# Patient Record
Sex: MALE | Race: WHITE | NOT HISPANIC OR LATINO | Employment: OTHER | URBAN - NONMETROPOLITAN AREA
[De-identification: names, ages, dates, MRNs, and addresses within clinical notes are randomized per-mention and may not be internally consistent; named-entity substitution may affect disease eponyms.]

---

## 2019-03-28 ENCOUNTER — HOSPITAL ENCOUNTER (INPATIENT)
Facility: HOSPITAL | Age: 64
LOS: 2 days | Discharge: HOME | End: 2019-03-30
Attending: EMERGENCY MEDICINE | Admitting: INTERNAL MEDICINE

## 2019-03-28 ENCOUNTER — APPOINTMENT (OUTPATIENT)
Dept: GENERAL RADIOLOGY | Facility: HOSPITAL | Age: 64
End: 2019-03-28

## 2019-03-28 ENCOUNTER — HOSPITAL ENCOUNTER (EMERGENCY)
Facility: HOSPITAL | Age: 64
End: 2019-03-28
Attending: INTERNAL MEDICINE | Admitting: INTERNAL MEDICINE

## 2019-03-28 VITALS — OXYGEN SATURATION: 100 %

## 2019-03-28 DIAGNOSIS — I21.3 ST ELEVATION MYOCARDIAL INFARCTION (STEMI), UNSPECIFIED ARTERY (HCC): Primary | ICD-10-CM

## 2019-03-28 PROBLEM — R73.9 HYPERGLYCEMIA: Status: ACTIVE | Noted: 2019-03-28

## 2019-03-28 PROBLEM — I10 ESSENTIAL HYPERTENSION: Chronic | Status: ACTIVE | Noted: 2019-03-28

## 2019-03-28 PROBLEM — Z72.0 TOBACCO ABUSE: Chronic | Status: ACTIVE | Noted: 2019-03-28

## 2019-03-28 LAB
ALBUMIN SERPL-MCNC: 4.2 G/DL (ref 3.5–5)
ALBUMIN/GLOB SERPL: 1.6 G/DL (ref 1.1–2.5)
ALP SERPL-CCNC: 85 U/L (ref 24–120)
ALT SERPL W P-5'-P-CCNC: <15 U/L (ref 0–54)
ANION GAP SERPL CALCULATED.3IONS-SCNC: 9 MMOL/L (ref 4–13)
AST SERPL-CCNC: 27 U/L (ref 7–45)
BASOPHILS # BLD AUTO: 0.07 10*3/MM3 (ref 0–0.2)
BASOPHILS NFR BLD AUTO: 0.5 % (ref 0–2)
BILIRUB SERPL-MCNC: 0.5 MG/DL (ref 0.1–1)
BUN BLD-MCNC: 17 MG/DL (ref 5–21)
BUN/CREAT SERPL: 18.1 (ref 7–25)
CALCIUM SPEC-SCNC: 8.9 MG/DL (ref 8.4–10.4)
CHLORIDE SERPL-SCNC: 103 MMOL/L (ref 98–110)
CO2 SERPL-SCNC: 25 MMOL/L (ref 24–31)
CREAT BLD-MCNC: 0.94 MG/DL (ref 0.5–1.4)
DEPRECATED RDW RBC AUTO: 39.1 FL (ref 40–54)
EOSINOPHIL # BLD AUTO: 0.09 10*3/MM3 (ref 0–0.7)
EOSINOPHIL NFR BLD AUTO: 0.6 % (ref 0–4)
ERYTHROCYTE [DISTWIDTH] IN BLOOD BY AUTOMATED COUNT: 12.2 % (ref 12–15)
GFR SERPL CREATININE-BSD FRML MDRD: 81 ML/MIN/1.73
GLOBULIN UR ELPH-MCNC: 2.7 GM/DL
GLUCOSE BLD-MCNC: 237 MG/DL (ref 70–100)
GLUCOSE BLDC GLUCOMTR-MCNC: 155 MG/DL (ref 70–130)
HCT VFR BLD AUTO: 47.4 % (ref 40–52)
HGB BLD-MCNC: 16.4 G/DL (ref 14–18)
HOLD SPECIMEN: NORMAL
HOLD SPECIMEN: NORMAL
IMM GRANULOCYTES # BLD AUTO: 0.07 10*3/MM3 (ref 0–0.05)
IMM GRANULOCYTES NFR BLD AUTO: 0.5 % (ref 0–5)
LYMPHOCYTES # BLD AUTO: 2.24 10*3/MM3 (ref 0.72–4.86)
LYMPHOCYTES NFR BLD AUTO: 16.1 % (ref 15–45)
MCH RBC QN AUTO: 30.3 PG (ref 28–32)
MCHC RBC AUTO-ENTMCNC: 34.6 G/DL (ref 33–36)
MCV RBC AUTO: 87.5 FL (ref 82–95)
MONOCYTES # BLD AUTO: 1.1 10*3/MM3 (ref 0.19–1.3)
MONOCYTES NFR BLD AUTO: 7.9 % (ref 4–12)
NEUTROPHILS # BLD AUTO: 10.35 10*3/MM3 (ref 1.87–8.4)
NEUTROPHILS NFR BLD AUTO: 74.4 % (ref 39–78)
NRBC BLD AUTO-RTO: 0 /100 WBC (ref 0–0)
PLATELET # BLD AUTO: 221 10*3/MM3 (ref 130–400)
PMV BLD AUTO: 10.2 FL (ref 6–12)
POTASSIUM BLD-SCNC: 4.7 MMOL/L (ref 3.5–5.3)
PROT SERPL-MCNC: 6.9 G/DL (ref 6.3–8.7)
RBC # BLD AUTO: 5.42 10*6/MM3 (ref 4.8–5.9)
SODIUM BLD-SCNC: 137 MMOL/L (ref 135–145)
TROPONIN I SERPL-MCNC: 0.07 NG/ML (ref 0–0.03)
TROPONIN I SERPL-MCNC: 122 NG/ML (ref 0–0.03)
TROPONIN I SERPL-MCNC: 33.8 NG/ML (ref 0–0.03)
TROPONIN I SERPL-MCNC: 95.9 NG/ML (ref 0–0.03)
WBC NRBC COR # BLD: 13.92 10*3/MM3 (ref 4.8–10.8)
WHOLE BLOOD HOLD SPECIMEN: NORMAL
WHOLE BLOOD HOLD SPECIMEN: NORMAL

## 2019-03-28 PROCEDURE — 027135Z DILATION OF CORONARY ARTERY, TWO ARTERIES WITH TWO DRUG-ELUTING INTRALUMINAL DEVICES, PERCUTANEOUS APPROACH: ICD-10-PCS | Performed by: INTERNAL MEDICINE

## 2019-03-28 PROCEDURE — C1894 INTRO/SHEATH, NON-LASER: HCPCS | Performed by: INTERNAL MEDICINE

## 2019-03-28 PROCEDURE — 84484 ASSAY OF TROPONIN QUANT: CPT | Performed by: EMERGENCY MEDICINE

## 2019-03-28 PROCEDURE — C1769 GUIDE WIRE: HCPCS | Performed by: INTERNAL MEDICINE

## 2019-03-28 PROCEDURE — 82962 GLUCOSE BLOOD TEST: CPT

## 2019-03-28 PROCEDURE — C1874 STENT, COATED/COV W/DEL SYS: HCPCS | Performed by: INTERNAL MEDICINE

## 2019-03-28 PROCEDURE — 93005 ELECTROCARDIOGRAM TRACING: CPT | Performed by: INTERNAL MEDICINE

## 2019-03-28 PROCEDURE — 99291 CRITICAL CARE FIRST HOUR: CPT

## 2019-03-28 PROCEDURE — 80053 COMPREHEN METABOLIC PANEL: CPT | Performed by: EMERGENCY MEDICINE

## 2019-03-28 PROCEDURE — 93010 ELECTROCARDIOGRAM REPORT: CPT | Performed by: INTERNAL MEDICINE

## 2019-03-28 PROCEDURE — L1830 KO IMMOB CANVAS LONG PRE OTS: HCPCS | Performed by: INTERNAL MEDICINE

## 2019-03-28 PROCEDURE — C1760 CLOSURE DEV, VASC: HCPCS | Performed by: INTERNAL MEDICINE

## 2019-03-28 PROCEDURE — 94799 UNLISTED PULMONARY SVC/PX: CPT

## 2019-03-28 PROCEDURE — 93005 ELECTROCARDIOGRAM TRACING: CPT

## 2019-03-28 PROCEDURE — 92928 PRQ TCAT PLMT NTRAC ST 1 LES: CPT | Performed by: INTERNAL MEDICINE

## 2019-03-28 PROCEDURE — 85025 COMPLETE CBC W/AUTO DIFF WBC: CPT | Performed by: EMERGENCY MEDICINE

## 2019-03-28 PROCEDURE — C1725 CATH, TRANSLUMIN NON-LASER: HCPCS | Performed by: INTERNAL MEDICINE

## 2019-03-28 PROCEDURE — C1887 CATHETER, GUIDING: HCPCS | Performed by: INTERNAL MEDICINE

## 2019-03-28 PROCEDURE — 25010000002 IOPAMIDOL 61 % SOLUTION: Performed by: INTERNAL MEDICINE

## 2019-03-28 PROCEDURE — 36415 COLL VENOUS BLD VENIPUNCTURE: CPT | Performed by: EMERGENCY MEDICINE

## 2019-03-28 PROCEDURE — 93458 L HRT ARTERY/VENTRICLE ANGIO: CPT | Performed by: INTERNAL MEDICINE

## 2019-03-28 PROCEDURE — 63710000001 INSULIN LISPRO (HUMAN) PER 5 UNITS: Performed by: INTERNAL MEDICINE

## 2019-03-28 PROCEDURE — B2111ZZ FLUOROSCOPY OF MULTIPLE CORONARY ARTERIES USING LOW OSMOLAR CONTRAST: ICD-10-PCS | Performed by: INTERNAL MEDICINE

## 2019-03-28 PROCEDURE — 92941 PRQ TRLML REVSC TOT OCCL AMI: CPT | Performed by: INTERNAL MEDICINE

## 2019-03-28 PROCEDURE — 99239 HOSP IP/OBS DSCHRG MGMT >30: CPT | Performed by: INTERNAL MEDICINE

## 2019-03-28 PROCEDURE — 71045 X-RAY EXAM CHEST 1 VIEW: CPT

## 2019-03-28 PROCEDURE — C9600 PERC DRUG-EL COR STENT SING: HCPCS | Performed by: INTERNAL MEDICINE

## 2019-03-28 PROCEDURE — 84484 ASSAY OF TROPONIN QUANT: CPT | Performed by: INTERNAL MEDICINE

## 2019-03-28 PROCEDURE — 4A023N7 MEASUREMENT OF CARDIAC SAMPLING AND PRESSURE, LEFT HEART, PERCUTANEOUS APPROACH: ICD-10-PCS | Performed by: INTERNAL MEDICINE

## 2019-03-28 PROCEDURE — B2151ZZ FLUOROSCOPY OF LEFT HEART USING LOW OSMOLAR CONTRAST: ICD-10-PCS | Performed by: INTERNAL MEDICINE

## 2019-03-28 PROCEDURE — 99152 MOD SED SAME PHYS/QHP 5/>YRS: CPT | Performed by: INTERNAL MEDICINE

## 2019-03-28 PROCEDURE — 93005 ELECTROCARDIOGRAM TRACING: CPT | Performed by: EMERGENCY MEDICINE

## 2019-03-28 DEVICE — XIENCE SIERRA™ EVEROLIMUS ELUTING CORONARY STENT SYSTEM 3.50 MM X 18 MM / RAPID-EXCHANGE
Type: IMPLANTABLE DEVICE | Status: FUNCTIONAL
Brand: XIENCE SIERRA™

## 2019-03-28 DEVICE — XIENCE SIERRA™ EVEROLIMUS ELUTING CORONARY STENT SYSTEM 3.00 MM X 18 MM / RAPID-EXCHANGE
Type: IMPLANTABLE DEVICE | Status: FUNCTIONAL
Brand: XIENCE SIERRA™

## 2019-03-28 DEVICE — XIENCE SIERRA™ EVEROLIMUS ELUTING CORONARY STENT SYSTEM 2.50 MM X 15 MM / RAPID-EXCHANGE
Type: IMPLANTABLE DEVICE | Status: FUNCTIONAL
Brand: XIENCE SIERRA™

## 2019-03-28 RX ORDER — CARVEDILOL 3.12 MG/1
3.12 TABLET ORAL EVERY 12 HOURS SCHEDULED
Status: DISCONTINUED | OUTPATIENT
Start: 2019-03-28 | End: 2019-03-30 | Stop reason: HOSPADM

## 2019-03-28 RX ORDER — ACETAMINOPHEN 325 MG/1
650 TABLET ORAL EVERY 6 HOURS PRN
Status: DISCONTINUED | OUTPATIENT
Start: 2019-03-28 | End: 2019-03-30 | Stop reason: HOSPADM

## 2019-03-28 RX ORDER — SODIUM CHLORIDE 0.9 % (FLUSH) 0.9 %
10 SYRINGE (ML) INJECTION AS NEEDED
Status: DISCONTINUED | OUTPATIENT
Start: 2019-03-28 | End: 2019-03-30 | Stop reason: HOSPADM

## 2019-03-28 RX ORDER — NICOTINE POLACRILEX 4 MG
15 LOZENGE BUCCAL
Status: DISCONTINUED | OUTPATIENT
Start: 2019-03-28 | End: 2019-03-30 | Stop reason: HOSPADM

## 2019-03-28 RX ORDER — ASPIRIN 81 MG/1
81 TABLET ORAL DAILY
Status: DISCONTINUED | OUTPATIENT
Start: 2019-03-28 | End: 2019-03-30 | Stop reason: HOSPADM

## 2019-03-28 RX ORDER — LIDOCAINE HYDROCHLORIDE 20 MG/ML
INJECTION, SOLUTION INFILTRATION; PERINEURAL AS NEEDED
Status: DISCONTINUED | OUTPATIENT
Start: 2019-03-28 | End: 2019-06-26

## 2019-03-28 RX ORDER — MIDAZOLAM HYDROCHLORIDE 1 MG/ML
INJECTION INTRAMUSCULAR; INTRAVENOUS AS NEEDED
Status: DISCONTINUED | OUTPATIENT
Start: 2019-03-28 | End: 2019-06-26

## 2019-03-28 RX ORDER — DEXTROSE MONOHYDRATE 25 G/50ML
25 INJECTION, SOLUTION INTRAVENOUS
Status: DISCONTINUED | OUTPATIENT
Start: 2019-03-28 | End: 2019-03-30 | Stop reason: HOSPADM

## 2019-03-28 RX ORDER — SODIUM CHLORIDE 0.9 % (FLUSH) 0.9 %
3-10 SYRINGE (ML) INJECTION AS NEEDED
Status: DISCONTINUED | OUTPATIENT
Start: 2019-03-28 | End: 2019-03-30 | Stop reason: HOSPADM

## 2019-03-28 RX ORDER — SODIUM CHLORIDE 0.9 % (FLUSH) 0.9 %
3 SYRINGE (ML) INJECTION EVERY 12 HOURS SCHEDULED
Status: DISCONTINUED | OUTPATIENT
Start: 2019-03-28 | End: 2019-03-30 | Stop reason: HOSPADM

## 2019-03-28 RX ORDER — ATORVASTATIN CALCIUM 40 MG/1
40 TABLET, FILM COATED ORAL NIGHTLY
Status: DISCONTINUED | OUTPATIENT
Start: 2019-03-28 | End: 2019-03-30 | Stop reason: HOSPADM

## 2019-03-28 RX ORDER — FENTANYL CITRATE 50 UG/ML
INJECTION, SOLUTION INTRAMUSCULAR; INTRAVENOUS AS NEEDED
Status: DISCONTINUED | OUTPATIENT
Start: 2019-03-28 | End: 2019-06-26

## 2019-03-28 RX ORDER — ONDANSETRON 2 MG/ML
4 INJECTION INTRAMUSCULAR; INTRAVENOUS EVERY 6 HOURS PRN
Status: DISCONTINUED | OUTPATIENT
Start: 2019-03-28 | End: 2019-03-30 | Stop reason: HOSPADM

## 2019-03-28 RX ADMIN — ATORVASTATIN CALCIUM 40 MG: 40 TABLET, FILM COATED ORAL at 20:14

## 2019-03-28 RX ADMIN — TICAGRELOR 90 MG: 90 TABLET ORAL at 20:14

## 2019-03-28 RX ADMIN — TICAGRELOR 90 MG: 90 TABLET ORAL at 09:38

## 2019-03-28 RX ADMIN — SODIUM CHLORIDE, PRESERVATIVE FREE 3 ML: 5 INJECTION INTRAVENOUS at 21:02

## 2019-03-28 RX ADMIN — CARVEDILOL 3.12 MG: 3.12 TABLET, FILM COATED ORAL at 09:39

## 2019-03-28 RX ADMIN — CARVEDILOL 3.12 MG: 3.12 TABLET, FILM COATED ORAL at 20:13

## 2019-03-28 RX ADMIN — INSULIN LISPRO 2 UNITS: 100 INJECTION, SOLUTION INTRAVENOUS; SUBCUTANEOUS at 20:14

## 2019-03-28 RX ADMIN — ASPIRIN 81 MG: 81 TABLET, DELAYED RELEASE ORAL at 09:38

## 2019-03-28 RX ADMIN — SODIUM CHLORIDE, PRESERVATIVE FREE 3 ML: 5 INJECTION INTRAVENOUS at 09:39

## 2019-03-29 ENCOUNTER — APPOINTMENT (OUTPATIENT)
Dept: CARDIOLOGY | Facility: HOSPITAL | Age: 64
End: 2019-03-29

## 2019-03-29 PROBLEM — E11.59 TYPE 2 DIABETES MELLITUS WITH CIRCULATORY DISORDER, WITHOUT LONG-TERM CURRENT USE OF INSULIN: Status: ACTIVE | Noted: 2019-03-28

## 2019-03-29 LAB
ANION GAP SERPL CALCULATED.3IONS-SCNC: 10 MMOL/L (ref 4–13)
ARTICHOKE IGE QN: 106 MG/DL (ref 0–99)
BH CV ECHO MEAS - AO MAX PG (FULL): 0.75 MMHG
BH CV ECHO MEAS - AO MAX PG: 4.5 MMHG
BH CV ECHO MEAS - AO MEAN PG (FULL): 1 MMHG
BH CV ECHO MEAS - AO MEAN PG: 3 MMHG
BH CV ECHO MEAS - AO ROOT AREA (BSA CORRECTED): 1.4
BH CV ECHO MEAS - AO ROOT AREA: 5.3 CM^2
BH CV ECHO MEAS - AO ROOT DIAM: 2.6 CM
BH CV ECHO MEAS - AO V2 MAX: 106 CM/SEC
BH CV ECHO MEAS - AO V2 MEAN: 76.8 CM/SEC
BH CV ECHO MEAS - AO V2 VTI: 23 CM
BH CV ECHO MEAS - AVA(I,A): 2.9 CM^2
BH CV ECHO MEAS - AVA(I,D): 2.9 CM^2
BH CV ECHO MEAS - AVA(V,A): 2.9 CM^2
BH CV ECHO MEAS - AVA(V,D): 2.9 CM^2
BH CV ECHO MEAS - BSA(HAYCOCK): 1.9 M^2
BH CV ECHO MEAS - BSA: 1.8 M^2
BH CV ECHO MEAS - BZI_BMI: 32.4 KILOGRAMS/M^2
BH CV ECHO MEAS - BZI_METRIC_HEIGHT: 157.5 CM
BH CV ECHO MEAS - BZI_METRIC_WEIGHT: 80.3 KG
BH CV ECHO MEAS - EDV(CUBED): 96.1 ML
BH CV ECHO MEAS - EDV(MOD-SP4): 88.8 ML
BH CV ECHO MEAS - EDV(TEICH): 96.3 ML
BH CV ECHO MEAS - EF(CUBED): 78.8 %
BH CV ECHO MEAS - EF(MOD-SP4): 64.9 %
BH CV ECHO MEAS - EF(TEICH): 71.2 %
BH CV ECHO MEAS - ESV(CUBED): 20.3 ML
BH CV ECHO MEAS - ESV(MOD-SP4): 31.2 ML
BH CV ECHO MEAS - ESV(TEICH): 27.8 ML
BH CV ECHO MEAS - FS: 40.4 %
BH CV ECHO MEAS - IVS/LVPW: 1.2
BH CV ECHO MEAS - IVSD: 1.2 CM
BH CV ECHO MEAS - LA DIMENSION: 3 CM
BH CV ECHO MEAS - LA/AO: 1.2
BH CV ECHO MEAS - LAT PEAK E' VEL: 6.1 CM/SEC
BH CV ECHO MEAS - LV DIASTOLIC VOL/BSA (35-75): 48.9 ML/M^2
BH CV ECHO MEAS - LV MASS(C)D: 174.5 GRAMS
BH CV ECHO MEAS - LV MASS(C)DI: 96.2 GRAMS/M^2
BH CV ECHO MEAS - LV MAX PG: 3.7 MMHG
BH CV ECHO MEAS - LV MEAN PG: 2 MMHG
BH CV ECHO MEAS - LV SYSTOLIC VOL/BSA (12-30): 17.2 ML/M^2
BH CV ECHO MEAS - LV V1 MAX: 96.8 CM/SEC
BH CV ECHO MEAS - LV V1 MEAN: 65.7 CM/SEC
BH CV ECHO MEAS - LV V1 VTI: 21 CM
BH CV ECHO MEAS - LVIDD: 4.6 CM
BH CV ECHO MEAS - LVIDS: 2.7 CM
BH CV ECHO MEAS - LVLD AP4: 8.7 CM
BH CV ECHO MEAS - LVLS AP4: 7 CM
BH CV ECHO MEAS - LVOT AREA (M): 3.1 CM^2
BH CV ECHO MEAS - LVOT AREA: 3.1 CM^2
BH CV ECHO MEAS - LVOT DIAM: 2 CM
BH CV ECHO MEAS - LVPWD: 0.99 CM
BH CV ECHO MEAS - MED PEAK E' VEL: 6.09 CM/SEC
BH CV ECHO MEAS - MV A MAX VEL: 63.5 CM/SEC
BH CV ECHO MEAS - MV DEC TIME: 0.25 SEC
BH CV ECHO MEAS - MV E MAX VEL: 110 CM/SEC
BH CV ECHO MEAS - MV E/A: 1.7
BH CV ECHO MEAS - PA MAX PG: 3.2 MMHG
BH CV ECHO MEAS - PA V2 MAX: 89.2 CM/SEC
BH CV ECHO MEAS - RAP SYSTOLE: 5 MMHG
BH CV ECHO MEAS - RVSP: 20.7 MMHG
BH CV ECHO MEAS - SI(AO): 67.3 ML/M^2
BH CV ECHO MEAS - SI(CUBED): 41.7 ML/M^2
BH CV ECHO MEAS - SI(LVOT): 36.4 ML/M^2
BH CV ECHO MEAS - SI(MOD-SP4): 31.7 ML/M^2
BH CV ECHO MEAS - SI(TEICH): 37.8 ML/M^2
BH CV ECHO MEAS - SV(AO): 122.1 ML
BH CV ECHO MEAS - SV(CUBED): 75.7 ML
BH CV ECHO MEAS - SV(LVOT): 66 ML
BH CV ECHO MEAS - SV(MOD-SP4): 57.6 ML
BH CV ECHO MEAS - SV(TEICH): 68.6 ML
BH CV ECHO MEAS - TR MAX VEL: 198 CM/SEC
BH CV ECHO MEASUREMENTS AVERAGE E/E' RATIO: 18.05
BUN BLD-MCNC: 16 MG/DL (ref 5–21)
BUN/CREAT SERPL: 19.5 (ref 7–25)
CALCIUM SPEC-SCNC: 8.8 MG/DL (ref 8.4–10.4)
CHLORIDE SERPL-SCNC: 102 MMOL/L (ref 98–110)
CHOLEST SERPL-MCNC: 151 MG/DL (ref 130–200)
CO2 SERPL-SCNC: 23 MMOL/L (ref 24–31)
CREAT BLD-MCNC: 0.82 MG/DL (ref 0.5–1.4)
DEPRECATED RDW RBC AUTO: 39.6 FL (ref 40–54)
ERYTHROCYTE [DISTWIDTH] IN BLOOD BY AUTOMATED COUNT: 12.4 % (ref 12–15)
GFR SERPL CREATININE-BSD FRML MDRD: 95 ML/MIN/1.73
GLUCOSE BLD-MCNC: 147 MG/DL (ref 70–100)
GLUCOSE BLDC GLUCOMTR-MCNC: 132 MG/DL (ref 70–130)
GLUCOSE BLDC GLUCOMTR-MCNC: 176 MG/DL (ref 70–130)
GLUCOSE BLDC GLUCOMTR-MCNC: 188 MG/DL (ref 70–130)
GLUCOSE BLDC GLUCOMTR-MCNC: 215 MG/DL (ref 70–130)
HBA1C MFR BLD: 7 %
HCT VFR BLD AUTO: 42.3 % (ref 40–52)
HDLC SERPL-MCNC: 42 MG/DL
HGB BLD-MCNC: 14.9 G/DL (ref 14–18)
LDLC/HDLC SERPL: 1.89 {RATIO}
LEFT ATRIUM VOLUME INDEX: 14.5 ML/M2
LEFT ATRIUM VOLUME: 26.3 CM3
LV EF 2D ECHO EST: 65 %
MAXIMAL PREDICTED HEART RATE: 158 BPM
MCH RBC QN AUTO: 30.7 PG (ref 28–32)
MCHC RBC AUTO-ENTMCNC: 35.2 G/DL (ref 33–36)
MCV RBC AUTO: 87 FL (ref 82–95)
PLATELET # BLD AUTO: 193 10*3/MM3 (ref 130–400)
PMV BLD AUTO: 10.8 FL (ref 6–12)
POTASSIUM BLD-SCNC: 4.1 MMOL/L (ref 3.5–5.3)
RBC # BLD AUTO: 4.86 10*6/MM3 (ref 4.8–5.9)
SODIUM BLD-SCNC: 135 MMOL/L (ref 135–145)
STRESS TARGET HR: 134 BPM
TRIGL SERPL-MCNC: 149 MG/DL (ref 0–149)
WBC NRBC COR # BLD: 13.42 10*3/MM3 (ref 4.8–10.8)

## 2019-03-29 PROCEDURE — 93010 ELECTROCARDIOGRAM REPORT: CPT | Performed by: INTERNAL MEDICINE

## 2019-03-29 PROCEDURE — 25010000002 PERFLUTREN 6.52 MG/ML SUSPENSION: Performed by: INTERNAL MEDICINE

## 2019-03-29 PROCEDURE — 83036 HEMOGLOBIN GLYCOSYLATED A1C: CPT | Performed by: INTERNAL MEDICINE

## 2019-03-29 PROCEDURE — 93306 TTE W/DOPPLER COMPLETE: CPT | Performed by: INTERNAL MEDICINE

## 2019-03-29 PROCEDURE — 93306 TTE W/DOPPLER COMPLETE: CPT

## 2019-03-29 PROCEDURE — 25010000002 ONDANSETRON PER 1 MG: Performed by: INTERNAL MEDICINE

## 2019-03-29 PROCEDURE — 94799 UNLISTED PULMONARY SVC/PX: CPT

## 2019-03-29 PROCEDURE — 82962 GLUCOSE BLOOD TEST: CPT

## 2019-03-29 PROCEDURE — 80048 BASIC METABOLIC PNL TOTAL CA: CPT | Performed by: INTERNAL MEDICINE

## 2019-03-29 PROCEDURE — 85027 COMPLETE CBC AUTOMATED: CPT | Performed by: INTERNAL MEDICINE

## 2019-03-29 PROCEDURE — 94760 N-INVAS EAR/PLS OXIMETRY 1: CPT

## 2019-03-29 PROCEDURE — 93005 ELECTROCARDIOGRAM TRACING: CPT | Performed by: INTERNAL MEDICINE

## 2019-03-29 PROCEDURE — 80061 LIPID PANEL: CPT | Performed by: INTERNAL MEDICINE

## 2019-03-29 PROCEDURE — 63710000001 INSULIN LISPRO (HUMAN) PER 5 UNITS: Performed by: INTERNAL MEDICINE

## 2019-03-29 PROCEDURE — 99232 SBSQ HOSP IP/OBS MODERATE 35: CPT | Performed by: INTERNAL MEDICINE

## 2019-03-29 RX ORDER — GLIPIZIDE 5 MG/1
5 TABLET ORAL
Status: DISCONTINUED | OUTPATIENT
Start: 2019-03-29 | End: 2019-03-30 | Stop reason: HOSPADM

## 2019-03-29 RX ADMIN — GLIPIZIDE 5 MG: 5 TABLET ORAL at 09:21

## 2019-03-29 RX ADMIN — INSULIN LISPRO 3 UNITS: 100 INJECTION, SOLUTION INTRAVENOUS; SUBCUTANEOUS at 17:46

## 2019-03-29 RX ADMIN — CARVEDILOL 3.12 MG: 3.12 TABLET, FILM COATED ORAL at 08:00

## 2019-03-29 RX ADMIN — SODIUM CHLORIDE, PRESERVATIVE FREE 3 ML: 5 INJECTION INTRAVENOUS at 08:01

## 2019-03-29 RX ADMIN — CARVEDILOL 3.12 MG: 3.12 TABLET, FILM COATED ORAL at 20:54

## 2019-03-29 RX ADMIN — INSULIN LISPRO 2 UNITS: 100 INJECTION, SOLUTION INTRAVENOUS; SUBCUTANEOUS at 11:12

## 2019-03-29 RX ADMIN — SODIUM CHLORIDE, PRESERVATIVE FREE 3 ML: 5 INJECTION INTRAVENOUS at 20:55

## 2019-03-29 RX ADMIN — ASPIRIN 81 MG: 81 TABLET, DELAYED RELEASE ORAL at 08:00

## 2019-03-29 RX ADMIN — ONDANSETRON HYDROCHLORIDE 4 MG: 2 INJECTION INTRAMUSCULAR; INTRAVENOUS at 09:25

## 2019-03-29 RX ADMIN — PERFLUTREN 9.78 MG: 6.52 INJECTION, SUSPENSION INTRAVENOUS at 07:54

## 2019-03-29 RX ADMIN — TICAGRELOR 90 MG: 90 TABLET ORAL at 20:54

## 2019-03-29 RX ADMIN — INSULIN LISPRO 2 UNITS: 100 INJECTION, SOLUTION INTRAVENOUS; SUBCUTANEOUS at 08:00

## 2019-03-29 RX ADMIN — ATORVASTATIN CALCIUM 40 MG: 40 TABLET, FILM COATED ORAL at 20:54

## 2019-03-29 RX ADMIN — TICAGRELOR 90 MG: 90 TABLET ORAL at 08:00

## 2019-03-30 VITALS
HEART RATE: 73 BPM | RESPIRATION RATE: 18 BRPM | BODY MASS INDEX: 35.51 KG/M2 | WEIGHT: 193 LBS | TEMPERATURE: 98 F | HEIGHT: 62 IN | SYSTOLIC BLOOD PRESSURE: 140 MMHG | DIASTOLIC BLOOD PRESSURE: 78 MMHG | OXYGEN SATURATION: 97 %

## 2019-03-30 LAB — GLUCOSE BLDC GLUCOMTR-MCNC: 153 MG/DL (ref 70–130)

## 2019-03-30 PROCEDURE — 82962 GLUCOSE BLOOD TEST: CPT

## 2019-03-30 PROCEDURE — 63710000001 INSULIN LISPRO (HUMAN) PER 5 UNITS: Performed by: INTERNAL MEDICINE

## 2019-03-30 RX ORDER — CARVEDILOL 3.12 MG/1
3.12 TABLET ORAL EVERY 12 HOURS SCHEDULED
Qty: 180 TABLET | Refills: 3 | Status: SHIPPED | OUTPATIENT
Start: 2019-03-30 | End: 2019-06-12 | Stop reason: SDUPTHER

## 2019-03-30 RX ORDER — ATORVASTATIN CALCIUM 40 MG/1
40 TABLET, FILM COATED ORAL NIGHTLY
Qty: 90 TABLET | Refills: 3 | Status: SHIPPED | OUTPATIENT
Start: 2019-03-30 | End: 2019-06-12 | Stop reason: SDUPTHER

## 2019-03-30 RX ORDER — ASPIRIN 81 MG/1
81 TABLET ORAL DAILY
Qty: 100 TABLET | Refills: 3 | Status: SHIPPED | OUTPATIENT
Start: 2019-03-31 | End: 2022-07-20

## 2019-03-30 RX ORDER — NITROGLYCERIN 0.4 MG/1
0.4 TABLET SUBLINGUAL
Status: DISCONTINUED | OUTPATIENT
Start: 2019-03-30 | End: 2019-03-30 | Stop reason: HOSPADM

## 2019-03-30 RX ORDER — LISINOPRIL 5 MG/1
5 TABLET ORAL
Qty: 90 TABLET | Refills: 3 | Status: SHIPPED | OUTPATIENT
Start: 2019-03-30 | End: 2019-06-12 | Stop reason: SDUPTHER

## 2019-03-30 RX ORDER — CLOPIDOGREL BISULFATE 75 MG/1
600 TABLET ORAL ONCE
Status: COMPLETED | OUTPATIENT
Start: 2019-03-30 | End: 2019-03-30

## 2019-03-30 RX ORDER — CLOPIDOGREL BISULFATE 75 MG/1
75 TABLET ORAL DAILY
Qty: 90 TABLET | Refills: 3 | Status: SHIPPED | OUTPATIENT
Start: 2019-03-31 | End: 2019-06-12 | Stop reason: SDUPTHER

## 2019-03-30 RX ORDER — CLOPIDOGREL BISULFATE 75 MG/1
75 TABLET ORAL DAILY
Status: DISCONTINUED | OUTPATIENT
Start: 2019-03-31 | End: 2019-03-30 | Stop reason: HOSPADM

## 2019-03-30 RX ORDER — NITROGLYCERIN 0.4 MG/1
0.4 TABLET SUBLINGUAL
Qty: 100 TABLET | Refills: 12 | Status: SHIPPED | OUTPATIENT
Start: 2019-03-30 | End: 2022-07-20 | Stop reason: SDUPTHER

## 2019-03-30 RX ORDER — LISINOPRIL 5 MG/1
5 TABLET ORAL
Status: DISCONTINUED | OUTPATIENT
Start: 2019-03-30 | End: 2019-03-30 | Stop reason: HOSPADM

## 2019-03-30 RX ADMIN — CARVEDILOL 3.12 MG: 3.12 TABLET, FILM COATED ORAL at 08:06

## 2019-03-30 RX ADMIN — CLOPIDOGREL 600 MG: 75 TABLET, FILM COATED ORAL at 10:25

## 2019-03-30 RX ADMIN — ASPIRIN 81 MG: 81 TABLET, DELAYED RELEASE ORAL at 08:06

## 2019-03-30 RX ADMIN — TICAGRELOR 90 MG: 90 TABLET ORAL at 08:06

## 2019-03-30 RX ADMIN — LISINOPRIL 5 MG: 5 TABLET ORAL at 10:25

## 2019-03-30 RX ADMIN — GLIPIZIDE 5 MG: 5 TABLET ORAL at 08:06

## 2019-03-30 RX ADMIN — INSULIN LISPRO 2 UNITS: 100 INJECTION, SOLUTION INTRAVENOUS; SUBCUTANEOUS at 08:06

## 2019-03-30 RX ADMIN — SODIUM CHLORIDE, PRESERVATIVE FREE 3 ML: 5 INJECTION INTRAVENOUS at 08:11

## 2019-03-31 ENCOUNTER — READMISSION MANAGEMENT (OUTPATIENT)
Dept: CALL CENTER | Facility: HOSPITAL | Age: 64
End: 2019-03-31

## 2019-03-31 NOTE — OUTREACH NOTE
Prep Survey      Responses   Facility patient discharged from?  [facility]   Is patient eligible?  Yes   Discharge diagnosis  STEMI, tobacco abuse, NIDDM II with circulatory disorder, essential HTN, s/p LHC with Stent JOSSIE , left ventriculography   Does the patient have one of the following disease processes/diagnoses(primary or secondary)?  Acute MI (STEMI,NSTEMI)   Does the patient have Home health ordered?  No   Is there a DME ordered?  No   Comments regarding appointments  Office will call.   Prep survey completed?  Yes          Sonal Juarez RN

## 2019-04-01 ENCOUNTER — READMISSION MANAGEMENT (OUTPATIENT)
Dept: CALL CENTER | Facility: HOSPITAL | Age: 64
End: 2019-04-01

## 2019-04-01 NOTE — OUTREACH NOTE
AMI Week 1 Survey      Responses   Facility patient discharged from?  [facility]   Does the patient have one of the following disease processes/diagnoses(primary or secondary)?  Acute MI (STEMI,NSTEMI)   Is there a successful TCM telephone encounter documented?  No   Week 1 attempt successful?  No   Unsuccessful attempts  Attempt 1          Sonal Davis RN

## 2019-04-03 ENCOUNTER — READMISSION MANAGEMENT (OUTPATIENT)
Dept: CALL CENTER | Facility: HOSPITAL | Age: 64
End: 2019-04-03

## 2019-04-03 NOTE — OUTREACH NOTE
AMI Week 1 Survey      Responses   Facility patient discharged from?  [facility]   Does the patient have one of the following disease processes/diagnoses(primary or secondary)?  Acute MI (STEMI,NSTEMI)   Is there a successful TCM telephone encounter documented?  No   Week 1 attempt successful?  No   Unsuccessful attempts  Attempt 2          Sonal Davis RN

## 2019-04-08 ENCOUNTER — READMISSION MANAGEMENT (OUTPATIENT)
Dept: CALL CENTER | Facility: HOSPITAL | Age: 64
End: 2019-04-08

## 2019-04-08 NOTE — OUTREACH NOTE
AMI Week 1 Survey      Responses   Facility patient discharged from?  [facility]   Does the patient have one of the following disease processes/diagnoses(primary or secondary)?  Acute MI (STEMI,NSTEMI)          Madalyn Denton RN

## 2019-04-08 NOTE — OUTREACH NOTE
AMI Week 2 Survey      Responses   Facility patient discharged from?  [facility]   Does the patient have one of the following disease processes/diagnoses(primary or secondary)?  Acute MI (STEMI,NSTEMI)   Week 2 attempt successful?  No   Unsuccessful attempts  Attempt 1          Madalyn Denton RN

## 2019-04-09 ENCOUNTER — READMISSION MANAGEMENT (OUTPATIENT)
Dept: CALL CENTER | Facility: HOSPITAL | Age: 64
End: 2019-04-09

## 2019-04-09 NOTE — OUTREACH NOTE
AMI Week 2 Survey      Responses   Facility patient discharged from?  [facility]   Does the patient have one of the following disease processes/diagnoses(primary or secondary)?  Acute MI (STEMI,NSTEMI)   Week 2 attempt successful?  No   Unsuccessful attempts  Attempt 2          Jonathan Bustamante RN

## 2019-04-15 ENCOUNTER — OFFICE VISIT (OUTPATIENT)
Dept: CARDIOLOGY | Facility: CLINIC | Age: 64
End: 2019-04-15

## 2019-04-15 ENCOUNTER — APPOINTMENT (OUTPATIENT)
Dept: LAB | Facility: HOSPITAL | Age: 64
End: 2019-04-15

## 2019-04-15 VITALS
DIASTOLIC BLOOD PRESSURE: 78 MMHG | WEIGHT: 195 LBS | HEIGHT: 62 IN | BODY MASS INDEX: 35.88 KG/M2 | HEART RATE: 66 BPM | SYSTOLIC BLOOD PRESSURE: 128 MMHG

## 2019-04-15 DIAGNOSIS — Z79.02 VISIT FOR MONITORING PLAVIX THERAPY: ICD-10-CM

## 2019-04-15 DIAGNOSIS — E78.2 MIXED HYPERLIPIDEMIA: ICD-10-CM

## 2019-04-15 DIAGNOSIS — I10 ESSENTIAL HYPERTENSION: Chronic | ICD-10-CM

## 2019-04-15 DIAGNOSIS — Z51.81 VISIT FOR MONITORING PLAVIX THERAPY: ICD-10-CM

## 2019-04-15 DIAGNOSIS — I25.10 CORONARY ARTERY DISEASE INVOLVING NATIVE CORONARY ARTERY OF NATIVE HEART WITHOUT ANGINA PECTORIS: Primary | ICD-10-CM

## 2019-04-15 DIAGNOSIS — I21.11 ST ELEVATION MYOCARDIAL INFARCTION INVOLVING RIGHT CORONARY ARTERY (HCC): ICD-10-CM

## 2019-04-15 DIAGNOSIS — Z72.0 TOBACCO ABUSE: Chronic | ICD-10-CM

## 2019-04-15 LAB
HCT VFR BLD AUTO: 46.3 % (ref 40–52)
PA ADP PRP-ACNC: 133 PRU
PLATELET # BLD AUTO: 255 10*3/MM3 (ref 130–400)

## 2019-04-15 PROCEDURE — 99214 OFFICE O/P EST MOD 30 MIN: CPT | Performed by: NURSE PRACTITIONER

## 2019-04-15 PROCEDURE — 93000 ELECTROCARDIOGRAM COMPLETE: CPT | Performed by: NURSE PRACTITIONER

## 2019-04-15 PROCEDURE — 36415 COLL VENOUS BLD VENIPUNCTURE: CPT

## 2019-04-15 PROCEDURE — 85576 BLOOD PLATELET AGGREGATION: CPT | Performed by: NURSE PRACTITIONER

## 2019-04-15 NOTE — PROGRESS NOTES
"    Subjective:     Encounter Date:04/15/2019      Patient ID: Anival Pennington is a 63 y.o. male.    Chief Complaint:  The patient reports he is feeling fair overall. He admits some fatigue. He states he had some brief chest discomfort 2 days after discharge and he denies any further since that time. He describes the pain as right sided and under his left arm and reports it lasted several seconds and felt like \"a TENS unit.\" It was non exertional and not similar to his previous angina or MI. He denies shortness of breath, edema, palpitations, orthopnea, PND, syncope, or pre syncope. He reports compliance with his medications. He states he is no longer following with Dr. Chen and plans to establish with a new PCP. He is awaiting a return phone call to begin cardiac rehab in [place], [place].         The following portions of the patient's history were reviewed and updated as appropriate: allergies, current medications, past family history, past medical history, past social history, past surgical history and problem list.  /78   Pulse 66   Ht 157.5 cm (62\")   Wt 88.5 kg (195 lb)   BMI 35.67 kg/m²   Allergies   Allergen Reactions   • Bee Venom Unknown (See Comments)     UNKNOWN        Current Outpatient Medications:   •  aspirin 81 MG EC tablet, Take 1 tablet by mouth Daily., Disp: 100 tablet, Rfl: 3  •  atorvastatin (LIPITOR) 40 MG tablet, Take 1 tablet by mouth Every Night., Disp: 90 tablet, Rfl: 3  •  carvedilol (COREG) 3.125 MG tablet, Take 1 tablet by mouth Every 12 (Twelve) Hours., Disp: 180 tablet, Rfl: 3  •  clopidogrel (PLAVIX) 75 MG tablet, Take 1 tablet by mouth Daily., Disp: 90 tablet, Rfl: 3  •  lisinopril (PRINIVIL,ZESTRIL) 5 MG tablet, Take 1 tablet by mouth Daily., Disp: 90 tablet, Rfl: 3  •  nitroglycerin (NITROSTAT) 0.4 MG SL tablet, Place 1 tablet under the tongue Every 5 (Five) Minutes As Needed for Chest Pain. Take no more than 3 doses in 15 minutes., Disp: 100 tablet, Rfl: 12  No current " facility-administered medications for this visit.     Facility-Administered Medications Ordered in Other Visits:   •  bivalirudin (ANGIOMAX) bolus from bag, , , PRN, Vincent Pan MD, 60.525 mg at 03/28/19 0640  •  fentaNYL Citrate (PF) (SUBLIMAZE) injection, , , PRN, Vincent Pan MD, 25 mcg at 03/28/19 0640  •  heparin infusion 2 units/mL in 0.9% NaCl, , , Continuous PRN, Vincent Pan MD, 1,000 mL at 03/28/19 0626  •  iopamidol (ISOVUE-300) 61 % injection, , , PRN, Vincent Pan MD, 155 mL at 03/28/19 0721  •  lidocaine (XYLOCAINE) 2% injection, , , PRN, Vincent Pan MD, 10 mL at 03/28/19 0632  •  midazolam (VERSED) injection, , , PRN, Vincent Pan MD, 1 mg at 03/28/19 0640  Past Medical History:   Diagnosis Date   • Cancer (CMS/[facility])     leukemia   • Coronary artery disease    • Diabetes (CMS/[facility])     Diet controlled   • Hyperlipidemia    • Hypertension    • Tobacco abuse      Past Surgical History:   Procedure Laterality Date   • CARDIAC CATHETERIZATION N/A 3/28/2019    Procedure: Left Heart Cath;  Surgeon: Vincent Pan MD;  Location:  [facility]ATH [facility];  Service: Cardiovascular   • CARDIAC CATHETERIZATION Bilateral 3/28/2019    Procedure: Stent JOSSIE coronary;  Surgeon: Vincent Pan MD;  Location:  [facility] CATH [facility];  Service: Cardiovascular   • CARDIAC CATHETERIZATION N/A 3/28/2019    Procedure: Left ventriculography;  Surgeon: Vincent Pan MD;  Location:  [facility] CATH [facility];  Service: Cardiovascular   • CORONARY STENT PLACEMENT     • TONSILLECTOMY       Social History     Socioeconomic History   • Marital status:      Spouse name: Not on file   • Number of children: Not on file   • Years of education: Not on file   • Highest education level: Not on file   Tobacco Use   • Smoking status: Current Every Day Smoker     Packs/day: 1.00     Years: 50.00     Pack years: 50.00   • Smokeless tobacco: Never Used   Substance and Sexual Activity   • Alcohol use: No      Frequency: Never   • Drug use: No   • Sexual activity: Defer     Family History   Problem Relation Age of Onset   • Valvular heart disease Mother    • Heart disease Brother        Review of Systems   Constitution: Positive for malaise/fatigue. Negative for chills, diaphoresis, fever and weakness.   HENT: Negative for nosebleeds.    Eyes: Negative for visual disturbance.   Cardiovascular: Positive for chest pain (atypical ). Negative for claudication, cyanosis, dyspnea on exertion, irregular heartbeat, leg swelling, near-syncope, orthopnea, palpitations, paroxysmal nocturnal dyspnea and syncope.   Respiratory: Negative for cough, hemoptysis, shortness of breath, sputum production and wheezing.    Hematologic/Lymphatic: Negative for bleeding problem.   Skin: Negative for color change and flushing.   Musculoskeletal: Negative for falls.   Gastrointestinal: Negative for bloating, abdominal pain, hematemesis, hematochezia, melena, nausea and vomiting.   Genitourinary: Negative for hematuria.   Neurological: Negative for dizziness and light-headedness.   Psychiatric/Behavioral: Negative for altered mental status.         ECG 12 Lead  Date/Time: 4/15/2019 2:20 PM  Performed by: Kianna De León APRN  Authorized by: Kianna De León APRN   Comparison: compared with previous ECG from 3/29/2019  Similar to previous ECG  Comparison to previous ECG: T wave inversions III and AVF   Rhythm: sinus rhythm               Objective:     Physical Exam   Constitutional: He is oriented to person, place, and time. He appears well-developed and well-nourished. No distress.   HENT:   Head: Normocephalic and atraumatic.   Eyes: Pupils are equal, round, and reactive to light.   Neck: Normal range of motion. Neck supple. No JVD present. No thyromegaly present.   Cardiovascular: Normal rate, regular rhythm, normal heart sounds and intact distal pulses. Exam reveals no gallop and no friction rub.   No murmur heard.  Pulmonary/Chest: Effort normal and  breath sounds normal. No respiratory distress. He has no wheezes. He has no rales. He exhibits no tenderness.   Abdominal: Soft. Bowel sounds are normal. He exhibits no distension. There is no tenderness.   Musculoskeletal: Normal range of motion. He exhibits no edema.   Neurological: He is alert and oriented to person, place, and time. No cranial nerve deficit.   Skin: Skin is warm and dry. He is not diaphoretic.   Psychiatric: He has a normal mood and affect. His behavior is normal.       Lab Review:       Assessment:          Diagnosis Plan   1. Coronary artery disease involving native coronary artery of native heart without angina pectoris    Stable, no angina   3/2019 STEMI s/p PCI with JOSSIE to RCA x 2 and JOSSIE to LCX x 1, 3/28/2019  Normal LVEF by echo and ventriculogram     2. Visit for monitoring Plavix therapy    P2Y12 Platelet Inhibition  Pt was discharged home on plavix rather than Brilinta due to cost; check PPI      3. ST elevation myocardial infarction involving right coronary artery (CMS/HCC)    S/p PCI with JOSSIE to RCA x 2 and JOSSIE to LCX x 1, 3/28/2019     4. Essential hypertension    Controlled    5. Tobacco abuse    I advised Anival of the risks of continuing to use tobacco, and I provided him with tobacco cessation educational materials in the After Visit Summary.     During this visit, I spent 4 minutes counseling the patient regarding tobacco cessation.     6. Mixed hyperlipidemia  , continue high intensity statin and recheck 3-6 mo      7. Diabetes mellitus type II - establish with PCP      Plan:       As noted above  Check PPI - pending this result, the pt may need to be switched from plavix to Brilinta if this can be covered by insurance and the patient is agreeable  Referred for cardiac rehab   Establish with PCP ASAP  Continue current medical therapy as listed above  Follow up 2 months, sooner with symptoms or concerns

## 2019-04-17 ENCOUNTER — TELEPHONE (OUTPATIENT)
Dept: CARDIOLOGY | Facility: CLINIC | Age: 64
End: 2019-04-17

## 2019-04-30 ENCOUNTER — TELEPHONE (OUTPATIENT)
Dept: CARDIOLOGY | Facility: CLINIC | Age: 64
End: 2019-04-30

## 2019-05-01 NOTE — TELEPHONE ENCOUNTER
I tried to call pt to let him know but he is not excepting calls at this time.  I will try again later.  Neal Vaughan, CMA

## 2019-05-06 NOTE — TELEPHONE ENCOUNTER
I tried to call pt again today and his phone is still not accepting calls.  I called the alt number and left a msg with his wife on a  msg to have pt call me back.  Neal Vaughan, CMA

## 2019-05-10 NOTE — TELEPHONE ENCOUNTER
Mailed pt a letter regarding this msg.  I could never get a hold of him by phone.  Neal Vaughan, CMA

## 2019-05-20 ENCOUNTER — DOCUMENTATION (OUTPATIENT)
Dept: CARDIOLOGY | Facility: CLINIC | Age: 64
End: 2019-05-20

## 2019-06-12 ENCOUNTER — OFFICE VISIT (OUTPATIENT)
Dept: CARDIOLOGY | Facility: CLINIC | Age: 64
End: 2019-06-12

## 2019-06-12 VITALS
HEIGHT: 62 IN | SYSTOLIC BLOOD PRESSURE: 128 MMHG | BODY MASS INDEX: 36.25 KG/M2 | DIASTOLIC BLOOD PRESSURE: 76 MMHG | HEART RATE: 75 BPM | OXYGEN SATURATION: 98 % | WEIGHT: 197 LBS

## 2019-06-12 DIAGNOSIS — I25.118 CORONARY ARTERY DISEASE OF NATIVE ARTERY OF NATIVE HEART WITH STABLE ANGINA PECTORIS (HCC): Primary | ICD-10-CM

## 2019-06-12 PROCEDURE — 99214 OFFICE O/P EST MOD 30 MIN: CPT | Performed by: NURSE PRACTITIONER

## 2019-06-12 PROCEDURE — 93000 ELECTROCARDIOGRAM COMPLETE: CPT | Performed by: NURSE PRACTITIONER

## 2019-06-12 RX ORDER — LISINOPRIL 5 MG/1
5 TABLET ORAL
Qty: 90 TABLET | Refills: 3 | Status: SHIPPED | OUTPATIENT
Start: 2019-06-12 | End: 2022-07-20

## 2019-06-12 RX ORDER — ATORVASTATIN CALCIUM 40 MG/1
40 TABLET, FILM COATED ORAL NIGHTLY
Qty: 90 TABLET | Refills: 3 | Status: SHIPPED | OUTPATIENT
Start: 2019-06-12 | End: 2022-07-20

## 2019-06-12 RX ORDER — CLOPIDOGREL BISULFATE 75 MG/1
75 TABLET ORAL DAILY
Qty: 90 TABLET | Refills: 3 | Status: SHIPPED | OUTPATIENT
Start: 2019-06-12 | End: 2022-07-20

## 2019-06-12 RX ORDER — ISOSORBIDE MONONITRATE 30 MG/1
30 TABLET, EXTENDED RELEASE ORAL DAILY
Qty: 90 TABLET | Refills: 3 | Status: SHIPPED | OUTPATIENT
Start: 2019-06-12 | End: 2022-07-20

## 2019-06-12 RX ORDER — CARVEDILOL 3.12 MG/1
3.12 TABLET ORAL EVERY 12 HOURS SCHEDULED
Qty: 180 TABLET | Refills: 3 | Status: SHIPPED | OUTPATIENT
Start: 2019-06-12 | End: 2022-07-20

## 2019-06-12 NOTE — PROGRESS NOTES
"    Subjective:     Encounter Date:06/12/2019      Patient ID: Anival Pennington is a 63 y.o. male.    Chief Complaint: cad, chest pain, fatigue, dizziness   The patient reports he is feeling fair overall. He admits continued fatigue. He reports he has been having 2 different types of chest pain. He has right sided musculoskeletal pain which is constant and worsened by palpation and when he wears a seatbelt and it rubs. He also reports exertional epigastric chest pressure over the past 6 weeks. This is relieved with rest and typically will last 1-4 minutes. It has been occurring nearly daily. It is not similar to his previous angina or MI. He is attending cardiac rehab in [place], [place] and he reports he is doing well with this, although he does have intermittent dizziness after exercise. He denies edema, shortness of breath, palpitations, orthopnea, PND or syncope. He continues to smoke. He admits he is non compliant with his statin because he cannot remember to take it at night. He also often misses his nighttime dose of coreg for this reason. He reports good blood pressure control. He has not established with a new PCP as instructed last office visit.         The following portions of the patient's history were reviewed and updated as appropriate: allergies, current medications, past family history, past medical history, past social history, past surgical history and problem list.  /76 (BP Location: Left arm, Patient Position: Sitting, Cuff Size: Adult)   Pulse 75   Ht 157.5 cm (62.01\")   Wt 89.4 kg (197 lb)   SpO2 98%   BMI 36.02 kg/m²   Allergies   Allergen Reactions   • Bee Venom Unknown (See Comments)     UNKNOWN        Current Outpatient Medications:   •  aspirin 81 MG EC tablet, Take 1 tablet by mouth Daily., Disp: 100 tablet, Rfl: 3  •  atorvastatin (LIPITOR) 40 MG tablet, Take 1 tablet by mouth Every Night., Disp: 90 tablet, Rfl: 3  •  carvedilol (COREG) 3.125 MG tablet, Take 1 tablet by mouth Every 12 " (Twelve) Hours., Disp: 180 tablet, Rfl: 3  •  clopidogrel (PLAVIX) 75 MG tablet, Take 1 tablet by mouth Daily., Disp: 90 tablet, Rfl: 3  •  lisinopril (PRINIVIL,ZESTRIL) 5 MG tablet, Take 1 tablet by mouth Daily., Disp: 90 tablet, Rfl: 3  •  nitroglycerin (NITROSTAT) 0.4 MG SL tablet, Place 1 tablet under the tongue Every 5 (Five) Minutes As Needed for Chest Pain. Take no more than 3 doses in 15 minutes., Disp: 100 tablet, Rfl: 12  •  isosorbide mononitrate (IMDUR) 30 MG 24 hr tablet, Take 1 tablet by mouth Daily., Disp: 90 tablet, Rfl: 3  No current facility-administered medications for this visit.     Facility-Administered Medications Ordered in Other Visits:   •  bivalirudin (ANGIOMAX) bolus from bag, , , PRN, Vincent Pan MD, 60.525 mg at 03/28/19 0640  •  fentaNYL Citrate (PF) (SUBLIMAZE) injection, , , PRN, Vincent Pan MD, 25 mcg at 03/28/19 0640  •  heparin infusion 2 units/mL in 0.9% NaCl, , , Continuous PRN, Vincent Pan MD, 1,000 mL at 03/28/19 0626  •  iopamidol (ISOVUE-300) 61 % injection, , , PRN, Vincent Pan MD, 155 mL at 03/28/19 0721  •  lidocaine (XYLOCAINE) 2% injection, , , PRN, Vincent Pan MD, 10 mL at 03/28/19 0632  •  midazolam (VERSED) injection, , , PRN, Vincent Pan MD, 1 mg at 03/28/19 0640  Past Medical History:   Diagnosis Date   • Cancer (CMS/[facility])     leukemia   • Coronary artery disease    • Diabetes (CMS/[facility])     Diet controlled   • Hyperlipidemia    • Hypertension    • Tobacco abuse      Past Surgical History:   Procedure Laterality Date   • CARDIAC CATHETERIZATION N/A 3/28/2019    Procedure: Left Heart Cath;  Surgeon: Vincent Pan MD;  Location:  [facility];  Service: Cardiovascular   • CARDIAC CATHETERIZATION Bilateral 3/28/2019    Procedure: Stent JOSSIE coronary;  Surgeon: Vincent Pan MD;  Location: [facility];  Service: Cardiovascular   • CARDIAC CATHETERIZATION N/A 3/28/2019    Procedure: Left ventriculography;  Surgeon:  Vincent Pan MD;  Location:  [facility];  Service: Cardiovascular   • CORONARY STENT PLACEMENT     • TONSILLECTOMY       Social History     Socioeconomic History   • Marital status:      Spouse name: Not on file   • Number of children: Not on file   • Years of education: Not on file   • Highest education level: Not on file   Tobacco Use   • Smoking status: Current Every Day Smoker     Packs/day: 1.00     Years: 50.00     Pack years: 50.00   • Smokeless tobacco: Never Used   Substance and Sexual Activity   • Alcohol use: No     Frequency: Never   • Drug use: No   • Sexual activity: Defer     Family History   Problem Relation Age of Onset   • Valvular heart disease Mother    • Heart disease Brother        Review of Systems   Constitution: Positive for malaise/fatigue. Negative for chills, diaphoresis, fever and weakness.   HENT: Negative for nosebleeds.    Eyes: Negative for visual disturbance.   Cardiovascular: Positive for chest pain. Negative for claudication, cyanosis, dyspnea on exertion, irregular heartbeat, leg swelling, near-syncope, orthopnea, palpitations, paroxysmal nocturnal dyspnea and syncope.   Respiratory: Negative for cough, hemoptysis, shortness of breath, sputum production and wheezing.    Hematologic/Lymphatic: Negative for bleeding problem.   Skin: Negative for color change and flushing.   Musculoskeletal: Negative for falls.   Gastrointestinal: Negative for bloating, abdominal pain, hematemesis, hematochezia, melena, nausea and vomiting.   Genitourinary: Negative for hematuria.   Neurological: Positive for dizziness. Negative for light-headedness.   Psychiatric/Behavioral: Negative for altered mental status.         ECG 12 Lead  Date/Time: 6/12/2019 2:29 PM  Performed by: Kianna De León APRN  Authorized by: Kianna De León APRN   Comparison: compared with previous ECG from 4/15/2019  Similar to previous ECG  Comparison to previous ECG: Inferior Q waves, t wave inversions  III and AVF - not significantly changed from previous   Rhythm: sinus rhythm  Conduction: right bundle branch block               Objective:     Physical Exam   Constitutional: He is oriented to person, place, and time. He appears well-developed and well-nourished. No distress.   HENT:   Head: Normocephalic and atraumatic.   Eyes: Pupils are equal, round, and reactive to light.   Neck: Normal range of motion. Neck supple. No JVD present. No thyromegaly present.   Cardiovascular: Normal rate, regular rhythm, normal heart sounds and intact distal pulses. Exam reveals no gallop and no friction rub.   No murmur heard.  Pulmonary/Chest: Effort normal and breath sounds normal. No respiratory distress. He has no wheezes. He has no rales. He exhibits no tenderness.   Abdominal: Soft. Bowel sounds are normal. He exhibits no distension. There is no tenderness.   Musculoskeletal: Normal range of motion. He exhibits no edema.   Neurological: He is alert and oriented to person, place, and time. No cranial nerve deficit.   Skin: Skin is warm and dry. He is not diaphoretic.   Psychiatric: He has a normal mood and affect. His behavior is normal.       Lab Review:       Assessment:          Diagnosis Plan   1. Coronary artery disease involving native coronary artery of native heart without angina pectoris    See notes in HPI regarding chest pain - some typical and atypical features- add imdur   3/2019 STEMI s/p PCI with JOSSIE to RCA x 2 and JOSSIE to LCX x 1, 3/28/2019  Normal LVEF by echo and ventriculogram     2. Visit for monitoring Plavix therapy    PPI was 133, continue Plavix      3. ST elevation myocardial infarction involving right coronary artery (CMS/HCC)    S/p PCI with JOSSIE to RCA x 2 and JOSSIE to LCX x 1, 3/28/2019     4. Essential hypertension    Controlled    5. Tobacco abuse    I advised Anival of the risks of continuing to use tobacco, and I provided him with tobacco cessation educational materials in the After Visit  Summary.     During this visit, I spent 4 minutes counseling the patient regarding tobacco cessation.    He does not express interest in quitting. He refuses Chantix due to cost, nor is he interested in using nicotine patches.      6. Mixed hyperlipidemia  - he has been non compliant with his statin -counseled on the importance of compliance - he reports he will get back on it today      7. Diabetes mellitus type II - establish with PCP ASAP - counseled on importance of following with PCP    8. Medical non compliance     9. Dizziness - reviewed orthostatic precautions, encouraged hydration      Plan:       As noted above  Add imdur 30 mg daily   Will reassess symptoms at follow up and consider stress echo at that time if warranted  Continue cardiac rehab   Establish with PCP ASAP  Continue current medical therapy as listed above  Reviewed s/s of ACS and when to call EMS/report to ER. He voices understanding of instructions.   Follow up 2 weeks, sooner with symptoms or concerns

## 2019-06-26 ENCOUNTER — TELEPHONE (OUTPATIENT)
Dept: CARDIOLOGY | Facility: CLINIC | Age: 64
End: 2019-06-26

## 2019-06-26 ENCOUNTER — OFFICE VISIT (OUTPATIENT)
Dept: CARDIOLOGY | Facility: CLINIC | Age: 64
End: 2019-06-26

## 2019-06-26 VITALS
HEIGHT: 62 IN | SYSTOLIC BLOOD PRESSURE: 125 MMHG | DIASTOLIC BLOOD PRESSURE: 75 MMHG | BODY MASS INDEX: 36.25 KG/M2 | OXYGEN SATURATION: 98 % | HEART RATE: 73 BPM | WEIGHT: 197 LBS | RESPIRATION RATE: 18 BRPM

## 2019-06-26 DIAGNOSIS — E78.2 MIXED HYPERLIPIDEMIA: ICD-10-CM

## 2019-06-26 DIAGNOSIS — I10 ESSENTIAL HYPERTENSION: Chronic | ICD-10-CM

## 2019-06-26 DIAGNOSIS — E66.01 CLASS 2 SEVERE OBESITY DUE TO EXCESS CALORIES WITH SERIOUS COMORBIDITY AND BODY MASS INDEX (BMI) OF 36.0 TO 36.9 IN ADULT (HCC): ICD-10-CM

## 2019-06-26 DIAGNOSIS — I25.10 CORONARY ARTERY DISEASE INVOLVING NATIVE CORONARY ARTERY OF NATIVE HEART WITHOUT ANGINA PECTORIS: Primary | ICD-10-CM

## 2019-06-26 DIAGNOSIS — E11.59 TYPE 2 DIABETES MELLITUS WITH OTHER CIRCULATORY COMPLICATION, WITHOUT LONG-TERM CURRENT USE OF INSULIN (HCC): ICD-10-CM

## 2019-06-26 DIAGNOSIS — Z72.0 TOBACCO ABUSE: Chronic | ICD-10-CM

## 2019-06-26 DIAGNOSIS — I21.11 ST ELEVATION MYOCARDIAL INFARCTION INVOLVING RIGHT CORONARY ARTERY (HCC): ICD-10-CM

## 2019-06-26 PROBLEM — E66.812 CLASS 2 SEVERE OBESITY DUE TO EXCESS CALORIES WITH SERIOUS COMORBIDITY AND BODY MASS INDEX (BMI) OF 36.0 TO 36.9 IN ADULT: Status: ACTIVE | Noted: 2019-06-26

## 2019-06-26 PROCEDURE — 93000 ELECTROCARDIOGRAM COMPLETE: CPT | Performed by: NURSE PRACTITIONER

## 2019-06-26 PROCEDURE — 99214 OFFICE O/P EST MOD 30 MIN: CPT | Performed by: NURSE PRACTITIONER

## 2019-06-26 NOTE — TELEPHONE ENCOUNTER
----- Message from CLAUDIO Sheikh sent at 6/26/2019  8:37 AM CDT -----  Regarding: labs  Please obtain recent lab results from PCP office and scan to chart.    Thank    Kayleigh

## 2019-06-26 NOTE — PATIENT INSTRUCTIONS
Steps to Quit Smoking  Smoking tobacco can be harmful to your health and can affect almost every organ in your body. Smoking puts you, and those around you, at risk for developing many serious chronic diseases. Quitting smoking is difficult, but it is one of the best things that you can do for your health. It is never too late to quit.  What are the benefits of quitting smoking?  When you quit smoking, you lower your risk of developing serious diseases and conditions, such as:  · Lung cancer or lung disease, such as COPD.  · Heart disease.  · Stroke.  · Heart attack.  · Infertility.  · Osteoporosis and bone fractures.    Additionally, symptoms such as coughing, wheezing, and shortness of breath may get better when you quit. You may also find that you get sick less often because your body is stronger at fighting off colds and infections. If you are pregnant, quitting smoking can help to reduce your chances of having a baby of low birth weight.  How do I get ready to quit?  When you decide to quit smoking, create a plan to make sure that you are successful. Before you quit:  · Pick a date to quit. Set a date within the next two weeks to give you time to prepare.  · Write down the reasons why you are quitting. Keep this list in places where you will see it often, such as on your bathroom mirror or in your car or wallet.  · Identify the people, places, things, and activities that make you want to smoke (triggers) and avoid them. Make sure to take these actions:  ? Throw away all cigarettes at home, at work, and in your car.  ? Throw away smoking accessories, such as ashtrays and lighters.  ? Clean your car and make sure to empty the ashtray.  ? Clean your home, including curtains and carpets.  · Tell your family, friends, and coworkers that you are quitting. Support from your loved ones can make quitting easier.  · Talk with your health care provider about your options for quitting smoking.  · Find out what treatment  options are covered by your health insurance.    What strategies can I use to quit smoking?  Talk with your healthcare provider about different strategies to quit smoking. Some strategies include:  · Quitting smoking altogether instead of gradually lessening how much you smoke over a period of time. Research shows that quitting “cold turkey” is more successful than gradually quitting.  · Attending in-person counseling to help you build problem-solving skills. You are more likely to have success in quitting if you attend several counseling sessions. Even short sessions of 10 minutes can be effective.  · Finding resources and support systems that can help you to quit smoking and remain smoke-free after you quit. These resources are most helpful when you use them often. They can include:  ? Online chats with a counselor.  ? Telephone quitlines.  ? Printed self-help materials.  ? Support groups or group counseling.  ? Text messaging programs.  ? Mobile phone applications.  · Taking medicines to help you quit smoking. (If you are pregnant or breastfeeding, talk with your health care provider first.) Some medicines contain nicotine and some do not. Both types of medicines help with cravings, but the medicines that include nicotine help to relieve withdrawal symptoms. Your health care provider may recommend:  ? Nicotine patches, gum, or lozenges.  ? Nicotine inhalers or sprays.  ? Non-nicotine medicine that is taken by mouth.    Talk with your health care provider about combining strategies, such as taking medicines while you are also receiving in-person counseling. Using these two strategies together makes you more likely to succeed in quitting than if you used either strategy on its own.  If you are pregnant or breastfeeding, talk with your health care provider about finding counseling or other support strategies to quit smoking. Do not take medicine to help you quit smoking unless told to do so by your health care  provider.  What things can I do to make it easier to quit?  Quitting smoking might feel overwhelming at first, but there is a lot that you can do to make it easier. Take these important actions:  · Reach out to your family and friends and ask that they support and encourage you during this time. Call telephone quitlines, reach out to support groups, or work with a counselor for support.  · Ask people who smoke to avoid smoking around you.  · Avoid places that trigger you to smoke, such as bars, parties, or smoke-break areas at work.  · Spend time around people who do not smoke.  · Lessen stress in your life, because stress can be a smoking trigger for some people. To lessen stress, try:  ? Exercising regularly.  ? Deep-breathing exercises.  ? Yoga.  ? Meditating.  ? Performing a body scan. This involves closing your eyes, scanning your body from head to toe, and noticing which parts of your body are particularly tense. Purposefully relax the muscles in those areas.  · Download or purchase mobile phone or tablet apps (applications) that can help you stick to your quit plan by providing reminders, tips, and encouragement. There are many free apps, such as QuitGuide from the CDC (Centers for Disease Control and Prevention). You can find other support for quitting smoking (smoking cessation) through smokefree.gov and other websites.    How will I feel when I quit smoking?  Within the first 24 hours of quitting smoking, you may start to feel some withdrawal symptoms. These symptoms are usually most noticeable 2-3 days after quitting, but they usually do not last beyond 2-3 weeks. Changes or symptoms that you might experience include:  · Mood swings.  · Restlessness, anxiety, or irritation.  · Difficulty concentrating.  · Dizziness.  · Strong cravings for sugary foods in addition to nicotine.  · Mild weight gain.  · Constipation.  · Nausea.  · Coughing or a sore throat.  · Changes in how your medicines work in your  body.  · A depressed mood.  · Difficulty sleeping (insomnia).    After the first 2-3 weeks of quitting, you may start to notice more positive results, such as:  · Improved sense of smell and taste.  · Decreased coughing and sore throat.  · Slower heart rate.  · Lower blood pressure.  · Clearer skin.  · The ability to breathe more easily.  · Fewer sick days.    Quitting smoking is very challenging for most people. Do not get discouraged if you are not successful the first time. Some people need to make many attempts to quit before they achieve long-term success. Do your best to stick to your quit plan, and talk with your health care provider if you have any questions or concerns.  This information is not intended to replace advice given to you by your health care provider. Make sure you discuss any questions you have with your health care provider.  Document Released: 12/12/2002 Document Revised: 07/24/2018 Document Reviewed: 05/03/2016  [employer] Interactive Patient Education © 2019 [employer]  Coronary Artery Disease, Male  Coronary artery disease (CAD) is a condition in which the arteries that lead to the heart (coronary arteries) become narrow or blocked. The narrowing or blockage can lead to decreased blood flow to the heart. Prolonged reduced blood flow can cause a heart attack (myocardial infarction or MI). This condition may also be called coronary heart disease.  Because CAD is the leading cause of death in men, it is important to understand what causes this condition and how it is treated.  What are the causes?  CAD is most often caused by atherosclerosis. This is the buildup of fat and cholesterol (plaque) on the inside of the arteries. Over time, the plaque may narrow or block the artery, reducing blood flow to the heart. Plaque can also become weak and break off within a coronary artery and cause a sudden blockage. Other less common causes of CAD include:  · An embolism or blood clot in a coronary  artery.  · A tearing of the artery (spontaneous coronary artery dissection).  · An aneurysm.  · Inflammation (vasculitis) in the artery wall.    What increases the risk?  The following factors may make you more likely to develop this condition:  · Age. Men over age 45 are at a greater risk of CAD.  · Family history of CAD.  · Gender. Men often develop CAD earlier in life than women.  · High blood pressure (hypertension).  · Diabetes.  · High cholesterol levels.  · Tobacco use.  · Excessive alcohol use.  · Lack of exercise.  · A diet high in saturated and trans fats, such as fried food and processed meat.    Other possible risk factors include:  · High stress levels.  · Depression.  · Obesity.  · Sleep apnea.    What are the signs or symptoms?  Many people do not have any symptoms during the early stages of CAD. As the condition progresses, symptoms may include:  · Chest pain (angina). The pain can:  ? Feel like a crushing or squeezing, or a tightness, pressure, fullness, or heaviness in the chest.  ? Last more than a few minutes or can stop and recur. The pain tends to get worse with exercise or stress and to fade with rest.  · Pain in the arms, neck, jaw, or back.  · Unexplained heartburn or indigestion.  · Shortness of breath.  · Nausea or vomiting.  · Sudden light-headedness.  · Sudden cold sweats.  · Fluttering or fast heartbeat (palpitations).    How is this diagnosed?  This condition is diagnosed based on:  · Your family and medical history.  · A physical exam.  · Tests, including:  ? A test to check the electrical signals in your heart (electrocardiogram).  ? Exercise stress test. This looks for signs of blockage when the heart is stressed with exercise, such as running on a treadmill.  ? Pharmacologic stress test. This test looks for signs of blockage when the heart is being stressed with a medicine.  ? Blood tests.  ? Coronary angiogram. This is a procedure to look at the coronary arteries to see if there  is any blockage. During this test, a dye is injected into your arteries so they appear on an X-ray.  ? A test that uses sound waves to take a picture of your heart (echocardiogram).  ? Chest X-ray.    How is this treated?  This condition may be treated by:  · Healthy lifestyle changes to reduce risk factors.  · Medicines such as:  ? Antiplatelet medicines and blood-thinning medicines, such as aspirin. These help to prevent blood clots.  ? Nitroglycerin.  ? Blood pressure medicines.  ? Cholesterol-lowering medicine.  · Coronary angioplasty and stenting. During this procedure, a thin, flexible tube is inserted through a blood vessel and into a blocked artery. A balloon or similar device on the end of the tube is inflated to open up the artery. In some cases, a small, mesh tube (stent) is inserted into the artery to keep it open.  · Coronary artery bypass surgery. During this surgery, veins or arteries from other parts of the body are used to create a bypass around the blockage and allow blood to reach your heart.    Follow these instructions at home:  Medicines  · Take over-the-counter and prescription medicines only as told by your health care provider.  · Do not take the following medicines unless your health care provider approves:  ? NSAIDs, such as ibuprofen, naproxen, or celecoxib.  ? Vitamin supplements that contain vitamin A, vitamin E, or both.  Lifestyle  · Follow an exercise program approved by your health care provider. Aim for 150 minutes of moderate exercise or 75 minutes of vigorous exercise each week.  · Maintain a healthy weight or lose weight as approved by your health care provider.  · Rest when you are tired.  · Learn to manage stress or try to limit your stress. Ask your health care provider for suggestions if you need help.  · Get screened for depression and seek treatment, if needed.  · Do not use any products that contain nicotine or tobacco, such as cigarettes and e-cigarettes. If you need  help quitting, ask your health care provider.  · Do not use illegal drugs.  Eating and drinking  · Follow a heart-healthy diet. A dietitian can help educate you about healthy food options and changes. In general, eat plenty of fruits and vegetables, lean meats, and whole grains.  · Avoid foods high in:  ? Sugar.  ? Salt (sodium).  ? Saturated fat, such as processed or fatty meat.  ? Trans fat, such as fried foods.  · Use healthy cooking methods such as roasting, grilling, broiling, baking, poaching, steaming, or stir-frying.  · If you drink alcohol, and your health care provider approves, limit your alcohol intake to no more than 2 drinks per day. One drink equals 12 ounces of beer, 5 ounces of wine, or 1½ ounces of hard liquor.  General instructions  · Manage any other health conditions, such as hypertension and diabetes. These conditions affect your heart.  · Your health care provider may ask you to monitor your blood pressure. Ideally, your blood pressure should be below 130/80.  · Keep all follow-up visits as told by your health care provider. This is important.  Get help right away if:  · You have pain in your chest, neck, arm, jaw, stomach, or back that:  ? Lasts more than a few minutes.  ? Is recurring.  ? Is not relieved by taking medicine under your tongue (sublingualnitroglycerin).  · You have too much (profuse) sweating without cause.  · You have unexplained:  ? Heartburn or indigestion.  ? Shortness of breath or difficulty breathing.  ? Fluttering or fast heartbeat (palpitations).  ? Nausea or vomiting.  ? Fatigue.  ? Feelings of nervousness or anxiety.  ? Weakness.  ? Diarrhea.  · You have sudden light-headedness or dizziness.  · You faint.  · You feel like hurting yourself or think about taking your own life.  These symptoms may represent a serious problem that is an emergency. Do not wait to see if the symptoms will go away. Get medical help right away. Call your local emergency services (911 in the  [place] Do not drive yourself to the hospital.  Summary  · Coronary artery disease (CAD) is a process in which the arteries that lead to the heart (coronary arteries) become narrow or blocked. The narrowing or blockage can lead to a heart attack.  · Many people do not have any symptoms during the early stages of CAD. This is called \"silent CAD.\"  · CAD can be treated with lifestyle changes, medicines, surgery, or a combination of these treatments.  This information is not intended to replace advice given to you by your health care provider. Make sure you discuss any questions you have with your health care provider.  Document Released: 07/15/2015 Document Revised: 12/08/2017 Document Reviewed: 12/08/2017  [employer] Interactive Patient Education © 2019 [employer]    "

## 2019-06-26 NOTE — TELEPHONE ENCOUNTER
Left a msg for Dr. Cardona to send the last labs to us.  These will be put into the pts chart once we get them.  Neal Vaughan, CMA

## 2019-06-26 NOTE — PROGRESS NOTES
"    Subjective:     Encounter Date:06/26/2019      Patient ID: Anival Pennington is a 63 y.o. male who presents for 2 week follow up of complaints of chest pain.  He has a hx of CAD with STEMI in March of this year. He also has hx of HTN, HLD,     Chief Complaint: Chest pain follow up  History of Present Illness  Chest pain  Previously he had reports right sided musculoskeletal pain that was worse with palpation. This is still present, however, improved. \"It doesn't really bother me anymore. Palpation does reproduce the pain. HE also had described exertional epigastric chest pressure. This has resolved since his last visit. He has been compliant with his new Rx.  He denies any side effects. His blood pressure has been stable and he has some reported dizziness that was a previous complaint. This has not worsened with Imdur. He is still participating in cardiac rehab. He continues to smoke. He does not appear to be interested in quitting. He has established a PCP and just had lab work drawn on Thursday, he has not heard results. He is not on any medications for diabetes at this time. He continues to deny edema, shortness of breath, palpitations, orthopnea, or syncope.    The following portions of the patient's history were reviewed and updated as appropriate: allergies, current medications, past family history, past medical history, past social history and past surgical history.     Allergies   Allergen Reactions   • Bee Venom Unknown (See Comments)     UNKNOWN        Current Outpatient Medications:   •  aspirin 81 MG EC tablet, Take 1 tablet by mouth Daily., Disp: 100 tablet, Rfl: 3  •  atorvastatin (LIPITOR) 40 MG tablet, Take 1 tablet by mouth Every Night., Disp: 90 tablet, Rfl: 3  •  carvedilol (COREG) 3.125 MG tablet, Take 1 tablet by mouth Every 12 (Twelve) Hours., Disp: 180 tablet, Rfl: 3  •  clopidogrel (PLAVIX) 75 MG tablet, Take 1 tablet by mouth Daily., Disp: 90 tablet, Rfl: 3  •  isosorbide mononitrate (IMDUR) " 30 MG 24 hr tablet, Take 1 tablet by mouth Daily., Disp: 90 tablet, Rfl: 3  •  lisinopril (PRINIVIL,ZESTRIL) 5 MG tablet, Take 1 tablet by mouth Daily., Disp: 90 tablet, Rfl: 3  •  nitroglycerin (NITROSTAT) 0.4 MG SL tablet, Place 1 tablet under the tongue Every 5 (Five) Minutes As Needed for Chest Pain. Take no more than 3 doses in 15 minutes., Disp: 100 tablet, Rfl: 12  No current facility-administered medications for this visit.     Past Medical History:   Diagnosis Date   • Cancer (CMS/HCC)     leukemia   • Coronary artery disease    • Diabetes (CMS/[facility])     Diet controlled   • Hyperlipidemia    • Hypertension    • Tobacco abuse      Family History   Problem Relation Age of Onset   • Valvular heart disease Mother    • Heart disease Brother      Social History     Socioeconomic History   • Marital status:      Spouse name: Not on file   • Number of children: Not on file   • Years of education: Not on file   • Highest education level: Not on file   Tobacco Use   • Smoking status: Current Every Day Smoker     Packs/day: 1.00     Years: 50.00     Pack years: 50.00   • Smokeless tobacco: Never Used   Substance and Sexual Activity   • Alcohol use: No     Frequency: Never   • Drug use: No   • Sexual activity: Defer     Past Surgical History:   Procedure Laterality Date   • CARDIAC CATHETERIZATION N/A 3/28/2019    Procedure: Left Heart Cath;  Surgeon: Vincent Pan MD;  Location:  [facility] CATH INVASIVE LOC[facility];  Service: Cardiovascular   • CARDIAC CATHETERIZATION Bilateral 3/28/2019    Procedure: Stent JOSSIE coronary;  Surgeon: Vincent Pan MD;  Location:  [facility] CATH [facility];  Service: Cardiovascular   • CARDIAC CATHETERIZATION N/A 3/28/2019    Procedure: Left ventriculography;  Surgeon: Vincent Pan MD;  Location:  [facility] CATH INVAS[facility];  Service: Cardiovascular   • CORONARY STENT PLACEMENT     • TONSILLECTOMY       Review of Systems   Constitution: Positive for malaise/fatigue. Negative for  "diaphoresis, fever and weakness.   Cardiovascular: Negative for chest pain, dyspnea on exertion, leg swelling, near-syncope, orthopnea, palpitations, paroxysmal nocturnal dyspnea and syncope.   Respiratory: Negative for cough, shortness of breath and wheezing.    Hematologic/Lymphatic: Negative for bleeding problem.   Musculoskeletal: Positive for arthritis.   Gastrointestinal: Negative for bloating, abdominal pain, nausea and vomiting.   Neurological: Positive for dizziness.   Psychiatric/Behavioral: Negative for altered mental status. The patient is not nervous/anxious.          ECG 12 Lead  Date/Time: 6/26/2019 8:50 AM  Performed by: Kayleigh Chaudhari APRN  Authorized by: Kayleigh Chaudhari APRN   Comparison: compared with previous ECG from 6/12/2019  Similar to previous ECG  Rhythm: sinus rhythm  Rate: normal  BPM: 73  Q waves: II, III and aVF    T inversion: III  T flattening: aVF  QRS axis: left    Clinical impression: abnormal EKG          /75 (BP Location: Right arm, Patient Position: Sitting, Cuff Size: Adult)   Pulse 73   Resp 18   Ht 157.5 cm (62\")   Wt 89.4 kg (197 lb)   SpO2 98%   BMI 36.03 kg/m²        Objective:     Physical Exam   Constitutional: He is oriented to person, place, and time. He appears well-developed and well-nourished. No distress.   HENT:   Head: Normocephalic and atraumatic.   Nose: Nose normal.   Mouth/Throat: Oropharynx is clear and moist.   Eyes: Conjunctivae are normal.   Neck: Normal range of motion. Neck supple.   Cardiovascular: Normal rate, regular rhythm and normal heart sounds.   No murmur heard.  Pulmonary/Chest: Effort normal. No respiratory distress. He has decreased breath sounds. He has no wheezes. He has no rales. He exhibits tenderness (right sided under breast).   Abdominal: Soft. Normal appearance. He exhibits no distension. There is no tenderness.   Musculoskeletal: He exhibits no edema.   Neurological: He is alert and oriented to person, place, and time. "   Skin: Skin is warm, dry and intact. He is not diaphoretic.   Psychiatric: He has a normal mood and affect. His behavior is normal.   Vitals reviewed.      Lab Review:       Assessment:          Diagnosis Plan   1. Coronary artery disease involving native coronary artery of native heart without angina pectoris     2. Essential hypertension     3. Mixed hyperlipidemia     4. ST elevation myocardial infarction involving right coronary artery (CMS/HCC)     5. Type 2 diabetes mellitus with other circulatory complication, without long-term current use of insulin (CMS/[facility])     6. Tobacco abuse            Plan:       - CAD: Improvement of symptoms since addition of Imdur. Continue DAPT due to recent STEMI. Continue cardiac rehab. No changes in medication regimen at this time. Stable without angina.     -HTN: well controlled. Continue same.    - HLD: on statin, has recently had labs with new PCP. I have asked our office to obtain these records.Continue statin.    - STEMI: march of 2019. On plavix due to cost of Brilinta.    - DM: elevated Hgb A1c of 7.0 during hospital stay. Should be addressed by PCP. Has had recent labs drawn from PCP but has not heard results.     - Smoking: daily tobacco use. No desire to quit. Encouraged cessation.     -Obesity: encouraged completion of cardiac rehab with continued efforts to maintain an exercise routine for health and weight loss.    Follow up in 6 months with Dr. Pan, sooner if needed. Continue follow up with PCP.

## 2019-07-05 ENCOUNTER — TELEPHONE (OUTPATIENT)
Dept: CARDIOLOGY | Facility: CLINIC | Age: 64
End: 2019-07-05

## 2019-07-05 NOTE — TELEPHONE ENCOUNTER
"I called pts wife and let her know.  She asked if he could take naproxen.  I told her as long as the pain medication did not have aspirin or anti inflammatory in it that would be fine and since naproxen has anti inflammatory he could not take it.  I told her to stay away from ibuprofen or motrin Advil type medication but tylenol was ok.  She said he was not prescribed a \"pain pill\" and needs one for his back.  I told her that we will not prescribe a pain pill but he can take tylenol over the counter.  She stated understanding.  Neal Vaughan, [facility]   "

## 2019-07-05 NOTE — TELEPHONE ENCOUNTER
Pts wife called and left a  msg asking what pain medications are safe for pt to take with his heart medication.  Please advise.  Neal Vaughan, CMA

## 2020-02-21 ENCOUNTER — OFFICE VISIT (OUTPATIENT)
Dept: CARDIOLOGY | Facility: CLINIC | Age: 65
End: 2020-02-21

## 2020-02-21 VITALS
DIASTOLIC BLOOD PRESSURE: 80 MMHG | WEIGHT: 203 LBS | SYSTOLIC BLOOD PRESSURE: 145 MMHG | HEIGHT: 64 IN | OXYGEN SATURATION: 98 % | HEART RATE: 82 BPM | BODY MASS INDEX: 34.66 KG/M2

## 2020-02-21 DIAGNOSIS — Z72.0 TOBACCO ABUSE: Chronic | ICD-10-CM

## 2020-02-21 DIAGNOSIS — E78.2 MIXED HYPERLIPIDEMIA: ICD-10-CM

## 2020-02-21 DIAGNOSIS — I25.10 CORONARY ARTERY DISEASE INVOLVING NATIVE CORONARY ARTERY OF NATIVE HEART WITHOUT ANGINA PECTORIS: ICD-10-CM

## 2020-02-21 DIAGNOSIS — R55 NEAR SYNCOPE: Primary | ICD-10-CM

## 2020-02-21 DIAGNOSIS — I10 ESSENTIAL HYPERTENSION: Chronic | ICD-10-CM

## 2020-02-21 PROCEDURE — 93000 ELECTROCARDIOGRAM COMPLETE: CPT | Performed by: INTERNAL MEDICINE

## 2020-02-21 PROCEDURE — 99214 OFFICE O/P EST MOD 30 MIN: CPT | Performed by: INTERNAL MEDICINE

## 2020-02-21 RX ORDER — GLIPIZIDE 2.5 MG/1
TABLET, EXTENDED RELEASE ORAL
COMMUNITY
Start: 2020-01-22 | End: 2022-07-20

## 2020-02-21 RX ORDER — METFORMIN HYDROCHLORIDE 750 MG/1
750 TABLET, EXTENDED RELEASE ORAL 2 TIMES DAILY
COMMUNITY
Start: 2020-01-22 | End: 2022-07-20

## 2020-02-21 NOTE — PROGRESS NOTES
Referring Provider: Mariana Lu APRN    Reason for Follow-up Visit: CAD    Subjective .   Chief Complaint:   Chief Complaint   Patient presents with   • Follow-up     6 month fu   • Coronary Artery Disease     history of NSTEMI.  pt has no complaints of symptoms and is feeling fine.   • Hypertension     pt does not check but states its doing fine.   • Hyperlipidemia     last lab done 6/20/19 LDL was 52 takes Lipitor 40 mg and states he is not regular taking it.  maybe once to twice a week.       History of present illness:  Anival Pennington is a 64 y.o. yo male with history of CAD, s/p JOSSIE 1 yr ago. Denies CP or SOB. Still smoking. Down to 1 ppd.        History  Past Medical History:   Diagnosis Date   • Cancer (CMS/HCC)     leukemia   • Coronary artery disease    • Diabetes (CMS/HCC)     Diet controlled   • Hyperlipidemia    • Hypertension    • Tobacco abuse    ,   Past Surgical History:   Procedure Laterality Date   • CARDIAC CATHETERIZATION N/A 3/28/2019    Procedure: Left Heart Cath;  Surgeon: Vincent Pan MD;  Location:  [facility];  Service: Cardiovascular   • CARDIAC CATHETERIZATION Bilateral 3/28/2019    Procedure: Stent JOSSIE coronary;  Surgeon: Vincent Pan MD;  Location:  [facility];  Service: Cardiovascular   • CARDIAC CATHETERIZATION N/A 3/28/2019    Procedure: Left ventriculography;  Surgeon: Vincent Pan MD;  Location:  [facility];  Service: Cardiovascular   • CORONARY STENT PLACEMENT     • TONSILLECTOMY     ,   Family History   Problem Relation Age of Onset   • Valvular heart disease Mother    • Heart attack Mother    • Heart disease Mother    • Heart disease Brother    • Heart attack Brother    • Cancer Father    • No Known Problems Brother    ,   Social History     Tobacco Use   • Smoking status: Current Every Day Smoker     Packs/day: 1.00     Years: 50.00     Pack years: 50.00     Start date: 1969   • Smokeless tobacco: Never Used  "  Substance Use Topics   • Alcohol use: Not Currently     Frequency: Never     Comment: quit 2010   • Drug use: Never   ,     Medications  Current Outpatient Medications   Medication Sig Dispense Refill   • aspirin 81 MG EC tablet Take 1 tablet by mouth Daily. 100 tablet 3   • atorvastatin (LIPITOR) 40 MG tablet Take 1 tablet by mouth Every Night. (Patient taking differently: Take 40 mg by mouth 2 (Two) Times a Week.) 90 tablet 3   • carvedilol (COREG) 3.125 MG tablet Take 1 tablet by mouth Every 12 (Twelve) Hours. 180 tablet 3   • clopidogrel (PLAVIX) 75 MG tablet Take 1 tablet by mouth Daily. 90 tablet 3   • glipizide (GLUCOTROL XL) 2.5 MG 24 hr tablet TAKE 1 TABLET BY MOUTH ONCE DAILY WITH BREAKFAST FOR 30 DAYS     • isosorbide mononitrate (IMDUR) 30 MG 24 hr tablet Take 1 tablet by mouth Daily. 90 tablet 3   • lisinopril (PRINIVIL,ZESTRIL) 5 MG tablet Take 1 tablet by mouth Daily. 90 tablet 3   • metFORMIN ER (GLUCOPHAGE-XR) 750 MG 24 hr tablet Take 750 mg by mouth 2 (Two) Times a Day.     • nitroglycerin (NITROSTAT) 0.4 MG SL tablet Place 1 tablet under the tongue Every 5 (Five) Minutes As Needed for Chest Pain. Take no more than 3 doses in 15 minutes. 100 tablet 12     No current facility-administered medications for this visit.        Allergies:  Bee venom and Aspirin    Review of Systems  Review of Systems   HENT: Negative for nosebleeds.    Cardiovascular: Negative for chest pain, claudication, dyspnea on exertion, irregular heartbeat, leg swelling, near-syncope, orthopnea, palpitations, paroxysmal nocturnal dyspnea and syncope.   Respiratory: Negative for cough, hemoptysis and shortness of breath.    Gastrointestinal: Negative for dysphagia, hematemesis and melena.   Genitourinary: Negative for hematuria.   Neurological: Positive for dizziness.   All other systems reviewed and are negative.      Objective     Physical Exam:  /80   Pulse 82   Ht 162.6 cm (64\")   Wt 92.1 kg (203 lb)   SpO2 98%   " BMI 34.84 kg/m²   Physical Exam   Constitutional: He is oriented to person, place, and time. He appears well-nourished. No distress.   HENT:   Head: Normocephalic.   Eyes: No scleral icterus.   Neck: Normal range of motion. Neck supple.   Cardiovascular: Normal rate, regular rhythm and normal heart sounds. Exam reveals no gallop and no friction rub.   No murmur heard.  Pulmonary/Chest: Effort normal and breath sounds normal. No respiratory distress. He has no wheezes. He has no rales.   Abdominal: Soft. Bowel sounds are normal. He exhibits no distension. There is no tenderness.   Musculoskeletal: He exhibits no edema.   Neurological: He is alert and oriented to person, place, and time.   Skin: Skin is warm and dry. He is not diaphoretic. No erythema.   Psychiatric: He has a normal mood and affect. His behavior is normal.       Results Review:    ECG 12 Lead  Date/Time: 2/21/2020 9:23 AM  Performed by: Vincent Pan MD  Authorized by: Vincent Pan MD   Comparison: compared with previous ECG   Similar to previous ECG  Rhythm: sinus rhythm  Rate: normal  Conduction: incomplete right bundle branch block  Q waves: II, III and aVF    ST Segments: ST segments normal  T Waves: T waves normal  QRS axis: left    Clinical impression: abnormal EKG            Office Visit on 04/15/2019   Component Date Value Ref Range Status   • Hematocrit 04/15/2019 46.3  40.0 - 52.0 % Final   • Platelets 04/15/2019 255  130 - 400 10*3/mm3 Final   • P2Y12 Reactivity Unit 04/15/2019 133  PRU Final       Assessment/Plan   Anival was seen today for follow-up, coronary artery disease, hypertension and hyperlipidemia.    Diagnoses and all orders for this visit:    Essential hypertension, adequate control    Coronary artery disease involving native coronary artery of native heart without angina pectoris, The patient denies chest pain, shortness of breath, dyspnea on exertion, orthopnea, PND, edema. There is no evidence of ongoing  ischemia    Tobacco abuse, discussed again the need to quit smoking but he is making progress    Mixed hyperlipidemia, Patient's statin therapy is followed by PCP.    Dizziness, will get a 21 day event monitor        Patient's Body mass index is 34.84 kg/m². BMI is above normal parameters. Recommendations include: exercise counseling.

## 2020-09-24 PROBLEM — Z95.5 S/P CORONARY ARTERY STENT PLACEMENT: Status: ACTIVE | Noted: 2020-09-24

## 2022-07-20 ENCOUNTER — LAB (OUTPATIENT)
Dept: LAB | Facility: HOSPITAL | Age: 67
End: 2022-07-20

## 2022-07-20 ENCOUNTER — OFFICE VISIT (OUTPATIENT)
Dept: FAMILY MEDICINE CLINIC | Facility: CLINIC | Age: 67
End: 2022-07-20

## 2022-07-20 VITALS
DIASTOLIC BLOOD PRESSURE: 80 MMHG | BODY MASS INDEX: 32.85 KG/M2 | HEART RATE: 84 BPM | TEMPERATURE: 99.7 F | HEIGHT: 63 IN | WEIGHT: 185.4 LBS | SYSTOLIC BLOOD PRESSURE: 120 MMHG | OXYGEN SATURATION: 96 %

## 2022-07-20 DIAGNOSIS — I20.0 UNSTABLE ANGINA: ICD-10-CM

## 2022-07-20 DIAGNOSIS — M89.9 DISORDER OF BONE, UNSPECIFIED: ICD-10-CM

## 2022-07-20 DIAGNOSIS — Z95.5 PRESENCE OF STENT IN CORONARY ARTERY IN PATIENT WITH CORONARY ARTERY DISEASE: ICD-10-CM

## 2022-07-20 DIAGNOSIS — I25.10 PRESENCE OF STENT IN CORONARY ARTERY IN PATIENT WITH CORONARY ARTERY DISEASE: ICD-10-CM

## 2022-07-20 DIAGNOSIS — D23.4: ICD-10-CM

## 2022-07-20 DIAGNOSIS — E11.59 TYPE 2 DIABETES MELLITUS WITH OTHER CIRCULATORY COMPLICATION, WITHOUT LONG-TERM CURRENT USE OF INSULIN: Primary | ICD-10-CM

## 2022-07-20 DIAGNOSIS — Z12.5 SCREENING PSA (PROSTATE SPECIFIC ANTIGEN): ICD-10-CM

## 2022-07-20 DIAGNOSIS — E08.59 DIABETES MELLITUS DUE TO UNDERLYING CONDITION WITH OTHER CIRCULATORY COMPLICATION, UNSPECIFIED WHETHER LONG TERM INSULIN USE: ICD-10-CM

## 2022-07-20 DIAGNOSIS — I25.2 HISTORY OF MI (MYOCARDIAL INFARCTION): ICD-10-CM

## 2022-07-20 DIAGNOSIS — M40.209 KYPHOSIS, UNSPECIFIED KYPHOSIS TYPE, UNSPECIFIED SPINAL REGION: ICD-10-CM

## 2022-07-20 DIAGNOSIS — E63.9 DISORDER OF NUTRITION: ICD-10-CM

## 2022-07-20 DIAGNOSIS — R42 DIZZINESS: ICD-10-CM

## 2022-07-20 DIAGNOSIS — E11.59 TYPE 2 DIABETES MELLITUS WITH OTHER CIRCULATORY COMPLICATION, WITHOUT LONG-TERM CURRENT USE OF INSULIN: ICD-10-CM

## 2022-07-20 DIAGNOSIS — E08.59 DIABETES MELLITUS DUE TO UNDERLYING CONDITION WITH OTHER CIRCULATORY COMPLICATIONS: ICD-10-CM

## 2022-07-20 DIAGNOSIS — F17.210 HEAVY SMOKER (MORE THAN 20 CIGARETTES PER DAY): ICD-10-CM

## 2022-07-20 LAB
25(OH)D3 SERPL-MCNC: 15.3 NG/ML (ref 30–100)
ALBUMIN SERPL-MCNC: 4.5 G/DL (ref 3.5–5.2)
ALBUMIN/GLOB SERPL: 1.6 G/DL
ALP SERPL-CCNC: 76 U/L (ref 39–117)
ALT SERPL W P-5'-P-CCNC: 8 U/L (ref 1–41)
ANION GAP SERPL CALCULATED.3IONS-SCNC: 15 MMOL/L (ref 5–15)
AST SERPL-CCNC: 18 U/L (ref 1–40)
BACTERIA UR QL AUTO: ABNORMAL /HPF
BILIRUB SERPL-MCNC: 0.6 MG/DL (ref 0–1.2)
BILIRUB UR QL STRIP: ABNORMAL
BUN SERPL-MCNC: 12 MG/DL (ref 8–23)
BUN/CREAT SERPL: 15 (ref 7–25)
CALCIUM SPEC-SCNC: 9.6 MG/DL (ref 8.6–10.5)
CHLORIDE SERPL-SCNC: 97 MMOL/L (ref 98–107)
CHOLEST SERPL-MCNC: 211 MG/DL (ref 0–200)
CLARITY UR: ABNORMAL
CO2 SERPL-SCNC: 23 MMOL/L (ref 22–29)
COLOR UR: ABNORMAL
CREAT SERPL-MCNC: 0.8 MG/DL (ref 0.76–1.27)
DEPRECATED RDW RBC AUTO: 45.4 FL (ref 37–54)
EGFRCR SERPLBLD CKD-EPI 2021: 98.2 ML/MIN/1.73
ERYTHROCYTE [DISTWIDTH] IN BLOOD BY AUTOMATED COUNT: 12.8 % (ref 12.3–15.4)
GLOBULIN UR ELPH-MCNC: 2.8 GM/DL
GLUCOSE SERPL-MCNC: 235 MG/DL (ref 65–99)
GLUCOSE UR STRIP-MCNC: ABNORMAL MG/DL
HBA1C MFR BLD: 11 % (ref 4.8–5.6)
HCT VFR BLD AUTO: 50.4 % (ref 37.5–51)
HDLC SERPL-MCNC: 42 MG/DL (ref 40–60)
HGB BLD-MCNC: 16.9 G/DL (ref 13–17.7)
HGB UR QL STRIP.AUTO: NEGATIVE
HYALINE CASTS UR QL AUTO: ABNORMAL /LPF
IRON 24H UR-MRATE: 145 MCG/DL (ref 59–158)
IRON SATN MFR SERPL: 33 % (ref 20–50)
KETONES UR QL STRIP: ABNORMAL
LDLC SERPL CALC-MCNC: 108 MG/DL (ref 0–100)
LDLC/HDLC SERPL: 2.31 {RATIO}
LEUKOCYTE ESTERASE UR QL STRIP.AUTO: ABNORMAL
MAGNESIUM SERPL-MCNC: 1.8 MG/DL (ref 1.6–2.4)
MCH RBC QN AUTO: 32.6 PG (ref 26.6–33)
MCHC RBC AUTO-ENTMCNC: 33.5 G/DL (ref 31.5–35.7)
MCV RBC AUTO: 97.3 FL (ref 79–97)
NITRITE UR QL STRIP: NEGATIVE
PH UR STRIP.AUTO: 5.5 [PH] (ref 5–8)
PLATELET # BLD AUTO: 255 10*3/MM3 (ref 140–450)
PMV BLD AUTO: 10.7 FL (ref 6–12)
POTASSIUM SERPL-SCNC: 4.6 MMOL/L (ref 3.5–5.2)
PROT SERPL-MCNC: 7.3 G/DL (ref 6–8.5)
PROT UR QL STRIP: ABNORMAL
PSA SERPL-MCNC: 0.67 NG/ML (ref 0–4)
RBC # BLD AUTO: 5.18 10*6/MM3 (ref 4.14–5.8)
RBC # UR STRIP: ABNORMAL /HPF
REF LAB TEST METHOD: ABNORMAL
SODIUM SERPL-SCNC: 135 MMOL/L (ref 136–145)
SP GR UR STRIP: >1.03 (ref 1–1.03)
SQUAMOUS #/AREA URNS HPF: ABNORMAL /HPF
TIBC SERPL-MCNC: 440 MCG/DL (ref 298–536)
TRANSFERRIN SERPL-MCNC: 295 MG/DL (ref 200–360)
TRIGL SERPL-MCNC: 359 MG/DL (ref 0–150)
TSH SERPL DL<=0.05 MIU/L-ACNC: 0.9 UIU/ML (ref 0.27–4.2)
UROBILINOGEN UR QL STRIP: ABNORMAL
VLDLC SERPL-MCNC: 61 MG/DL (ref 5–40)
WBC # UR STRIP: ABNORMAL /HPF
WBC NRBC COR # BLD: 11.53 10*3/MM3 (ref 3.4–10.8)
YEAST URNS QL MICRO: ABNORMAL /HPF

## 2022-07-20 PROCEDURE — 80061 LIPID PANEL: CPT

## 2022-07-20 PROCEDURE — 81001 URINALYSIS AUTO W/SCOPE: CPT

## 2022-07-20 PROCEDURE — 36415 COLL VENOUS BLD VENIPUNCTURE: CPT

## 2022-07-20 PROCEDURE — 87086 URINE CULTURE/COLONY COUNT: CPT

## 2022-07-20 PROCEDURE — 83540 ASSAY OF IRON: CPT

## 2022-07-20 PROCEDURE — 83735 ASSAY OF MAGNESIUM: CPT

## 2022-07-20 PROCEDURE — 83036 HEMOGLOBIN GLYCOSYLATED A1C: CPT

## 2022-07-20 PROCEDURE — G0103 PSA SCREENING: HCPCS

## 2022-07-20 PROCEDURE — 84466 ASSAY OF TRANSFERRIN: CPT

## 2022-07-20 PROCEDURE — 82306 VITAMIN D 25 HYDROXY: CPT

## 2022-07-20 PROCEDURE — 84443 ASSAY THYROID STIM HORMONE: CPT | Performed by: FAMILY MEDICINE

## 2022-07-20 PROCEDURE — 80053 COMPREHEN METABOLIC PANEL: CPT

## 2022-07-20 PROCEDURE — 85027 COMPLETE CBC AUTOMATED: CPT

## 2022-07-20 PROCEDURE — 99204 OFFICE O/P NEW MOD 45 MIN: CPT | Performed by: FAMILY MEDICINE

## 2022-07-20 RX ORDER — NAPROXEN 250 MG/1
250 TABLET ORAL 2 TIMES DAILY PRN
COMMUNITY

## 2022-07-20 RX ORDER — NITROGLYCERIN 0.4 MG/1
0.4 TABLET SUBLINGUAL
Qty: 100 TABLET | Refills: 12 | Status: SHIPPED | OUTPATIENT
Start: 2022-07-20

## 2022-07-20 NOTE — PROGRESS NOTES
Chief Complaint  Establish Care and Diabetes   He and wife are concerned about dizziness complaints he has.  She is with him today.    Subjective        History of Present Illness  Anival Pennington is a 66 y.o. male who presents to [facility] - new patient.  He has significant cardiac hx and DM and has been off the rx that were started in the past after his MI ( B blocker, ACE I, statin, Imdur, plavix, ASA, GARG for DM) but he has continued his metformin.  Well-tolerated.  Needs Rx.  No recent labs.    Previous meds:                                        Doesn't take NTG for CP at this point because he is out of it.  Actually would take it if he had some as he still has some CP but his main activity is lying in bed all the time watching TV; he gets up to eat and go to bathroom.  He has chronic back pain that is alleviated by lying down.  Not taking controlled substances, only some naprosyn for pain.  ALFA reviewed today.  No entries.    Pt reports after his past heart surgery he still had some residual blockage but Dr. Pan didn't want to stent it.  I reviewed with him the reasoning behind medical management of CAD and that he is no longer taking the tx.    Last EKG was 2021 and Holter too; reviewed today:  Study Description    Monitor placed on patient  on 2/21/2020 .  The monitor was scanned on 5/14/2020. The patient was monitored for 21 days. Indications for this exam include syncope. Average HR:  88. Min HR:  51. Max HR:  120.     Study Findings    Patient diary submitted.Dizziness was reported during the monitoring period. Dizziness had no correlations. Patient reported rare episodes of dizziness. No complications noted. The predominant rhythm noted during the testing period was sinus rhythm. Premature atrial contractions occured occasionally. There was no evidence of atrial arrhythmias. There were no episodes of supraventricular tachycardia. Premature ventricular contractions  occured rarely. There were no episodes of ventricular tachycardia. Sinoatrial node conduction was normal. No atrioventricular block noted.     Study Impressions    A relatively benign monitor study. Occasional asymptomatic PAC's     ---------------------------       ECG 12 Lead  Date/Time: 2/21/2020 9:23 AM  Performed by: Vincent Pan MD  Authorized by: Vincent Pan MD   Comparison: compared with previous ECG   Similar to previous ECG  Rhythm: sinus rhythm  Rate: normal  Conduction: incomplete right bundle branch block  Q waves: II, III and aVF     ST Segments: ST segments normal  T Waves: T waves normal  QRS axis: left    Clinical impression: abnormal EKG       Last A1C here:      0 Result Notes     1  Topic    Component   Ref Range & Units 3 yr ago    Hemoglobin A1C   % 7.0    Resulting Agency  [facility]            Had some checks since then at another facility and he believes it was 7.10    Review of Systems   HENT: Negative for trouble swallowing.    Respiratory: Negative for cough and shortness of breath.    Cardiovascular: Positive for chest pain. Negative for palpitations and leg swelling.        Has squeezing chest pain that can last a couple hours and then will go away.   Gastrointestinal:        Takes occ naprosyn OTC for back pain and may only take it Q 10 days;  Had gastritis sx with high dose IBU about 20 years ago.   Neurological:        + Vertigo and has off balance and slt rotational quality,  X 1 years.    Doesn't eat salty foods.    Denies ear issues.      Denies h/o head injury    Dizziness is related to movement and activity, not necessarily related to chest pain.        Past Medical History:   Diagnosis Date   • Cancer (HCC)     leukemia   • Coronary artery disease    • diabetes with circulatory complications    • Hyperlipidemia    • Hypertension    • Obesity (BMI 30-39.9)    • Tobacco abuse        Outpatient Medications Prior to Visit   Medication Sig Dispense Refill   • metFORMIN  (GLUCOPHAGE) 1000 MG tablet Take 1,000 mg by mouth 2 (Two) Times a Day With Meals.     • nitroglycerin (NITROSTAT) 0.4 MG SL tablet Place 1 tablet under the tongue Every 5 (Five) Minutes As Needed for Chest Pain. Take no more than 3 doses in 15 minutes. 100 tablet 12   • naproxen (NAPROSYN) 250 MG tablet Take 250 mg by mouth 2 (Two) Times a Day As Needed.     • aspirin 81 MG EC tablet Take 1 tablet by mouth Daily. 100 tablet 3   • atorvastatin (LIPITOR) 40 MG tablet Take 1 tablet by mouth Every Night. (Patient taking differently: Take 40 mg by mouth 2 (Two) Times a Week.) 90 tablet 3   • carvedilol (COREG) 3.125 MG tablet Take 1 tablet by mouth Every 12 (Twelve) Hours. 180 tablet 3   • clopidogrel (PLAVIX) 75 MG tablet Take 1 tablet by mouth Daily. 90 tablet 3   • glipizide (GLUCOTROL XL) 2.5 MG 24 hr tablet TAKE 1 TABLET BY MOUTH ONCE DAILY WITH BREAKFAST FOR 30 DAYS     • isosorbide mononitrate (IMDUR) 30 MG 24 hr tablet Take 1 tablet by mouth Daily. 90 tablet 3   • lisinopril (PRINIVIL,ZESTRIL) 5 MG tablet Take 1 tablet by mouth Daily. 90 tablet 3   • metFORMIN ER (GLUCOPHAGE-XR) 750 MG 24 hr tablet Take 750 mg by mouth 2 (Two) Times a Day.       No facility-administered medications prior to visit.      Past Surgical History:   Procedure Laterality Date   • CARDIAC CATHETERIZATION N/A 3/28/2019    Procedure: Left Heart Cath;  Surgeon: Vincent Pan MD;  Location:  [facility];  Service: Cardiovascular   • CARDIAC CATHETERIZATION Bilateral 3/28/2019    Procedure: Stent JOSSIE coronary;  Surgeon: Vincent Pan MD;  Location:  [facility];  Service: Cardiovascular   • CARDIAC CATHETERIZATION N/A 3/28/2019    Procedure: Left ventriculography;  Surgeon: Vincent Pan MD;  Location:  [facility];  Service: Cardiovascular   • CORONARY STENT PLACEMENT     • TONSILLECTOMY           Objective   Vital Signs:   /80 (BP Location: Left arm, Patient Position: Sitting,  "Cuff Size: Adult)   Pulse 84   Temp 99.7 °F (37.6 °C) (Oral)   Ht 160 cm (63\")   Wt 84.1 kg (185 lb 6.4 oz)   SpO2 96%   BMI 32.84 kg/m²       Physical Exam  Vitals reviewed.   Constitutional:       Appearance: He is obese. He is not ill-appearing.   HENT:      Head:        Right Ear: External ear normal.      Left Ear: External ear normal.      Nose: Nose normal.      Mouth/Throat:      Mouth: Mucous membranes are moist.      Pharynx: Oropharynx is clear.      Comments: Large tongue, Mallampati 3 throat  Eyes:      Extraocular Movements: Extraocular movements intact.      Conjunctiva/sclera: Conjunctivae normal.   Neck:      Comments: Enlarged neck circumference, no carotid bruits.  NTTP.  Normal thyroid exam.  Cardiovascular:      Rate and Rhythm: Normal rate and regular rhythm.      Heart sounds: No murmur heard.  Pulmonary:      Effort: Pulmonary effort is normal.      Comments: Diminished I and E phases and limited airflow throughout.  Occ smoker's cough, gruff/ wet-sounding voice  Chest:   Breasts:      Right: No supraclavicular adenopathy.      Left: No supraclavicular adenopathy.       Abdominal:      General: Bowel sounds are normal.      Palpations: Abdomen is soft.      Tenderness: There is no abdominal tenderness.      Comments: Centrally obese and protuberant habitus.   Musculoskeletal:      Right lower leg: No edema.      Left lower leg: No edema.      Comments: Ambulatory on his own. Able to sit and stand from a chair on his own.  Stiff after sitting for a while.    Kyphotic upper back,  Mildly stooped posture   Lymphadenopathy:      Head:      Right side of head: No tonsillar adenopathy.      Left side of head: No tonsillar adenopathy.      Cervical: No cervical adenopathy.      Upper Body:      Right upper body: No supraclavicular adenopathy.      Left upper body: No supraclavicular adenopathy.   Skin:     General: Skin is warm.      Findings: No rash.   Neurological:      General: No focal " deficit present.      Mental Status: He is alert and oriented to person, place, and time.      Motor: No tremor.      Comments: Not objectively dizzy here in the office.  Able to change positions independently at a normal pace   Psychiatric:         Attention and Perception: Attention normal.         Mood and Affect: Affect is blunt.         Speech: Speech normal.         Behavior: Behavior normal. Behavior is not slowed, withdrawn or hyperactive. Behavior is cooperative.         Cognition and Memory: Cognition normal.         Judgment: Judgment is impulsive.      Comments: Not interested in taking more meds or doing more interventions.                 Assessment and Plan    Diagnoses and all orders for this visit:    1. Type 2 diabetes mellitus with other circulatory complication, without long-term current use of insulin (HCC) (Primary)  -     Hemoglobin A1c; Future  -     Comprehensive Metabolic Panel; Future  -     Lipid Panel; Future  -     Urinalysis With Culture If Indicated - Urine, Clean Catch; Future  -     metFORMIN (GLUCOPHAGE) 1000 MG tablet; Take 1 tablet by mouth 2 (Two) Times a Day With Meals. For diabetes control  Dispense: 180 tablet; Refill: 1    2. Heavy smoker (more than 20 cigarettes per day)  -     CBC (No Diff); Future  -     Comprehensive Metabolic Panel; Future  -     Vitamin D 25 Hydroxy; Future    3. Trichilemmoma of scalp and skin of neck  -     Ambulatory Referral to Plastic Surgery    4. Presence of stent in coronary artery in patient with coronary artery disease  -     Hemoglobin A1c; Future  -     Comprehensive Metabolic Panel; Future  -     Lipid Panel; Future  -     Magnesium; Future  -     Iron Profile; Future  -     TSH  -     Vitamin D 25 Hydroxy; Future  -     nitroglycerin (NITROSTAT) 0.4 MG SL tablet; Place 1 tablet under the tongue Every 5 (Five) Minutes As Needed for Chest Pain. Take no more than 3 doses in 15 minutes.  Dispense: 100 tablet; Refill: 12    5. Dizziness  -      Magnesium; Future  -     Iron Profile; Future  -     TSH  -     Urinalysis With Culture If Indicated - Urine, Clean Catch; Future  -     Vitamin D 25 Hydroxy; Future    6. Screening PSA (prostate specific antigen)  -     PSA Screen; Future    7. Kyphosis, unspecified kyphosis type, unspecified spinal region  -     Vitamin D 25 Hydroxy; Future    8. Unstable angina (HCC)  -     Lipid Panel; Future    9. Disorder of nutrition  -     Magnesium; Future  -     Iron Profile; Future  -     Vitamin D 25 Hydroxy; Future    10. Diabetes mellitus due to underlying condition with other circulatory complication, unspecified whether long term insulin use (HCC)  -     Iron Profile; Future  -     TSH  -     Vitamin D 25 Hydroxy; Future    11. Disorder of bone, unspecified   -     Vitamin D 25 Hydroxy; Future    12. History of MI (myocardial infarction)      Consider statin, f/u with cardiology and carotid US due to the dizziness complaints.      Follow Up   Return in about 3 months (around 10/20/2022) for recheck dm, lab results.  Sooner as indicated by today's lab results.    Counseled pt on benefits of statin to prevent CVA and MI and further CAD progression.   He is not sure he wants to take additional medications.  Believes he is doing okay right now.    I did give him a Rx for SL NTG and counseled him and his wife that he may benefit from ImDur due to what sounds like unstable angina.  He will think about it.  Encouraged pt to see cardiology for further discussion/ updated testing.  He will think about a referral, not ready at this time.    Patient was given instructions and counseling regarding his condition or for health maintenance advice.       Please see specific information/handouts pulled into the AVS if appropriate.       Betty Mcguire M.D.  [facility]   Family Medicine

## 2022-07-21 LAB — BACTERIA SPEC AEROBE CULT: NORMAL

## 2022-07-24 ENCOUNTER — DOCUMENTATION (OUTPATIENT)
Dept: FAMILY MEDICINE CLINIC | Facility: CLINIC | Age: 67
End: 2022-07-24

## 2022-07-24 PROBLEM — R42 DIZZINESS: Status: ACTIVE | Noted: 2022-07-24

## 2022-07-24 PROBLEM — I25.2 HISTORY OF MI (MYOCARDIAL INFARCTION): Status: ACTIVE | Noted: 2022-07-24

## 2022-07-24 PROBLEM — E08.59 DIABETES MELLITUS DUE TO UNDERLYING CONDITION WITH CIRCULATORY COMPLICATION (HCC): Status: ACTIVE | Noted: 2022-07-24

## 2022-07-24 PROBLEM — F17.210 HEAVY SMOKER (MORE THAN 20 CIGARETTES PER DAY): Status: ACTIVE | Noted: 2022-07-24

## 2022-07-24 RX ORDER — ROSUVASTATIN CALCIUM 10 MG/1
10 TABLET, COATED ORAL DAILY
Qty: 30 TABLET | Refills: 2 | Status: SHIPPED | OUTPATIENT
Start: 2022-07-24 | End: 2022-10-26 | Stop reason: SDUPTHER

## 2022-07-24 NOTE — PROGRESS NOTES
Diabetes is very uncontrolled, vitamin D level is low, would add vitamin D 3   2000 IU by mouth Daily and statin due to heart blockage history and high cholesterol and triglycerides.    Send patient a letter w the results.  Statin Rx will be sent to the pharmacy ( [facility]); needs recheck in the office in 3-8 weeks to see how he is tolerating it.   Needs to bring in some BG readings to that appt.  Please call him and see if he needs a BG monitor etc.... sent in for him.    A1C of 11 is not likely to improve without insulin therapy. But he may not have been consistent with dosing prior to the recent labs.  ADA diet, increased water intake ( he has lots of sugar spilling from the blood into the urine) and regular use of the BID metformin is key.  Based on further home BG readings, we may add once a day long- acting insulin or additional oral meds.        Send a letter to him also.    Have him track for any increased muscle aches, weakness or swelling in his legs while on the Crestor.  Let us know if that is happening.     Also, a referral to Cardiology and an updated ECHO and carotid US are recommended for work up of the dizziness.  I will order those if he wants further evaluation.  Please let me know what he says.     Other labs look okay.

## 2022-07-25 ENCOUNTER — TELEPHONE (OUTPATIENT)
Dept: FAMILY MEDICINE CLINIC | Facility: CLINIC | Age: 67
End: 2022-07-25

## 2022-07-25 NOTE — TELEPHONE ENCOUNTER
Patient's  called and stated that she would like a callback from Nurse or PCP    Notes: Patient is wanting to check status of plastic surgeon referral and is needing to get results for recent lab work.    Please advise with callback @ [phone]    Thank you,  Duc eGe, PCT

## 2022-08-01 ENCOUNTER — TELEPHONE (OUTPATIENT)
Dept: FAMILY MEDICINE CLINIC | Facility: CLINIC | Age: 67
End: 2022-08-01

## 2022-08-04 NOTE — TELEPHONE ENCOUNTER
Reviewed.  Dr. Heaton wants to defer surgery until his DM is better-controlled and he has stopped smoking.  Pt isn't likely to quit smoking, will discuss further DM management with him at his next appt.  A1C was 11.

## 2022-08-04 NOTE — TELEPHONE ENCOUNTER
Please see if patient can come in sooner than October to discuss diabetes management per Dr. Mcguire

## 2022-08-25 ENCOUNTER — TELEPHONE (OUTPATIENT)
Dept: FAMILY MEDICINE CLINIC | Facility: CLINIC | Age: 67
End: 2022-08-25

## 2022-09-13 ENCOUNTER — HOSPITAL ENCOUNTER (EMERGENCY)
Facility: HOSPITAL | Age: 67
Discharge: HOME | End: 2022-09-13
Admitting: FAMILY MEDICINE

## 2022-09-13 ENCOUNTER — APPOINTMENT (OUTPATIENT)
Dept: GENERAL RADIOLOGY | Facility: HOSPITAL | Age: 67
End: 2022-09-13

## 2022-09-13 VITALS
OXYGEN SATURATION: 99 % | WEIGHT: 180 LBS | BODY MASS INDEX: 31.89 KG/M2 | DIASTOLIC BLOOD PRESSURE: 80 MMHG | RESPIRATION RATE: 18 BRPM | HEART RATE: 83 BPM | SYSTOLIC BLOOD PRESSURE: 160 MMHG | HEIGHT: 63 IN | TEMPERATURE: 98.4 F

## 2022-09-13 DIAGNOSIS — M17.12 OSTEOARTHRITIS OF LEFT KNEE, UNSPECIFIED OSTEOARTHRITIS TYPE: Primary | ICD-10-CM

## 2022-09-13 DIAGNOSIS — S86.912A KNEE STRAIN, LEFT, INITIAL ENCOUNTER: ICD-10-CM

## 2022-09-13 PROCEDURE — 99283 EMERGENCY DEPT VISIT LOW MDM: CPT

## 2022-09-13 PROCEDURE — 73562 X-RAY EXAM OF KNEE 3: CPT

## 2022-09-13 NOTE — DISCHARGE INSTRUCTIONS
Please continue to wear the knee brace for stability within your knee.  It is important you rest, ice, and elevate the knee is much as tolerated.  Please also apply heat followed by gentle range of motion stretching.  Please use anti-inflammatories.  You will need to follow-up with the orthopedic [facility] for further evaluation, possible additional imaging, and discussion of future treatment.  Please follow with your primary care provider or the orthopedic [facility] within the next 48 hours.  Should you develop any new or worsening symptoms please return to the ER for further evaluation.
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
## 2022-09-17 DIAGNOSIS — E78.2 MIXED HYPERLIPIDEMIA: ICD-10-CM

## 2022-09-17 DIAGNOSIS — I25.2 HISTORY OF MI (MYOCARDIAL INFARCTION): ICD-10-CM

## 2022-09-17 DIAGNOSIS — E55.9 VITAMIN D DEFICIENCY: ICD-10-CM

## 2022-09-17 DIAGNOSIS — E11.59 TYPE 2 DIABETES MELLITUS WITH OTHER CIRCULATORY COMPLICATION, WITHOUT LONG-TERM CURRENT USE OF INSULIN: Primary | ICD-10-CM

## 2022-09-17 DIAGNOSIS — I10 ESSENTIAL HYPERTENSION: ICD-10-CM

## 2022-09-17 DIAGNOSIS — I25.10 CORONARY ARTERY DISEASE INVOLVING NATIVE CORONARY ARTERY OF NATIVE HEART WITHOUT ANGINA PECTORIS: ICD-10-CM

## 2022-09-17 DIAGNOSIS — F17.210 HEAVY SMOKER (MORE THAN 20 CIGARETTES PER DAY): ICD-10-CM

## 2022-09-17 NOTE — TELEPHONE ENCOUNTER
Let pt know 12 hour fasting labs have been ordered for mid October.  He needs to do them 4-5 days before his appointment in October so we have the results back for the office visit.  Drink lots of water during the fasting.  Tell him:  Fasting means having no juice, no milk, and no food, but you can have anything with zero calories such as water, black coffee, unsweet tea or diet soda while you are fasting.  We recommend 12 hours of fasting before the labs but if you can't do that, then 8-10 hours is acceptable. There is no need for an appointment for the labwork; the main lab is open 6 AM to 5 PM Monday through Friday on the first floor in the [facility].  Go to the main entrance.  Make sure you drink lots of water before the labs are done so it's easier for them to draw the blood and for you to urinate;  urine tests have been ordered.

## 2022-10-20 ENCOUNTER — LAB (OUTPATIENT)
Dept: LAB | Facility: HOSPITAL | Age: 67
End: 2022-10-20

## 2022-10-20 ENCOUNTER — OFFICE VISIT (OUTPATIENT)
Dept: FAMILY MEDICINE CLINIC | Facility: CLINIC | Age: 67
End: 2022-10-20
Payer: MEDICARE

## 2022-10-20 VITALS
BODY MASS INDEX: 32.25 KG/M2 | HEIGHT: 63 IN | TEMPERATURE: 98 F | SYSTOLIC BLOOD PRESSURE: 150 MMHG | WEIGHT: 182 LBS | DIASTOLIC BLOOD PRESSURE: 82 MMHG | HEART RATE: 80 BPM | OXYGEN SATURATION: 98 %

## 2022-10-20 DIAGNOSIS — Z53.21 PATIENT LEFT WITHOUT BEING SEEN: Primary | ICD-10-CM

## 2022-10-20 DIAGNOSIS — F17.210 HEAVY SMOKER (MORE THAN 20 CIGARETTES PER DAY): ICD-10-CM

## 2022-10-20 DIAGNOSIS — I10 ESSENTIAL HYPERTENSION: ICD-10-CM

## 2022-10-20 DIAGNOSIS — E55.9 VITAMIN D DEFICIENCY: ICD-10-CM

## 2022-10-20 DIAGNOSIS — E11.59 TYPE 2 DIABETES MELLITUS WITH OTHER CIRCULATORY COMPLICATION, WITHOUT LONG-TERM CURRENT USE OF INSULIN: ICD-10-CM

## 2022-10-20 DIAGNOSIS — E78.2 MIXED HYPERLIPIDEMIA: ICD-10-CM

## 2022-10-20 LAB
25(OH)D3 SERPL-MCNC: 20.6 NG/ML (ref 30–100)
ALBUMIN SERPL-MCNC: 4.1 G/DL (ref 3.5–5.2)
ALBUMIN/GLOB SERPL: 1.5 G/DL
ALP SERPL-CCNC: 79 U/L (ref 39–117)
ALT SERPL W P-5'-P-CCNC: 6 U/L (ref 1–41)
ANION GAP SERPL CALCULATED.3IONS-SCNC: 12.3 MMOL/L (ref 5–15)
AST SERPL-CCNC: 14 U/L (ref 1–40)
BACTERIA UR QL AUTO: ABNORMAL /HPF
BILIRUB SERPL-MCNC: 0.3 MG/DL (ref 0–1.2)
BILIRUB UR QL STRIP: NEGATIVE
BUN SERPL-MCNC: 14 MG/DL (ref 8–23)
BUN/CREAT SERPL: 15.6 (ref 7–25)
CALCIUM SPEC-SCNC: 9.6 MG/DL (ref 8.6–10.5)
CHLORIDE SERPL-SCNC: 99 MMOL/L (ref 98–107)
CHOLEST SERPL-MCNC: 174 MG/DL (ref 0–200)
CLARITY UR: CLEAR
CO2 SERPL-SCNC: 22.7 MMOL/L (ref 22–29)
COLOR UR: YELLOW
CREAT SERPL-MCNC: 0.9 MG/DL (ref 0.76–1.27)
EGFRCR SERPLBLD CKD-EPI 2021: 94.2 ML/MIN/1.73
GLOBULIN UR ELPH-MCNC: 2.7 GM/DL
GLUCOSE SERPL-MCNC: 171 MG/DL (ref 65–99)
GLUCOSE UR STRIP-MCNC: ABNORMAL MG/DL
HBA1C MFR BLD: 8.5 % (ref 4.8–5.6)
HDLC SERPL-MCNC: 46 MG/DL (ref 40–60)
HGB UR QL STRIP.AUTO: NEGATIVE
HYALINE CASTS UR QL AUTO: ABNORMAL /LPF
KETONES UR QL STRIP: NEGATIVE
LDLC SERPL CALC-MCNC: 95 MG/DL (ref 0–100)
LDLC/HDLC SERPL: 1.94 {RATIO}
LEUKOCYTE ESTERASE UR QL STRIP.AUTO: ABNORMAL
NITRITE UR QL STRIP: NEGATIVE
PH UR STRIP.AUTO: 6 [PH] (ref 5–8)
POTASSIUM SERPL-SCNC: 4 MMOL/L (ref 3.5–5.2)
PROT SERPL-MCNC: 6.8 G/DL (ref 6–8.5)
PROT UR QL STRIP: ABNORMAL
RBC # UR STRIP: ABNORMAL /HPF
REF LAB TEST METHOD: ABNORMAL
SODIUM SERPL-SCNC: 134 MMOL/L (ref 136–145)
SP GR UR STRIP: 1.02 (ref 1–1.03)
SQUAMOUS #/AREA URNS HPF: ABNORMAL /HPF
TRIGL SERPL-MCNC: 193 MG/DL (ref 0–150)
UROBILINOGEN UR QL STRIP: ABNORMAL
VLDLC SERPL-MCNC: 33 MG/DL (ref 5–40)
WBC # UR STRIP: ABNORMAL /HPF

## 2022-10-20 PROCEDURE — 81001 URINALYSIS AUTO W/SCOPE: CPT

## 2022-10-20 PROCEDURE — 36415 COLL VENOUS BLD VENIPUNCTURE: CPT

## 2022-10-20 PROCEDURE — 82043 UR ALBUMIN QUANTITATIVE: CPT

## 2022-10-20 PROCEDURE — 3077F SYST BP >= 140 MM HG: CPT | Performed by: FAMILY MEDICINE

## 2022-10-20 PROCEDURE — 80061 LIPID PANEL: CPT

## 2022-10-20 PROCEDURE — 3046F HEMOGLOBIN A1C LEVEL >9.0%: CPT | Performed by: FAMILY MEDICINE

## 2022-10-20 PROCEDURE — 3079F DIAST BP 80-89 MM HG: CPT | Performed by: FAMILY MEDICINE

## 2022-10-20 PROCEDURE — 1126F AMNT PAIN NOTED NONE PRSNT: CPT | Performed by: FAMILY MEDICINE

## 2022-10-20 PROCEDURE — 82306 VITAMIN D 25 HYDROXY: CPT

## 2022-10-20 PROCEDURE — 83036 HEMOGLOBIN GLYCOSYLATED A1C: CPT

## 2022-10-20 PROCEDURE — 80053 COMPREHEN METABOLIC PANEL: CPT

## 2022-10-21 LAB — ALBUMIN UR-MCNC: 9.2 MG/DL

## 2022-10-26 DIAGNOSIS — E78.2 MIXED HYPERLIPIDEMIA: Primary | ICD-10-CM

## 2022-10-26 DIAGNOSIS — E66.01 CLASS 2 SEVERE OBESITY DUE TO EXCESS CALORIES WITH SERIOUS COMORBIDITY AND BODY MASS INDEX (BMI) OF 36.0 TO 36.9 IN ADULT: ICD-10-CM

## 2022-10-26 DIAGNOSIS — I25.2 HISTORY OF MI (MYOCARDIAL INFARCTION): ICD-10-CM

## 2022-10-26 RX ORDER — ROSUVASTATIN CALCIUM 10 MG/1
10 TABLET, COATED ORAL DAILY
Qty: 30 TABLET | Refills: 5 | Status: SHIPPED | OUTPATIENT
Start: 2022-10-26

## 2022-10-27 NOTE — PROGRESS NOTES
Pt should continue the metformin 1000 mg BID but add either Farxiga for better DM control and heart protection or use Ozempic ( perhaps Rybelsus if shot not wanted) to help with DM and weight loss.  Weight loss could help to alleviate his back pain.      Needs OV.

## 2022-12-12 ENCOUNTER — TELEPHONE (OUTPATIENT)
Dept: FAMILY MEDICINE CLINIC | Facility: CLINIC | Age: 67
End: 2022-12-12

## 2022-12-12 DIAGNOSIS — R42 DIZZINESS: Primary | ICD-10-CM

## 2022-12-12 RX ORDER — MECLIZINE HYDROCHLORIDE 25 MG/1
25 TABLET ORAL 3 TIMES DAILY PRN
Qty: 20 TABLET | Refills: 0 | Status: SHIPPED | OUTPATIENT
Start: 2022-12-12 | End: 2023-05-16 | Stop reason: SDUPTHER

## 2022-12-12 NOTE — TELEPHONE ENCOUNTER
Caller: DEVORAH LUCERO    Relationship: Emergency Contact    Best call back number: [phone]    What medication are you requesting: no preference, something for vertigo. Pt said that this was discussed at visit but nothing was sent.     What are your current symptoms: vertigo, has worsened some    If a prescription is needed, what is your preferred pharmacy and phone number: [facility] [address] [place] [address] [phone] [facility] [phone] FX     Additional notes:

## 2022-12-13 NOTE — TELEPHONE ENCOUNTER
Meclizine sent to pt's pharmacy.  If dizziness worsens or he has chest pain/stroke symptoms, needs to call ambulance and get seen at ER.

## 2022-12-13 NOTE — TELEPHONE ENCOUNTER
Spouse notified and to report to ER if symptoms worsens or having chest pain. Wife voiced understanding.

## 2023-05-02 ENCOUNTER — TELEPHONE (OUTPATIENT)
Dept: FAMILY MEDICINE CLINIC | Facility: CLINIC | Age: 68
End: 2023-05-02

## 2023-05-02 NOTE — TELEPHONE ENCOUNTER
Caller: DEVORAH LUCERO    Relationship: Emergency Contact    Best call back number:[phone]    Requested Prescriptions:   Requested Prescriptions      No prescriptions requested or ordered in this encounter        Pharmacy where request should be sent: [address] [phone] [facility] [phone]      Last office visit with prescribing clinician: Visit date not found   Last telemedicine visit with prescribing clinician: Visit date not found   Next office visit with prescribing clinician: Visit date not found     Additional details provided by patient:  VIRTIGO MEDICATION     Does the patient have less than a 3 day supply:  [x] Yes  [] No    Would you like a call back once the refill request has been completed: [x] Yes [] No    If the office needs to give you a call back, can they leave a voicemail: [x] Yes [] No    Alverto Leary Rep   05/02/23 13:28 CDT

## 2023-05-16 ENCOUNTER — OFFICE VISIT (OUTPATIENT)
Dept: FAMILY MEDICINE CLINIC | Facility: CLINIC | Age: 68
End: 2023-05-16
Payer: MEDICARE

## 2023-05-16 VITALS
TEMPERATURE: 98.4 F | BODY MASS INDEX: 33.49 KG/M2 | SYSTOLIC BLOOD PRESSURE: 130 MMHG | DIASTOLIC BLOOD PRESSURE: 70 MMHG | OXYGEN SATURATION: 98 % | RESPIRATION RATE: 18 BRPM | WEIGHT: 182 LBS | HEART RATE: 104 BPM | HEIGHT: 62 IN

## 2023-05-16 DIAGNOSIS — L03.211 CELLULITIS OF FACE: Primary | ICD-10-CM

## 2023-05-16 DIAGNOSIS — R42 DIZZINESS: ICD-10-CM

## 2023-05-16 DIAGNOSIS — E66.9 OBESITY (BMI 30-39.9): ICD-10-CM

## 2023-05-16 PROCEDURE — 99214 OFFICE O/P EST MOD 30 MIN: CPT | Performed by: NURSE PRACTITIONER

## 2023-05-16 PROCEDURE — 3075F SYST BP GE 130 - 139MM HG: CPT | Performed by: NURSE PRACTITIONER

## 2023-05-16 PROCEDURE — 3078F DIAST BP <80 MM HG: CPT | Performed by: NURSE PRACTITIONER

## 2023-05-16 RX ORDER — SULFAMETHOXAZOLE AND TRIMETHOPRIM 800; 160 MG/1; MG/1
1 TABLET ORAL 2 TIMES DAILY
Qty: 20 TABLET | Refills: 0 | Status: SHIPPED | OUTPATIENT
Start: 2023-05-16 | End: 2023-05-26

## 2023-05-16 RX ORDER — MECLIZINE HYDROCHLORIDE 25 MG/1
25 TABLET ORAL 3 TIMES DAILY PRN
Qty: 20 TABLET | Refills: 0 | Status: SHIPPED | OUTPATIENT
Start: 2023-05-16

## 2023-05-16 NOTE — PROGRESS NOTES
CLAUDIO Brown  [facility]   Family Medicine  [address]  [place] [address]  Phone: [phone]  Fax: [phone]         Chief Complaint:  Chief Complaint   Patient presents with    Facial Swelling     Started 2 weeks ago         History:  Anival Pennington is a 67 y.o. male that is an established patient. He  is here for evaluation of the above complaint.    Facial Swelling  Pertinent negatives include no abdominal pain, chest pain, congestion, fatigue, fever, headaches, myalgias, nausea, neck pain, numbness, rash, vomiting or weakness.        Facial swelling started 2 weeks ago. Notes that the right side of his face is erythematous and warm to touch. Denies drainage.          ROS:  Review of Systems   Constitutional:  Negative for fatigue, fever and unexpected weight change.   HENT:  Positive for facial swelling. Negative for congestion, ear pain, rhinorrhea, sinus pressure, sinus pain and voice change.    Eyes:  Negative for visual disturbance.   Respiratory:  Negative for shortness of breath and wheezing.    Cardiovascular:  Negative for chest pain and palpitations.   Gastrointestinal:  Negative for abdominal pain, nausea and vomiting.   Genitourinary:  Negative for dysuria and flank pain.   Musculoskeletal:  Negative for back pain, myalgias and neck pain.   Skin:  Negative for color change and rash.   Neurological:  Negative for dizziness, weakness, numbness and headaches.   Psychiatric/Behavioral:  Negative for behavioral problems, dysphoric mood, self-injury and sleep disturbance.       reports that he has been smoking cigarettes. He started smoking about 54 years ago. He has a 75.00 pack-year smoking history. He has never used smokeless tobacco. He reports that he does not currently use alcohol. He reports that he does not currently use drugs after having used the following drugs: Marijuana.    Current Outpatient Medications   Medication Instructions    meclizine  "(ANTIVERT) 25 mg, Oral, 3 Times Daily PRN, May make you sleepy.    metFORMIN (GLUCOPHAGE) 1,000 mg, Oral, 2 Times Daily With Meals, For diabetes control    naproxen (NAPROSYN) 250 mg, Oral, 2 Times Daily PRN    nitroglycerin (NITROSTAT) 0.4 mg, Sublingual, Every 5 Minutes PRN, Take no more than 3 doses in 15 minutes.    rosuvastatin (CRESTOR) 10 mg, Oral, Daily, To prevent heart attacks and control cholesterol    sulfamethoxazole-trimethoprim (Bactrim DS) 800-160 MG per tablet 1 tablet, Oral, 2 Times Daily       OBJECTIVE:  /70 (BP Location: Left arm, Patient Position: Sitting, Cuff Size: Adult)   Pulse 104   Temp 98.4 °F (36.9 °C) (Infrared)   Resp 18   Ht 157.5 cm (62\")   Wt 82.6 kg (182 lb)   SpO2 98%   BMI 33.29 kg/m²    Physical Exam  Vitals and nursing note reviewed.   Constitutional:       Appearance: Normal appearance. He is well-developed.   HENT:      Head: Normocephalic and atraumatic.        Comments: Abscess to right side of face 3cm x 3cm.      Right Ear: Tympanic membrane, ear canal and external ear normal.      Left Ear: Tympanic membrane, ear canal and external ear normal.      Nose: Nose normal. No septal deviation, nasal tenderness or congestion.      Mouth/Throat:      Lips: Pink. No lesions.      Mouth: Mucous membranes are moist. No oral lesions.      Dentition: Normal dentition.      Pharynx: Oropharynx is clear. No pharyngeal swelling, oropharyngeal exudate or posterior oropharyngeal erythema.   Eyes:      General: Lids are normal. Vision grossly intact. No scleral icterus.        Right eye: No discharge.         Left eye: No discharge.      Extraocular Movements: Extraocular movements intact.      Conjunctiva/sclera: Conjunctivae normal.      Right eye: Right conjunctiva is not injected.      Left eye: Left conjunctiva is not injected.      Pupils: Pupils are equal, round, and reactive to light.   Neck:      Thyroid: No thyroid mass.      Trachea: Trachea normal. "   Cardiovascular:      Rate and Rhythm: Normal rate and regular rhythm.      Heart sounds: Normal heart sounds. No murmur heard.    No gallop.   Pulmonary:      Effort: Pulmonary effort is normal.      Breath sounds: Normal breath sounds and air entry. No wheezing, rhonchi or rales.   Musculoskeletal:         General: No tenderness or deformity. Normal range of motion.      Cervical back: Full passive range of motion without pain, normal range of motion and neck supple.      Thoracic back: Normal.      Right lower leg: No edema.      Left lower leg: No edema.   Skin:     General: Skin is warm and dry.      Coloration: Skin is not jaundiced.      Findings: No rash.   Neurological:      Mental Status: He is alert and oriented to person, place, and time.      Sensory: Sensation is intact.      Motor: Motor function is intact.      Coordination: Coordination is intact.      Gait: Gait is intact.      Deep Tendon Reflexes: Reflexes are normal and symmetric.   Psychiatric:         Mood and Affect: Mood and affect normal.         Behavior: Behavior normal.         Judgment: Judgment normal.       Procedures    Assessment/Plan:     Diagnoses and all orders for this visit:    1. Cellulitis of face (Primary)    2. Dizziness  -     meclizine (ANTIVERT) 25 MG tablet; Take 1 tablet by mouth 3 (Three) Times a Day As Needed for Dizziness. May make you sleepy.  Dispense: 20 tablet; Refill: 0    3. Obesity (BMI 30-39.9)    Other orders  -     sulfamethoxazole-trimethoprim (Bactrim DS) 800-160 MG per tablet; Take 1 tablet by mouth 2 (Two) Times a Day for 10 days.  Dispense: 20 tablet; Refill: 0      BMI is >= 30 and <35. (Class 1 Obesity). The following options were offered after discussion;: nutrition counseling/recommendations    An After Visit Summary was printed and given to the patient at discharge.  Return in about 1 week (around 5/23/2023), or if symptoms worsen or fail to improve.       Patient Instructions   Begin bactrim  twice daily.           Discussion:     I spent 30 minutes caring for Anival on this date of service. This time includes time spent by me in the following activities: preparing for the visit, reviewing tests, obtaining and/or reviewing a separately obtained history, performing a medically appropriate examination and/or evaluation, counseling and educating the patient/family/caregiver, ordering medications, tests, or procedures, and documenting information in the medical record     Joanna SNYDER 5/21/2023   Electronically signed.

## 2023-05-21 PROBLEM — E66.9 OBESITY (BMI 30-39.9): Status: ACTIVE | Noted: 2023-05-21

## 2023-11-15 ENCOUNTER — TELEPHONE (OUTPATIENT)
Dept: VASCULAR SURGERY | Facility: CLINIC | Age: 68
End: 2023-11-15
Payer: MEDICARE

## 2023-11-16 ENCOUNTER — OFFICE VISIT (OUTPATIENT)
Dept: VASCULAR SURGERY | Facility: CLINIC | Age: 68
End: 2023-11-16
Payer: MEDICARE

## 2023-11-16 VITALS
DIASTOLIC BLOOD PRESSURE: 68 MMHG | WEIGHT: 186.8 LBS | HEART RATE: 96 BPM | SYSTOLIC BLOOD PRESSURE: 132 MMHG | BODY MASS INDEX: 34.37 KG/M2 | OXYGEN SATURATION: 98 % | HEIGHT: 62 IN

## 2023-11-16 DIAGNOSIS — I73.9 PAD (PERIPHERAL ARTERY DISEASE): ICD-10-CM

## 2023-11-16 DIAGNOSIS — Z13.6 SCREENING FOR AAA (ABDOMINAL AORTIC ANEURYSM): ICD-10-CM

## 2023-11-16 DIAGNOSIS — I10 ESSENTIAL HYPERTENSION: Chronic | ICD-10-CM

## 2023-11-16 DIAGNOSIS — E78.2 MIXED HYPERLIPIDEMIA: ICD-10-CM

## 2023-11-16 DIAGNOSIS — I65.23 BILATERAL CAROTID ARTERY STENOSIS: Primary | ICD-10-CM

## 2023-11-16 DIAGNOSIS — Z72.0 TOBACCO ABUSE: Chronic | ICD-10-CM

## 2023-11-16 PROCEDURE — 1159F MED LIST DOCD IN RCRD: CPT | Performed by: NURSE PRACTITIONER

## 2023-11-16 PROCEDURE — 99214 OFFICE O/P EST MOD 30 MIN: CPT | Performed by: NURSE PRACTITIONER

## 2023-11-16 PROCEDURE — 3075F SYST BP GE 130 - 139MM HG: CPT | Performed by: NURSE PRACTITIONER

## 2023-11-16 PROCEDURE — 3078F DIAST BP <80 MM HG: CPT | Performed by: NURSE PRACTITIONER

## 2023-11-16 PROCEDURE — 1160F RVW MEDS BY RX/DR IN RCRD: CPT | Performed by: NURSE PRACTITIONER

## 2023-11-16 RX ORDER — GLIMEPIRIDE 4 MG/1
8 TABLET ORAL
COMMUNITY
Start: 2023-10-09

## 2023-11-16 RX ORDER — CILOSTAZOL 100 MG/1
TABLET ORAL
COMMUNITY

## 2023-11-16 NOTE — LETTER
November 30, 2023       No Recipients    Patient: Anival Pennington   YOB: 1955   Date of Visit: 11/16/2023     Dear Betty Mcguire MD:       Thank you for referring Anival Pennington to me for evaluation. Below are the relevant portions of my assessment and plan of care.    If you have questions, please do not hesitate to call me. I look forward to following Anival along with you.         Sincerely,        Daphnie CLAUDIO Christianson        CC:   No Recipients    Daphnie ChristiansonCLAUDIO  11/30/23 1620  Sign when Signing Visit  11/16/2023      Betty Mcguire MD  [address] [place] [address]    Anival Pennington  1955    Chief Complaint   Patient presents with   • Peripheral Vascular Disease     Legs are heavy and painful, limited ability to walk distance       Dear Betty Mcguire MD:      HPI  I had the pleasure of seeing your patient Anival Pennington in the office today.  Thank you kindly for this consultation.  As you recall, Anival Pennington is a 68 y.o.  male who you are currently following for routine health maintenance.  He has been having complaints of calf claudication greater on the right versus left.  He is a current daily smoker.  He does report short distance claudication to his right lower extremity and had to stop from parking garage into our office.  He did have testing performed at [facility] per records revealing severe arterial insufficiency to the right lower extremity and mild to the left, which I did review in office.    Past Medical History:   Diagnosis Date   • Cancer     leukemia   • Coronary artery disease    • diabetes with circulatory complications    • Hyperlipidemia    • Hypertension    • Obesity (BMI 30-39.9)    • Tobacco abuse        Past Surgical History:   Procedure Laterality Date   • CARDIAC CATHETERIZATION N/A 3/28/2019    Procedure: Left Heart Cath;  Surgeon: Vincent Pan MD;  Location:  [facility];  Service: Cardiovascular   • CARDIAC  CATHETERIZATION Bilateral 3/28/2019    Procedure: Stent JOSSIE coronary;  Surgeon: Vincent Pan MD;  Location:  [facility];  Service: Cardiovascular   • CARDIAC CATHETERIZATION N/A 3/28/2019    Procedure: Left ventriculography;  Surgeon: Vincent Pan MD;  Location:  [facility];  Service: Cardiovascular   • CORONARY STENT PLACEMENT     • TONSILLECTOMY         Family History   Problem Relation Age of Onset   • Valvular heart disease Mother    • Heart attack Mother    • Heart disease Mother    • Heart disease Brother    • Heart attack Brother    • Cancer Father    • No Known Problems Brother        Social History     Socioeconomic History   • Marital status:    Tobacco Use   • Smoking status: Every Day     Packs/day: 1.50     Years: 50.00     Additional pack years: 0.00     Total pack years: 75.00     Types: Cigarettes     Start date: 1969   • Smokeless tobacco: Never   Substance and Sexual Activity   • Alcohol use: Not Currently     Comment: quit 2010   • Drug use: Not Currently     Types: Marijuana     Comment: occ   • Sexual activity: Defer       Allergies   Allergen Reactions   • Aspirin Other (See Comments)     Tightness in throat with high dose asa.  Can take the 81 mg without difficutly   • Bee Venom Anaphylaxis       Current Outpatient Medications   Medication Instructions   • cilostazol (PLETAL) 100 MG tablet TAKE 1 TABLET BY MOUTH TWICE DAILY FOR LEG PAIN   • Cyanocobalamin 3000 MCG/ML liquid 3 drops, Sublingual, Daily   • glimepiride (AMARYL) 8 mg, Oral   • meclizine (ANTIVERT) 25 mg, Oral, 3 Times Daily PRN, May make you sleepy.   • metFORMIN (GLUCOPHAGE) 1,000 mg, Oral, 2 Times Daily With Meals, For diabetes control   • naproxen (NAPROSYN) 250 mg, 2 Times Daily PRN   • nitroglycerin (NITROSTAT) 0.4 mg, Sublingual, Every 5 Minutes PRN, Take no more than 3 doses in 15 minutes.   • rosuvastatin (CRESTOR) 10 mg, Oral, Daily, To prevent heart attacks and control  "cholesterol         Review of Systems   Constitutional: Negative.    HENT: Negative.     Eyes: Negative.    Respiratory: Negative.     Cardiovascular: Negative.         Claudication right leg greater than left   Gastrointestinal: Negative.    Endocrine: Negative.    Genitourinary: Negative.    Musculoskeletal: Negative.    Skin: Negative.    Allergic/Immunologic: Negative.    Neurological: Negative.    Hematological: Negative.    Psychiatric/Behavioral: Negative.     All other systems reviewed and are negative.      /68   Pulse 96   Ht 157.5 cm (62\")   Wt 84.7 kg (186 lb 12.8 oz)   SpO2 98%   BMI 34.17 kg/m²   Physical Exam  Vitals and nursing note reviewed.   Constitutional:       Appearance: Normal appearance. He is well-developed.   HENT:      Head: Normocephalic and atraumatic.   Eyes:      General: No scleral icterus.     Pupils: Pupils are equal, round, and reactive to light.   Neck:      Thyroid: No thyromegaly.   Cardiovascular:      Rate and Rhythm: Normal rate and regular rhythm.      Pulses:           Dorsalis pedis pulses are detected w/ Doppler on the right side.        Posterior tibial pulses are detected w/ Doppler on the right side.      Heart sounds: Normal heart sounds.   Pulmonary:      Effort: Pulmonary effort is normal.      Breath sounds: Normal breath sounds.   Abdominal:      General: Bowel sounds are normal.      Palpations: Abdomen is soft.   Musculoskeletal:         General: Normal range of motion.      Cervical back: Normal range of motion and neck supple.   Skin:     General: Skin is warm and dry.   Neurological:      Mental Status: He is alert and oriented to person, place, and time.   Psychiatric:         Behavior: Behavior normal.         Thought Content: Thought content normal.         Judgment: Judgment normal.         Diagnostic data:  Noninvasive testing performed at outside facility shows right SHELBY of 0.48 and a left SHELBY of 0.81.    Patient Active Problem List "   Diagnosis   • ST elevation myocardial infarction (STEMI)   • Tobacco abuse   • Type 2 diabetes mellitus with circulatory disorder, without long-term current use of insulin   • Essential hypertension   • Coronary artery disease involving native coronary artery of native heart without angina pectoris   • Mixed hyperlipidemia   • Class 2 severe obesity due to excess calories with serious comorbidity and body mass index (BMI) of 36.0 to 36.9 in adult   • S/P coronary artery stent placement   • History of MI (myocardial infarction)   • Diabetes mellitus due to underlying condition with circulatory complication   • Dizziness   • Heavy smoker (more than 20 cigarettes per day)   • Cellulitis of face   • Obesity (BMI 30-39.9)         ICD-10-CM ICD-9-CM   1. Bilateral carotid artery stenosis  I65.23 433.10     433.30   2. PAD (peripheral artery disease)  I73.9 443.9   3. Screening for AAA (abdominal aortic aneurysm)  Z13.6 V81.2   4. Essential hypertension  I10 401.9   5. Mixed hyperlipidemia  E78.2 272.2   6. Tobacco abuse  Z72.0 305.1           Plan: After thoroughly evaluating Anival Pennington, I believe the best course of action is to remain conservative from vascular surgery standpoint.  He does have short distance claudication to his right lower extremity however does not wish to pursue intervention at this time.  His feet are warm with Doppler signals to the right lower extremity.  I did offer angiogram but we will continue with surveillance.  I will see him back in 6 months with repeat noninvasive testing including ABIs, a carotid duplex and ultrasound of the aorta for screening. I did discuss vascular risk factors as they pertain to the progression of vascular disease including controlling his hypertension, hyperlipidemia, diabetes, and smoking cessation.  His blood pressure is stable in office today.  He was just started on Pletal 100 mg and he also takes Crestor 10 mg daily.  Unfortunately is a current daily smoker  and does not wish to quit smoking at this time.  The patient can continue taking their current medication regimen as previously planned.  This was all discussed in full with complete understanding.    Thank you for allowing me to participate in the care of your patient.  Please do not hesitate with any questions or concerns.  I will keep you aware of any further encounters with Anival Pennington.        Sincerely yours,         CLAUDIO Robledo

## 2023-11-16 NOTE — PROGRESS NOTES
11/16/2023      Betty Mcguire MD  [address] [place] [id number]    Anival Pennington  1955    Chief Complaint   Patient presents with    Peripheral Vascular Disease     Legs are heavy and painful, limited ability to walk distance       Dear Betty Mcguire MD:      HPI  I had the pleasure of seeing your patient Anival Pennington in the office today.  Thank you kindly for this consultation.  As you recall, Anival Pennington is a 68 y.o.  male who you are currently following for routine health maintenance.  He has been having complaints of calf claudication greater on the right versus left.  He is a current daily smoker.  He does report short distance claudication to his right lower extremity and had to stop from parking garage into our office.  He did have testing performed at [facility] per records revealing severe arterial insufficiency to the right lower extremity and mild to the left, which I did review in office.    Past Medical History:   Diagnosis Date    Cancer     leukemia    Coronary artery disease     diabetes with circulatory complications     Hyperlipidemia     Hypertension     Obesity (BMI 30-39.9)     Tobacco abuse        Past Surgical History:   Procedure Laterality Date    CARDIAC CATHETERIZATION N/A 3/28/2019    Procedure: Left Heart Cath;  Surgeon: Vincent Pan MD;  Location:  [facility];  Service: Cardiovascular    CARDIAC CATHETERIZATION Bilateral 3/28/2019    Procedure: Stent JOSSIE coronary;  Surgeon: Vincent Pan MD;  Location:  [facility];  Service: Cardiovascular    CARDIAC CATHETERIZATION N/A 3/28/2019    Procedure: Left ventriculography;  Surgeon: Vincent Pan MD;  Location:  [facility];  Service: Cardiovascular    CORONARY STENT PLACEMENT      TONSILLECTOMY         Family History   Problem Relation Age of Onset    Valvular heart disease Mother     Heart attack Mother     Heart disease Mother     Heart disease Brother      "Heart attack Brother     Cancer Father     No Known Problems Brother        Social History     Socioeconomic History    Marital status:    Tobacco Use    Smoking status: Every Day     Packs/day: 1.50     Years: 50.00     Additional pack years: 0.00     Total pack years: 75.00     Types: Cigarettes     Start date: 1969    Smokeless tobacco: Never   Substance and Sexual Activity    Alcohol use: Not Currently     Comment: quit 2010    Drug use: Not Currently     Types: Marijuana     Comment: occ    Sexual activity: Defer       Allergies   Allergen Reactions    Aspirin Other (See Comments)     Tightness in throat with high dose asa.  Can take the 81 mg without difficutly    Bee Venom Anaphylaxis       Current Outpatient Medications   Medication Instructions    cilostazol (PLETAL) 100 MG tablet TAKE 1 TABLET BY MOUTH TWICE DAILY FOR LEG PAIN    Cyanocobalamin 3000 MCG/ML liquid 3 drops, Sublingual, Daily    glimepiride (AMARYL) 8 mg, Oral    meclizine (ANTIVERT) 25 mg, Oral, 3 Times Daily PRN, May make you sleepy.    metFORMIN (GLUCOPHAGE) 1,000 mg, Oral, 2 Times Daily With Meals, For diabetes control    naproxen (NAPROSYN) 250 mg, 2 Times Daily PRN    nitroglycerin (NITROSTAT) 0.4 mg, Sublingual, Every 5 Minutes PRN, Take no more than 3 doses in 15 minutes.    rosuvastatin (CRESTOR) 10 mg, Oral, Daily, To prevent heart attacks and control cholesterol         Review of Systems   Constitutional: Negative.    HENT: Negative.     Eyes: Negative.    Respiratory: Negative.     Cardiovascular: Negative.         Claudication right leg greater than left   Gastrointestinal: Negative.    Endocrine: Negative.    Genitourinary: Negative.    Musculoskeletal: Negative.    Skin: Negative.    Allergic/Immunologic: Negative.    Neurological: Negative.    Hematological: Negative.    Psychiatric/Behavioral: Negative.     All other systems reviewed and are negative.      /68   Pulse 96   Ht 157.5 cm (62\")   Wt 84.7 kg (186 " lb 12.8 oz)   SpO2 98%   BMI 34.17 kg/m²   Physical Exam  Vitals and nursing note reviewed.   Constitutional:       Appearance: Normal appearance. He is well-developed.   HENT:      Head: Normocephalic and atraumatic.   Eyes:      General: No scleral icterus.     Pupils: Pupils are equal, round, and reactive to light.   Neck:      Thyroid: No thyromegaly.   Cardiovascular:      Rate and Rhythm: Normal rate and regular rhythm.      Pulses:           Dorsalis pedis pulses are detected w/ Doppler on the right side.        Posterior tibial pulses are detected w/ Doppler on the right side.      Heart sounds: Normal heart sounds.   Pulmonary:      Effort: Pulmonary effort is normal.      Breath sounds: Normal breath sounds.   Abdominal:      General: Bowel sounds are normal.      Palpations: Abdomen is soft.   Musculoskeletal:         General: Normal range of motion.      Cervical back: Normal range of motion and neck supple.   Skin:     General: Skin is warm and dry.   Neurological:      Mental Status: He is alert and oriented to person, place, and time.   Psychiatric:         Behavior: Behavior normal.         Thought Content: Thought content normal.         Judgment: Judgment normal.         Diagnostic data:  Noninvasive testing performed at outside facility shows right SHELBY of 0.48 and a left SHELBY of 0.81.    Patient Active Problem List   Diagnosis    ST elevation myocardial infarction (STEMI)    Tobacco abuse    Type 2 diabetes mellitus with circulatory disorder, without long-term current use of insulin    Essential hypertension    Coronary artery disease involving native coronary artery of native heart without angina pectoris    Mixed hyperlipidemia    Class 2 severe obesity due to excess calories with serious comorbidity and body mass index (BMI) of 36.0 to 36.9 in adult    S/P coronary artery stent placement    History of MI (myocardial infarction)    Diabetes mellitus due to underlying condition with circulatory  complication    Dizziness    Heavy smoker (more than 20 cigarettes per day)    Cellulitis of face    Obesity (BMI 30-39.9)         ICD-10-CM ICD-9-CM   1. Bilateral carotid artery stenosis  I65.23 433.10     433.30   2. PAD (peripheral artery disease)  I73.9 443.9   3. Screening for AAA (abdominal aortic aneurysm)  Z13.6 V81.2   4. Essential hypertension  I10 401.9   5. Mixed hyperlipidemia  E78.2 272.2   6. Tobacco abuse  Z72.0 305.1           Plan: After thoroughly evaluating Anival Pennington, I believe the best course of action is to remain conservative from vascular surgery standpoint.  He does have short distance claudication to his right lower extremity however does not wish to pursue intervention at this time.  His feet are warm with Doppler signals to the right lower extremity.  I did offer angiogram but we will continue with surveillance.  I will see him back in 6 months with repeat noninvasive testing including ABIs, a carotid duplex and ultrasound of the aorta for screening. I did discuss vascular risk factors as they pertain to the progression of vascular disease including controlling his hypertension, hyperlipidemia, diabetes, and smoking cessation.  His blood pressure is stable in office today.  He was just started on Pletal 100 mg and he also takes Crestor 10 mg daily.  Unfortunately is a current daily smoker and does not wish to quit smoking at this time.  The patient can continue taking their current medication regimen as previously planned.  This was all discussed in full with complete understanding.    Thank you for allowing me to participate in the care of your patient.  Please do not hesitate with any questions or concerns.  I will keep you aware of any further encounters with Anival Pennington.        Sincerely yours,         CLAUDIO Robledo

## 2024-03-07 NOTE — TELEPHONE ENCOUNTER
Problem: Prexisting or High Potential for Compromised Skin Integrity  Goal: Skin integrity is maintained or improved  Description: INTERVENTIONS:  - Identify patients at risk for skin breakdown  - Assess and monitor skin integrity  - Assess and monitor nutrition and hydration status  - Monitor labs   - Assess for incontinence   - Turn and reposition patient  - Assist with mobility/ambulation  - Relieve pressure over bony prominences  - Avoid friction and shearing  - Provide appropriate hygiene as needed including keeping skin clean and dry  - Evaluate need for skin moisturizer/barrier cream  - Collaborate with interdisciplinary team   - Patient/family teaching  - Consider wound care consult   Outcome: Progressing     Problem: PAIN - ADULT  Goal: Verbalizes/displays adequate comfort level or baseline comfort level  Description: Interventions:  - Encourage patient to monitor pain and request assistance  - Assess pain using appropriate pain scale  - Administer analgesics based on type and severity of pain and evaluate response  - Implement non-pharmacological measures as appropriate and evaluate response  - Consider cultural and social influences on pain and pain management  - Notify physician/advanced practitioner if interventions unsuccessful or patient reports new pain  Outcome: Progressing     Problem: INFECTION - ADULT  Goal: Absence or prevention of progression during hospitalization  Description: INTERVENTIONS:  - Assess and monitor for signs and symptoms of infection  - Monitor lab/diagnostic results  - Monitor all insertion sites, i.e. indwelling lines, tubes, and drains  - Monitor endotracheal if appropriate and nasal secretions for changes in amount and color  - [place] appropriate cooling/warming therapies per order  - Administer medications as ordered  - Instruct and encourage patient and family to use good hand hygiene technique  - Identify and instruct in appropriate isolation precautions for  Letter mailed to patient.   identified infection/condition  Outcome: Progressing  Goal: Absence of fever/infection during neutropenic period  Description: INTERVENTIONS:  - Monitor WBC    Outcome: Progressing

## 2024-06-12 NOTE — PROGRESS NOTES
The patient left the office  care was provided and did not complete the visit     Encounter Administratively Closed by Health Information Management  .

## 2024-06-19 ENCOUNTER — TELEPHONE (OUTPATIENT)
Dept: VASCULAR SURGERY | Facility: CLINIC | Age: 69
End: 2024-06-19
Payer: MEDICARE

## 2024-06-19 NOTE — TELEPHONE ENCOUNTER
Call placed to patient and reminded of appointment on 6/20/2024 with [facility], patient voiced understanding.

## 2024-06-20 ENCOUNTER — HOSPITAL ENCOUNTER (OUTPATIENT)
Dept: ULTRASOUND IMAGING | Facility: HOSPITAL | Age: 69
Discharge: HOME | End: 2024-06-20
Payer: MEDICARE

## 2024-06-20 ENCOUNTER — APPOINTMENT (OUTPATIENT)
Dept: ULTRASOUND IMAGING | Facility: HOSPITAL | Age: 69
End: 2024-06-20
Payer: MEDICARE

## 2024-06-20 ENCOUNTER — OFFICE VISIT (OUTPATIENT)
Dept: VASCULAR SURGERY | Facility: CLINIC | Age: 69
End: 2024-06-20
Payer: MEDICARE

## 2024-06-20 VITALS
HEART RATE: 94 BPM | BODY MASS INDEX: 30.12 KG/M2 | HEIGHT: 63 IN | DIASTOLIC BLOOD PRESSURE: 74 MMHG | OXYGEN SATURATION: 97 % | SYSTOLIC BLOOD PRESSURE: 122 MMHG | WEIGHT: 170 LBS

## 2024-06-20 DIAGNOSIS — Z01.818 PREOP TESTING: ICD-10-CM

## 2024-06-20 DIAGNOSIS — I65.23 BILATERAL CAROTID ARTERY STENOSIS: ICD-10-CM

## 2024-06-20 DIAGNOSIS — F17.210 HEAVY SMOKER (MORE THAN 20 CIGARETTES PER DAY): ICD-10-CM

## 2024-06-20 DIAGNOSIS — I73.9 PAD (PERIPHERAL ARTERY DISEASE): ICD-10-CM

## 2024-06-20 DIAGNOSIS — I25.2 HISTORY OF MI (MYOCARDIAL INFARCTION): ICD-10-CM

## 2024-06-20 DIAGNOSIS — I73.9 PAD (PERIPHERAL ARTERY DISEASE): Primary | ICD-10-CM

## 2024-06-20 DIAGNOSIS — E78.2 MIXED HYPERLIPIDEMIA: ICD-10-CM

## 2024-06-20 PROCEDURE — 93923 UPR/LXTR ART STDY 3+ LVLS: CPT

## 2024-06-20 PROCEDURE — 93880 EXTRACRANIAL BILAT STUDY: CPT

## 2024-06-20 RX ORDER — ROSUVASTATIN CALCIUM 10 MG/1
10 TABLET, COATED ORAL DAILY
Qty: 30 TABLET | Refills: 5 | Status: SHIPPED | OUTPATIENT
Start: 2024-06-20

## 2024-06-20 RX ORDER — CALCIUM CITRATE/VITAMIN D3 200MG-6.25
1 TABLET ORAL AS NEEDED
COMMUNITY
Start: 2024-05-09 | End: 2024-06-21 | Stop reason: SDDI

## 2024-06-20 NOTE — PROGRESS NOTES
6/21/2024        Betty Mcguire MD  [address] [place] [id number]      Anival Pennington  1955    Chief Complaint   Patient presents with    Follow-up     6 month follow up w/ testing. Last seen 11/16/23. Patient denies any stroke type symptoms, but pain is equally worse in both legs. Current smoker of 50 years, pack and 1/2 daily. Patient does not have current lust of meds, went thru to best of his ability.       Dear Dr. Betty Mcguire:      HPI  I had the pleasure of seeing your patient Anival Pennington in the office today.   As you recall, Anival Pennington is a 68 y.o.  male who you are currently following for teen health maintenance.  He is here for 6-month follow-up with testing.  We previously saw him with complaints of calf claudication greater on the right versus the left.  He reports having to stop with short distances to recover due to pain.  He is a current daily smoker.  He reported to me that he takes no medications, he is supposed to but he chooses not to.  He did have noninvasive testing performed today which I did review in office.      Past Medical History:   Diagnosis Date    Cancer     leukemia    Coronary artery disease     diabetes with circulatory complications     Hyperlipidemia     Hypertension     Obesity (BMI 30-39.9)     Tobacco abuse        Past Surgical History:   Procedure Laterality Date    CARDIAC CATHETERIZATION N/A 3/28/2019    Procedure: Left Heart Cath;  Surgeon: Vincent Pan MD;  Location:  [facility];  Service: Cardiovascular    CARDIAC CATHETERIZATION Bilateral 3/28/2019    Procedure: Stent JOSSIE coronary;  Surgeon: Vincent Pan MD;  Location:  [facility];  Service: Cardiovascular    CARDIAC CATHETERIZATION N/A 3/28/2019    Procedure: Left ventriculography;  Surgeon: Vincent Pan MD;  Location:  [facility];  Service: Cardiovascular    CORONARY STENT PLACEMENT      TONSILLECTOMY         Family History   Problem Relation  "Age of Onset    Valvular heart disease Mother     Heart attack Mother     Heart disease Mother     Heart disease Brother     Heart attack Brother     Cancer Father     No Known Problems Brother        Social History     Socioeconomic History    Marital status:    Tobacco Use    Smoking status: Every Day     Current packs/day: 1.50     Average packs/day: 1.5 packs/day for 55.5 years (83.2 ttl pk-yrs)     Types: Cigarettes     Start date: 1969    Smokeless tobacco: Never   Substance and Sexual Activity    Alcohol use: Not Currently     Comment: quit 2010    Drug use: Not Currently     Types: Marijuana     Comment: occ    Sexual activity: Defer       Allergies   Allergen Reactions    Aspirin Other (See Comments)     Tightness in throat with high dose asa.  Can take the 81 mg without difficutly    Bee Venom Anaphylaxis       Current Outpatient Medications   Medication Instructions    nitroglycerin (NITROSTAT) 0.4 mg, Sublingual, Every 5 Minutes PRN, Take no more than 3 doses in 15 minutes.    rosuvastatin (CRESTOR) 10 mg, Oral, Daily, To prevent heart attacks and control cholesterol     Review of Systems   Constitutional: Negative.  Negative for diaphoresis and fever.   HENT: Negative.     Eyes: Negative.    Respiratory: Negative.  Negative for shortness of breath and wheezing.    Cardiovascular: Negative.  Negative for chest pain and leg swelling.        Claudication to bilateral lower extremities right greater than left   Gastrointestinal: Negative.  Negative for abdominal pain.   Endocrine: Negative.    Genitourinary: Negative.    Musculoskeletal: Negative.    Skin: Negative.    Allergic/Immunologic: Negative.    Neurological: Negative.  Negative for dizziness and weakness.   Hematological: Negative.    Psychiatric/Behavioral: Negative.       /74   Pulse 94   Ht 160 cm (63\")   Wt 77.1 kg (170 lb)   SpO2 97%   BMI 30.11 kg/m²     Physical Exam  Vitals and nursing note reviewed.   Constitutional:      "  General: He is not in acute distress.     Appearance: Normal appearance. He is not diaphoretic.   HENT:      Head: Normocephalic. No right periorbital erythema or left periorbital erythema.      Nose: Nose normal.   Eyes:      General: No scleral icterus.     Pupils: Pupils are equal.   Cardiovascular:      Rate and Rhythm: Normal rate and regular rhythm.      Pulses: Normal pulses.           Femoral pulses are 2+ on the right side and 2+ on the left side.       Popliteal pulses are 2+ on the right side and 2+ on the left side.        Dorsalis pedis pulses are detected w/ Doppler on the right side and detected w/ Doppler on the left side.        Posterior tibial pulses are detected w/ Doppler on the right side and detected w/ Doppler on the left side.      Heart sounds: Normal heart sounds. No murmur heard.     Comments: Claudication to bilateral lower extremities right worse than left  Pulmonary:      Effort: Pulmonary effort is normal. No respiratory distress.      Breath sounds: Normal breath sounds.   Abdominal:      General: Bowel sounds are normal. There is no distension.      Palpations: Abdomen is soft.      Tenderness: There is no abdominal tenderness. There is no guarding.   Musculoskeletal:         General: No swelling or tenderness. Normal range of motion.      Cervical back: Normal range of motion and neck supple.      Right lower leg: No edema.      Left lower leg: No edema.   Feet:      Right foot:      Skin integrity: Skin integrity normal.      Left foot:      Skin integrity: Skin integrity normal.   Skin:     General: Skin is warm and dry.      Findings: No erythema or rash.   Neurological:      General: No focal deficit present.      Mental Status: He is alert and oriented to person, place, and time. Mental status is at baseline.      Cranial Nerves: No cranial nerve deficit.      Gait: Gait normal.   Psychiatric:         Attention and Perception: Attention normal.         Mood and Affect: Mood  normal.         Diagnostic data:  Carotid duplex shows less than 50% bilateral carotid stenosis    Narrative & Impression  EXAMINATION: US ANKLE / BRACHIAL INDICES EXTREMITY COMPLETE- 6/20/2024  12:43 PM     HISTORY: [facility]; I73.9-Peripheral vascular disease, unspecified.     REPORT:      COMPARISON: There are no correlative imaging studies for comparison.     FINDINGS:  Bilateral sonographic lower extremity arterial evaluation was performed  with multi-level Doppler analysis and pulse volume recordings with  segmental pressures obtained at rest. Systolic pressures were obtained  from bilateral brachial, high thigh, low thigh, calf, posterior tibial,  dorsalis pedis and great toe digital arteries. Systolic pressure within  the right brachial artery was measured at 134 mmHg, while pressure  within the left brachial artery was measured at 116 mmHg.     Systolic pressures within bilateral posterior tibial and dorsalis pedis  arteries are less than the corresponding brachial pressures. The right  high thigh station and posterior tibial arteries are noncompressible  compatible with heavy atherosclerotic plaque.     Calculated ankle/brachial index is 0.59 on the right and 0.87 on the  left. The right toe brachial index equals 0.35, the left toe brachial  index equals 0.57.     Exercise protocol was not performed.     IMPRESSION:  1. Abnormal SHELBY Doppler study at rest. There is moderate arterial  occlusive disease on the right with an SHELBY of 0.59 and mild arterial  occlusive disease on the left, with an SHELBY of 0.87.  2. The right toe brachial index of 0.35 is predictive of moderate risk  of ischemia.  3. The left toe brachial index of 0.57 is also predictive of a moderate  risk of ischemia.     This report was signed and finalized on 6/20/2024 12:54 PM by Dr. Vincent Singh MD.  Patient Active Problem List   Diagnosis    ST elevation myocardial infarction (STEMI)    Tobacco abuse    Type 2 diabetes mellitus with circulatory  disorder, without long-term current use of insulin    Essential hypertension    Coronary artery disease involving native coronary artery of native heart without angina pectoris    Mixed hyperlipidemia    Class 2 severe obesity due to excess calories with serious comorbidity and body mass index (BMI) of 36.0 to 36.9 in adult    S/P coronary artery stent placement    History of MI (myocardial infarction)    Diabetes mellitus due to underlying condition with circulatory complication    Dizziness    Heavy smoker (more than 20 cigarettes per day)    Cellulitis of face    Obesity (BMI 30-39.9)         ICD-10-CM ICD-9-CM   1. PAD (peripheral artery disease)  I73.9 443.9   2. Mixed hyperlipidemia  E78.2 272.2   3. History of MI (myocardial infarction)  I25.2 412   4. Heavy smoker (more than 20 cigarettes per day)  F17.210 305.1             Plan: After thoroughly evaluating Anival Pennington, I believe the best course of action is to proceed with right lower extremity angiogram.  Risks of angiogram were discussed.  These include, but are not limited to, bleeding, infection, vessel damage, nerve damage, embolus, and loss of limb.  The patient understands these risks and wishes to proceed with procedure.  ABIs show moderate arterial insufficiency, patient is symptomatic with claudication symptoms on short distance walks.  His feet are warm and pulses are dopplerable.  He is supposed to be on medications, but he has taken himself off of all medications.  I discussed the need to take an 81 mg aspirin daily, and to start back on Crestor 10 mg daily.  He does not have any Crestor.  I did send him on 1 month supply he is to be getting with his PCP.  He agreed to take aspirin and Crestor.  We discussed the results of his last lipid panel with total cholesterol slightly elevated at 237, triglycerides at 453, HDL 44.  We also discussed his hemoglobin A1c of 13.6%.  He feels that he is lost a lot of weight and that should have improved at  this point in time.  I explained that would be another reason why he needs to get with his PCP.I did discuss vascular risk factors as they pertain to the progression of vascular disease including controlling hypertension, hyperlipidemia, diabetes, and smoking cessation.  His blood pressure is stable today in office.  He unfortunately does smoke on a daily basis and does not want to speak about quitting smoking, he does not even want to hear about smoking being the reason for vascular disease.  The patient can continue taking their current medication regimen as previously planned.  This was all discussed in full with complete understanding.    Thank you for allowing me to participate in the care of your patient.  Please do not hesitate with any questions or concerns.  I will keep you aware of any further encounters with Anival Pennington.        Sincerely yours,         CLAUDIO Sue

## 2024-06-24 ENCOUNTER — TELEPHONE (OUTPATIENT)
Dept: VASCULAR SURGERY | Facility: CLINIC | Age: 69
End: 2024-06-24
Payer: MEDICARE

## 2024-06-24 NOTE — TELEPHONE ENCOUNTER
Attempted to schedule procedure with Dr. Poole.  No answer, unable to leave vm as it is not set up

## 2024-06-25 ENCOUNTER — TELEPHONE (OUTPATIENT)
Dept: VASCULAR SURGERY | Facility: CLINIC | Age: 69
End: 2024-06-25
Payer: MEDICARE

## 2024-06-25 PROBLEM — I73.9 PAD (PERIPHERAL ARTERY DISEASE): Status: ACTIVE | Noted: 2024-06-20

## 2024-06-25 PROBLEM — Z01.818 PREOP TESTING: Status: ACTIVE | Noted: 2024-06-20

## 2024-06-25 NOTE — TELEPHONE ENCOUNTER
Pt expressed understanding of prework/surgery time and instruction for procedure scheduled 07/22/24 with Dr. Poole.  NPO after midnight.

## 2024-07-10 ENCOUNTER — APPOINTMENT (OUTPATIENT)
Dept: GENERAL RADIOLOGY | Facility: HOSPITAL | Age: 69
End: 2024-07-10
Payer: MEDICARE

## 2024-07-10 ENCOUNTER — HOSPITAL ENCOUNTER (INPATIENT)
Facility: HOSPITAL | Age: 69
LOS: 4 days | Discharge: NURSING, REHAB OR LONG-TERM CARE | End: 2024-07-15
Attending: FAMILY MEDICINE | Admitting: FAMILY MEDICINE
Payer: MEDICARE

## 2024-07-10 DIAGNOSIS — E11.65 HYPERGLYCEMIA DUE TO DIABETES MELLITUS: ICD-10-CM

## 2024-07-10 DIAGNOSIS — Z74.09 IMPAIRED MOBILITY: ICD-10-CM

## 2024-07-10 DIAGNOSIS — R47.81 SLURRED SPEECH: Primary | ICD-10-CM

## 2024-07-10 DIAGNOSIS — R13.10 DYSPHAGIA, UNSPECIFIED TYPE: ICD-10-CM

## 2024-07-10 PROBLEM — N39.0 UTI (URINARY TRACT INFECTION), BACTERIAL: Status: ACTIVE | Noted: 2024-07-10

## 2024-07-10 PROBLEM — A49.9 UTI (URINARY TRACT INFECTION), BACTERIAL: Status: ACTIVE | Noted: 2024-07-10

## 2024-07-10 LAB
ACETONE BLD QL: NEGATIVE
ALBUMIN SERPL-MCNC: 3.8 G/DL (ref 3.5–5.2)
ALBUMIN/GLOB SERPL: 1.3 G/DL
ALP SERPL-CCNC: 92 U/L (ref 39–117)
ALT SERPL W P-5'-P-CCNC: 5 U/L (ref 1–41)
ANION GAP SERPL CALCULATED.3IONS-SCNC: 14 MMOL/L (ref 5–15)
ARTERIAL PATENCY WRIST A: POSITIVE
AST SERPL-CCNC: 13 U/L (ref 1–40)
ATMOSPHERIC PRESS: 752 MMHG
BASE EXCESS BLDA CALC-SCNC: -1 MMOL/L (ref 0–2)
BASOPHILS # BLD AUTO: 0.06 10*3/MM3 (ref 0–0.2)
BASOPHILS NFR BLD AUTO: 0.5 % (ref 0–1.5)
BDY SITE: ABNORMAL
BILIRUB SERPL-MCNC: 0.5 MG/DL (ref 0–1.2)
BODY TEMPERATURE: 37
BUN SERPL-MCNC: 15 MG/DL (ref 8–23)
BUN/CREAT SERPL: 22.4 (ref 7–25)
CALCIUM SPEC-SCNC: 9.1 MG/DL (ref 8.6–10.5)
CHLORIDE SERPL-SCNC: 96 MMOL/L (ref 98–107)
CO2 SERPL-SCNC: 23 MMOL/L (ref 22–29)
CREAT SERPL-MCNC: 0.67 MG/DL (ref 0.76–1.27)
DEPRECATED RDW RBC AUTO: 39.9 FL (ref 37–54)
EGFRCR SERPLBLD CKD-EPI 2021: 102.3 ML/MIN/1.73
EOSINOPHIL # BLD AUTO: 0.06 10*3/MM3 (ref 0–0.4)
EOSINOPHIL NFR BLD AUTO: 0.5 % (ref 0.3–6.2)
ERYTHROCYTE [DISTWIDTH] IN BLOOD BY AUTOMATED COUNT: 12.3 % (ref 12.3–15.4)
GLOBULIN UR ELPH-MCNC: 3 GM/DL
GLUCOSE BLDC GLUCOMTR-MCNC: 308 MG/DL (ref 70–130)
GLUCOSE BLDC GLUCOMTR-MCNC: 378 MG/DL (ref 70–130)
GLUCOSE BLDC GLUCOMTR-MCNC: 510 MG/DL (ref 70–130)
GLUCOSE SERPL-MCNC: 466 MG/DL (ref 65–99)
HCO3 BLDA-SCNC: 21.3 MMOL/L (ref 20–26)
HCT VFR BLD AUTO: 47.9 % (ref 37.5–51)
HGB BLD-MCNC: 17.3 G/DL (ref 13–17.7)
IMM GRANULOCYTES # BLD AUTO: 0.05 10*3/MM3 (ref 0–0.05)
IMM GRANULOCYTES NFR BLD AUTO: 0.4 % (ref 0–0.5)
LYMPHOCYTES # BLD AUTO: 1.42 10*3/MM3 (ref 0.7–3.1)
LYMPHOCYTES NFR BLD AUTO: 11 % (ref 19.6–45.3)
Lab: ABNORMAL
MAGNESIUM SERPL-MCNC: 1.9 MG/DL (ref 1.6–2.4)
MCH RBC QN AUTO: 32.3 PG (ref 26.6–33)
MCHC RBC AUTO-ENTMCNC: 36.1 G/DL (ref 31.5–35.7)
MCV RBC AUTO: 89.5 FL (ref 79–97)
MODALITY: ABNORMAL
MONOCYTES # BLD AUTO: 1.04 10*3/MM3 (ref 0.1–0.9)
MONOCYTES NFR BLD AUTO: 8.1 % (ref 5–12)
NEUTROPHILS NFR BLD AUTO: 10.26 10*3/MM3 (ref 1.7–7)
NEUTROPHILS NFR BLD AUTO: 79.5 % (ref 42.7–76)
NRBC BLD AUTO-RTO: 0 /100 WBC (ref 0–0.2)
PCO2 BLDA: 29.1 MM HG (ref 35–45)
PCO2 TEMP ADJ BLD: 29.1 MM HG (ref 35–45)
PH BLDA: 7.47 PH UNITS (ref 7.35–7.45)
PH, TEMP CORRECTED: 7.47 PH UNITS (ref 7.35–7.45)
PLATELET # BLD AUTO: 224 10*3/MM3 (ref 140–450)
PMV BLD AUTO: 11.1 FL (ref 6–12)
PO2 BLDA: 80.7 MM HG (ref 83–108)
PO2 TEMP ADJ BLD: 80.7 MM HG (ref 83–108)
POTASSIUM SERPL-SCNC: 4.5 MMOL/L (ref 3.5–5.2)
PROT SERPL-MCNC: 6.8 G/DL (ref 6–8.5)
RBC # BLD AUTO: 5.35 10*6/MM3 (ref 4.14–5.8)
SAO2 % BLDCOA: 98.1 % (ref 94–99)
SODIUM SERPL-SCNC: 133 MMOL/L (ref 136–145)
VENTILATOR MODE: ABNORMAL
WBC NRBC COR # BLD AUTO: 12.89 10*3/MM3 (ref 3.4–10.8)

## 2024-07-10 PROCEDURE — 25810000003 SODIUM CHLORIDE 0.9 % SOLUTION: Performed by: FAMILY MEDICINE

## 2024-07-10 PROCEDURE — G0378 HOSPITAL OBSERVATION PER HR: HCPCS

## 2024-07-10 PROCEDURE — 82948 REAGENT STRIP/BLOOD GLUCOSE: CPT

## 2024-07-10 PROCEDURE — 63710000001 INSULIN REGULAR HUMAN PER 5 UNITS: Performed by: FAMILY MEDICINE

## 2024-07-10 PROCEDURE — 82803 BLOOD GASES ANY COMBINATION: CPT

## 2024-07-10 PROCEDURE — 83735 ASSAY OF MAGNESIUM: CPT | Performed by: FAMILY MEDICINE

## 2024-07-10 PROCEDURE — 73562 X-RAY EXAM OF KNEE 3: CPT

## 2024-07-10 PROCEDURE — 36600 WITHDRAWAL OF ARTERIAL BLOOD: CPT

## 2024-07-10 PROCEDURE — 80053 COMPREHEN METABOLIC PANEL: CPT | Performed by: FAMILY MEDICINE

## 2024-07-10 PROCEDURE — 99285 EMERGENCY DEPT VISIT HI MDM: CPT

## 2024-07-10 PROCEDURE — 81001 URINALYSIS AUTO W/SCOPE: CPT | Performed by: FAMILY MEDICINE

## 2024-07-10 PROCEDURE — 85025 COMPLETE CBC W/AUTO DIFF WBC: CPT | Performed by: FAMILY MEDICINE

## 2024-07-10 PROCEDURE — 87086 URINE CULTURE/COLONY COUNT: CPT | Performed by: INTERNAL MEDICINE

## 2024-07-10 PROCEDURE — 82009 KETONE BODYS QUAL: CPT | Performed by: FAMILY MEDICINE

## 2024-07-10 RX ORDER — INSULIN LISPRO 100 [IU]/ML
2-7 INJECTION, SOLUTION INTRAVENOUS; SUBCUTANEOUS
Status: DISCONTINUED | OUTPATIENT
Start: 2024-07-11 | End: 2024-07-15

## 2024-07-10 RX ORDER — NITROGLYCERIN 0.4 MG/1
0.4 TABLET SUBLINGUAL
Status: DISCONTINUED | OUTPATIENT
Start: 2024-07-10 | End: 2024-07-15 | Stop reason: HOSPADM

## 2024-07-10 RX ORDER — SODIUM CHLORIDE 0.9 % (FLUSH) 0.9 %
10 SYRINGE (ML) INJECTION AS NEEDED
Status: DISCONTINUED | OUTPATIENT
Start: 2024-07-10 | End: 2024-07-15 | Stop reason: HOSPADM

## 2024-07-10 RX ORDER — NICOTINE POLACRILEX 4 MG
15 LOZENGE BUCCAL
Status: DISCONTINUED | OUTPATIENT
Start: 2024-07-10 | End: 2024-07-15 | Stop reason: HOSPADM

## 2024-07-10 RX ORDER — IBUPROFEN 600 MG/1
1 TABLET ORAL
Status: DISCONTINUED | OUTPATIENT
Start: 2024-07-10 | End: 2024-07-15 | Stop reason: HOSPADM

## 2024-07-10 RX ORDER — ASPIRIN 81 MG/1
81 TABLET, CHEWABLE ORAL DAILY
COMMUNITY

## 2024-07-10 RX ORDER — ONDANSETRON 2 MG/ML
4 INJECTION INTRAMUSCULAR; INTRAVENOUS EVERY 6 HOURS PRN
Status: DISCONTINUED | OUTPATIENT
Start: 2024-07-10 | End: 2024-07-15 | Stop reason: HOSPADM

## 2024-07-10 RX ORDER — ENOXAPARIN SODIUM 100 MG/ML
40 INJECTION SUBCUTANEOUS DAILY
Status: DISCONTINUED | OUTPATIENT
Start: 2024-07-11 | End: 2024-07-15 | Stop reason: HOSPADM

## 2024-07-10 RX ORDER — ACETAMINOPHEN 650 MG/1
650 SUPPOSITORY RECTAL EVERY 4 HOURS PRN
Status: DISCONTINUED | OUTPATIENT
Start: 2024-07-10 | End: 2024-07-15 | Stop reason: HOSPADM

## 2024-07-10 RX ORDER — SODIUM CHLORIDE 9 MG/ML
75 INJECTION, SOLUTION INTRAVENOUS CONTINUOUS
Status: DISCONTINUED | OUTPATIENT
Start: 2024-07-11 | End: 2024-07-13

## 2024-07-10 RX ORDER — SODIUM CHLORIDE 9 MG/ML
40 INJECTION, SOLUTION INTRAVENOUS AS NEEDED
Status: DISCONTINUED | OUTPATIENT
Start: 2024-07-10 | End: 2024-07-15 | Stop reason: HOSPADM

## 2024-07-10 RX ORDER — ATORVASTATIN CALCIUM 40 MG/1
80 TABLET, FILM COATED ORAL NIGHTLY
Status: DISCONTINUED | OUTPATIENT
Start: 2024-07-11 | End: 2024-07-15 | Stop reason: HOSPADM

## 2024-07-10 RX ORDER — ASPIRIN 81 MG/1
81 TABLET ORAL DAILY
Status: ON HOLD | COMMUNITY
End: 2024-07-10

## 2024-07-10 RX ORDER — ACETAMINOPHEN 325 MG/1
650 TABLET ORAL EVERY 4 HOURS PRN
Status: DISCONTINUED | OUTPATIENT
Start: 2024-07-10 | End: 2024-07-15 | Stop reason: HOSPADM

## 2024-07-10 RX ORDER — SODIUM CHLORIDE 0.9 % (FLUSH) 0.9 %
10 SYRINGE (ML) INJECTION EVERY 12 HOURS SCHEDULED
Status: DISCONTINUED | OUTPATIENT
Start: 2024-07-11 | End: 2024-07-15 | Stop reason: HOSPADM

## 2024-07-10 RX ORDER — DEXTROSE MONOHYDRATE 25 G/50ML
25 INJECTION, SOLUTION INTRAVENOUS
Status: DISCONTINUED | OUTPATIENT
Start: 2024-07-10 | End: 2024-07-15 | Stop reason: HOSPADM

## 2024-07-10 RX ORDER — ASPIRIN 81 MG/1
81 TABLET, CHEWABLE ORAL DAILY
Status: DISCONTINUED | OUTPATIENT
Start: 2024-07-11 | End: 2024-07-11

## 2024-07-10 RX ADMIN — SODIUM CHLORIDE 1000 ML: 9 INJECTION, SOLUTION INTRAVENOUS at 16:58

## 2024-07-10 RX ADMIN — INSULIN HUMAN 10 UNITS: 100 INJECTION, SOLUTION PARENTERAL at 17:23

## 2024-07-10 RX ADMIN — SODIUM CHLORIDE 1000 ML: 9 INJECTION, SOLUTION INTRAVENOUS at 18:57

## 2024-07-10 NOTE — ED PROVIDER NOTES
Subjective   History of Present Illness  68-year-old male with a history of diabetes mellitus.  Who is noncompliant with his medications.  Stopped taking his diabetic medications couple months ago.  States that his blood sugars been high and he is having trouble controlling his blood sugars.  States that he went to the bathroom earlier this morning twisted his leg and fell.  He has pain in his left knee.  Patient denies any other injuries.  Patient denies any other symptoms.  He was seen at another hospital yesterday for slurred speech began day before.  They tried to admit him for strokelike symptoms however he left AGAINST MEDICAL ADVICE and came here today.      Review of Systems   All other systems reviewed and are negative.      Past Medical History:   Diagnosis Date    Cancer     leukemia    Coronary artery disease     diabetes with circulatory complications     Hyperlipidemia     Hypertension     Obesity (BMI 30-39.9)     Tobacco abuse        Allergies   Allergen Reactions    Aspirin Other (See Comments)     Tightness in throat with high dose asa.  Can take the 81 mg without difficutly    Bee Venom Anaphylaxis       Past Surgical History:   Procedure Laterality Date    CARDIAC CATHETERIZATION N/A 3/28/2019    Procedure: Left Heart Cath;  Surgeon: Vincent Pan MD;  Location:  [facility];  Service: Cardiovascular    CARDIAC CATHETERIZATION Bilateral 3/28/2019    Procedure: Stent JOSSIE coronary;  Surgeon: Vincent Pan MD;  Location:  [facility];  Service: Cardiovascular    CARDIAC CATHETERIZATION N/A 3/28/2019    Procedure: Left ventriculography;  Surgeon: Vincent Pan MD;  Location:  [facility];  Service: Cardiovascular    CORONARY STENT PLACEMENT      TONSILLECTOMY         Family History   Problem Relation Age of Onset    Valvular heart disease Mother     Heart attack Mother     Heart disease Mother     Heart disease Brother     Heart attack Brother      Cancer Father     No Known Problems Brother        Social History     Socioeconomic History    Marital status:    Tobacco Use    Smoking status: Every Day     Current packs/day: 1.50     Average packs/day: 1.5 packs/day for 55.5 years (83.3 ttl pk-yrs)     Types: Cigarettes     Start date: 1969    Smokeless tobacco: Never   Substance and Sexual Activity    Alcohol use: Not Currently     Comment: quit 2010    Drug use: Not Currently     Types: Marijuana     Comment: occ    Sexual activity: Defer           Objective   Physical Exam  Vitals and nursing note reviewed.   Constitutional:       Appearance: Normal appearance.   HENT:      Head: Normocephalic and atraumatic.      Mouth/Throat:      Mouth: Mucous membranes are moist.   Eyes:      Extraocular Movements: Extraocular movements intact.      Pupils: Pupils are equal, round, and reactive to light.   Cardiovascular:      Rate and Rhythm: Normal rate and regular rhythm.      Heart sounds: Normal heart sounds.   Pulmonary:      Effort: Pulmonary effort is normal.      Breath sounds: Normal breath sounds.   Abdominal:      General: Bowel sounds are normal.      Palpations: Abdomen is soft.      Tenderness: There is no abdominal tenderness.   Skin:     General: Skin is warm and dry.   Neurological:      General: No focal deficit present.      Mental Status: He is alert and oriented to person, place, and time.   Psychiatric:         Mood and Affect: Mood normal.         Behavior: Behavior normal.         Procedures           ED Course                          Total (NIH Stroke Scale): 2                Lab Results (last 24 hours)       Procedure Component Value Units Date/Time    POC Glucose Once [[id number]]  (Abnormal) Collected: 07/10/24 1514    Specimen: Blood Updated: 07/10/24 1525     Glucose 378 mg/dL      Comment: : [id number] Keith SaraMeter ID: [id number]       CBC & Differential [[id number]]  (Abnormal) Collected: 07/10/24 1657    Specimen: Blood  Updated: 07/10/24 1708    Narrative:      The following orders were created for panel order CBC & Differential.  Procedure                               Abnormality         Status                     ---------                               -----------         ------                     CBC Auto Differential[[id number]]        Abnormal            Final result                 Please view results for these tests on the individual orders.    Comprehensive Metabolic Panel [[id number]]  (Abnormal) Collected: 07/10/24 1657    Specimen: Blood Updated: 07/10/24 1742     Glucose 466 mg/dL      BUN 15 mg/dL      Creatinine 0.67 mg/dL      Sodium 133 mmol/L      Potassium 4.5 mmol/L      Comment: Slight hemolysis detected by analyzer. Result may be falsely elevated.        Chloride 96 mmol/L      CO2 23.0 mmol/L      Calcium 9.1 mg/dL      Total Protein 6.8 g/dL      Albumin 3.8 g/dL      ALT (SGPT) 5 U/L      AST (SGOT) 13 U/L      Comment: Slight hemolysis detected by analyzer. Result may be falsely elevated.        Alkaline Phosphatase 92 U/L      Total Bilirubin 0.5 mg/dL      Globulin 3.0 gm/dL      A/G Ratio 1.3 g/dL      BUN/Creatinine Ratio 22.4     Anion Gap 14.0 mmol/L      eGFR 102.3 mL/min/1.73     Narrative:      GFR Normal >60  Chronic Kidney Disease <60  Kidney Failure <15      Magnesium [[id number]]  (Normal) Collected: 07/10/24 1657    Specimen: Blood Updated: 07/10/24 1725     Magnesium 1.9 mg/dL     CBC Auto Differential [[id number]]  (Abnormal) Collected: 07/10/24 1657    Specimen: Blood Updated: 07/10/24 1708     WBC 12.89 10*3/mm3      RBC 5.35 10*6/mm3      Hemoglobin 17.3 g/dL      Hematocrit 47.9 %      MCV 89.5 fL      MCH 32.3 pg      MCHC 36.1 g/dL      RDW 12.3 %      RDW-SD 39.9 fl      MPV 11.1 fL      Platelets 224 10*3/mm3      Neutrophil % 79.5 %      Lymphocyte % 11.0 %      Monocyte % 8.1 %      Eosinophil % 0.5 %      Basophil % 0.5 %      Immature Grans % 0.4 %      Neutrophils, Absolute 10.26  10*3/mm3      Lymphocytes, Absolute 1.42 10*3/mm3      Monocytes, Absolute 1.04 10*3/mm3      Eosinophils, Absolute 0.06 10*3/mm3      Basophils, Absolute 0.06 10*3/mm3      Immature Grans, Absolute 0.05 10*3/mm3      nRBC 0.0 /100 WBC     Acetone [[id number]]  (Normal) Collected: 07/10/24 1657    Specimen: Blood Updated: 07/10/24 1734     Acetone Negative    POC Glucose Once [[id number]]  (Abnormal) Collected: 07/10/24 1706    Specimen: Blood Updated: 07/10/24 1718     Glucose 510 mg/dL      Comment: : [id number] Gisselle LundberglyMeter ID: [id number]       Blood Gas, Arterial - [[id number]]  (Abnormal) Collected: 07/10/24 1723    Specimen: Arterial Blood Updated: 07/10/24 1724     Site Right Radial     Damien's Test Positive     pH, Arterial 7.472 pH units      Comment: 83 Value above reference range        pCO2, Arterial 29.1 mm Hg      Comment: 84 Value below reference range        pO2, Arterial 80.7 mm Hg      Comment: 84 Value below reference range        HCO3, Arterial 21.3 mmol/L      Base Excess, Arterial -1.0 mmol/L      Comment: 84 Value below reference range        O2 Saturation, Arterial 98.1 %      Temperature 37.0     Barometric Pressure for Blood Gas 752 mmHg      Modality Room Air     Ventilator Mode NA     Collected by [id number]     Comment: Meter: [id number]     :  Ana Malave, MONTEZ        pCO2, Temperature Corrected 29.1 mm Hg      pH, Temp Corrected 7.472 pH Units      pO2, Temperature Corrected 80.7 mm Hg     Urinalysis With Microscopic If Indicated (No Culture) - Urine, Clean Catch [[id number]]  (Abnormal) Collected: 07/10/24 1847    Specimen: Urine, Clean Catch Updated: 07/10/24 1921     Color, UA Yellow     Appearance, UA Cloudy     pH, UA 6.5     Specific Gravity, UA >1.030     Glucose, UA >=1000 mg/dL (3+)     Ketones, UA 15 mg/dL (1+)     Bilirubin, UA Negative     Blood, UA Trace     Protein,  mg/dL (2+)     Leuk Esterase, UA Moderate (2+)     Nitrite, UA Negative      Urobilinogen, UA 1.0 E.U./dL    Urinalysis, Microscopic Only - Urine, Clean Catch [[id number]]  (Abnormal) Collected: 07/10/24 1847    Specimen: Urine, Clean Catch Updated: 07/10/24 1921     RBC, UA 0-2 /HPF      WBC, UA Too Numerous to Count /HPF      Bacteria, UA Trace /HPF      Squamous Epithelial Cells, UA 3-6 /HPF      Yeast, UA       Moderate/2+ Budding Yeast w/Hyphae     /HPF     Hyaline Casts, UA None Seen /LPF      Methodology Manual Light Microscopy    POC Glucose Once [[id number]]  (Abnormal) Collected: 07/10/24 1855    Specimen: Blood Updated: 07/10/24 1907     Glucose 308 mg/dL      Comment: : [id number] Faviola ColeMeter ID: [id number]             XR Knee 3 View Left   Final Result   1. Moderate tricompartmental osteoarthritis with small knee joint   effusion. No acute osseous injury.           This report was signed and finalized on 7/10/2024 5:52 PM by Dr. Ragini Soto MD.            Medications   sodium chloride 0.9 % flush 10 mL (has no administration in time range)   sodium chloride 0.9 % bolus 1,000 mL (0 mL Intravenous Stopped 7/10/24 1857)   insulin regular (humuLIN R,novoLIN R) injection 10 Units (10 Units Intravenous Given 7/10/24 1723)   sodium chloride 0.9 % bolus 1,000 mL (1,000 mL Intravenous New Bag 7/10/24 1857)       Medical Decision Making  68-year-old male came in with slurred speech and hyperglycemia.  Patient's blood sugar did improve.  I discussed the findings with Dr. Mcneill he the hospitalist on-call.  He has accepted patient under their services.  Patient did have a CT scan of the head within the last 24 hours that was negative for any acute findings after reviewing the outside records.  No new CT scan was performed today after discussion with the hospitalist on-call.  Patient will be admitted to their service at this time.    Problems Addressed:  Hyperglycemia due to diabetes mellitus: complicated acute illness or injury  Slurred speech: complicated acute illness or  injury    Amount and/or Complexity of Data Reviewed  External Data Reviewed: labs, radiology and notes.  Labs: ordered. Decision-making details documented in ED Course.  Radiology: ordered. Decision-making details documented in ED Course.    Risk  OTC drugs.  Prescription drug management.  Decision regarding hospitalization.        Final diagnoses:   Slurred speech   Hyperglycemia due to diabetes mellitus       ED Disposition  ED Disposition       ED Disposition   Decision to Admit    Condition   --    Comment   Level of Care: Remote Telemetry [26]   Diagnosis: Slurred speech [[id number]]   Admitting Physician: ALEXIA URENA [[id number]]   Attending Physician: ALEXIA URENA [[id number]]                 No follow-up provider specified.       Medication List      No changes were made to your prescriptions during this visit.            Lit Ureña MD  07/10/24 [id number]

## 2024-07-11 ENCOUNTER — APPOINTMENT (OUTPATIENT)
Dept: CT IMAGING | Facility: HOSPITAL | Age: 69
End: 2024-07-11
Payer: MEDICARE

## 2024-07-11 ENCOUNTER — APPOINTMENT (OUTPATIENT)
Dept: CARDIOLOGY | Facility: HOSPITAL | Age: 69
End: 2024-07-11
Payer: MEDICARE

## 2024-07-11 ENCOUNTER — APPOINTMENT (OUTPATIENT)
Dept: MRI IMAGING | Facility: HOSPITAL | Age: 69
End: 2024-07-11
Payer: MEDICARE

## 2024-07-11 PROBLEM — R29.818 ACUTE FOCAL NEUROLOGICAL DEFICIT: Status: ACTIVE | Noted: 2024-07-10

## 2024-07-11 PROBLEM — I63.512 ACUTE ISCHEMIC LEFT MCA STROKE: Status: ACTIVE | Noted: 2024-07-11

## 2024-07-11 PROBLEM — I63.9 ACUTE STROKE DUE TO ISCHEMIA: Status: ACTIVE | Noted: 2024-07-11

## 2024-07-11 LAB
AMPHET+METHAMPHET UR QL: NEGATIVE
AMPHETAMINES UR QL: NEGATIVE
ANION GAP SERPL CALCULATED.3IONS-SCNC: 11 MMOL/L (ref 5–15)
BACTERIA UR QL AUTO: ABNORMAL /HPF
BARBITURATES UR QL SCN: NEGATIVE
BENZODIAZ UR QL SCN: NEGATIVE
BILIRUB UR QL STRIP: NEGATIVE
BUN SERPL-MCNC: 10 MG/DL (ref 8–23)
BUN/CREAT SERPL: 23.8 (ref 7–25)
BUPRENORPHINE SERPL-MCNC: NEGATIVE NG/ML
CALCIUM SPEC-SCNC: 8.5 MG/DL (ref 8.6–10.5)
CANNABINOIDS SERPL QL: NEGATIVE
CHLORIDE SERPL-SCNC: 102 MMOL/L (ref 98–107)
CHOLEST SERPL-MCNC: 104 MG/DL (ref 0–200)
CLARITY UR: ABNORMAL
CO2 SERPL-SCNC: 22 MMOL/L (ref 22–29)
COCAINE UR QL: NEGATIVE
COLOR UR: YELLOW
CREAT SERPL-MCNC: 0.42 MG/DL (ref 0.76–1.27)
DEPRECATED RDW RBC AUTO: 39.9 FL (ref 37–54)
EGFRCR SERPLBLD CKD-EPI 2021: 117.8 ML/MIN/1.73
ERYTHROCYTE [DISTWIDTH] IN BLOOD BY AUTOMATED COUNT: 12.1 % (ref 12.3–15.4)
FENTANYL UR-MCNC: NEGATIVE NG/ML
GLUCOSE BLDC GLUCOMTR-MCNC: 251 MG/DL (ref 70–130)
GLUCOSE BLDC GLUCOMTR-MCNC: 271 MG/DL (ref 70–130)
GLUCOSE BLDC GLUCOMTR-MCNC: 274 MG/DL (ref 70–130)
GLUCOSE BLDC GLUCOMTR-MCNC: 278 MG/DL (ref 70–130)
GLUCOSE BLDC GLUCOMTR-MCNC: 318 MG/DL (ref 70–130)
GLUCOSE SERPL-MCNC: 233 MG/DL (ref 65–99)
GLUCOSE UR STRIP-MCNC: ABNORMAL MG/DL
HBA1C MFR BLD: 13.2 % (ref 4.8–5.6)
HCT VFR BLD AUTO: 40.7 % (ref 37.5–51)
HDLC SERPL-MCNC: 37 MG/DL (ref 40–60)
HGB BLD-MCNC: 14 G/DL (ref 13–17.7)
HGB UR QL STRIP.AUTO: ABNORMAL
HYALINE CASTS UR QL AUTO: ABNORMAL /LPF
KETONES UR QL STRIP: ABNORMAL
LDLC SERPL CALC-MCNC: 38 MG/DL (ref 0–100)
LDLC/HDLC SERPL: 0.86 {RATIO}
LEUKOCYTE ESTERASE UR QL STRIP.AUTO: ABNORMAL
MCH RBC QN AUTO: 31 PG (ref 26.6–33)
MCHC RBC AUTO-ENTMCNC: 34.4 G/DL (ref 31.5–35.7)
MCV RBC AUTO: 90.2 FL (ref 79–97)
METHADONE UR QL SCN: NEGATIVE
NITRITE UR QL STRIP: NEGATIVE
OPIATES UR QL: NEGATIVE
OXYCODONE UR QL SCN: NEGATIVE
PCP UR QL SCN: NEGATIVE
PH UR STRIP.AUTO: 6.5 [PH] (ref 5–8)
PLATELET # BLD AUTO: 218 10*3/MM3 (ref 140–450)
PMV BLD AUTO: 10.9 FL (ref 6–12)
POTASSIUM SERPL-SCNC: 3.5 MMOL/L (ref 3.5–5.2)
PROT UR QL STRIP: ABNORMAL
RBC # BLD AUTO: 4.51 10*6/MM3 (ref 4.14–5.8)
RBC # UR STRIP: ABNORMAL /HPF
REF LAB TEST METHOD: ABNORMAL
SODIUM SERPL-SCNC: 135 MMOL/L (ref 136–145)
SP GR UR STRIP: >1.03 (ref 1–1.03)
SQUAMOUS #/AREA URNS HPF: ABNORMAL /HPF
T4 FREE SERPL-MCNC: 1.29 NG/DL (ref 0.93–1.7)
TRICYCLICS UR QL SCN: NEGATIVE
TRIGL SERPL-MCNC: 175 MG/DL (ref 0–150)
TSH SERPL DL<=0.05 MIU/L-ACNC: 0.89 UIU/ML (ref 0.27–4.2)
UROBILINOGEN UR QL STRIP: ABNORMAL
VIT B12 BLD-MCNC: 171 PG/ML (ref 211–946)
VLDLC SERPL-MCNC: 29 MG/DL (ref 5–40)
WBC # UR STRIP: ABNORMAL /HPF
WBC NRBC COR # BLD AUTO: 13.25 10*3/MM3 (ref 3.4–10.8)
YEAST URNS QL MICRO: ABNORMAL /HPF

## 2024-07-11 PROCEDURE — 70551 MRI BRAIN STEM W/O DYE: CPT

## 2024-07-11 PROCEDURE — 84439 ASSAY OF FREE THYROXINE: CPT | Performed by: CLINICAL NURSE SPECIALIST

## 2024-07-11 PROCEDURE — 97162 PT EVAL MOD COMPLEX 30 MIN: CPT

## 2024-07-11 PROCEDURE — 93306 TTE W/DOPPLER COMPLETE: CPT

## 2024-07-11 PROCEDURE — 25510000001 IOPAMIDOL PER 1 ML: Performed by: INTERNAL MEDICINE

## 2024-07-11 PROCEDURE — 84443 ASSAY THYROID STIM HORMONE: CPT | Performed by: CLINICAL NURSE SPECIALIST

## 2024-07-11 PROCEDURE — 63710000001 INSULIN LISPRO (HUMAN) PER 5 UNITS: Performed by: FAMILY MEDICINE

## 2024-07-11 PROCEDURE — 85027 COMPLETE CBC AUTOMATED: CPT | Performed by: FAMILY MEDICINE

## 2024-07-11 PROCEDURE — 80061 LIPID PANEL: CPT | Performed by: FAMILY MEDICINE

## 2024-07-11 PROCEDURE — 25010000002 ENOXAPARIN PER 10 MG: Performed by: FAMILY MEDICINE

## 2024-07-11 PROCEDURE — 63710000001 INSULIN GLARGINE PER 5 UNITS: Performed by: FAMILY MEDICINE

## 2024-07-11 PROCEDURE — 70496 CT ANGIOGRAPHY HEAD: CPT

## 2024-07-11 PROCEDURE — 70498 CT ANGIOGRAPHY NECK: CPT

## 2024-07-11 PROCEDURE — 93010 ELECTROCARDIOGRAM REPORT: CPT | Performed by: EMERGENCY MEDICINE

## 2024-07-11 PROCEDURE — 82607 VITAMIN B-12: CPT | Performed by: CLINICAL NURSE SPECIALIST

## 2024-07-11 PROCEDURE — 25810000003 SODIUM CHLORIDE 0.9 % SOLUTION: Performed by: FAMILY MEDICINE

## 2024-07-11 PROCEDURE — 80307 DRUG TEST PRSMV CHEM ANLYZR: CPT | Performed by: CLINICAL NURSE SPECIALIST

## 2024-07-11 PROCEDURE — 93306 TTE W/DOPPLER COMPLETE: CPT | Performed by: INTERNAL MEDICINE

## 2024-07-11 PROCEDURE — 83036 HEMOGLOBIN GLYCOSYLATED A1C: CPT | Performed by: FAMILY MEDICINE

## 2024-07-11 PROCEDURE — 97166 OT EVAL MOD COMPLEX 45 MIN: CPT

## 2024-07-11 PROCEDURE — 80048 BASIC METABOLIC PNL TOTAL CA: CPT | Performed by: FAMILY MEDICINE

## 2024-07-11 PROCEDURE — 99223 1ST HOSP IP/OBS HIGH 75: CPT | Performed by: NURSE PRACTITIONER

## 2024-07-11 PROCEDURE — 82948 REAGENT STRIP/BLOOD GLUCOSE: CPT

## 2024-07-11 PROCEDURE — 93005 ELECTROCARDIOGRAM TRACING: CPT | Performed by: FAMILY MEDICINE

## 2024-07-11 PROCEDURE — 92610 EVALUATE SWALLOWING FUNCTION: CPT | Performed by: SPEECH-LANGUAGE PATHOLOGIST

## 2024-07-11 PROCEDURE — 25510000001 PERFLUTREN 6.52 MG/ML SUSPENSION: Performed by: HOSPITALIST

## 2024-07-11 RX ORDER — FLUCONAZOLE 150 MG/1
150 TABLET ORAL ONCE
Status: COMPLETED | OUTPATIENT
Start: 2024-07-11 | End: 2024-07-11

## 2024-07-11 RX ORDER — ASPIRIN 81 MG/1
81 TABLET, CHEWABLE ORAL DAILY
Status: DISCONTINUED | OUTPATIENT
Start: 2024-07-11 | End: 2024-07-15 | Stop reason: HOSPADM

## 2024-07-11 RX ORDER — ASPIRIN 300 MG/1
300 SUPPOSITORY RECTAL DAILY
Status: DISCONTINUED | OUTPATIENT
Start: 2024-07-11 | End: 2024-07-15 | Stop reason: HOSPADM

## 2024-07-11 RX ORDER — POTASSIUM CHLORIDE 750 MG/1
40 CAPSULE, EXTENDED RELEASE ORAL ONCE
Status: COMPLETED | OUTPATIENT
Start: 2024-07-11 | End: 2024-07-11

## 2024-07-11 RX ORDER — LISINOPRIL 10 MG/1
10 TABLET ORAL
Status: DISCONTINUED | OUTPATIENT
Start: 2024-07-12 | End: 2024-07-12

## 2024-07-11 RX ADMIN — ENOXAPARIN SODIUM 40 MG: 100 INJECTION SUBCUTANEOUS at 09:26

## 2024-07-11 RX ADMIN — ATORVASTATIN CALCIUM 80 MG: 40 TABLET ORAL at 21:18

## 2024-07-11 RX ADMIN — IOPAMIDOL 100 ML: 755 INJECTION, SOLUTION INTRAVENOUS at 12:06

## 2024-07-11 RX ADMIN — INSULIN LISPRO 4 UNITS: 100 INJECTION, SOLUTION INTRAVENOUS; SUBCUTANEOUS at 17:11

## 2024-07-11 RX ADMIN — INSULIN LISPRO 5 UNITS: 100 INJECTION, SOLUTION INTRAVENOUS; SUBCUTANEOUS at 12:30

## 2024-07-11 RX ADMIN — POTASSIUM CHLORIDE 40 MEQ: 750 CAPSULE, EXTENDED RELEASE ORAL at 17:06

## 2024-07-11 RX ADMIN — INSULIN GLARGINE 10 UNITS: 100 INJECTION, SOLUTION SUBCUTANEOUS at 21:19

## 2024-07-11 RX ADMIN — INSULIN LISPRO 4 UNITS: 100 INJECTION, SOLUTION INTRAVENOUS; SUBCUTANEOUS at 08:23

## 2024-07-11 RX ADMIN — Medication 10 ML: at 09:32

## 2024-07-11 RX ADMIN — ACETAMINOPHEN 650 MG: 325 TABLET, FILM COATED ORAL at 21:17

## 2024-07-11 RX ADMIN — SODIUM CHLORIDE 75 ML/HR: 9 INJECTION, SOLUTION INTRAVENOUS at 18:21

## 2024-07-11 RX ADMIN — INSULIN LISPRO 4 UNITS: 100 INJECTION, SOLUTION INTRAVENOUS; SUBCUTANEOUS at 21:19

## 2024-07-11 RX ADMIN — PERFLUTREN 13.04 MG: 6.52 INJECTION, SUSPENSION INTRAVENOUS at 11:10

## 2024-07-11 RX ADMIN — Medication 10 ML: at 00:42

## 2024-07-11 RX ADMIN — SODIUM CHLORIDE 75 ML/HR: 9 INJECTION, SOLUTION INTRAVENOUS at 00:41

## 2024-07-11 RX ADMIN — ASPIRIN 81 MG CHEWABLE TABLET 81 MG: 81 TABLET CHEWABLE at 09:26

## 2024-07-11 RX ADMIN — INSULIN GLARGINE 10 UNITS: 100 INJECTION, SOLUTION SUBCUTANEOUS at 00:41

## 2024-07-11 RX ADMIN — FLUCONAZOLE 150 MG: 150 TABLET ORAL at 17:06

## 2024-07-11 NOTE — THERAPY EVALUATION
Acute Care - Speech Language Pathology   Swallow Initial Evaluation [facility]     Patient Name: Anival Pennington  : 1955  MRN: [id number]  Today's Date: 2024               Admit Date: 7/10/2024    SPEECH-LANGUAGE PATHOLOGY EVALUATION - SWALLOW  Subjective: The patient was seen on this date for a Clinical Swallow evaluation.  Patient was alert and cooperative. Spouse present at end of session.   Significant history: Presented due to slurred speech. Awaiting test results. Medical history of non-compliance with medications for DM, lukemia, HLD, HTN, and tobacco use. SLP consulted due to failed rn dysphagia screening secondary to facial and lingual weakness.   Objective: Oral motor examination results: right facial asymmetry. Speech is slurred and only 50% intelligible.  Textures given during assessment of swallow function included thin liquid, puree consistency, and regular consistency.  Assessment: Difficulties were noted with none of the above consistencies.  Observations: Edentulous but does not have dentures. No overt s/s of aspiration observed. Mildly increased prep time with solids due to edentulous status.     SLP Findings:  Patient presents with functional swallow, without esophageal component.   Recommendations: Diet Textures: Regular diet with thin liquids.  Medications should be taken whole as tolerated.   Recommended Strategies: upright for PO, small bites and sips, and alternate liquids and solids. Oral care 2x a day.  Other Recommended Evaluations: Speech-Language Evaluation    Dysphagia therapy is recommended PRN to ensure diet toleration.   Caridad Aguayo MS CCC-SLP 2024 08:14 CDT    Visit Dx:     ICD-10-CM ICD-9-CM   1. Slurred speech  R47.81 784.59   2. Hyperglycemia due to diabetes mellitus  E11.65 250.02   3. Dysphagia, unspecified type  R13.10 787.20     Patient Active Problem List   Diagnosis    ST elevation myocardial infarction (STEMI)    Tobacco abuse    Type 2 diabetes  Spoke with pt and got her scheduled    mellitus with circulatory disorder, without long-term current use of insulin    Essential hypertension    Coronary artery disease involving native coronary artery of native heart without angina pectoris    Mixed hyperlipidemia    Class 2 severe obesity due to excess calories with serious comorbidity and body mass index (BMI) of 36.0 to 36.9 in adult    S/P coronary artery stent placement    History of MI (myocardial infarction)    Diabetes mellitus due to underlying condition with circulatory complication    Dizziness    Heavy smoker (more than 20 cigarettes per day)    Cellulitis of face    Obesity (BMI 30-39.9)    PAD (peripheral artery disease)    Preop testing    Acute focal neurological deficit    UTI (urinary tract infection), bacterial    Uncontrolled type 2 diabetes mellitus with hyperglycemia     Past Medical History:   Diagnosis Date    Cancer     leukemia    Coronary artery disease     diabetes with circulatory complications     Hyperlipidemia     Hypertension     Obesity (BMI 30-39.9)     Tobacco abuse      Past Surgical History:   Procedure Laterality Date    CARDIAC CATHETERIZATION N/A 3/28/2019    Procedure: Left Heart Cath;  Surgeon: Vincent Pan MD;  Location:  [facility];  Service: Cardiovascular    CARDIAC CATHETERIZATION Bilateral 3/28/2019    Procedure: Stent JOSSIE coronary;  Surgeon: Vincent Pan MD;  Location:  [facility];  Service: Cardiovascular    CARDIAC CATHETERIZATION N/A 3/28/2019    Procedure: Left ventriculography;  Surgeon: Vincent Pan MD;  Location:  [facility];  Service: Cardiovascular    CORONARY STENT PLACEMENT      TONSILLECTOMY         SLP Recommendation and Plan  SLP Swallowing Diagnosis: mild, oral dysphagia, R/O pharyngeal dysphagia (07/11/24 0653)  SLP Diet Recommendation: regular textures, thin liquids (07/11/24 0653)  Recommended Precautions and Strategies: upright posture during/after eating, small bites of food  and sips of liquid, general aspiration precautions (07/11/24 0653)  SLP Rec. for Method of Medication Administration: as tolerated (07/11/24 0653)     Monitor for Signs of Aspiration: yes, notify SLP if any concerns (07/11/24 0653)  Recommended Diagnostics: SLE/Cog/Motor Speech Evaluation (07/11/24 0653)  Swallow Criteria for Skilled Therapeutic Interventions Met: demonstrates skilled criteria (07/11/24 0653)  Anticipated Discharge Disposition (SLP): unknown (07/11/24 0653)  Rehab Potential/Prognosis, Swallowing: good, to achieve stated therapy goals (07/11/24 0653)  Therapy Frequency (Swallow): PRN (07/11/24 0653)  Predicted Duration Therapy Intervention (Days): until discharge (07/11/24 0653)  Oral Care Recommendations: Oral Care BID/PRN, Toothbrush (07/11/24 0653)                                        Plan of Care Reviewed With: patient, spouse, caregiver (CIARA Avilez)  Progress: no change (Initial Evaluation)      SWALLOW EVALUATION (Last 72 Hours)       SLP Adult Swallow Evaluation       Row Name 07/11/24 0653                   Rehab Evaluation    Document Type evaluation  -MG        Subjective Information no complaints  -MG        Patient Observations alert;poorly cooperative;agree to therapy  -MG        Patient/Family/Caregiver Comments/Observations wife present at end of session  -MG        Patient Effort good  -MG        Symptoms Noted During/After Treatment none  -MG           General Information    Patient Profile Reviewed yes  -MG        Pertinent History Of Current Problem Presented due to slurred speech. Awaiting test results. Medical history of non-compliance with medications for DM, lukemia, HLD, HTN, and tobacco use. SLP consulted due to failed rn dysphagia screening secondary to facial and lingual weakness.  -MG        Current Method of Nutrition NPO  -MG        Precautions/Limitations, Vision WFL;for purposes of eval  -MG        Precautions/Limitations, Hearing WFL;for purposes of eval  -MG         Prior Level of Function-Communication WFL  -MG        Prior Level of Function-Swallowing no diet consistency restrictions  -MG        Plans/Goals Discussed with patient;agreed upon;spouse/S.O.  -MG        Barriers to Rehab none identified  -MG        Patient's Goals for Discharge return to PO diet  -MG        Family Goals for Discharge patient able to return to PO diet  -MG           Pain    Additional Documentation Pain Scale: FACES Pre/Post-Treatment (Group)  -MG           Pain Scale: FACES Pre/Post-Treatment    Pain: FACES Scale, Pretreatment 0-->no hurt  -MG        Posttreatment Pain Rating 0-->no hurt  -MG           Oral Motor Structure and Function    Dentition Assessment edentulous, does not have dentures  -MG        Secretion Management WNL/WFL  -MG        Mucosal Quality moist, healthy  -MG        Volitional Swallow WFL  -MG        Volitional Cough WFL  -MG           Oral Musculature and Cranial Nerve Assessment    Oral Motor General Assessment oral labial or buccal impairment;lingual impairment  -MG        Oral Labial or Buccal Impairment, Detail, Cranial Nerve VII (Facial): CN7: Motor Impairment;right labial droop  -MG        Lingual Impairment, Detail. Cranial Nerves IX, XII (Glossopharyngeal and Hypoglossal) CN12: Motor Impairment;reduced strength right  -MG           General Eating/Swallowing Observations    Respiratory Support Currently in Use room air  -MG        Eating/Swallowing Skills fed by SLP;self-fed  -MG        Positioning During Eating upright in bed  -MG        Utensils Used spoon;straw  -MG        Consistencies Trialed pureed;thin liquids;regular textures  -MG           Clinical Swallow Eval    Oral Prep Phase WFL  -MG        Oral Transit WFL  -MG        Oral Residue WFL  -MG        Pharyngeal Phase no overt signs/symptoms of pharyngeal impairment  -MG        Esophageal Phase unremarkable  -MG        Clinical Swallow Evaluation Summary See note.  -MG           SLP Evaluation Clinical  Impression    SLP Swallowing Diagnosis mild;oral dysphagia;R/O pharyngeal dysphagia  -MG        Functional Impact risk of aspiration/pneumonia  -MG        Rehab Potential/Prognosis, Swallowing good, to achieve stated therapy goals  -MG        Swallow Criteria for Skilled Therapeutic Interventions Met demonstrates skilled criteria  -MG           Recommendations    Therapy Frequency (Swallow) PRN  -MG        Predicted Duration Therapy Intervention (Days) until discharge  -MG        SLP Diet Recommendation regular textures;thin liquids  -MG        Recommended Diagnostics SLE/Cog/Motor Speech Evaluation  -MG        Recommended Precautions and Strategies upright posture during/after eating;small bites of food and sips of liquid;general aspiration precautions  -MG        Oral Care Recommendations Oral Care BID/PRN;Toothbrush  -MG        SLP Rec. for Method of Medication Administration as tolerated  -MG        Monitor for Signs of Aspiration yes;notify SLP if any concerns  -MG        Anticipated Discharge Disposition (SLP) unknown  -MG           Swallow Goals (SLP)    Swallow LTGs Swallow Long Term Goal (free text)  -MG        Swallow STGs diet tolerance goal selection (SLP)  -MG        Diet Tolerance Goal Selection (SLP) Patient will tolerate trials of  -MG           (LTG) Swallow    (LTG) Swallow Patient will tolerate least restrictive diet without overt s/s of aspiration.  -MG        [place] (Swallow Long Term Goal) independently (over 90% accuracy)  -MG        Time Frame (Swallow Long Term Goal) by discharge  -MG        Barriers (Swallow Long Term Goal) none  -MG        Progress/Outcomes (Swallow Long Term Goal) new goal  -MG           (STG) Patient will tolerate trials of    Consistencies Trialed (Tolerate trials) regular textures;thin liquids  -MG        Desired Outcome (Tolerate trials) without signs/symptoms of aspiration;without signs of distress;with adequate oral prep/transit/clearance  -MG         [place] (Tolerate trials) independently (over 90% accuracy)  -MG        Time Frame (Tolerate trials) by discharge  -MG        Progress/Outcomes (Tolerate trials) new goal  -MG                  User Key  (r) = Recorded By, (t) = Taken By, (c) = Cosigned By      Initials Name Effective Dates    MG AguayoCaridad MS CCC-SLP 07/11/23 -                     EDUCATION  The patient has been educated in the following areas:   Dysphagia (Swallowing Impairment) Oral Care/Hydration.        SLP GOALS       Row Name 07/11/24 0653             (LTG) Swallow    (LTG) Swallow Patient will tolerate least restrictive diet without overt s/s of aspiration.  -MG      [place] (Swallow Long Term Goal) independently (over 90% accuracy)  -MG      Time Frame (Swallow Long Term Goal) by discharge  -MG      Barriers (Swallow Long Term Goal) none  -MG      Progress/Outcomes (Swallow Long Term Goal) new goal  -MG         (STG) Patient will tolerate trials of    Consistencies Trialed (Tolerate trials) regular textures;thin liquids  -MG      Desired Outcome (Tolerate trials) without signs/symptoms of aspiration;without signs of distress;with adequate oral prep/transit/clearance  -MG      [place] (Tolerate trials) independently (over 90% accuracy)  -MG      Time Frame (Tolerate trials) by discharge  -MG      Progress/Outcomes (Tolerate trials) new goal  -MG                User Key  (r) = Recorded By, (t) = Taken By, (c) = Cosigned By      Initials Name Provider Type    MG AguayoCaridad MS CCC-SLP Speech and Language Pathologist                         Time Calculation:    Time Calculation- SLP       Row Name 07/11/24 0813             Time Calculation- SLP    SLP Start Time 0653  -MG      SLP Stop Time 0805  -MG      SLP Time Calculation (min) 72 min  -MG      SLP Received On 07/11/24  -MG      SLP Goal Re-Cert Due Date 07/21/24  -MG         Untimed Charges    SLP Eval/Re-eval  ST Eval Oral Pharyng Swallow - 03514  -MG       [id number] Eval Oral Pharyng Swallow Minutes 72  -MG         Total Minutes    Untimed Charges Total Minutes 72  -MG       Total Minutes 72  -MG                User Key  (r) = Recorded By, (t) = Taken By, (c) = Cosigned By      Initials Name Provider Type    Caridad Bernal, MS CCC-SLP Speech and Language Pathologist                    Therapy Charges for Today       Code Description Service Date Service Provider Modifiers Qty    [id number]  ST EVAL ORAL PHARYNG SWALLOW 5 7/11/2024 Caridad Aguayo, MS CCC-SLP GN 1                 Caridad Aguayo MS CCC-SLP  7/11/2024

## 2024-07-11 NOTE — PLAN OF CARE
Goal Outcome Evaluation:  Plan of Care Reviewed With: patient, spouse        Progress: no change  Outcome Evaluation: OT eval completed. Pt in fowlers upon therapist arrival; A&Ox4; No c/o pain at rest but does report L knee pain with movement; Speech remains slurred; Pt's spouse also present. Pt reports Mod I-I with all BADLs including fxl ambulation at baseline. Today, Pt performed supine>sit utilizing bedrail with HOB elevated with Max A and cues for sequencing and body mechanics. Pt demos intermittent posterior lean while seated EOB requiring Min A to correct. Pt dependent to ashanti B socks while seated EOB. Pt performed all sit<>stands from bed, chair, and toilet with Mod A and cues for sequencing, safety awareness, and body mechanics. Pt ambulated from bed>BR>chair utilizing rwx with Min A and constant cues for safety awareness, sequencing, positioning of AD, and body mechanics. Pt required Max A for toileting tasks. Pt demos some RUE weakness and during neuro testing, Pt did demo slight pronation of RUE, however, coordination equal and minimally impaired bilaterally. Skilled OT intervention indicated in order to address deficits in fxl mobility, fxl activity tolerance, balance, coordination, strength, and use of adaptive techniques/equipment during performance of BADLs. Recommend SNF at discharge.      Anticipated Discharge Disposition (OT): skilled nursing facility

## 2024-07-11 NOTE — ED NOTES
Nursing report ED to floor  nAival Pennington  68 y.o.  male    HPI:   Chief Complaint   Patient presents with    Hyperglycemia    Speech Problem       Admitting doctor:   Ady Allen MD    Consulting provider(s):  Consults       No orders found from 6/11/2024 to 7/11/2024.             Admitting diagnosis:   The primary encounter diagnosis was Slurred speech. A diagnosis of Hyperglycemia due to diabetes mellitus was also pertinent to this visit.    Code status:   Current Code Status       Date Active Code Status Order ID Comments User Context       Prior            Allergies:   Aspirin and Bee venom    Intake and Output  No intake or output data in the 24 hours ending 07/10/24 2029    Weight:       07/10/24  1513   Weight: 78.9 kg (174 lb)       Most recent vitals:   Vitals:    07/10/24 1616 07/10/24 1631 07/10/24 1646 07/10/24 1701   BP: 123/73 131/79 137/68 128/90   BP Location:       Patient Position:       Pulse: 95 94 93 92   Resp:       Temp:       TempSrc:       SpO2: 96% 94% 96% 95%   Weight:       Height:         Oxygen Therapy: .    Active LDAs/IV Access:   Lines, Drains & Airways       Active LDAs       Name Placement date Placement time Site Days    Peripheral IV 07/10/24 1656 Anterior;Right Forearm 07/10/24  1656  Forearm  less than 1                    Labs (abnormal labs have a star):   Labs Reviewed   COMPREHENSIVE METABOLIC PANEL - Abnormal; Notable for the following components:       Result Value    Glucose 466 (*)     Creatinine 0.67 (*)     Sodium 133 (*)     Chloride 96 (*)     All other components within normal limits    Narrative:     GFR Normal >60  Chronic Kidney Disease <60  Kidney Failure <15     URINALYSIS W/ MICROSCOPIC IF INDICATED (NO CULTURE) - Abnormal; Notable for the following components:    Appearance, UA Cloudy (*)     Specific Gravity, UA >1.030 (*)     Glucose, UA >=1000 mg/dL (3+) (*)     Ketones, UA 15 mg/dL (1+) (*)     Blood, UA Trace (*)     Protein,  mg/dL  (2+) (*)     Leuk Esterase, UA Moderate (2+) (*)     All other components within normal limits   CBC WITH AUTO DIFFERENTIAL - Abnormal; Notable for the following components:    WBC 12.89 (*)     MCHC 36.1 (*)     Neutrophil % 79.5 (*)     Lymphocyte % 11.0 (*)     Neutrophils, Absolute 10.26 (*)     Monocytes, Absolute 1.04 (*)     All other components within normal limits   BLOOD GAS, ARTERIAL - Abnormal; Notable for the following components:    pH, Arterial 7.472 (*)     pCO2, Arterial 29.1 (*)     pO2, Arterial 80.7 (*)     Base Excess, Arterial -1.0 (*)     pCO2, Temperature Corrected 29.1 (*)     pH, Temp Corrected 7.472 (*)     pO2, Temperature Corrected 80.7 (*)     All other components within normal limits   URINALYSIS, MICROSCOPIC ONLY - Abnormal; Notable for the following components:    WBC, UA Too Numerous to Count (*)     Bacteria, UA Trace (*)     Squamous Epithelial Cells, UA 3-6 (*)     Yeast, UA Moderate/2+ Budding Yeast w/Hyphae (*)     All other components within normal limits   POCT GLUCOSE FINGERSTICK - Abnormal; Notable for the following components:    Glucose 378 (*)     All other components within normal limits   POCT GLUCOSE FINGERSTICK - Abnormal; Notable for the following components:    Glucose 510 (*)     All other components within normal limits   POCT GLUCOSE FINGERSTICK - Abnormal; Notable for the following components:    Glucose 308 (*)     All other components within normal limits   MAGNESIUM - Normal   ACETONE - Normal   BLOOD GAS, ARTERIAL   POCT GLUCOSE FINGERSTICK   POCT GLUCOSE FINGERSTICK   CBC AND DIFFERENTIAL    Narrative:     The following orders were created for panel order CBC & Differential.  Procedure                               Abnormality         Status                     ---------                               -----------         ------                     CBC Auto Differential[[id number]]        Abnormal            Final result                 Please view results  for these tests on the individual orders.       Meds given in ED:   Medications   sodium chloride 0.9 % flush 10 mL (has no administration in time range)   sodium chloride 0.9 % bolus 1,000 mL (0 mL Intravenous Stopped 7/10/24 1857)   insulin regular (humuLIN R,novoLIN R) injection 10 Units (10 Units Intravenous Given 7/10/24 1723)   sodium chloride 0.9 % bolus 1,000 mL (1,000 mL Intravenous New Bag 7/10/24 1857)           NIH Stroke Scale:  Interval: baseline    Isolation/Infection(s):  No active isolations   No active infections     COVID Testing  Collected .  Resulted .    Nursing report ED to floor:  Mental status: .  Ambulatory status: .  Precautions: .    ED nurse phone extentsion- ..

## 2024-07-11 NOTE — THERAPY EVALUATION
Patient Name: Anival Pennington  : 1955    MRN: [id number]                              Today's Date: 2024       Admit Date: 7/10/2024    Visit Dx:     ICD-10-CM ICD-9-CM   1. Slurred speech  R47.81 784.59   2. Hyperglycemia due to diabetes mellitus  E11.65 250.02   3. Dysphagia, unspecified type  R13.10 787.20     Patient Active Problem List   Diagnosis    ST elevation myocardial infarction (STEMI)    Tobacco abuse    Type 2 diabetes mellitus with circulatory disorder, without long-term current use of insulin    Essential hypertension    Coronary artery disease involving native coronary artery of native heart without angina pectoris    Mixed hyperlipidemia    Class 2 severe obesity due to excess calories with serious comorbidity and body mass index (BMI) of 36.0 to 36.9 in adult    S/P coronary artery stent placement    History of MI (myocardial infarction)    Diabetes mellitus due to underlying condition with circulatory complication    Dizziness    Heavy smoker (more than 20 cigarettes per day)    Cellulitis of face    Obesity (BMI 30-39.9)    PAD (peripheral artery disease)    Preop testing    Acute focal neurological deficit    UTI (urinary tract infection), bacterial    Uncontrolled type 2 diabetes mellitus with hyperglycemia     Past Medical History:   Diagnosis Date    Cancer     leukemia    Coronary artery disease     diabetes with circulatory complications     Hyperlipidemia     Hypertension     Obesity (BMI 30-39.9)     Tobacco abuse      Past Surgical History:   Procedure Laterality Date    CARDIAC CATHETERIZATION N/A 3/28/2019    Procedure: Left Heart Cath;  Surgeon: Vincent Pan MD;  Location:  [facility];  Service: Cardiovascular    CARDIAC CATHETERIZATION Bilateral 3/28/2019    Procedure: Stent JOSSIE coronary;  Surgeon: Vincent Pan MD;  Location:  [facility];  Service: Cardiovascular    CARDIAC CATHETERIZATION N/A 3/28/2019    Procedure: Left  ventriculography;  Surgeon: Vincent Pan MD;  Location:  [facility];  Service: Cardiovascular    CORONARY STENT PLACEMENT      TONSILLECTOMY        General Information       Row Name 07/11/24 0754          OT Time and Intention    Document Type evaluation  cc: slurred speech. Awaiting further imaging. Dx: Acute focal neurological deficit, UTI, Uncontrolled Type 2 DM  -LS     Mode of Treatment occupational therapy  -LS       Row Name 07/11/24 0754          General Information    Patient Profile Reviewed yes  -LS     Prior Level of Function independent:;ADL's;all household mobility;community mobility;driving;dependent:;home management;cooking;cleaning;shopping  -LS     Existing Precautions/Restrictions fall  -LS       Row Name 07/11/24 0754          Occupational Profile    Environmental Supports and Barriers (Occupational Profile) Tub shower combination with shower chair. 1 grab bar next to toilet. Other AD/DME: multiple SCs  -LS       Row Name 07/11/24 0754          Living Environment    People in Home spouse  -LS       Row Name 07/11/24 [facility]          Home Main Entrance    Number of Stairs, Main Entrance two  -LS     Stair Railings, Main Entrance none  -LS       Row Name 07/11/24 0754          Stairs Within Home, Primary    Number of Stairs, Within Home, Primary none  -LS       Row Name 07/11/24 0754          Cognition    Orientation Status (Cognition) oriented x 3;oriented to;person;place;time  -LS       Row Name 07/11/24 0754          Safety Issues, Functional Mobility    Safety Issues Affecting Function (Mobility) ability to follow commands;awareness of need for assistance;at risk behavior observed;impulsivity;insight into deficits/self-awareness;judgment;positioning of assistive device;problem-solving;safety precaution awareness;safety precautions follow-through/compliance;sequencing abilities  -LS     Impairments Affecting Function (Mobility) balance;cognition;coordination;endurance/activity  tolerance;pain;postural/trunk control;strength  -     Cognitive Impairments, Mobility Safety/Performance attention;awareness, need for assistance;impulsivity;insight into deficits/self-awareness;judgment;problem-solving/reasoning;safety precaution awareness;safety precaution follow-through;sequencing abilities  -               User Key  (r) = Recorded By, (t) = Taken By, (c) = Cosigned By      Initials Name Provider Type     Shanice Cummins OTR/L Occupational Therapist                     Mobility/ADL's       Row Name 07/11/24 0754          Bed Mobility    Bed Mobility supine-sit  -     Supine-Sit [place] (Bed Mobility) maximum assist (25% patient effort);verbal cues;nonverbal cues (demo/gesture)  -     Assistive Device (Bed Mobility) bed rails;head of bed elevated  -       Row Name 07/11/24 0754          Transfers    Transfers sit-stand transfer;stand-sit transfer;toilet transfer  -       Row Name 07/11/24 0754          Sit-Stand Transfer    Sit-Stand [place] (Transfers) moderate assist (50% patient effort);verbal cues;nonverbal cues (demo/gesture)  -     Assistive Device (Sit-Stand Transfers) walker, front-wheeled  -LS       Row Name 07/11/24 0754          Stand-Sit Transfer    Stand-Sit [place] (Transfers) moderate assist (50% patient effort);verbal cues;nonverbal cues (demo/gesture)  -     Assistive Device (Stand-Sit Transfers) walker, front-wheeled  -LS       Row Name 07/11/24 0754          Toilet Transfer    Type (Toilet Transfer) sit-stand;stand-sit  -     [place] Level (Toilet Transfer) moderate assist (50% patient effort);verbal cues;nonverbal cues (demo/gesture)  -       Row Name 07/11/24 0754          Functional Mobility    Functional Mobility- Ind. Level minimum assist (75% patient effort);verbal cues required;nonverbal cues required (demo/gesture)  -     Functional Mobility- Device walker, front-wheeled  -LS     Patient was able to Ambulate yes  -       Row  Name 07/11/24 0754          Activities of Daily Living    BADL Assessment/Intervention upper body dressing;lower body dressing;toileting  -       Row Name 07/11/24 0754          Upper Body Dressing Assessment/Training    [place] Level (Upper Body Dressing) upper body dressing skills;moderate assist (50% patient effort)  -LS     Position (Upper Body Dressing) edge of bed sitting  -       Row Name 07/11/24 0754          Lower Body Dressing Assessment/Training    [place] Level (Lower Body Dressing) lower body dressing skills;maximum assist (25% patient effort)  -LS     Position (Lower Body Dressing) unsupported sitting;supported standing  -[facility] Name 07/11/24 0754          Toileting Assessment/Training    [place] Level (Toileting) toileting skills;maximum assist (25% patient effort)  -LS     Position (Toileting) unsupported sitting;supported standing  -               User Key  (r) = Recorded By, (t) = Taken By, (c) = Cosigned By      Initials Name Provider Type     Shanice Cummins OTR/L Occupational Therapist                   Obj/Interventions       Row Name 07/11/24 0754          Sensory Assessment (Somatosensory)    Sensory Assessment (Somatosensory) UE sensation intact  -[facility] Name 07/11/24 0754          Vision Assessment/Intervention    Visual Impairment/Limitations WFL;corrective lenses for reading  -[facility] Name 07/11/24 0754          Range of Motion Comprehensive    General Range of Motion bilateral upper extremity ROM WFL  -[facility] Name 07/11/24 0754          Strength Comprehensive (MMT)    General Manual Muscle Testing (MMT) Assessment upper extremity strength deficits identified  -     Comment, General Manual Muscle Testing (MMT) Assessment R shoulder and triceps 4/5; R biceps and  4+/5; LUE strength grossly 5/5  -       Row Name 07/11/24 0754          Motor Skills    Motor Skills coordination;other (see comments)  during neuro testing, Pt did demo slight  pronation of RUE  -LS     Coordination gross motor deficit;fine motor deficit;bilateral;finger to nose;dysdiadochokinesia;minimal impairment;other (see comments)  -       Row Name 07/11/24 0754          Balance    Balance Assessment sitting static balance;sitting dynamic balance;standing static balance;standing dynamic balance  -LS     Static Sitting Balance contact guard;minimal assist  -LS     Dynamic Sitting Balance contact guard;minimal assist  -LS     Position, Sitting Balance sitting edge of bed;supported;unsupported  -LS     Static Standing Balance contact guard;minimal assist;verbal cues;non-verbal cues (demo/gesture)  -LS     Dynamic Standing Balance minimal assist;verbal cues;non-verbal cues (demo/gesture)  -LS     Position/Device Used, Standing Balance supported;walker, front-wheeled  -LS               User Key  (r) = Recorded By, (t) = Taken By, (c) = Cosigned By      Initials Name Provider Type    LS Shanice Cummins, OTR/L Occupational Therapist                   Goals/Plan       Row Name 07/11/24 0754          Transfer Goal 1 (OT)    Activity/Assistive Device (Transfer Goal 1, OT) toilet;tub  -LS     Door Level/Cues Needed (Transfer Goal 1, OT) contact guard required  -LS     Time Frame (Transfer Goal 1, OT) long term goal (LTG);10 days  -LS     Progress/Outcome (Transfer Goal 1, OT) new goal  -       Row Name 07/11/24 0754          Dressing Goal 1 (OT)    Activity/Device (Dressing Goal 1, OT) dressing skills, all  -LS     Door/Cues Needed (Dressing Goal 1, OT) moderate assist (50-74% patient effort)  -LS     Time Frame (Dressing Goal 1, OT) long term goal (LTG);10 days  -LS     Progress/Outcome (Dressing Goal 1, OT) new goal  -       Row Name 07/11/24 0754          Toileting Goal 1 (OT)    Activity/Device (Toileting Goal 1, OT) toileting skills, all  -LS     Door Level/Cues Needed (Toileting Goal 1, OT) minimum assist (75% or more patient effort)  -LS     Time Frame  (Toileting Goal 1, OT) long term goal (LTG);10 days  -LS     Progress/Outcome (Toileting Goal 1, OT) new goal  -LS       Row Name 07/11/24 0754          Therapy Assessment/Plan (OT)    Planned Therapy Interventions (OT) activity tolerance training;functional balance retraining;occupation/activity based interventions;BADL retraining;strengthening exercise;transfer/mobility retraining;patient/caregiver education/training;ROM/therapeutic exercise;cognitive/visual perception retraining;neuromuscular control/coordination retraining;adaptive equipment training  -LS               User Key  (r) = Recorded By, (t) = Taken By, (c) = Cosigned By      Initials Name Provider Type    LS Shanice Cummins, OTR/L Occupational Therapist                   Clinical Impression       Row Name 07/11/24 0754          Pain Assessment    Pretreatment Pain Rating 0/10 - no pain  -LS     Posttreatment Pain Rating 0/10 - no pain  -LS     Pre/Posttreatment Pain Comment Pt reports no pain at rest but reports having L knee pain with movement.  -       Row Name 07/11/24 0754          Plan of Care Review    Plan of Care Reviewed With patient;spouse  -LS     Progress no change  -LS     Outcome Evaluation OT eval completed. Pt in fowlers upon therapist arrival; A&Ox4; No c/o pain at rest but does report L knee pain with movement; Speech remains slurred; Pt's spouse also present. Pt reports Mod I-I with all BADLs including fxl ambulation at baseline. Today, Pt performed supine>sit utilizing bedrail with HOB elevated with Max A and cues for sequencing and body mechanics. Pt demos intermittent posterior lean while seated EOB requiring Min A to correct. Pt dependent to ashanti B socks while seated EOB. Pt performed all sit<>stands from bed, chair, and toilet with Mod A and cues for sequencing, safety awareness, and body mechanics. Pt ambulated from bed>BR>chair utilizing rwx with Min A and constant cues for safety awareness, sequencing, positioning of AD, and  body mechanics. Pt required Max A for toileting tasks. Pt demos some RUE weakness and during neuro testing, Pt did demo slight pronation of RUE, however, coordination equal and minimally impaired bilaterally. Skilled OT intervention indicated in order to address deficits in fxl mobility, fxl activity tolerance, balance, coordination, strength, and use of adaptive techniques/equipment during performance of BADLs. Recommend SNF at discharge.  -       Row Name 07/11/24 0754          Therapy Assessment/Plan (OT)    Rehab Potential (OT) good, to achieve stated therapy goals  -     Criteria for Skilled Therapeutic Interventions Met (OT) yes;skilled treatment is necessary  -LS     Therapy Frequency (OT) 5 times/wk  -LS       Row Name 07/11/24 0754          Therapy Plan Review/Discharge Plan (OT)    Anticipated Discharge Disposition (OT) skilled nursing facility  -       Row Name 07/11/24 0754          Positioning and Restraints    Pre-Treatment Position in bed  -LS     Post Treatment Position chair  -LS     In Chair reclined;call light within reach;encouraged to call for assist;exit alarm on;legs elevated  -LS               User Key  (r) = Recorded By, (t) = Taken By, (c) = Cosigned By      Initials Name Provider Type    LS Shanice Cummins, OTR/L Occupational Therapist                   Outcome Measures       Row Name 07/11/24 0754          How much help from another is currently needed...    Putting on and taking off regular lower body clothing? 2  -LS     Bathing (including washing, rinsing, and drying) 2  -LS     Toileting (which includes using toilet bed pan or urinal) 2  -LS     Putting on and taking off regular upper body clothing 2  -LS     Taking care of personal grooming (such as brushing teeth) 3  -LS     Eating meals 4  -LS     AM-PAC 6 Clicks Score (OT) 15  -LS       Row Name 07/11/24 0754          Functional Assessment    Outcome Measure Options AM-PAC 6 Clicks Daily Activity (OT)  -LS               User  Key  (r) = Recorded By, (t) = Taken By, (c) = Cosigned By      Initials Name Provider Type     Shanice Cummins OTR/L Occupational Therapist                    Occupational Therapy Education       Title: PT OT SLP Therapies (In Progress)       Topic: Occupational Therapy (In Progress)       Point: ADL training (Done)       Description:   Instruct learner(s) on proper safety adaptation and remediation techniques during self care or transfers.   Instruct in proper use of assistive devices.                  Learning Progress Summary             Patient Acceptance, E, VU,NR by  at 7/11/2024 1004                         Point: Home exercise program (Not Started)       Description:   Instruct learner(s) on appropriate technique for monitoring, assisting and/or progressing therapeutic exercises/activities.                  Learner Progress:  Not documented in this visit.              Point: Precautions (Done)       Description:   Instruct learner(s) on prescribed precautions during self-care and functional transfers.                  Learning Progress Summary             Patient Acceptance, E, VU,NR by  at 7/11/2024 1004                         Point: Body mechanics (Done)       Description:   Instruct learner(s) on proper positioning and spine alignment during self-care, functional mobility activities and/or exercises.                  Learning Progress Summary             Patient Acceptance, E, VU,NR by  at 7/11/2024 1004                                         User Key       Initials Effective Dates Name Provider Type Discipline     06/20/22 -  Shanice Cummins OTR/L Occupational Therapist OT                  OT Recommendation and Plan  Planned Therapy Interventions (OT): activity tolerance training, functional balance retraining, occupation/activity based interventions, BADL retraining, strengthening exercise, transfer/mobility retraining, patient/caregiver education/training, ROM/therapeutic exercise,  cognitive/visual perception retraining, neuromuscular control/coordination retraining, adaptive equipment training  Therapy Frequency (OT): 5 times/wk  Plan of Care Review  Plan of Care Reviewed With: patient, spouse  Progress: no change  Outcome Evaluation: OT beatriz completed. Pt in fowlers upon therapist arrival; A&Ox4; No c/o pain at rest but does report L knee pain with movement; Speech remains slurred; Pt's spouse also present. Pt reports Mod I-I with all BADLs including fxl ambulation at baseline. Today, Pt performed supine>sit utilizing bedrail with HOB elevated with Max A and cues for sequencing and body mechanics. Pt demos intermittent posterior lean while seated EOB requiring Min A to correct. Pt dependent to ashanti B socks while seated EOB. Pt performed all sit<>stands from bed, chair, and toilet with Mod A and cues for sequencing, safety awareness, and body mechanics. Pt ambulated from bed>BR>chair utilizing rwx with Min A and constant cues for safety awareness, sequencing, positioning of AD, and body mechanics. Pt required Max A for toileting tasks. Pt demos some RUE weakness and during neuro testing, Pt did demo slight pronation of RUE, however, coordination equal and minimally impaired bilaterally. Skilled OT intervention indicated in order to address deficits in fxl mobility, fxl activity tolerance, balance, coordination, strength, and use of adaptive techniques/equipment during performance of BADLs. Recommend SNF at discharge.     Time Calculation:         Time Calculation- OT       Row Name 07/11/24 0754             Time Calculation- OT    OT Start Time 0754  +10 minutes chart review  -      OT Stop Time 0842  -      OT Time Calculation (min) 48 min  -      OT Non-Billable Time (min) 58 min  -      OT Received On 07/11/24  -      OT Goal Re-Cert Due Date 07/21/24  -                User Key  (r) = Recorded By, (t) = Taken By, (c) = Cosigned By      Initials Name Provider Type    CLARISSE Cummins  CAYDEN PrasadR/L Occupational Therapist                  Therapy Charges for Today       Code Description Service Date Service Provider Modifiers Qty    84906725842 HC OT EVAL MOD COMPLEXITY 4 7/11/2024 Shanice Cummins OTR/L GO 1                 JAMEEL Coe/ROMULO  7/11/2024

## 2024-07-11 NOTE — PLAN OF CARE
Goal Outcome Evaluation:  Plan of Care Reviewed With: patient      Disoriented to time, Pt has slurred speech, facial right side drooping, tongue deviates to the right, failed the dysphagia screening while MD was at bedside. NIH has been three since the Pt has arrived to the floor from ED. Blood sugar is being monitored per orders, sugar has been high. Pt is on RA, IV infusing without difficulty, call light within reach, bed alarm is set.

## 2024-07-11 NOTE — H&P
[facility]  HISTORY AND PHYSICAL    Date of Admission: 7/10/2024  Primary Care Physician: Betty Mcguire MD    Subjective   Primary Historian: Patient    Chief Complaint: Slurred speech    History of Present Illness  68 year old male with PMH of CAD, PVD, DM , smoking, that presents to the ER with complaints of slurred speech of sudden onset for about 3 days. Tongue deviates slightly to the right and he coughs with attempting to drink water. Extremity strength and sensation appears normal.    He is also having elevated blood sugar and has been off insulin for several months due to cost. Wife also reports that he has problems with leg circulation, he was evaluated by vascular surgery in June and recommended for arteriogram due to abnormal SHELBY.     Review of Systems   Otherwise complete ROS reviewed and negative except as mentioned in the HPI.    Past Medical History:   Past Medical History:   Diagnosis Date    Cancer     leukemia    Coronary artery disease     diabetes with circulatory complications     Hyperlipidemia     Hypertension     Obesity (BMI 30-39.9)     Tobacco abuse      Past Surgical History:  Past Surgical History:   Procedure Laterality Date    CARDIAC CATHETERIZATION N/A 3/28/2019    Procedure: Left Heart Cath;  Surgeon: Vincent Pan MD;  Location:  [facility];  Service: Cardiovascular    CARDIAC CATHETERIZATION Bilateral 3/28/2019    Procedure: Stent JOSSIE coronary;  Surgeon: Vincent Pan MD;  Location:  [facility];  Service: Cardiovascular    CARDIAC CATHETERIZATION N/A 3/28/2019    Procedure: Left ventriculography;  Surgeon: Vincent Pan MD;  Location:  [facility];  Service: Cardiovascular    CORONARY STENT PLACEMENT      TONSILLECTOMY       Social History:  reports that he has been smoking cigarettes. He started smoking about 55 years ago. He has a 83.3 pack-year smoking history. He  "has never used smokeless tobacco. He reports that he does not currently use alcohol. He reports that he does not currently use drugs after having used the following drugs: Marijuana.    Family History: family history includes Cancer in his father; Heart attack in his brother and mother; Heart disease in his brother and mother; No Known Problems in his brother; Valvular heart disease in his mother.       Allergies:  Allergies   Allergen Reactions    Aspirin Other (See Comments)     Tightness in throat with high dose asa.  Can take the 81 mg without difficutly    Bee Venom Anaphylaxis       Medications:  Prior to Admission medications    Medication Sig Start Date End Date Taking? Authorizing Provider   aspirin 81 MG chewable tablet Chew 1 tablet Daily.    Provider, MD Merly   nitroglycerin (NITROSTAT) 0.4 MG SL tablet Place 1 tablet under the tongue Every 5 (Five) Minutes As Needed for Chest Pain. Take no more than 3 doses in 15 minutes. 7/20/22   Betty Mcguire MD   rosuvastatin (Crestor) 10 MG tablet Take 1 tablet by mouth Daily. To prevent heart attacks and control cholesterol 6/20/24   Joanie Devlin APRN   aspirin 81 MG EC tablet Take 1 tablet by mouth Daily.  7/10/24  Provider, MD Merly     I have utilized all available immediate resources to obtain, update, or review the patient's current medications (including all prescriptions, over-the-counter products, herbals, cannabis/cannabidiol products, and vitamin/mineral/dietary (nutritional) supplements).    Objective     Vital Signs: /79 (BP Location: Right arm, Patient Position: Lying)   Pulse 86   Temp 98.8 °F (37.1 °C) (Oral)   Resp 18   Ht 157.5 cm (62\")   Wt 77.1 kg (169 lb 15.6 oz)   SpO2 100%   BMI 31.09 kg/m²   Physical Exam  Constitutional:       Appearance: He is well-developed. He is not ill-appearing.   HENT:      Head: Normocephalic and atraumatic.      Right Ear: External ear normal.      Left Ear: External ear normal.      " Nose: Nose normal.      Mouth/Throat:      Mouth: Mucous membranes are dry.   Eyes:      General:         Right eye: No discharge.         Left eye: No discharge.      Extraocular Movements: Extraocular movements intact.      Conjunctiva/sclera: Conjunctivae normal.      Pupils: Pupils are equal, round, and reactive to light.   Neck:      Vascular: No JVD.   Cardiovascular:      Rate and Rhythm: Regular rhythm. Tachycardia present.      Heart sounds: Normal heart sounds. No murmur heard.  Pulmonary:      Effort: Pulmonary effort is normal. No respiratory distress.      Breath sounds: Normal breath sounds. No wheezing or rales.   Chest:      Chest wall: No tenderness.   Abdominal:      General: Bowel sounds are normal. There is no distension.      Palpations: Abdomen is soft.      Tenderness: There is no abdominal tenderness. There is no guarding or rebound.   Musculoskeletal:         General: No tenderness or deformity. Normal range of motion.      Cervical back: Normal range of motion and neck supple. No rigidity.      Right lower leg: No edema.      Left lower leg: No edema.   Skin:     General: Skin is warm and dry.      Capillary Refill: Capillary refill takes less than 2 seconds.      Findings: No rash.   Neurological:      Mental Status: He is alert and oriented to person, place, and time.      Cranial Nerves: Cranial nerve deficit present.      Sensory: No sensory deficit.      Motor: Weakness present. No abnormal muscle tone.      Deep Tendon Reflexes: Reflexes normal.   Psychiatric:         Mood and Affect: Mood normal.         Behavior: Behavior normal.     Results Reviewed:  Lab Results (last 24 hours)       Procedure Component Value Units Date/Time    Urinalysis With Microscopic If Indicated (No Culture) - Urine, Clean Catch [[id number]]  (Abnormal) Collected: 07/10/24 1847    Specimen: Urine, Clean Catch Updated: 07/10/24 1921     Color, UA Yellow     Appearance, UA Cloudy     pH, UA 6.5     Specific  Gravity, UA >1.030     Glucose, UA >=1000 mg/dL (3+)     Ketones, UA 15 mg/dL (1+)     Bilirubin, UA Negative     Blood, UA Trace     Protein,  mg/dL (2+)     Leuk Esterase, UA Moderate (2+)     Nitrite, UA Negative     Urobilinogen, UA 1.0 E.U./dL    Urinalysis, Microscopic Only - Urine, Clean Catch [[id number]]  (Abnormal) Collected: 07/10/24 1847    Specimen: Urine, Clean Catch Updated: 07/10/24 1921     RBC, UA 0-2 /HPF      WBC, UA Too Numerous to Count /HPF      Bacteria, UA Trace /HPF      Squamous Epithelial Cells, UA 3-6 /HPF      Yeast, UA       Moderate/2+ Budding Yeast w/Hyphae     /HPF     Hyaline Casts, UA None Seen /LPF      Methodology Manual Light Microscopy    POC Glucose Once [[id number]]  (Abnormal) Collected: 07/10/24 1855    Specimen: Blood Updated: 07/10/24 1907     Glucose 308 mg/dL      Comment: : [id number] Faviola ColeMeter ID: [id number]       Comprehensive Metabolic Panel [[id number]]  (Abnormal) Collected: 07/10/24 1657    Specimen: Blood Updated: 07/10/24 1742     Glucose 466 mg/dL      BUN 15 mg/dL      Creatinine 0.67 mg/dL      Sodium 133 mmol/L      Potassium 4.5 mmol/L      Comment: Slight hemolysis detected by analyzer. Result may be falsely elevated.        Chloride 96 mmol/L      CO2 23.0 mmol/L      Calcium 9.1 mg/dL      Total Protein 6.8 g/dL      Albumin 3.8 g/dL      ALT (SGPT) 5 U/L      AST (SGOT) 13 U/L      Comment: Slight hemolysis detected by analyzer. Result may be falsely elevated.        Alkaline Phosphatase 92 U/L      Total Bilirubin 0.5 mg/dL      Globulin 3.0 gm/dL      A/G Ratio 1.3 g/dL      BUN/Creatinine Ratio 22.4     Anion Gap 14.0 mmol/L      eGFR 102.3 mL/min/1.73     Narrative:      GFR Normal >60  Chronic Kidney Disease <60  Kidney Failure <15      Acetone [[id number]]  (Normal) Collected: 07/10/24 1657    Specimen: Blood Updated: 07/10/24 1734     Acetone Negative    Magnesium [[id number]]  (Normal) Collected: 07/10/24 1229    Specimen: Blood  Updated: 07/10/24 1725     Magnesium 1.9 mg/dL     Blood Gas, Arterial - [[id number]]  (Abnormal) Collected: 07/10/24 1723    Specimen: Arterial Blood Updated: 07/10/24 1724     Site Right Radial     Damien's Test Positive     pH, Arterial 7.472 pH units      Comment: 83 Value above reference range        pCO2, Arterial 29.1 mm Hg      Comment: 84 Value below reference range        pO2, Arterial 80.7 mm Hg      Comment: 84 Value below reference range        HCO3, Arterial 21.3 mmol/L      Base Excess, Arterial -1.0 mmol/L      Comment: 84 Value below reference range        O2 Saturation, Arterial 98.1 %      Temperature 37.0     Barometric Pressure for Blood Gas 752 mmHg      Modality Room Air     Ventilator Mode NA     Collected by [id number]     Comment: Meter: [id number]     :  Ana Malave, MONTEZ        pCO2, Temperature Corrected 29.1 mm Hg      pH, Temp Corrected 7.472 pH Units      pO2, Temperature Corrected 80.7 mm Hg     POC Glucose Once [[id number]]  (Abnormal) Collected: 07/10/24 1706    Specimen: Blood Updated: 07/10/24 1718     Glucose 510 mg/dL      Comment: : [id number] Gisselle MedinaMeter ID: [id number]       CBC & Differential [[id number]]  (Abnormal) Collected: 07/10/24 1657    Specimen: Blood Updated: 07/10/24 1708    Narrative:      The following orders were created for panel order CBC & Differential.  Procedure                               Abnormality         Status                     ---------                               -----------         ------                     CBC Auto Differential[[id number]]        Abnormal            Final result                 Please view results for these tests on the individual orders.    CBC Auto Differential [[id number]]  (Abnormal) Collected: 07/10/24 1657    Specimen: Blood Updated: 07/10/24 1708     WBC 12.89 10*3/mm3      RBC 5.35 10*6/mm3      Hemoglobin 17.3 g/dL      Hematocrit 47.9 %      MCV 89.5 fL      MCH 32.3 pg      MCHC 36.1 g/dL       RDW 12.3 %      RDW-SD 39.9 fl      MPV 11.1 fL      Platelets 224 10*3/mm3      Neutrophil % 79.5 %      Lymphocyte % 11.0 %      Monocyte % 8.1 %      Eosinophil % 0.5 %      Basophil % 0.5 %      Immature Grans % 0.4 %      Neutrophils, Absolute 10.26 10*3/mm3      Lymphocytes, Absolute 1.42 10*3/mm3      Monocytes, Absolute 1.04 10*3/mm3      Eosinophils, Absolute 0.06 10*3/mm3      Basophils, Absolute 0.06 10*3/mm3      Immature Grans, Absolute 0.05 10*3/mm3      nRBC 0.0 /100 WBC     POC Glucose Once [[id number]]  (Abnormal) Collected: 07/10/24 1514    Specimen: Blood Updated: 07/10/24 1525     Glucose 378 mg/dL      Comment: : [id number] Inna RothaMeter ID: [id number]             Imaging Results (Last 24 Hours)       Procedure Component Value Units Date/Time    XR Knee 3 View Left [[id number]] Collected: 07/10/24 1751     Updated: 07/10/24 1755    Narrative:      XR KNEE 3 VW LEFT-     HISTORY: Knee pain/fall and injury     COMPARISON: 9/13/2022     FINDINGS: 3 views of the left knee are obtained.     There is moderate tricompartmental osteoarthritis with bony spurring  greatest of the lateral femoral condyle. Osteopenia is suspected. Small  knee joint effusion. No acute fracture or dislocation. Mild vascular  calcification.       Impression:      1. Moderate tricompartmental osteoarthritis with small knee joint  effusion. No acute osseous injury.        This report was signed and finalized on 7/10/2024 5:52 PM by Dr. Ragini Soto MD.             I have personally reviewed and interpreted the radiology studies and ECG obtained at time of admission.     Assessment / Plan   Assessment:   Active Hospital Problems    Diagnosis     **Acute focal neurological deficit     UTI (urinary tract infection), bacterial     Uncontrolled type 2 diabetes mellitus with hyperglycemia     PAD (peripheral artery disease)     Coronary artery disease involving native coronary artery of native heart without angina pectoris      Essential hypertension      Treatment Plan  The patient will be admitted to my service here at [facility].   Admit to neurofloor  Vitals every 4 hours  Neurochecks every 4 hours  Bedside swallow failed will remain n.p.o.  IV fluids normal saline 75 cc an hour    Slurred speech concerning for recent stroke  Neurology consult  ASA/Lipitor  PT/OT/SLP  MRI brain would be indicated, alert reports metallic implant, will need clarification  CT brain, CTA brain and neck  Lipid panel and A1C  Blood sugar control  Smoking cessation advised    DM 2 Uncontrolled  A1C in AM  Lantus 10 units nightly  Humalog low dose sliding scale AC and HS  IVF bolus given in ER, will continue with NS at 75 cc/hour    Pyuria, asymptomatic  Continue to monitor for symptoms    DVT prophylaxis > Lovenox 40 mg SQ daily    Medical Decision Making  Number and Complexity of problems: 4 complex medical problems  Differential Diagnosis: see above    Conditions and Status        Condition is unchanged.     [facility] Data  External documents reviewed: [employer]  Cardiac tracing (EKG, telemetry) interpretation: EKG pending  Radiology interpretation: see above  Labs reviewed: see above  Any tests that were considered but not ordered: none     Decision rules/scores evaluated (example [id number], Wells, etc): N/A     Discussed with: Patient and wife at bedside     Care Planning  Shared decision making: Patient  Code status and discussions: Full code    Disposition  Social Determinants of Health that impact treatment or disposition: none  Estimated length of stay is to be determined.     I confirmed that the patient's advanced care plan is present, code status is documented, and a surrogate decision maker is listed in the patient's medical record.     The patient's surrogate decision maker is family, see records.     The patient was seen and examined by me on 7/10/2024 at 2231.    Electronically signed by Ady Allen MD, 07/10/24, 22:31  CDT.

## 2024-07-11 NOTE — PLAN OF CARE
Goal Outcome Evaluation:      Patient a&o x4, speech slightly slurred. Continued on neuro checks and  are equal. PT and OT working with patient and Patient up in room with walker and walked to bathroom and tolerated well. Patient had echo, CT angiogram and MRI today. Continued on IV fluids. All safety precautions maintained and call light in reach and spouse at bedside.

## 2024-07-11 NOTE — CONSULTS
Neurology Consult Note    Consult Date: 2024  Referring MD: No ref. provider found  Reason for Consult: stroke like symptoms    Patient: Anival Pennington (68 y.o. male)  MRN: [id number]  : 1955    History of Present Illness:   Anival Pennington is a 68 y.o. male with history of CAD, MI (coronary stent), HLD, PVD, DM (uncontrolled), HTN, obesity, and tobacco abuse. Patient consulted to inpatient neurology due to concerns of possible stroke. Patient presented to [facility] ER with complaints of slurred speech that occurred ~ 3 days before. Prior to coming to [facility] he was seen [facility] on  for similar symptoms. CT scan of head was negative at that facility. Patient ultimately left A and then presented here on . He was noted to have tongue deviation to the right, coughing with attempting to drink water, and slight right sided facial droop. He had fallen and hurt his left knee (x-ray negative) and thus does not want to move it, but otherwise moves all extremities well without overt weakness.  Patient's hemoglobin A1c is 13.2%.  He relates that he has not been taking his insulin in several months due to financial constraints/cost.  Patient does smoke 1-1/2 pack of cigarettes per day and is unwilling to reduce or quit.  He denies alcohol or drug usage, including THC.  He is taking Crestor 10 mg and 81 mg aspirin (states allergic to higher dose of ASA). Workup revealed trace bacteria and positive esterase on urinalysis.  WBC count of 12.89/13.25, glucose 466, LDL 38, TSH/T4 normal.   UDS, TTE, CTA of the head and neck, MRI of the brain have been ordered for today.  Patient has known peripheral vascular disease and is actually scheduled for a right arteriogram with possible intervention with Dr. Poole on  due to an abnormal SHELBY.  He is currently on Lovenox for DVT prophylaxis.      Medical History:   Past Medical/Surgical Hx:  Past Medical History:   Diagnosis  Date    Cancer     leukemia    Coronary artery disease     diabetes with circulatory complications     Hyperlipidemia     Hypertension     Obesity (BMI 30-39.9)     Tobacco abuse      Past Surgical History:   Procedure Laterality Date    CARDIAC CATHETERIZATION N/A 3/28/2019    Procedure: Left Heart Cath;  Surgeon: Vincent Pan MD;  Location:  [facility] CATH [facility]VAS[facility] LOC[facility];  Service: Cardiovascular    CARDIAC CATHETERIZATION Bilateral 3/28/2019    Procedure: Stent JOSSIE coronary;  Surgeon: Vincent Pan MD;  Location:  [facility]ATH [facility];  Service: Cardiovascular    CARDIAC CATHETERIZATION N/A 3/28/2019    Procedure: Left ventriculography;  Surgeon: Vincent Pan MD;  Location:  [facility] CATH INVAS[facility];  Service: Cardiovascular    CORONARY STENT PLACEMENT      TONSILLECTOMY         Medications On Admission:  Medications Prior to Admission   Medication Sig Dispense Refill Last Dose    aspirin 81 MG chewable tablet Chew 1 tablet Daily.       nitroglycerin (NITROSTAT) 0.4 MG SL tablet Place 1 tablet under the tongue Every 5 (Five) Minutes As Needed for Chest Pain. Take no more than 3 doses in 15 minutes. 100 tablet 12     rosuvastatin (Crestor) 10 MG tablet Take 1 tablet by mouth Daily. To prevent heart attacks and control cholesterol 30 tablet 5        Current Medications:    Current Facility-Administered Medications:     acetaminophen (TYLENOL) tablet 650 mg, 650 mg, Oral, Q4H PRN **OR** acetaminophen (TYLENOL) suppository 650 mg, 650 mg, Rectal, Q4H PRN, Ady Allen MD    aspirin chewable tablet 81 mg, 81 mg, Oral, Daily, 81 mg at 07/11/24 0926 **OR** aspirin suppository 300 mg, 300 mg, Rectal, Daily, Aurelia Urbano APRN    atorvastatin (LIPITOR) tablet 80 mg, 80 mg, Oral, Nightly, Ady Allen MD    dextrose (D50W) (25 g/50 mL) IV injection 25 g, 25 g, Intravenous, Q15 Min PRN, Ady Allen MD    dextrose (GLUTOSE) oral gel 15 g, 15 g, Oral, Q15 Min PRN,  Ady Allen MD    Enoxaparin Sodium (LOVENOX) syringe 40 mg, 40 mg, Subcutaneous, Daily, Ady Allen MD, 40 mg at 07/11/24 0926    glucagon (GLUCAGEN) injection 1 mg, 1 mg, Intramuscular, Q15 Min PRN, Ady Allen MD    insulin glargine (LANTUS, SEMGLEE) injection 10 Units, 10 Units, Subcutaneous, Nightly, Ady Allen MD, 10 Units at 07/11/24 0041    Insulin Lispro (humaLOG) injection 2-7 Units, 2-7 Units, Subcutaneous, 4x Daily AC & at Bedtime, Ady Allen MD, 4 Units at 07/11/24 0823    nitroglycerin (NITROSTAT) SL tablet 0.4 mg, 0.4 mg, Sublingual, Q5 Min PRN, Ady Allen MD    ondansetron (ZOFRAN) injection 4 mg, 4 mg, Intravenous, Q6H PRN, Ady Allen MD    [COMPLETED] Insert Peripheral IV, , , Once **AND** sodium chloride 0.9 % flush 10 mL, 10 mL, Intravenous, PRN, Ady Allen MD    sodium chloride 0.9 % flush 10 mL, 10 mL, Intravenous, Q12H, Ady Allen MD, 10 mL at 07/11/24 0932    sodium chloride 0.9 % flush 10 mL, 10 mL, Intravenous, PRN, Ady Allen MD    sodium chloride 0.9 % infusion 40 mL, 40 mL, Intravenous, PRN, Ady Allen MD    sodium chloride 0.9 % infusion, 75 mL/hr, Intravenous, Continuous, Ady Allen MD, Last Rate: 75 mL/hr at 07/11/24 0041, 75 mL/hr at 07/11/24 0041     Allergies:  Allergies   Allergen Reactions    Aspirin Other (See Comments)     Tightness in throat with high dose asa.  Can take the 81 mg without difficutly    Bee Venom Anaphylaxis       Social Hx:  Social History     Socioeconomic History    Marital status:    Tobacco Use    Smoking status: Every Day     Current packs/day: 1.50     Average packs/day: 1.5 packs/day for 55.5 years (83.3 ttl pk-yrs)     Types: Cigarettes     Start date: 1969    Smokeless tobacco: Never   Vaping Use    Vaping status: Never Used   Substance and Sexual Activity    Alcohol use: Not  "Currently     Comment: quit 2010    Drug use: Not Currently     Types: Marijuana     Comment: occ    Sexual activity: Defer       Family Hx:  Family History   Problem Relation Age of Onset    Valvular heart disease Mother     Heart attack Mother     Heart disease Mother     Heart disease Brother     Heart attack Brother     Cancer Father     No Known Problems Brother      Physical Examination:   Vital Signs:  Vitals:    07/10/24 2055 07/11/24 0023 07/11/24 0412 07/11/24 0726   BP: 169/79 152/77 153/91 148/81   BP Location: Right arm Right arm Right arm Right arm   Patient Position: Lying Lying Lying Lying   Pulse: 86 85 80 94   Resp: 18 18 18 18   Temp: 98.8 °F (37.1 °C) 99 °F (37.2 °C) 99 °F (37.2 °C) 98.8 °F (37.1 °C)   TempSrc: Oral Oral Oral Oral   SpO2: 100% 97% 98% 98%   Weight: 77.1 kg (169 lb 15.6 oz)      Height: 157.5 cm (62\")          General Exam:  Appears older than stated age. Chronically ill appearing. Sitting in recliner with legs elevated and eating breakfast at time of exam.   Head:  Normocephalic, atraumatic  HEENT:  Neck supple. Endentulous and without dentures.   CVS:  Regular rate and rhythm.  Carotid Examination:  No bruits  Lungs:  Clear to auscultation  Abdomen:  Nontender, Nondistended  Extremities:  No signs of peripheral edema. Fingernails are long and nicotine stained. Left knee is painful to move.   Skin:  No rashes    Neurologic Exam:    Mental Status:    -Awake, Alert, Oriented X 3 (thought the year was 2022).   -No word finding difficulties  -No aphasia  -Moderate dysarthria with ~ 50% words intelligible.   -Follows simple and complex commands    CN II:  Visual fields full.  Pupils equally reactive to light  CN III, IV, VI:  Extraocular Muscles full with no signs of nystagmus  CN V:  Facial sensory is symmetric with no asymmetries.  CN VII:  Facial motor asymmetric with right sided facial droop noted.   CN VIII:  Gross hearing intact bilaterally  CN IX:  Palate elevates " "symmetrically  CN X:  Palate elevates symmetrically  CN XI:  Shoulder shrug symmetric  CN XII:  Tongue--unable to fully assess due to patient eating and would not stop for the exam. Did have slight tongue deviation to right yesterday.     Motor: (strength out of 5:  1= minimal movement, 2 = movement in plane of gravity, 3 = movement against gravity, 4 = movement against some resistance, 5 = full strength)    -Right Upper Ext: Proximal:5- Distal: 5-  RUE slightly weaker than LUE. Slight pronation noted to RUE.  -Left Upper Ext: Proximal: 5 Distal: 5    -Right Lower Ext: Proximal: 5 Distal: 5  -Left Lower Ext: patient would not move LLE due to complaints of left knee pain with ROM.     DTR:  -Right   Biceps: 2+ Triceps: 2+ Brachioradialis: 2+   Patella: 2+ Ankle: 2+ Neg Babinski  -Left   Biceps: 2+ Triceps: 2+ Brachioradialis: 2+   Patella: 2+ Ankle: 2+ Neg Babinski    Sensory:  -Intact to light touch, pinprick, temperature, pain, and proprioception    Coordination:  -Finger to nose intact without ataxia noted.     Gait  -Not assessed due to safety/fall risk and complaints of left knee pain.     Recent Diagnostics:   Laboratory Results:   - Reviewed in EMR  Lab Results   Component Value Date    GLUCOSE 233 (H) 07/11/2024    CALCIUM 8.5 (L) 07/11/2024     (L) 07/11/2024    K 3.5 07/11/2024    CO2 22.0 07/11/2024     07/11/2024    BUN 10 07/11/2024    CREATININE 0.42 (L) 07/11/2024    EGFRIFNONA 95 03/29/2019    BCR 23.8 07/11/2024    ANIONGAP 11.0 07/11/2024     Lab Results   Component Value Date    WBC 13.25 (H) 07/11/2024    HGB 14.0 07/11/2024    HCT 40.7 07/11/2024    MCV 90.2 07/11/2024     07/11/2024     No results found for: \"PTT\", \"INR\"  Lab Results   Component Value Date    TRIG 175 (H) 07/11/2024    HDL 37 (L) 07/11/2024    LDL 38 07/11/2024     Lab Results   Component Value Date    HGBA1C 13.20 (H) 07/11/2024       Imaging Results:  Imaging Results (Last 24 Hours)       Procedure Component " Value Units Date/Time    XR Knee 3 View Left [[id number]] Collected: 07/10/24 1751     Updated: 07/10/24 1755    Narrative:      XR KNEE 3 VW LEFT-     HISTORY: Knee pain/fall and injury     COMPARISON: 9/13/2022     FINDINGS: 3 views of the left knee are obtained.     There is moderate tricompartmental osteoarthritis with bony spurring  greatest of the lateral femoral condyle. Osteopenia is suspected. Small  knee joint effusion. No acute fracture or dislocation. Mild vascular  calcification.       Impression:      1. Moderate tricompartmental osteoarthritis with small knee joint  effusion. No acute osseous injury.        This report was signed and finalized on 7/10/2024 5:52 PM by Dr. Ragini Soto MD.                Other labs:  Result Review:  I have personally reviewed the results from the time of this admission to 7/11/2024 10:32 CDT and agree with these findings:  [x]  Laboratory list / accordion  []  Microbiology  [x]  Radiology  [x]  EKG/Telemetry   [x]  Cardiology/Vascular   []  Pathology  []  Old records  []  Other:  Most notable findings include: see above in HPI  Other RAD:  TTE, CTA head and neck and MRI brain are pending.       Assessment & Plan:   Anival Pennington is a 68 y.o. male with history of CAD, MI (coronary stent), HLD, PVD, DM (uncontrolled), HTN, obesity, and tobacco abuse. Patient consulted to inpatient neurology due to concerns of possible stroke. Patient presented to [facility] ER with complaints of slurred speech that occurred ~ 3 days before. Prior to coming to [facility] he was seen at [facility] on July 9th for similar symptoms. CT scan of head was negative at that facility. Patient ultimately left AMA and then presented here on July 10th. He was noted to have tongue deviation to the right, coughing with attempting to drink water, and slight right sided facial droop. He had fallen and hurt his left knee (x-ray negative) and thus does not want to move it, but otherwise  moves all extremities well without overt weakness.  Patient's hemoglobin A1c is 13.2%.  He relates that he has not been taking his insulin in several months due to financial constraints/cost.  Patient does smoke 1-1/2 pack of cigarettes per day and is unwilling to reduce or quit.  He denies alcohol or drug usage, including THC.  He is taking Crestor 10 mg and 81 mg aspirin (states allergic to higher dose of ASA). Workup revealed trace bacteria and positive esterase on urinalysis.  WBC count of 12.89/13.25, glucose 466, LDL 38, TSH/T4 normal.   UDS, TTE, CTA of the head and neck, MRI of the brain have been ordered for today.  Patient has known peripheral vascular disease and is actually scheduled for a right arteriogram with possible intervention with Dr. Poole on July 22 due to an abnormal SHELBY.  He is currently on Lovenox for DVT prophylaxis.    Impression:  Symptoms and neurological exam are concerning for possible stroke.   HTN  HLD--LDL 38  DM, uncontrolled with A1C=13.2%  PVD  Tobacco abuse (1.5+ PPD)  Hx of medical noncompliance  UTI      Plan:   Obtain MRI of brain, CTA head and neck today.  No lytics given due to the fact that patient did not present for treatment until 3-4 days after symptom onset.  Continue with Lipitor and 81 mg ASA. (On Crestor 10 mg at home).   Need tighter control of blood glucose. Patient will be seen by diabetic educator while here. He will need follow up with his PCP, Dr. Betty Mcguire after D/C.   Continue to follow closely with Dr. Poole in vascular for PVD and right arteriogram on 7/22/2024.   Smoking cessation discussed at length. Patient is unwilling to reduce or quit smoking to reduce stroke risk and improve overall health.   Check UDS  UTI---to be addressed by attending.   PT/OT/SLP consulted. Patient would benefit from SNF/rehab at discharge.   Further recommendations per Dr. DEYSI Bell.     Madina Neal, CLAUDIO  07/11/24  10:12 CDT    Medical Decision  Making    Number/Complexity of Problems  Moderate  1 undiagnosed new problem with uncertain prognosis -   1 acute illness with systemic symptoms -   High  1 acute or chronic illness that poses a threat to life/body function -   High     MDM Data  Moderate - 1/3 categories  Extensive - 2/3 categories    Category 1: 3 of the following  Review of external notes  Review of results  Ordering of each unique test  Independent historian  Category 2:  Independent interpretation of test (ex: imaging)  Category 3:  Discussion of management with another provider    Extensive     Treatment Plan  Moderate - Prescription Drug management  High  Drug therapy requiring intensive monitoring for toxicity  Decision regarding hospitalization or escalation of care  De-escalate care/DNR decisions  High

## 2024-07-11 NOTE — PLAN OF CARE
Goal Outcome Evaluation:  Plan of Care Reviewed With: patient, spouse        Progress: no change  Outcome Evaluation: PT eval complete. Pt alert and oriented x4. Pt found in fowlers position with wife at bedside and agreeable to therapy. Pt reports being ind with ADLs and gait prior to this and able to ambulate short and long distances. Speech also remains slurred at this time. Pt mod A sup to sit for trunk and LLE assist. Pt MMT found to be LLE 3+/5 grossly secondary to pain in L knee and 4/5 RLE grossly. Pt CGA for dynamic sitting balance secondary to posterior LOB a few times. Pt mod A to stand with use of FWW with heavy posterior lean needing cueing to stand upright. Pt ambulated to BR with CGA needing mod A-max A for toileting and doffing dirty brief and donning clean brief. Pt with one LOB in BR needing mod A to correct. Pt CGA back to bed and min A for LLE back into bed. Pt needing multiple v/c throughout session for safety with ambulation and management of FWW with min A to steer FWW as needed. Pt able to following commands 75% of the time and needing v/c to stay on task and cue into safety with OOB activities. Pt demonstrates decreased functional strength and endurance from reported baseline. Pt left in room in fowlers position with all needs met and nsg in room. Pt would benefit from PT to address these strength and endurance deficits for a safe d/c. Anticipate d/c to SNF.      Anticipated Discharge Disposition (PT): skilled nursing facility

## 2024-07-11 NOTE — NURSING NOTE
[facility]  INPATIENT WOUND & OSTOMY CARE    Today's Date: 07/11/24    Patient Name: Anival Pennington  MRN: [id number]  CSN: [id number]  PCP: Betty Mcguire MD  Attending Provider: Patrice Paz MD  Length of Stay: 0    I placed pressure injury prevention measure orders per protocol due to patient being at risk for skin breakdown.    Apply silicone foam border dressing per protocol to sacral spine/bilateral heels for protection.  Nursing to change dressing every 3 days and PRN if soiled. Nursing is to peel back dressing with every assessment to assess skin underneath dressing. No barrier cream under dressing.     Please reach out to wound care nurse if any skin issues arise.     This document has been electronically signed by Juliann Olguin RN on 7/11/2024 07:56 CDT

## 2024-07-11 NOTE — CASE MANAGEMENT/SOCIAL WORK
Continued Stay Note  CARLO Pulido     Patient Name: Anival Pennington  MRN: [id number]  Today's Date: 7/11/2024    Admit Date: 7/10/2024        Discharge Plan       Row Name 07/11/24 1777       Plan    Plan Comments Attempted to see but therapy is currently working with pt. SW will follow up with pt/family tomorrow regarding dc plans.                   Discharge Codes    No documentation.                       RAMBO Hickman

## 2024-07-11 NOTE — PLAN OF CARE
Goal Outcome Evaluation:  Plan of Care Reviewed With: patient, spouse, caregiver (CIARA Avilez)        Progress: no change (Initial Evaluation)         Anticipated Discharge Disposition (SLP): unknown          SLP Swallowing Diagnosis: mild, oral dysphagia, R/O pharyngeal dysphagia (07/11/24 0653)             SPEECH-LANGUAGE PATHOLOGY EVALUATION - SWALLOW  Subjective: The patient was seen on this date for a Clinical Swallow evaluation.  Patient was alert and cooperative. Spouse present at end of session.   Significant history: Presented due to slurred speech. Awaiting test results. Medical history of non-compliance with medications for DM, lukemia, HLD, HTN, and tobacco use. SLP consulted due to failed rn dysphagia screening secondary to facial and lingual weakness.   Objective: Oral motor examination results: right facial asymmetry. Speech is slurred and only 50% intelligible.  Textures given during assessment of swallow function included thin liquid, puree consistency, and regular consistency.  Assessment: Difficulties were noted with none of the above consistencies.  Observations: Edentulous but does not have dentures. No overt s/s of aspiration observed. Mildly increased prep time with solids due to edentulous status.     SLP Findings:  Patient presents with functional swallow, without esophageal component.   Recommendations: Diet Textures: Regular diet with thin liquids.  Medications should be taken whole as tolerated.   Recommended Strategies: upright for PO, small bites and sips, and alternate liquids and solids. Oral care 2x a day.  Other Recommended Evaluations: Speech-Language Evaluation    Dysphagia therapy is recommended PRN to ensure diet toleration.

## 2024-07-11 NOTE — PROGRESS NOTES
[facility] Medicine Services  INPATIENT PROGRESS NOTE    Length of Stay: 0  Date of Admission: 7/10/2024  Primary Care Physician: Betty Mcgiure MD    Subjective   Chief Complaint: Slurred speech    SASHA Pennington presented to [facility] emergency room 7/10/2024 with slurred speech for the past 3 days.  He was initially seen at [facility] [place] on 7/9 with similar symptoms.  CT scan of the head was negative and patient left AMA.  He presented to our facility on 7/10 noting tongue deviation to the right, coughing when attempting to drink water and slight right-sided facial drooping.  Patient reported he went to the bathroom earlier on the day of admission, twisted his leg and fell injuring his left knee.  He denied any other injuries.  He reports a history of coronary artery disease, peripheral vascular disease, diabetes mellitus, tobacco use.  Patient reported he has been off of his insulin for several months due to cost.  He reported leg circulation evaluated by Dr. Poole vascular surgery June 2024 with recommendations for arteriogram 7/22/24 due to abnormal SHELBY.  Patient was noted to have slurring of speech.  He moved extremities equally.  WBC 12.89, glucose 110, 466, 308, creatinine 0.67, urinalysis too numerous to count WBC, trace bacteria, moderate yeast.  X-ray left knee moderate tricompartmental osteoarthritis with small knee effusion.  No acute osseous injury.  Normal saline, insulin given in ER.        Today  Sitting up in bed.  Patient does have slurring of speech and word finding issues.  Mild right facial drooping.  He follows commands and moves extremities.  Patient is reluctant to move left lower extremity due to knee pain.  Denies complaints of chest pain or palpitations.  Denies nausea, vomiting or abdominal.  Speech therapy flared for thin liquids.  Occupational Therapy noted intermittent posterior lean when seated.  Related from  bed to chair with walker and cues for safety.  Right upper extremity weakness during testing.  Nation equal and minimally impaired.  Skilled OT. Consider SNF at discharge.      Review of Systems   Constitutional:  Positive for activity change. Negative for fatigue and fever.   HENT:  Negative for congestion.    Eyes:  Negative for photophobia and visual disturbance.   Respiratory:  Negative for cough, shortness of breath and wheezing.    Cardiovascular:  Negative for chest pain, palpitations and leg swelling.   Gastrointestinal:  Negative for constipation, diarrhea, nausea and vomiting.   Endocrine: Negative for cold intolerance, heat intolerance and polyuria.   Genitourinary:  Negative for dysuria, frequency and urgency.   Musculoskeletal:  Positive for gait problem (Left knee pain).   Skin:  Negative for color change, pallor, rash and wound.   Allergic/Immunologic: Negative for immunocompromised state.   Neurological:  Positive for speech difficulty (Slurred speech) and weakness. Negative for light-headedness.   Hematological:  Negative for adenopathy. Does not bruise/bleed easily.   Psychiatric/Behavioral:  Negative for agitation, behavioral problems and confusion.         All pertinent negatives and positives are as above. All other systems have been reviewed and are negative unless otherwise stated.     Objective    Temp:  [97.6 °F (36.4 °C)-99 °F (37.2 °C)] 97.6 °F (36.4 °C)  Heart Rate:  [78-97] 78  Resp:  [18-19] 18  BP: (123-169)/(68-91) 150/74    Physical Exam  Vitals and nursing note reviewed.   Constitutional:       Comments: Patient sitting up in bed.  No oxygen use.  No visitors in room.   HENT:      Head: Normocephalic and atraumatic.      Nose: No congestion.      Mouth/Throat:      Pharynx: Oropharynx is clear. No oropharyngeal exudate or posterior oropharyngeal erythema.   Eyes:      Extraocular Movements: Extraocular movements intact.      Pupils: Pupils are equal, round, and reactive to light.    Cardiovascular:      Rate and Rhythm: Normal rate and regular rhythm.      Heart sounds: No murmur heard.     Comments: Normal sinus rhythm 73 on telemetry.  Pulmonary:      Breath sounds: No wheezing, rhonchi or rales.      Comments: No oxygen in use.  Abdominal:      Palpations: Abdomen is soft.      Tenderness: There is no abdominal tenderness.   Genitourinary:     Comments: Voiding.  Musculoskeletal:         General: Tenderness (Left knee) present.      Cervical back: Normal range of motion and neck supple.   Skin:     General: Skin is warm and dry.   Neurological:      General: No focal deficit present.      Mental Status: He is alert and oriented to person, place, and time.      Comments: Slurred speech, mild right facial drooping.  Follows commands.  Moves extremities.   Psychiatric:         Mood and Affect: Mood normal.         Behavior: Behavior normal.           Results Review:  I have reviewed the labs, radiology results, and diagnostic studies.    Laboratory Data:      Results from last 7 days   Lab Units 07/11/24  0539 07/10/24  1657   WBC 10*3/mm3 13.25* 12.89*   HEMOGLOBIN g/dL 14.0 17.3   HEMATOCRIT % 40.7 47.9   PLATELETS 10*3/mm3 218 224        Results from last 7 days   Lab Units 07/11/24  0450 07/10/24  1657   SODIUM mmol/L 135* 133*   POTASSIUM mmol/L 3.5 4.5   CHLORIDE mmol/L 102 96*   CO2 mmol/L 22.0 23.0   BUN mg/dL 10 15   CREATININE mg/dL 0.42* 0.67*   GLUCOSE mg/dL 233* 466*   CALCIUM mg/dL 8.5* 9.1   ALT (SGPT) U/L  --  5         Culture Data:      Microbiology Results (last 10 days)       ** No results found for the last 240 hours. **              Radiology Data:   Imaging Results (Last 72 Hours)       Procedure Component Value Units Date/Time    CT Angiogram Neck [[id number]] Collected: 07/11/24 1243     Updated: 07/11/24 1303    Narrative:      EXAMINATION: CT ANGIOGRAM NECK-      7/11/2024 10:42 AM     HISTORY: Stroke, follow up     In order to have a CT radiation dose as low as  reasonably achievable  Automated Exposure Control was utilized for adjustment of the mA and/or  KV according to patient size.     Total DLP = 877.44 mGy.cm     The CT angiography of the neck is performed after intravenous contrast  enhancement.     Images are acquired in axial plane and subsequent 2D reconstruction  coronal and sagittal planes and 3D maximum intensity projection  reconstruction.     There is no previous similar study for comparison. The correlation made  with MR imaging of the brain and CT angiography of the head performed  today.     Atheromatous changes of the limited visualized aortic arch is noted. No  aneurysmal dilatation or dissection.     Atheromatous plaques are seen at the origin of the brachiocephalic, left  common carotid and left subclavian arteries. No significant stenosis.     The brachiocephalic trunk divides into normal appearing right subclavian  and right common carotid arteries.     Both common carotid arteries have a normal course and caliber throughout  the neck. No areas of focal stenosis or aneurysmal dilatation.     There is noncalcific plaque in the distal 3 cm length of the right  common carotid artery extending into the bifurcation. There is 30%  stenosis of the long segment of the distal right common carotid artery.  The plaque extends into the bifurcation and along the posterior aspect  of the proximal carotid bulb and approximately 70% stenosis of the bulb.  The remaining left carotid bulb and left internal carotid artery is  normal. The right external carotid artery is normal. Normal branches.     There is a noncalcific plaque in the distal left common carotid artery  extending no significant stenosis of the common carotid artery or the  carotid bulb. There is a small rounded hyperdensity along the anterior  aspect of the base of the carotid bulb which probably represent a small  calcification in the plaque. I do not believe this represent an  ulcerated plaque. The  remaining left internal carotid artery is normal  in course and caliber. There is 50% stenosis of the origin of the left  external carotid artery. Subsequent normal branches.     There is normal origin of both vertebral arteries. There is an area of  60% stenosis of the distal V1 segment of the left vertebral artery  before it enters the C6 foramen transversarium. There is approximately  50% focal stenosis of the right vertebral artery in the foramen  transversarium of C6. Remaining vertebral artery bilaterally are normal  in course and caliber. Normal size, right dominant, vertebral artery  enter the foramen magnum and subsequent to join to make a normal size  basilar artery.     The limited visualized soft tissues of the neck are unremarkable.  Heterogeneous thyroid gland is seen with probable nodules on both sides,  right more than the left.     Limited visualized lungs show a small spiculated nodule in the right  upper lobe, image #108 in series 2, measuring 8 mm in diameter. This  need to be further evaluated with CT scan of the chest.       Impression:      1. Atheromatous changes of the carotid and vertebral arteries. 70% focal  stenosis of the right carotid bulb 60% focal stenosis of the distal V1  segment of the left vertebral artery and 50% stenosis of the distal V1  segment of the right vertebral artery before entering the foramen  transversarium of C6. The details are given above.  2. NASCET criteria were utilized for estimation of carotid arterial  stenosis.           This report was signed and finalized on 7/11/2024 1:00 PM by Dr. Patrick Whitley MD.       CT Angiogram Head w AI Analysis of LVO [[id number]] Collected: 07/11/24 1232     Updated: 07/11/24 1246    Narrative:      EXAMINATION: CT ANGIOGRAM HEAD W AI ANALYSIS OF LVO-     7/11/2024 10:42 AM     HISTORY: Stroke, follow up     CT angiography of the head is performed before and after intravenous  contrast enhancement.     Images are acquired  in axial plane and subsequent 2D reconstruction in  coronal and sagittal planes and 3D maximum intensity projection  reconstruction.     There is no previous similar study for comparison. The correlation made  with MR imaging of the brain performed earlier today.     Unenhanced images of the brain show moderate diffuse chronic ischemic  and atrophic changes. No acute intracranial abnormality. No acute bony  abnormality.     The post enhancement images show normal size internal carotid arteries  at the skull base and in the carotid canal bilaterally.     Atheromatous changes are seen in the internal carotid arteries in the  cavernous sinus bilaterally. No areas of flow-limiting stenosis or  aneurysmal dilatation.     Normal size suprasellar internal carotid artery bilaterally is seen  dividing into anterior and middle cerebral arteries bilaterally. There  is moderate irregularity and mild diffuse narrowing of the proximal M1  segment of the left middle cerebral artery. No flow-limiting stenosis.  There is also an area of focal stenosis in the distal M1 segment of the  right middle cerebral artery. However I believe this is due to the  tortuosity of the vessel and not a true stenosis. The A1 segment of the  right LEATHA is relatively smaller than the left. However A2 and A3  segments bilaterally are symmetrical. There is subsequent normal  insular, opercular and cortical branches of the middle cerebral arteries  bilaterally. No areas of flow-limiting stenosis or aneurysmal  dilatation.     Normal size vertebral arteries enter the foramen magnum and join to make  a normal size basilar artery which subsequently divides into normal.  Posterior cerebral arteries bilaterally. No areas of focal stenosis or  aneurysmal dilatation.       Impression:      1. Moderate irregularity and diffuse narrowing of the M1 segment of the  left middle cerebral artery with less than 50% stenosis. The remaining  intracranial circulation is  unremarkable as detailed above. No foci of  flow-limiting stenosis or aneurysmal dilatation.  2. The NASCET criteria were utilized for estimation of carotid arterial  stenosis.                    This report was signed and finalized on 7/11/2024 12:43 PM by Dr. Patrick Whitley MD.       MRI Brain Without Contrast [[id number]] Collected: 07/11/24 1222     Updated: 07/11/24 1229    Narrative:      MRI BRAIN WO CONTRAST- 7/11/2024 10:20 AM     HISTORY: stroke like symptoms, dysarthria. tongue deviation;  R47.81-Slurred speech; E11.65-Type 2 diabetes mellitus with  hyperglycemia; R13.10-Dysphagia, unspecified     TECHNIQUE: Multisequence, multiplanar MRI of the brain without contrast     COMPARISON: CT scans dated 7/11/2024     FINDINGS:     There are 2 small foci of diffusion restriction. This includes a  posterior limb left internal capsule lacunar infarct (series 203-image  19) as well what appears to be a tiny cortical infarct in the right  postcentral gyrus (series 203-image 25, series 302-image 94). Moderate  chronic small vessel ischemic changes. No intra-axial or extra-axial  hemorrhage. No intracranial mass lesion or mass effect. The ventricles,  cortical sulci and basal cisterns are symmetric and age appropriate.  Posterior fossa structures are unremarkable. Pituitary gland and sella  are unremarkable. The major intracranial flow-voids are preserved.  Orbital contents are unremarkable. The paranasal sinuses are clear.  Mastoid air cells are clear. Incidentally noted scalp trichilemmal  cysts.       Impression:         1.  There are 2 small foci of acute ischemia, a posterior limb left  internal capsule lacunar infarct and a second tiny cortical infarct in  the right postcentral gyrus at the vertex.  2.  Chronic small vessel ischemic changes.     This report was signed and finalized on 7/11/2024 12:26 PM by Dr Mekhi Malone.       XR Knee 3 View Left [[id number]] Collected: 07/10/24 1751     Updated: 07/10/24  1755    Narrative:      XR KNEE 3 VW LEFT-     HISTORY: Knee pain/fall and injury     COMPARISON: 9/13/2022     FINDINGS: 3 views of the left knee are obtained.     There is moderate tricompartmental osteoarthritis with bony spurring  greatest of the lateral femoral condyle. Osteopenia is suspected. Small  knee joint effusion. No acute fracture or dislocation. Mild vascular  calcification.       Impression:      1. Moderate tricompartmental osteoarthritis with small knee joint  effusion. No acute osseous injury.        This report was signed and finalized on 7/10/2024 5:52 PM by Dr. Ragini Soto MD.               Intake/Output    Intake/Output Summary (Last 24 hours) at 7/11/2024 1427  Last data filed at 7/11/2024 0900  Gross per 24 hour   Intake 240 ml   Output 525 ml   Net -285 ml       Scheduled Meds  aspirin, 81 mg, Oral, Daily   Or  aspirin, 300 mg, Rectal, Daily  atorvastatin, 80 mg, Oral, Nightly  enoxaparin, 40 mg, Subcutaneous, Daily  insulin glargine, 10 Units, Subcutaneous, Nightly  insulin lispro, 2-7 Units, Subcutaneous, 4x Daily AC & at Bedtime  sodium chloride, 10 mL, Intravenous, Q12H        I have reviewed the patient current medications.     Assessment/Plan     Active Hospital Problems    Diagnosis     **Acute ischemic stroke left  internal capsule lacunar and  right postcentral gyrus at the vertex.     Acute focal neurological deficit     UTI (urinary tract infection), bacterial     Uncontrolled type 2 diabetes mellitus with hyperglycemia     PAD (peripheral artery disease)     Coronary artery disease involving native coronary artery of native heart without angina pectoris     Essential hypertension        Treatment Plan:    1.  Acute ischemic stroke.  Presented with 3-day history of slurred speech.  CT head/9 mild cerebral atrophy and white matter gliosis.  No acute cerebral pathology.  Aspirin, statin ordered on admission.  SCDs for deep vein thrombosis prophylaxis.    MRI of the brain 7/11  2 small foci of acute ischemia, posterior limb left internal capsule or lacunar infarct and second tiny cortical infarct right postcenteral gyrus at the vertex chronic small vessel ischemic changes.  CTA head moderate irregularity and diffuse narrowing of M1 segment of left middle cerebral artery with less than 50% stenosis.  No foci of flow-limiting stenosis or aneurysmal dilatation.  CTA neck atheromatous changes of carotid and vertebral arteries.  70% focal stenosis right carotid bulb, 60% focal stenosis distal D1 segment left vertebral artery and 50% stenosis distal V1 segment right vertebral artery before entering the foramen transversarium C6.  ECHO pendig.    Physical therapy, Occupational Therapy, speech therapy consulted.  Regular diet with thin liquids and medications whole.  OT notes maximal assist for toileting tasks.  Some right upper extremity weakness.  Will OT intervention to address fix mobility, activity tolerance, balance, coordination.    TSH 0.888, free T41.29.  Lipid panel LDL 38 at goal less than 70.  Triglycerides 175, cholesterol 104.    2.  Abnormal urinalysis.  Too numerous to count WBC, trace bacteria, moderate yeast.  Await culture.  Give Diflucan.    3.  Diabetes mellitus type 2, poorly followed with hyperglycemia.  Hemoglobin A1c 13.2.  Patient has not taken insulin recently due to cost.  Accu Checks with sliding scale insulin coverage.  Glucoses 318, 251, 275.  Lantus 10 units nightly.    4.  Primary hypertension blood pressure 150/74.  No home blood pressure medications listed.  Consider losartan 25 and lisinopril 10.    5.  Peripheral artery disease.  Follows with Dr. Poole with plans for right lower extremity angiogram 7/22/2024.    6.  History of coronary artery disease.  Continue aspirin and statin.    7.  Hypokalemia.  Potassium 3.5.  Replace.  Repeat BMP in AM.    8.  Lovenox and SCDs for deep vein thrombosis prophylaxis.    Medical Decision Making  Number and Complexity of  problems: 7  Acute ischemic stroke: Acute, high complexity poses threat to life and bodily function  Abnormal urinalysis: Acute, moderate complexity  Diabetes mellitus type 2 with hyperglycemia: Chronic, high complexity, not at baseline  Primary hypertension: Chronic, high complexity, not at baseline  Peripheral artery disease: Chronic, high complexity needs intervention  History of coronary artery disease: Chronic, moderate complexity, stable  For kalemia: Acute, moderate complexity, not at baseline    Differential Diagnosis: None    Conditions and Status        Condition is unchanged.     [facility] Data  External documents reviewed: Care everywhere.  ER visit [facility] 7/9/2024.  [facility] reviewed vascular surgery office visits.  Cardiac tracing (EKG, telemetry) interpretation: Normal sinus rhythm 73 on telemetry  Radiology interpretation: Reviewed radiology interpretation MRI of the brain, CTA head neck.  X-ray left knee  Labs reviewed:   BMP 7/11/2024.  Repeat BMP in AM.  CBC  7/11/24.  Repeat CBC in AM.  Hemoglobin A1c, lipid panel, TSH    Any tests that were considered but not ordered: None     Decision rules/scores evaluated (example [id number], Wells, etc): None     Discussed with: Dr. Paz and patient.     Care Planning  Shared decision making: Dr. Paz patient.  Patient agrees to neurology consult, MRI, echocardiogram, aspirin, statin, insulin for glucose control, follow-up with PCP for improved glycemic control  Code status and discussions: Full code  Patient surrogate decision maker is his wife, Kavya    Disposition  Social Determinants of Health that impact treatment or disposition: None  I expect the patient to be discharged to home with home health versus rehab in 1-2 days.     Electronically signed by CLAUDIO Guzmán, 07/11/24, 14:27 CDT.    The above documentation resulted from a face-to-face encounter by me Brenda SNYDER, [facility].

## 2024-07-11 NOTE — THERAPY EVALUATION
Patient Name: Anival Pennington  : 1955    MRN: [id number]                              Today's Date: 2024       Admit Date: 7/10/2024    Visit Dx:     ICD-10-CM ICD-9-CM   1. Slurred speech  R47.81 784.59   2. Hyperglycemia due to diabetes mellitus  E11.65 250.02   3. Dysphagia, unspecified type  R13.10 787.20   4. Impaired mobility [Z74.09]  Z74.09 799.89     Patient Active Problem List   Diagnosis    ST elevation myocardial infarction (STEMI)    Tobacco abuse    Type 2 diabetes mellitus with circulatory disorder, without long-term current use of insulin    Essential hypertension    Coronary artery disease involving native coronary artery of native heart without angina pectoris    Mixed hyperlipidemia    Class 2 severe obesity due to excess calories with serious comorbidity and body mass index (BMI) of 36.0 to 36.9 in adult    S/P coronary artery stent placement    History of MI (myocardial infarction)    Diabetes mellitus due to underlying condition with circulatory complication    Dizziness    Heavy smoker (more than 20 cigarettes per day)    Cellulitis of face    Obesity (BMI 30-39.9)    PAD (peripheral artery disease)    Preop testing    Acute focal neurological deficit    UTI (urinary tract infection), bacterial    Uncontrolled type 2 diabetes mellitus with hyperglycemia    Acute ischemic stroke left  internal capsule lacunar and  right postcentral gyrus at the vertex.    Acute stroke due to ischemia     Past Medical History:   Diagnosis Date    Acute ischemic left MCA stroke 2024    Cancer     leukemia    Coronary artery disease     diabetes with circulatory complications     Hyperlipidemia     Hypertension     Obesity (BMI 30-39.9)     Tobacco abuse      Past Surgical History:   Procedure Laterality Date    CARDIAC CATHETERIZATION N/A 3/28/2019    Procedure: Left Heart Cath;  Surgeon: Vincent Pan MD;  Location:  [facility];  Service: Cardiovascular    CARDIAC  CATHETERIZATION Bilateral 3/28/2019    Procedure: Stent JOSSIE coronary;  Surgeon: Vincent Pan MD;  Location:  [facility];  Service: Cardiovascular    CARDIAC CATHETERIZATION N/A 3/28/2019    Procedure: Left ventriculography;  Surgeon: Vincent Pan MD;  Location:  [facility];  Service: Cardiovascular    CORONARY STENT PLACEMENT      TONSILLECTOMY        General Information       Row Name 07/11/24 1412 07/11/24 1048       Physical Therapy Time and Intention    Document Type evaluation  CC: slurred speech; Dx: acute focal neurological deficit  -TOD (r) CN (t) TOD (c) evaluation  CC: slurred speech; Dx: acute focal neurological deficit  -TOD (r) CN (t) TOD (c)    Mode of Treatment physical therapy  -TOD (r) CN (t) TOD (c) physical therapy  -TOD (r) CN (t) TOD (c)      Row Name 07/11/24 1412 07/11/24 1048       General Information    Patient Profile Reviewed yes  -TOD (r) CN (t) TOD (c) yes  -TOD (r) CN (t) TOD (c)    Prior Level of Function independent:;feeding;grooming;dressing;bathing;all household mobility;community mobility;ADL's  -TOD (r) CN (t) TOD (c) --    Existing Precautions/Restrictions fall  -TOD (r) CN (t) TOD (c) fall  -TOD (r) CN (t) TOD (c)    Barriers to Rehab medically complex  -TOD (r) CN (t) TOD (c) --      Row Name 07/11/24 1412 07/11/24 1048       Living Environment    People in Home spouse;child(jeffrey), adult  SON AND GF  -TOD (r) CN (t) TOD (c) spouse  -TOD (r) CN (t) TOD (c)      Row Name 07/11/24 1412 07/11/24 1048       Home Main Entrance    Number of Stairs, Main Entrance three  -TOD (r) CN (t) TOD (c) two  -TOD (r) CN (t) TOD (c)    Stair Railings, Main Entrance none  -TOD (r) CN (t) TOD (c) none  -TOD (r) CN (t) TOD (c)      Row Name 07/11/24 1412 07/11/24 1048       Stairs Within Home, Primary    Number of Stairs, Within Home, Primary none  -TOD (r) CN (t) TOD (c) none  -TOD (r) CN (t) TOD (c)      Row Name 07/11/24 1414          Cognition    Orientation Status (Cognition) oriented x 4   -TOD (r) CN (t) TOD (c)       Row Name 07/11/24 1412          Safety Issues, Functional Mobility    Safety Issues Affecting Function (Mobility) ability to follow commands;at risk behavior observed;awareness of need for assistance;impulsivity;insight into deficits/self-awareness;judgment;positioning of assistive device;problem-solving;safety precaution awareness;safety precautions follow-through/compliance;sequencing abilities  -TOD (r) CN (t) TOD (c)     Impairments Affecting Function (Mobility) balance;cognition;coordination;endurance/activity tolerance;pain;postural/trunk control;range of motion (ROM);strength  -TOD (r) CN (t) TOD (c)     Cognitive Impairments, Mobility Safety/Performance awareness, need for assistance;attention;impulsivity;insight into deficits/self-awareness;judgment;problem-solving/reasoning;safety precaution awareness;safety precaution follow-through;sequencing abilities  -TOD (r) CN (t) TOD (c)               User Key  (r) = Recorded By, (t) = Taken By, (c) = Cosigned By      Initials Name Provider Type    Puneet Francois, PT DPT Physical Therapist    Elvia Salinas, PT Student PT Student                   Mobility       Row Name 07/11/24 1412          Bed Mobility    Bed Mobility scooting/bridging;supine-sit;sit-supine  -TOD (r) CN (t) TOD (c)     Scooting/Bridging [place] (Bed Mobility) moderate assist (50% patient effort);maximum assist (25% patient effort)  -TOD (r) CN (t) TOD (c)     Supine-Sit [place] (Bed Mobility) minimum assist (75% patient effort);moderate assist (50% patient effort)  -TOD (r) CN (t) TOD (c)     Sit-Supine [place] (Bed Mobility) minimum assist (75% patient effort);contact guard  -TOD (r) CN (t) TOD (c)     Assistive Device (Bed Mobility) bed rails;draw sheet;head of bed elevated  -TOD (r) CN (t) TOD (c)       Row Name 07/11/24 1412          Sit-Stand Transfer    Sit-Stand [place] (Transfers) moderate assist (50% patient effort)  -TOD (r) CN (t) TOD (c)      Assistive Device (Sit-Stand Transfers) walker, front-wheeled  -TOD (r) CN (t) TOD (c)       Row Name 07/11/24 1412          Gait/Stairs (Locomotion)    [place] Level (Gait) contact guard;minimum assist (75% patient effort)  -TOD (r) CN (t) TOD (c)     Assistive Device (Gait) walker, front-wheeled  -TOD (r) CN (t) TOD (c)     Distance in Feet (Gait) 20  -TOD (r) CN (t) TOD (c)     Deviations/Abnormal Patterns (Gait) base of support, wide;gait speed decreased;stride length decreased  -TOD (r) CN (t) TOD (c)               User Key  (r) = Recorded By, (t) = Taken By, (c) = Cosigned By      Initials Name Provider Type    Puneet Francois, PT DPT Physical Therapist    Elvia Salinas, PT Student PT Student                   Obj/Interventions       Row Name 07/11/24 1412          Range of Motion Comprehensive    Comment, General Range of Motion LLE ROM limited 20% by pain and swelling. RLE ROM limited 10%  -TOD (r) CN (t) TOD (c)       Row Name 07/11/24 1412          Strength Comprehensive (MMT)    Comment, General Manual Muscle Testing (MMT) Assessment LLE 3+/5 grossly secondary to pain in L knee and 4/5 RLE grossly.  -TOD (r) CN (t) TOD (c)       Row Name 07/11/24 1412          Balance    Balance Assessment sitting static balance;sitting dynamic balance;standing static balance;standing dynamic balance  -TOD (r) CN (t) TOD (c)     Static Sitting Balance supervision;contact guard  -TOD (r) CN (t) TOD (c)     Dynamic Sitting Balance supervision;contact guard  -TOD (r) CN (t) TOD (c)     Position, Sitting Balance unsupported;sitting edge of bed  -TOD (r) CN (t) TOD (c)     Static Standing Balance contact guard;minimal assist  -TOD (r) CN (t) TOD (c)     Dynamic Standing Balance minimal assist  -TOD (r) CN (t) TOD (c)     Position/Device Used, Standing Balance walker, front-wheeled  -TOD (r) CN (t) TOD (c)       Row Name 07/11/24 1412          Sensory Assessment (Somatosensory)    Sensory Assessment (Somatosensory) LE sensation intact   -TOD (r) CN (t) TOD (c)               User Key  (r) = Recorded By, (t) = Taken By, (c) = Cosigned By      Initials Name Provider Type    Puneet Francois, PT DPT Physical Therapist    Elvia Salinas, PT Student PT Student                   Goals/Plan       Row Name 07/11/24 1412          Bed Mobility Goal 1 (PT)    Activity/Assistive Device (Bed Mobility Goal 1, PT) sit to supine/supine to sit  -TOD (r) CN (t) TOD (c)     [place] Level/Cues Needed (Bed Mobility Goal 1, PT) standby assist  -TOD (r) CN (t) TOD (c)     Time Frame (Bed Mobility Goal 1, PT) long term goal (LTG);10 days  -TOD (r) CN (t) TDO (c)     Progress/Outcomes (Bed Mobility Goal 1, PT) new goal  -TOD (r) CN (t) TOD (c)       Row Name 07/11/24 1412          Transfer Goal 1 (PT)    Activity/Assistive Device (Transfer Goal 1, PT) sit-to-stand/stand-to-sit;bed-to-chair/chair-to-bed  -TOD     [place] Level/Cues Needed (Transfer Goal 1, PT) standby assist  -TOD (r) CN (t) TOD (c)     Time Frame (Transfer Goal 1, PT) long term goal (LTG);10 days  -TOD (r) CN (t) TOD (c)       Row Name 07/11/24 1412          Gait Training Goal 1 (PT)    Activity/Assistive Device (Gait Training Goal 1, PT) gait (walking locomotion);assistive device use;decrease fall risk;improve balance and speed;increase endurance/gait distance;walker, rolling  -TOD (r) CN (t) TOD (c)     [place] Level (Gait Training Goal 1, PT) standby assist  -TOD (r) CN (t) TOD (c)     Distance (Gait Training Goal 1, PT) 50  -TOD (r) CN (t) TOD (c)     Time Frame (Gait Training Goal 1, PT) long term goal (LTG);10 days  -TOD (r) CN (t) TOD (c)     Progress/Outcome (Gait Training Goal 1, PT) new goal  -TOD (r) CN (t) TOD (c)       Row Name 07/11/24 1412          Therapy Assessment/Plan (PT)    Planned Therapy Interventions (PT) balance training;bed mobility training;gait training;home exercise program;strengthening;ROM (range of motion);postural re-education;patient/family education;neuromuscular  re-education;transfer training;motor coordination training  -TOD               User Key  (r) = Recorded By, (t) = Taken By, (c) = Cosigned By      Initials Name Provider Type    Puneet Francois, PT DPT Physical Therapist    Elvia Salinas, PT Student PT Student                   Clinical Impression       Row Name 07/11/24 1412          Pain    Pretreatment Pain Rating 0/10 - no pain  -TOD (r) CN (t) TOD (c)       Row Name 07/11/24 1412          Plan of Care Review    Plan of Care Reviewed With patient;spouse  -TOD (r) CN (t) TOD (c)     Progress no change  -TOD (r) CN (t) TOD (c)     Outcome Evaluation PT eval complete. Pt alert and oriented x4. Pt found in fowlers position with wife at bedside and agreeable to therapy. Pt reports being ind with ADLs and gait prior to this and able to ambulate short and long distances. Speech also remains slurred at this time. Pt mod A sup to sit for trunk and LLE assist. Pt MMT found to be LLE 3+/5 grossly secondary to pain in L knee and 4/5 RLE grossly. Pt CGA for dynamic sitting balance secondary to posterior LOB a few times. Pt mod A to stand with use of FWW with heavy posterior lean needing cueing to stand upright. Pt ambulated to BR with CGA needing mod A-max A for toileting and doffing dirty brief and donning clean brief. Pt with one LOB in BR needing mod A to correct. Pt CGA back to bed and min A for LLE back into bed. Pt needing multiple v/c throughout session for safety with ambulation and management of FWW with min A to steer FWW as needed. Pt able to following commands 75% of the time and needing v/c to stay on task and cue into safety with OOB activities. Pt demonstrates decreased functional strength and endurance from reported baseline. Pt left in room in fowlers position with all needs met and nsg in room. Pt would benefit from PT to address these strength and endurance deficits for a safe d/c. Anticipate d/c to SNF.  -TOD (r) CN (t) TOD (c)       Row Name  07/11/24 1412          Therapy Assessment/Plan (PT)    Patient/Family Therapy Goals Statement (PT) not stated  -TOD (r) CN (t) TOD (c)     Rehab Potential (PT) good, to achieve stated therapy goals  -TOD (r) CN (t) TOD (c)     Criteria for Skilled Interventions Met (PT) yes;skilled treatment is necessary  -TOD (r) CN (t) TOD (c)     Therapy Frequency (PT) 2 times/day  -TOD (r) CN (t) TOD (c)     Predicted Duration of Therapy Intervention (PT) until d/c  -TOD (r) CN (t) TOD (c)       Row Name 07/11/24 1412          Positioning and Restraints    Pre-Treatment Position in bed  -TOD (r) CN (t) TOD (c)     Post Treatment Position bed  -TOD (r) CN (t) TOD (c)     In Bed notified nsg;fowlers;call light within reach;side rails up x2;encouraged to call for assist;with other staff;with nsg;with family/caregiver  -TOD (r) CN (t) TOD (c)               User Key  (r) = Recorded By, (t) = Taken By, (c) = Cosigned By      Initials Name Provider Type    Puneet Francois, PT DPT Physical Therapist    Elvia Salinas, PT Student PT Student                   Outcome Measures       Row Name 07/11/24 1412 07/11/24 0800       How much help from another person do you currently need...    Turning from your back to your side while in flat bed without using bedrails? 3  -TOD (r) CN (t) TOD (c) 3  -JM    Moving from lying on back to sitting on the side of a flat bed without bedrails? 3  -TOD (r) CN (t) TOD (c) 3  -JM    Moving to and from a bed to a chair (including a wheelchair)? 3  -TOD (r) CN (t) TOD (c) 3  -JM    Standing up from a chair using your arms (e.g., wheelchair, bedside chair)? 3  -TOD (r) CN (t) TOD (c) 3  -JM    Climbing 3-5 steps with a railing? 2  -TOD (r) CN (t) TOD (c) 2  -JM    To walk in hospital room? 3  -TOD (r) CN (t) TOD (c) 3  -JM    AM-PAC 6 Clicks Score (PT) 17  -TOD (r) CN (t) 17  -JM    Highest Level of Mobility Goal 5 --> Static standing  -TOD (r) CN (t) 5 --> Static standing  -JAIME      Row Name 07/11/24 1412 07/11/24 0759        Functional Assessment    Outcome Measure Options AM-PAC 6 Clicks Basic Mobility (PT)  -TOD (r) CN (t) TOD (c) AM-PAC 6 Clicks Daily Activity (OT)  -LS              User Key  (r) = Recorded By, (t) = Taken By, (c) = Cosigned By      Initials Name Provider Type    Puneet Francois, PT DPT Physical Therapist    Shanice Almaraz, OTR/L Occupational Therapist    Fatmata Whitmore, RN Registered Nurse    Elvia Salinas, PT Student PT Student                                 Physical Therapy Education       Title: PT OT SLP Therapies (In Progress)       Topic: Physical Therapy (In Progress)       Point: Mobility training (Done)       Learning Progress Summary             Patient Acceptance, E,TB, VU,DU,NR by CN at 7/11/2024 1412    Acceptance, E,TB, VU,DU,NR by CN at 7/11/2024 1412    Comment: Educated on PT role in pt care.   Family Acceptance, E,TB, VU,DU,NR by CN at 7/11/2024 1412    Comment: Educated on PT role in pt care.                         Point: Home exercise program (Not Started)       Learner Progress:  Not documented in this visit.              Point: Body mechanics (Not Started)       Learner Progress:  Not documented in this visit.              Point: Precautions (Not Started)       Learner Progress:  Not documented in this visit.                              User Key       Initials Effective Dates Name Provider Type Discipline    NANCY 06/24/24 -  Elvia Oakes, PT Student PT Student PT                  PT Recommendation and Plan     Plan of Care Reviewed With: patient, spouse  Progress: no change  Outcome Evaluation: PT eval complete. Pt alert and oriented x4. Pt found in fowlers position with wife at bedside and agreeable to therapy. Pt reports being ind with ADLs and gait prior to this and able to ambulate short and long distances. Speech also remains slurred at this time. Pt mod A sup to sit for trunk and LLE assist. Pt MMT found to be LLE 3+/5 grossly secondary to pain in L knee and 4/5  RLE grossly. Pt CGA for dynamic sitting balance secondary to posterior LOB a few times. Pt mod A to stand with use of FWW with heavy posterior lean needing cueing to stand upright. Pt ambulated to BR with CGA needing mod A-max A for toileting and doffing dirty brief and donning clean brief. Pt with one LOB in BR needing mod A to correct. Pt CGA back to bed and min A for LLE back into bed. Pt needing multiple v/c throughout session for safety with ambulation and management of FWW with min A to steer FWW as needed. Pt able to following commands 75% of the time and needing v/c to stay on task and cue into safety with OOB activities. Pt demonstrates decreased functional strength and endurance from reported baseline. Pt left in room in fowlers position with all needs met and nsg in room. Pt would benefit from PT to address these strength and endurance deficits for a safe d/c. Anticipate d/c to SNF.     Time Calculation:         PT Charges       Row Name 07/11/24 1412             Time Calculation    Start Time 1412  -TOD (r) CN (t) TOD (c)      Stop Time 1453  -TOD (r) CN (t) TOD (c)      Time Calculation (min) 41 min  -TOD (r) CN (t)      PT Received On 07/11/24  -TOD (r) CN (t) TOD (c)      PT Goal Re-Cert Due Date 07/21/24  -TOD (r) CN (t) TOD (c)                User Key  (r) = Recorded By, (t) = Taken By, (c) = Cosigned By      Initials Name Provider Type    Puneet Francois, PT DPT Physical Therapist    Elvia Salinas, PT Student PT Student                      PT G-Codes  Outcome Measure Options: AM-PAC 6 Clicks Basic Mobility (PT)  AM-PAC 6 Clicks Score (PT): 17  AM-PAC 6 Clicks Score (OT): 15  PT Discharge Summary  Anticipated Discharge Disposition (PT): skilled nursing facility    Elvia Oakes, PT Student  7/11/2024

## 2024-07-12 PROBLEM — E53.8 B12 DEFICIENCY: Status: ACTIVE | Noted: 2024-07-12

## 2024-07-12 PROBLEM — M17.12 OSTEOARTHRITIS OF LEFT KNEE: Status: ACTIVE | Noted: 2024-07-12

## 2024-07-12 LAB
ANION GAP SERPL CALCULATED.3IONS-SCNC: 10 MMOL/L (ref 5–15)
BH CV ECHO MEAS - AO MAX PG: 3.7 MMHG
BH CV ECHO MEAS - AO MEAN PG: 2.15 MMHG
BH CV ECHO MEAS - AO ROOT DIAM: 3.3 CM
BH CV ECHO MEAS - AO V2 MAX: 95.8 CM/SEC
BH CV ECHO MEAS - AO V2 VTI: 17.3 CM
BH CV ECHO MEAS - AVA(I,D): 3 CM2
BH CV ECHO MEAS - EDV(CUBED): 100.9 ML
BH CV ECHO MEAS - EDV(MOD-SP2): 73.1 ML
BH CV ECHO MEAS - EDV(MOD-SP4): 105.5 ML
BH CV ECHO MEAS - EF(MOD-BP): 55 %
BH CV ECHO MEAS - EF(MOD-SP2): 45.9 %
BH CV ECHO MEAS - EF(MOD-SP4): 63.3 %
BH CV ECHO MEAS - ESV(CUBED): 35.1 ML
BH CV ECHO MEAS - ESV(MOD-SP2): 39.5 ML
BH CV ECHO MEAS - ESV(MOD-SP4): 38.8 ML
BH CV ECHO MEAS - FS: 29.7 %
BH CV ECHO MEAS - IVS/LVPW: 0.92 CM
BH CV ECHO MEAS - IVSD: 1.03 CM
BH CV ECHO MEAS - LA DIMENSION: 3 CM
BH CV ECHO MEAS - LAT PEAK E' VEL: 9 CM/SEC
BH CV ECHO MEAS - LV DIASTOLIC VOL/BSA (35-75): 58.5 CM2
BH CV ECHO MEAS - LV MASS(C)D: 178.4 GRAMS
BH CV ECHO MEAS - LV MAX PG: 4.3 MMHG
BH CV ECHO MEAS - LV MEAN PG: 2.49 MMHG
BH CV ECHO MEAS - LV SYSTOLIC VOL/BSA (12-30): 21.5 CM2
BH CV ECHO MEAS - LV V1 MAX: 103.1 CM/SEC
BH CV ECHO MEAS - LV V1 VTI: 18.9 CM
BH CV ECHO MEAS - LVIDD: 4.7 CM
BH CV ECHO MEAS - LVIDS: 3.3 CM
BH CV ECHO MEAS - LVOT AREA: 2.8 CM2
BH CV ECHO MEAS - LVOT DIAM: 1.89 CM
BH CV ECHO MEAS - LVPWD: 1.12 CM
BH CV ECHO MEAS - MED PEAK E' VEL: 7 CM/SEC
BH CV ECHO MEAS - MV A MAX VEL: 98.9 CM/SEC
BH CV ECHO MEAS - MV DEC SLOPE: 552.6 CM/SEC2
BH CV ECHO MEAS - MV DEC TIME: 0.15 SEC
BH CV ECHO MEAS - MV E MAX VEL: 80.1 CM/SEC
BH CV ECHO MEAS - MV E/A: 0.81
BH CV ECHO MEAS - MV MAX PG: 4.2 MMHG
BH CV ECHO MEAS - MV MEAN PG: 1.92 MMHG
BH CV ECHO MEAS - MV V2 VTI: 20.4 CM
BH CV ECHO MEAS - MVA(VTI): 2.6 CM2
BH CV ECHO MEAS - PA V2 MAX: 158.9 CM/SEC
BH CV ECHO MEAS - PULM A REVS DUR: 0.15 SEC
BH CV ECHO MEAS - PULM A REVS VEL: 27.4 CM/SEC
BH CV ECHO MEAS - RAP SYSTOLE: 3 MMHG
BH CV ECHO MEAS - RV MAX PG: 7.7 MMHG
BH CV ECHO MEAS - RV V1 MAX: 138.4 CM/SEC
BH CV ECHO MEAS - RV V1 VTI: 27 CM
BH CV ECHO MEAS - RVDD: 2.2 CM
BH CV ECHO MEAS - RVSP: 7.6 MMHG
BH CV ECHO MEAS - SV(LVOT): 52.7 ML
BH CV ECHO MEAS - SV(MOD-SP2): 33.6 ML
BH CV ECHO MEAS - SV(MOD-SP4): 66.7 ML
BH CV ECHO MEAS - SVI(LVOT): 29.2 ML/M2
BH CV ECHO MEAS - SVI(MOD-SP2): 18.6 ML/M2
BH CV ECHO MEAS - SVI(MOD-SP4): 37 ML/M2
BH CV ECHO MEAS - TAPSE (>1.6): 1.3 CM
BH CV ECHO MEAS - TR MAX PG: 4.6 MMHG
BH CV ECHO MEAS - TR MAX VEL: 107 CM/SEC
BH CV ECHO MEASUREMENTS AVERAGE E/E' RATIO: 10.01
BH CV ECHO SHUNT ASSESSMENT PERFORMED (HIDDEN SCRIPTING): 1
BH CV XLRA - TDI S': 10 CM/SEC
BUN SERPL-MCNC: 9 MG/DL (ref 8–23)
BUN/CREAT SERPL: 18 (ref 7–25)
CALCIUM SPEC-SCNC: 8.7 MG/DL (ref 8.6–10.5)
CHLORIDE SERPL-SCNC: 101 MMOL/L (ref 98–107)
CO2 SERPL-SCNC: 23 MMOL/L (ref 22–29)
CREAT SERPL-MCNC: 0.5 MG/DL (ref 0.76–1.27)
DEPRECATED RDW RBC AUTO: 39.6 FL (ref 37–54)
EGFRCR SERPLBLD CKD-EPI 2021: 111.8 ML/MIN/1.73
ERYTHROCYTE [DISTWIDTH] IN BLOOD BY AUTOMATED COUNT: 12.1 % (ref 12.3–15.4)
GLUCOSE BLDC GLUCOMTR-MCNC: 205 MG/DL (ref 70–130)
GLUCOSE BLDC GLUCOMTR-MCNC: 251 MG/DL (ref 70–130)
GLUCOSE BLDC GLUCOMTR-MCNC: 278 MG/DL (ref 70–130)
GLUCOSE BLDC GLUCOMTR-MCNC: 282 MG/DL (ref 70–130)
GLUCOSE SERPL-MCNC: 254 MG/DL (ref 65–99)
HCT VFR BLD AUTO: 39.8 % (ref 37.5–51)
HGB BLD-MCNC: 14.3 G/DL (ref 13–17.7)
LEFT ATRIUM VOLUME INDEX: 19 ML/M2
LEFT ATRIUM VOLUME: 33 ML
MCH RBC QN AUTO: 32.1 PG (ref 26.6–33)
MCHC RBC AUTO-ENTMCNC: 35.9 G/DL (ref 31.5–35.7)
MCV RBC AUTO: 89.2 FL (ref 79–97)
PLATELET # BLD AUTO: 205 10*3/MM3 (ref 140–450)
PMV BLD AUTO: 11.2 FL (ref 6–12)
POTASSIUM SERPL-SCNC: 3.9 MMOL/L (ref 3.5–5.2)
QT INTERVAL: 412 MS
QTC INTERVAL: 493 MS
RBC # BLD AUTO: 4.46 10*6/MM3 (ref 4.14–5.8)
SODIUM SERPL-SCNC: 134 MMOL/L (ref 136–145)
WBC NRBC COR # BLD AUTO: 11.37 10*3/MM3 (ref 3.4–10.8)

## 2024-07-12 PROCEDURE — 97530 THERAPEUTIC ACTIVITIES: CPT

## 2024-07-12 PROCEDURE — 97110 THERAPEUTIC EXERCISES: CPT

## 2024-07-12 PROCEDURE — 97116 GAIT TRAINING THERAPY: CPT

## 2024-07-12 PROCEDURE — 80048 BASIC METABOLIC PNL TOTAL CA: CPT | Performed by: FAMILY MEDICINE

## 2024-07-12 PROCEDURE — 25010000002 CYANOCOBALAMIN PER 1000 MCG: Performed by: CLINICAL NURSE SPECIALIST

## 2024-07-12 PROCEDURE — 85027 COMPLETE CBC AUTOMATED: CPT | Performed by: FAMILY MEDICINE

## 2024-07-12 PROCEDURE — 63710000001 INSULIN LISPRO (HUMAN) PER 5 UNITS: Performed by: FAMILY MEDICINE

## 2024-07-12 PROCEDURE — 25810000003 SODIUM CHLORIDE 0.9 % SOLUTION: Performed by: FAMILY MEDICINE

## 2024-07-12 PROCEDURE — 99233 SBSQ HOSP IP/OBS HIGH 50: CPT | Performed by: NURSE PRACTITIONER

## 2024-07-12 PROCEDURE — 97535 SELF CARE MNGMENT TRAINING: CPT

## 2024-07-12 PROCEDURE — 25010000002 CEFTRIAXONE PER 250 MG: Performed by: NURSE PRACTITIONER

## 2024-07-12 PROCEDURE — 82948 REAGENT STRIP/BLOOD GLUCOSE: CPT

## 2024-07-12 PROCEDURE — 63710000001 INSULIN GLARGINE PER 5 UNITS: Performed by: NURSE PRACTITIONER

## 2024-07-12 PROCEDURE — 25010000002 ENOXAPARIN PER 10 MG: Performed by: FAMILY MEDICINE

## 2024-07-12 RX ORDER — CYANOCOBALAMIN 1000 UG/ML
1000 INJECTION, SOLUTION INTRAMUSCULAR; SUBCUTANEOUS DAILY
Status: COMPLETED | OUTPATIENT
Start: 2024-07-12 | End: 2024-07-14

## 2024-07-12 RX ORDER — NICOTINE 21 MG/24HR
1 PATCH, TRANSDERMAL 24 HOURS TRANSDERMAL DAILY PRN
Status: DISCONTINUED | OUTPATIENT
Start: 2024-07-12 | End: 2024-07-15 | Stop reason: HOSPADM

## 2024-07-12 RX ORDER — LISINOPRIL 20 MG/1
20 TABLET ORAL
Status: DISCONTINUED | OUTPATIENT
Start: 2024-07-12 | End: 2024-07-13

## 2024-07-12 RX ADMIN — INSULIN LISPRO 3 UNITS: 100 INJECTION, SOLUTION INTRAVENOUS; SUBCUTANEOUS at 20:47

## 2024-07-12 RX ADMIN — ENOXAPARIN SODIUM 40 MG: 100 INJECTION SUBCUTANEOUS at 08:38

## 2024-07-12 RX ADMIN — ACETAMINOPHEN 650 MG: 325 TABLET, FILM COATED ORAL at 01:31

## 2024-07-12 RX ADMIN — INSULIN LISPRO 4 UNITS: 100 INJECTION, SOLUTION INTRAVENOUS; SUBCUTANEOUS at 08:38

## 2024-07-12 RX ADMIN — Medication 10 ML: at 20:46

## 2024-07-12 RX ADMIN — SODIUM CHLORIDE 75 ML/HR: 9 INJECTION, SOLUTION INTRAVENOUS at 06:38

## 2024-07-12 RX ADMIN — LISINOPRIL 20 MG: 20 TABLET ORAL at 08:39

## 2024-07-12 RX ADMIN — INSULIN GLARGINE 20 UNITS: 100 INJECTION, SOLUTION SUBCUTANEOUS at 20:47

## 2024-07-12 RX ADMIN — ASPIRIN 81 MG CHEWABLE TABLET 81 MG: 81 TABLET CHEWABLE at 08:39

## 2024-07-12 RX ADMIN — INSULIN LISPRO 4 UNITS: 100 INJECTION, SOLUTION INTRAVENOUS; SUBCUTANEOUS at 13:33

## 2024-07-12 RX ADMIN — SODIUM CHLORIDE 75 ML/HR: 9 INJECTION, SOLUTION INTRAVENOUS at 20:49

## 2024-07-12 RX ADMIN — INSULIN LISPRO 4 UNITS: 100 INJECTION, SOLUTION INTRAVENOUS; SUBCUTANEOUS at 17:22

## 2024-07-12 RX ADMIN — SODIUM CHLORIDE 1000 MG: 900 INJECTION INTRAVENOUS at 08:38

## 2024-07-12 RX ADMIN — ACETAMINOPHEN 650 MG: 325 TABLET, FILM COATED ORAL at 10:22

## 2024-07-12 RX ADMIN — CYANOCOBALAMIN 1000 MCG: 1000 INJECTION, SOLUTION INTRAMUSCULAR at 10:15

## 2024-07-12 RX ADMIN — ATORVASTATIN CALCIUM 80 MG: 40 TABLET ORAL at 20:46

## 2024-07-12 NOTE — PLAN OF CARE
Goal Outcome Evaluation:  Plan of Care Reviewed With: patient      A&Ox 4, RA, neuro check no changes, slurred speech, slight facial droop on right side, right tongue deviation Blood sugar monitored, up x2 to bathroom, has a preventive pad on coccyx, bed alarm on, call light within reach, spouse at bedside. Pt has mild pain on left knee gave Pt medication for pain see mar and gave Pt a ice pack.

## 2024-07-12 NOTE — THERAPY TREATMENT NOTE
Patient Name: Anival Pennington  : 1955    MRN: [id number]                              Today's Date: 2024       Admit Date: 7/10/2024    Visit Dx:     ICD-10-CM ICD-9-CM   1. Slurred speech  R47.81 784.59   2. Hyperglycemia due to diabetes mellitus  E11.65 250.02   3. Dysphagia, unspecified type  R13.10 787.20   4. Impaired mobility [Z74.09]  Z74.09 799.89     Patient Active Problem List   Diagnosis    ST elevation myocardial infarction (STEMI)    Tobacco abuse    Type 2 diabetes mellitus with circulatory disorder, without long-term current use of insulin    Essential hypertension    Coronary artery disease involving native coronary artery of native heart without angina pectoris    Mixed hyperlipidemia    Class 2 severe obesity due to excess calories with serious comorbidity and body mass index (BMI) of 36.0 to 36.9 in adult    S/P coronary artery stent placement    History of MI (myocardial infarction)    Diabetes mellitus due to underlying condition with circulatory complication    Dizziness    Heavy smoker (more than 20 cigarettes per day)    Cellulitis of face    Obesity (BMI 30-39.9)    PAD (peripheral artery disease)    Preop testing    Acute focal neurological deficit    UTI (urinary tract infection), bacterial    Uncontrolled type 2 diabetes mellitus with hyperglycemia    Acute ischemic stroke left  internal capsule lacunar and  right postcentral gyrus at the vertex.    Acute stroke due to ischemia    B12 deficiency     Past Medical History:   Diagnosis Date    Acute ischemic left MCA stroke 2024    Cancer     leukemia    Coronary artery disease     diabetes with circulatory complications     Hyperlipidemia     Hypertension     Obesity (BMI 30-39.9)     Tobacco abuse      Past Surgical History:   Procedure Laterality Date    CARDIAC CATHETERIZATION N/A 3/28/2019    Procedure: Left Heart Cath;  Surgeon: Vincent Pan MD;  Location:  [facility];  Service: Cardiovascular     CARDIAC CATHETERIZATION Bilateral 3/28/2019    Procedure: Stent JOSSIE coronary;  Surgeon: Vincent Pan MD;  Location:  [facility];  Service: Cardiovascular    CARDIAC CATHETERIZATION N/A 3/28/2019    Procedure: Left ventriculography;  Surgeon: Vincent Pan MD;  Location:  [facility];  Service: Cardiovascular    CORONARY STENT PLACEMENT      TONSILLECTOMY        General Information       Row Name 07/12/24 1115          OT Time and Intention    Document Type therapy note (daily note)  -     Mode of Treatment occupational therapy  -       Row Name 07/12/24 1115          General Information    Patient Profile Reviewed yes  -LS     Existing Precautions/Restrictions fall  -       Row Name 07/12/24 1115          Cognition    Orientation Status (Cognition) oriented x 3;oriented to;person;place;time;verbal cues/prompts needed for orientation  -       Row Name 07/12/24 1115          Safety Issues, Functional Mobility    Safety Issues Affecting Function (Mobility) ability to follow commands;at risk behavior observed;awareness of need for assistance;impulsivity;insight into deficits/self-awareness;judgment;positioning of assistive device;problem-solving;safety precaution awareness;safety precautions follow-through/compliance;sequencing abilities  -     Impairments Affecting Function (Mobility) balance;cognition;coordination;endurance/activity tolerance;pain;postural/trunk control;strength  -     Cognitive Impairments, Mobility Safety/Performance attention;awareness, need for assistance;impulsivity;judgment;problem-solving/reasoning;insight into deficits/self-awareness;safety precaution awareness;safety precaution follow-through;sequencing abilities  -               User Key  (r) = Recorded By, (t) = Taken By, (c) = Cosigned By      Initials Name Provider Type    LS Shanice Cummins OTR/L Occupational Therapist                     Mobility/ADL's       Row Name 07/12/24 1115           Transfers    Transfers sit-stand transfer  -       Row Name 07/12/24 1115          Sit-Stand Transfer    Sit-Stand [place] (Transfers) maximum assist (25% patient effort);verbal cues;nonverbal cues (demo/gesture)  -     Assistive Device (Sit-Stand Transfers) walker, front-wheeled  -       Row Name 07/12/24 1115          Activities of Daily Living    BADL Assessment/Intervention grooming  -       Row Name 07/12/24 1115          Grooming Assessment/Training    [place] Level (Grooming) grooming skills;oral care regimen;set up;standby assist;verbal cues;nonverbal cues (demo/gesture)  -               User Key  (r) = Recorded By, (t) = Taken By, (c) = Cosigned By      Initials Name Provider Type    Shanice Almaraz, OTR/L Occupational Therapist                   Obj/Interventions    No documentation.                  Goals/Plan    No documentation.                  Clinical Impression       Row Name 07/12/24 1115          Pain Assessment    Pretreatment Pain Rating 6/10  -LS     Posttreatment Pain Rating 6/10  -LS     Pain Location - Side/Orientation Right  -LS     Pain Location - knee  -LS     Pain Intervention(s) Repositioned;Ambulation/increased activity;Cold applied  -       Row Name 07/12/24 1115          Plan of Care Review    Plan of Care Reviewed With patient;spouse  -LS     Progress declining   -LS     Outcome Evaluation OT tx completed. Pt in fowlers upon therapist arrival; A&Ox3; Flat affect; c/o 6/10 R knee pain; Pt's spouse also present. Pt demo'd significant decline in fxl status this date as compared to initial eval yesterday. Pt required Max A for sit>stand with constant verbal/visual/tactile cues for sequencing and body mechanics. Pt demo'd significant R posterolateral lean in standing and was unable to correct with cueing or physical assistance therefore ambulating to BR was not attempted. Since Pt unable to ambulate this date, Pt performed oral hygiene while seated at bedside  table with SBA after set-up requiring consant verbal/visual cues to redirect attention to task and for sequencing. Pt continues to require skilled OT intervention in order to address remaining deficits in fxl mobility, fxl activity tolerance, balance, coordination, and use of adaptive techniques/equipment during performance of BADLs. Recommend SNF at discharge.  -       Row Name 07/12/24 1115          Therapy Plan Review/Discharge Plan (OT)    Anticipated Discharge Disposition (OT) skilled nursing facility  -       Row Name 07/12/24 1115          Positioning and Restraints    Pre-Treatment Position sitting in chair/recliner  -LS     Post Treatment Position chair  -LS     In Chair reclined;call light within reach;encouraged to call for assist;exit alarm on;with family/caregiver;legs elevated  -LS               User Key  (r) = Recorded By, (t) = Taken By, (c) = Cosigned By      Initials Name Provider Type    LS Shanice Cummins OTR/L Occupational Therapist                   Outcome Measures       Row Name 07/12/24 1115          How much help from another is currently needed...    Putting on and taking off regular lower body clothing? 1  -LS     Bathing (including washing, rinsing, and drying) 2  -LS     Toileting (which includes using toilet bed pan or urinal) 1  -LS     Putting on and taking off regular upper body clothing 2  -LS     Taking care of personal grooming (such as brushing teeth) 3  -LS     Eating meals 3  -LS     AM-PAC 6 Clicks Score (OT) 12  -LS       Row Name 07/12/24 0840          How much help from another person do you currently need...    Turning from your back to your side while in flat bed without using bedrails? 3  -SD     Moving from lying on back to sitting on the side of a flat bed without bedrails? 3  -SD     Moving to and from a bed to a chair (including a wheelchair)? 2  -SD     Standing up from a chair using your arms (e.g., wheelchair, bedside chair)? 2  -SD     Climbing 3-5 steps with  a railing? 2  -SD     To walk in hospital room? 2  -SD     AM-PAC 6 Clicks Score (PT) 14  -SD     Highest Level of Mobility Goal 4 --> Transfer to chair/commode  -SD       Row Name 07/12/24 1115          Functional Assessment    Outcome Measure Options AM-PAC 6 Clicks Daily Activity (OT)  -CLARISSE               User Key  (r) = Recorded By, (t) = Taken By, (c) = Cosigned By      Initials Name Provider Type    Supriya Archibald LPN Licensed Nurse    Shanice Almaraz, OTR/L Occupational Therapist                    Occupational Therapy Education       Title: PT OT SLP Therapies (In Progress)       Topic: Occupational Therapy (In Progress)       Point: ADL training (Done)       Description:   Instruct learner(s) on proper safety adaptation and remediation techniques during self care or transfers.   Instruct in proper use of assistive devices.                  Learning Progress Summary             Patient Acceptance, E, VU,NR by CLARISSE at 7/12/2024 1238    Acceptance, TB, NR by JAIME at 7/11/2024 1832    Acceptance, E, VU,NR by CLARISSE at 7/11/2024 1004                         Point: Home exercise program (In Progress)       Description:   Instruct learner(s) on appropriate technique for monitoring, assisting and/or progressing therapeutic exercises/activities.                  Learning Progress Summary             Patient Acceptance, TB, NR by JAIME at 7/11/2024 1832                         Point: Precautions (Done)       Description:   Instruct learner(s) on prescribed precautions during self-care and functional transfers.                  Learning Progress Summary             Patient Acceptance, E, VU,NR by CLARISSE at 7/12/2024 1238    Acceptance, TB, NR by JAIME at 7/11/2024 1832    Acceptance, E, VU,NR by CLARISSE at 7/11/2024 1004                         Point: Body mechanics (Done)       Description:   Instruct learner(s) on proper positioning and spine alignment during self-care, functional mobility activities and/or exercises.                   Learning Progress Summary             Patient Acceptance, E, VU,NR by CLARISSE at 7/12/2024 1238    Acceptance, TB, NR by JAIME at 7/11/2024 1832    Acceptance, E, VU,NR by CLARISSE at 7/11/2024 1004                                         User Key       Initials Effective Dates Name Provider Type Discipline    CLARISSE 06/20/22 -  Shanice Cummins, OTR/L Occupational Therapist OT    JAIME 04/22/24 -  Fatmata Styles, [facility] Registered Nurse Nurse                  OT Recommendation and Plan  Planned Therapy Interventions (OT): activity tolerance training, functional balance retraining, occupation/activity based interventions, BADL retraining, strengthening exercise, transfer/mobility retraining, patient/caregiver education/training, ROM/therapeutic exercise, cognitive/visual perception retraining, neuromuscular control/coordination retraining, adaptive equipment training  Therapy Frequency (OT): 5 times/wk  Plan of Care Review  Plan of Care Reviewed With: patient, spouse  Progress: (S) declining  Outcome Evaluation: OT tx completed. Pt in fowlers upon therapist arrival; A&Ox3; Flat affect; c/o 6/10 R knee pain; Pt's spouse also present. Pt demo'd significant decline in fxl status this date as compared to initial eval yesterday. Pt required Max A for sit>stand with constant verbal/visual/tactile cues for sequencing and body mechanics. Pt demo'd significant R posterolateral lean in standing and was unable to correct with cueing or physical assistance therefore ambulating to BR was not attempted. Since Pt unable to ambulate this date, Pt performed oral hygiene while seated at bedside table with SBA after set-up requiring consant verbal/visual cues to redirect attention to task and for sequencing. Pt continues to require skilled OT intervention in order to address remaining deficits in fxl mobility, fxl activity tolerance, balance, coordination, and use of adaptive techniques/equipment during performance of BADLs. Recommend SNF at discharge.     Time  Calculation:         Time Calculation- OT       Row Name 07/12/24 1115 07/12/24 1043          Time Calculation- OT    OT Start Time 1115  -LS --     OT Stop Time 1139  -LS --     OT Time Calculation (min) 24 min  -LS --     Total Timed Code Minutes- OT 24 minute(s)  -LS --     OT Received On 07/12/24  -LS --        Timed Charges    54814 - Gait Training Minutes  -- 17  -NW        Total Minutes    Timed Charges Total Minutes -- 17  -NW      Total Minutes -- 17  -NW               User Key  (r) = Recorded By, (t) = Taken By, (c) = Cosigned By      Initials Name Provider Type    NW Radha Barnett, PTA Physical Therapist Assistant    LS Shanice Cummins OTR/L Occupational Therapist                  Therapy Charges for Today       Code Description Service Date Service Provider Modifiers Qty    72355016675 HC OT EVAL MOD COMPLEXITY 4 7/11/2024 Shanice Cummins OTR/L GO 1    25693302298 HC OT SELF CARE/MGMT/TRAIN EA 15 MIN 7/12/2024 Shanice Cummins OTR/L GO 2                 Shanice Cummins OTR/ROMULO  7/12/2024

## 2024-07-12 NOTE — PLAN OF CARE
Goal Outcome Evaluation:  Plan of Care Reviewed With: other (see comments)        Progress: no change  Outcome Evaluation: Pt admitted with ischemic stroke. Pt has been evaluated by SLP at bedside who recommended pt on Regular diet,thin liquids w/ CCHO modifier. Pt has pmh of diabetes with medical noncompliance. Per epic progress note review pt had stopped taking diabetic medicaiton several months ago due to cost. Per nutrition risk screen pt with unsure of wt loss and decreased appetite. Pt HgbA1c is 13.2%. Will follow for education needs as appropriate. Oral intake avg 100% of two meals today. Cont to follow for plan of care.

## 2024-07-12 NOTE — PROGRESS NOTES
Neurology Progress Note      Chief Complaint:  Stroke  Length of Stay:  1   Subjective     Subjective: Overall, patient is feeling better.  He complains that he did not sleep much last night due to the nursing staff waking him up for neurological exams.  No acute neurological changes since last assessment yesterday.  PT/OT are recommending skilled nursing facility at discharge.  Patient and wife are amendable to this.  Patient has a history of vitamin B-12 deficiency and remains with low serum B12 on lab result.    Medications:  Current Facility-Administered Medications   Medication Dose Route Frequency Provider Last Rate Last Admin    acetaminophen (TYLENOL) tablet 650 mg  650 mg Oral Q4H PRN Ady Allen MD   650 mg at 07/12/24 1022    Or    acetaminophen (TYLENOL) suppository 650 mg  650 mg Rectal Q4H PRN Ady Allen MD        aspirin chewable tablet 81 mg  81 mg Oral Daily Aurelia Urbano APRN   81 mg at 07/12/24 0839    Or    aspirin suppository 300 mg  300 mg Rectal Daily Aurelia Urbano APRN        atorvastatin (LIPITOR) tablet 80 mg  80 mg Oral Nightly Ady Allen MD   80 mg at 07/11/24 2118    cefTRIAXone (ROCEPHIN) 1,000 mg in sodium chloride 0.9 % 100 mL MBP  1,000 mg Intravenous Q24H Brenda Delgado APRN 200 mL/hr at 07/12/24 0838 1,000 mg at 07/12/24 0838    cyanocobalamin injection 1,000 mcg  1,000 mcg Intramuscular Daily Aurelia Urbano APRN   1,000 mcg at 07/12/24 1015    dextrose (D50W) (25 g/50 mL) IV injection 25 g  25 g Intravenous Q15 Min PRN Ady Allen MD        dextrose (GLUTOSE) oral gel 15 g  15 g Oral Q15 Min PRN Ady Allne MD        Enoxaparin Sodium (LOVENOX) syringe 40 mg  40 mg Subcutaneous Daily Ady Allen MD   40 mg at 07/12/24 0838    glucagon (GLUCAGEN) injection 1 mg  1 mg Intramuscular Q15 Min PRN Ady Allen MD        insulin glargine (LANTUS, SEMGLEE) injection 15 Units  15  Units Subcutaneous Nightly Brenda Delgdao APRN        Insulin Lispro (humaLOG) injection 2-7 Units  2-7 Units Subcutaneous 4x Daily AC & at Bedtime Ady Allen MD   4 Units at 07/12/24 0838    lisinopril (PRINIVIL,ZESTRIL) tablet 20 mg  20 mg Oral Q24H Brenda Delgado APRN   20 mg at 07/12/24 0839    nicotine (NICODERM CQ) 14 MG/24HR patch 1 patch  1 patch Transdermal Daily PRN Aurelia Urbano APRN        nitroglycerin (NITROSTAT) SL tablet 0.4 mg  0.4 mg Sublingual Q5 Min PRN Ady Allen MD        ondansetron (ZOFRAN) injection 4 mg  4 mg Intravenous Q6H PRN Ady Allen MD        sodium chloride 0.9 % flush 10 mL  10 mL Intravenous PRN Ady Allen MD        sodium chloride 0.9 % flush 10 mL  10 mL Intravenous Q12H Ady Allen MD   10 mL at 07/11/24 0932    sodium chloride 0.9 % flush 10 mL  10 mL Intravenous PRN Ady Allen MD        sodium chloride 0.9 % infusion 40 mL  40 mL Intravenous PRN Ady Allen MD        sodium chloride 0.9 % infusion  75 mL/hr Intravenous Continuous Ady Allen MD 75 mL/hr at 07/12/24 0638 75 mL/hr at 07/12/24 0638             Objective      Vital Signs  Temp:  [97 °F (36.1 °C)-98.3 °F (36.8 °C)] 98.3 °F (36.8 °C)  Heart Rate:  [74-87] 87  Resp:  [18] 18  BP: (145-169)/(72-96) 167/96    Physical Exam:  Appears older than stated age.  Chronically ill-appearing.  Lying in hospital bed with wife at bedside at time of exam.  HEENT:  neck is supple. Endentulous and without dentures.  CVS:  RRR  Lungs:  CTA - B/L  Abd:  NT/ND  Ext:  no edema.  Fingernails are long and nicotine stained.  Left knee is swollen and painful to move.  Skin:  no rashes    Pertinent Neuro Exam:  No acute neurological changes since last assessment.    Mental Status:    -Awake, Alert, Oriented X 3.   -No word finding difficulties  -No aphasia  -Moderate dysarthria with ~ 50% words intelligible.   -Follows  simple and complex commands     CN II:  Visual fields full.  Pupils equally reactive to light  CN III, IV, VI:  Extraocular Muscles full with no signs of nystagmus  CN V:  Facial sensory is symmetric with no asymmetries.  CN VII:  Facial motor asymmetric with slight right sided facial droop noted.   CN VIII:  Gross hearing intact bilaterally  CN IX:  Palate elevates symmetrically  CN X:  Palate elevates symmetrically  CN XI:  Shoulder shrug symmetric  CN XII:  Tongue--slight tongue deviation to right     Motor: (strength out of 5:  1= minimal movement, 2 = movement in plane of gravity, 3 = movement against gravity, 4 = movement against some resistance, 5 = full strength)     -Right Upper Ext: Proximal:5- Distal: 5-  RUE slightly weaker than LUE. Slight pronation noted to RUE.  -Left Upper Ext: Proximal: 5Distal: 5     -Right Lower Ext: Proximal: 4 Distal: 4  -Left Lower Ext: patient would not move LLE due to complaints of left knee pain with ROM. PT rates 3+/5.     DTR:  -Right              Biceps: 2+       Triceps: 2+      Brachioradialis: 2+              Patella: 2+       Ankle: 2+         Neg Babinski  -Left              Biceps: 2+       Triceps: 2+      Brachioradialis: 2+              Patella: 2+       Ankle: 2+         Neg Babinski     Sensory:  -Intact to light touch, pinprick, temperature, pain, and proprioception     Coordination:  -Finger to nose intact without ataxia noted.      Gait  -Not assessed due to safety/fall risk and complaints of left knee pain.   Last nurse assessment:  Interval:  (hand off with kellee Pt beening uncorporative)  1a. Level of Consciousness: 0-->Alert, keenly responsive  1b. LOC Questions: 0-->Answers both questions correctly  1c. LOC Commands: 0-->Performs both tasks correctly  2. Best Gaze: 0-->Normal  3. Visual: 0-->No visual loss  4. Facial Palsy: 1-->Minor paralysis (flattened nasolabial fold, asymmetry on smiling)  5a. Motor Arm, Left: 0-->No drift, limb holds 90 (or 45)  degrees for full 10 secs  5b. Motor Arm, Right: 0-->No drift, limb holds 90 (or 45) degrees for full 10 secs  6a. Motor Leg, Left: 0-->No drift, leg holds 30 degree position for full 5 secs  6b. Motor Leg, Right: 0-->No drift, leg holds 30 degree position for full 5 secs  7. Limb Ataxia: 0-->Absent  8. Sensory: 0-->Normal, no sensory loss  9. Best Language: 1-->Mild-to-moderate aphasia, some obvious loss of fluency or facility of comprehension, without significant limitation on ideas expressed or form of expression. Reduction of speech and/or comprehension, however, makes conversation. . . (see row details)  10. Dysarthria: 1-->Mild-to-moderate dysarthria, patient slurs at least some words and, at worst, can be understood with some difficulty  11. Extinction and Inattention (formerly Neglect): 0-->No abnormality    Total (NIH Stroke Scale): 3       Results Review:      Labs:  Result Review:  I have personally reviewed the results from the time of this admission to 7/12/2024 10:57 CDT and agree with these findings:  [x]  Laboratory list / accordion  []  Microbiology  [x]  Radiology  [x]  EKG/Telemetry   [x]  Cardiology/Vascular   []  Pathology  []  Old records  []  Other:  Most notable findings include: Serum B12 level of 171. TTE= left ventricular ejection fraction 56 to 60%.  Left ventricular wall segments are hypokinetic. Saline test negative.      Imaging:  CTA head and Neck: 1. Atheromatous changes of the carotid and vertebral arteries. 70% focal   stenosis of the right carotid bulb 60% focal stenosis of the distal V1   segment of the left vertebral artery and 50% stenosis of the distal V1   segment of the right vertebral artery before entering the foramen   transversarium of C6. The details are given above.   2. NASCET criteria were utilized for estimation of carotid arterial   stenosis.     MRI Brain Without Contrast    Result Date: 7/11/2024  MRI BRAIN WO CONTRAST- 7/11/2024 10:20 AM  HISTORY: stroke like  symptoms, dysarthria. tongue deviation; R47.81-Slurred speech; E11.65-Type 2 diabetes mellitus with hyperglycemia; R13.10-Dysphagia, unspecified  TECHNIQUE: Multisequence, multiplanar MRI of the brain without contrast  COMPARISON: CT scans dated 7/11/2024  FINDINGS:  There are 2 small foci of diffusion restriction. This includes a posterior limb left internal capsule lacunar infarct (series 203-image 19) as well what appears to be a tiny cortical infarct in the right postcentral gyrus (series 203-image 25, series 302-image 94). Moderate chronic small vessel ischemic changes. No intra-axial or extra-axial hemorrhage. No intracranial mass lesion or mass effect. The ventricles, cortical sulci and basal cisterns are symmetric and age appropriate. Posterior fossa structures are unremarkable. Pituitary gland and sella are unremarkable. The major intracranial flow-voids are preserved. Orbital contents are unremarkable. The paranasal sinuses are clear. Mastoid air cells are clear. Incidentally noted scalp trichilemmal cysts.      Impression:  1.  There are 2 small foci of acute ischemia, a posterior limb left internal capsule lacunar infarct and a second tiny cortical infarct in the right postcentral gyrus at the vertex. 2.  Chronic small vessel ischemic changes.  This report was signed and finalized on 7/11/2024 12:26 PM by Dr Mekhi Malone.           Assessment/Plan        Hospital Problem List      Acute ischemic stroke left  internal capsule lacunar and  right postcentral gyrus at the vertex.    Essential hypertension    Coronary artery disease involving native coronary artery of native heart without angina pectoris    PAD (peripheral artery disease)    Acute focal neurological deficit    UTI (urinary tract infection), bacterial    Uncontrolled type 2 diabetes mellitus with hyperglycemia    Acute stroke due to ischemia    Impression:  Stroke--posterior limb left internal capsule lacunar infarct and a second tiny  cortical infarct in the right postcentral gyrus at the vertex per MRI of the brain.  Again, no lytics given due to the fact the patient did not present for treatment until 3 to 4 days after symptom onset.  HTN  HLD--LDL 38  DM, uncontrolled with A1C=13.2%  PVD  Tobacco abuse (1.5+ PPD)  Hx of medical noncompliance  UTI  Vitamin B12 deficiency, chronic  Left knee pain and swelling limiting ambulation    Plan:  Continue with Lipitor and 81 mg of aspirin.  (On Crestor 10 mg at home).  Need tighter control of blood glucose. Patient has been seen by diabetic educator while here with recommendations for cheaper insulin alternatives. He will need follow up with his PCP, Dr. Betty Mcguire after D/C.   Continue to follow closely with Dr. Poole in vascular for PVD and right arteriogram on 7/22/2024.   Smoking cessation discussed at length. Patient is now willing to quit smoking (wife will quit with him). Denies need for nicotine patch at this time.  UTI---to be addressed by attending.  Replace Vitamin B12 (injection ordered) and FU with PCP at discharge.   PT/OT/SLP consulted. Plan is for SNF/rehab at discharge.   Consider ortho consult for left knee swelling and pain ?   Neuro will sign off for now. Patient to FU in outpatient neurology in 4-6 weeks.   Further recommendations per Dr. DEYSI Bell.       Medical Decision Making    Number/Complexity of Problems  Moderate  1 undiagnosed new problem with uncertain prognosis -   1 acute illness with systemic symptoms -   High  1 acute or chronic illness that pose a threat to life/body function -   High     MDM Data  Moderate - 1/3 categories  Extensive - 2/3 categories    Category 1: 3 of the following  Review of external notes  Review of results  Ordering of each unique test  Independent historian  Category 2:  Independent interpretation of test (ex: imaging)  Category 3:  Discussion of management with another provider    Extensive     Treatment Plan  Moderate - Prescription  Drug management  High  Drug therapy requiring intensive monitoring for toxicity  Decision regarding hospitalization or escalation of care  De-escalate care/DNR decisions  High       Madina Neal, APRN  07/12/24  10:51 CDT

## 2024-07-12 NOTE — PLAN OF CARE
Goal Outcome Evaluation:              Outcome Evaluation: Pt A&Ox3, Room air, Tele in use, SCD's in use. Bed alarm in use. Right sided weakness. Up with x2 assistance to chair with walker and gait belt. Incontinent of urine at times, voids via urinal at times. Wife at bedside, attentive to pt. Diabetes Educator consulted today. Ramesh BAEZ on D/C. Call light within reach.

## 2024-07-12 NOTE — THERAPY TREATMENT NOTE
Acute Care - Physical Therapy Treatment Note  [facility]     Patient Name: Anival Pennington  : 1955  MRN: [id number]  Today's Date: 2024      Visit Dx:     ICD-10-CM ICD-9-CM   1. Slurred speech  R47.81 784.59   2. Hyperglycemia due to diabetes mellitus  E11.65 250.02   3. Dysphagia, unspecified type  R13.10 787.20   4. Impaired mobility [Z74.09]  Z74.09 799.89     Patient Active Problem List   Diagnosis    ST elevation myocardial infarction (STEMI)    Tobacco abuse    Type 2 diabetes mellitus with circulatory disorder, without long-term current use of insulin    Essential hypertension    Coronary artery disease involving native coronary artery of native heart without angina pectoris    Mixed hyperlipidemia    Class 2 severe obesity due to excess calories with serious comorbidity and body mass index (BMI) of 36.0 to 36.9 in adult    S/P coronary artery stent placement    History of MI (myocardial infarction)    Diabetes mellitus due to underlying condition with circulatory complication    Dizziness    Heavy smoker (more than 20 cigarettes per day)    Cellulitis of face    Obesity (BMI 30-39.9)    PAD (peripheral artery disease)    Preop testing    Acute focal neurological deficit    UTI (urinary tract infection), bacterial    Uncontrolled type 2 diabetes mellitus with hyperglycemia    Acute ischemic stroke left  internal capsule lacunar and  right postcentral gyrus at the vertex.    Acute stroke due to ischemia     Past Medical History:   Diagnosis Date    Acute ischemic left MCA stroke 2024    Cancer     leukemia    Coronary artery disease     diabetes with circulatory complications     Hyperlipidemia     Hypertension     Obesity (BMI 30-39.9)     Tobacco abuse      Past Surgical History:   Procedure Laterality Date    CARDIAC CATHETERIZATION N/A 3/28/2019    Procedure: Left Heart Cath;  Surgeon: Vincent Pan MD;  Location: [facility] INVAS[facility] LOC[facility];  Service: Cardiovascular    CARDIAC  CATHETERIZATION Bilateral 3/28/2019    Procedure: Stent JOSSIE coronary;  Surgeon: Vincent Pan MD;  Location:  [facility] CATH INVASIVE LOCATION;  Service: Cardiovascular    CARDIAC CATHETERIZATION N/A 3/28/2019    Procedure: Left ventriculography;  Surgeon: Vincent Pan MD;  Location:  [facility] CATH INVASIVE LOCATION;  Service: Cardiovascular    CORONARY STENT PLACEMENT      TONSILLECTOMY       PT Assessment (Last 12 Hours)       PT Evaluation and Treatment       Row Name 07/12/24 0957          Physical Therapy Time and Intention    Subjective Information complains of;pain  -NW     Document Type therapy note (daily note)  -NW     Mode of Treatment physical therapy  -NW     Comment L knee swollen and hurting/ R side weakness  -NW       Row Name 07/12/24 0957          General Information    Existing Precautions/Restrictions fall  -NW     Limitations/Impairments safety/cognitive  -NW       Row Name 07/12/24 0957          Pain    Posttreatment Pain Rating 7/10  -NW     Pain Location - Side/Orientation Left  -NW     Pain Location - knee  -NW       Row Name 07/12/24 0957          Bed Mobility    Supine-Sit [place] (Bed Mobility) verbal cues;moderate assist (50% patient effort)  -NW     Sit-Supine [place] (Bed Mobility) --  chair  -NW     Assistive Device (Bed Mobility) bed rails;draw sheet  -NW     Comment, (Bed Mobility) lots of extra time and cues  pt w/ R lean and posterior  -NW       Row Name 07/12/24 0957          Sit-Stand Transfer    Sit-Stand [place] (Transfers) verbal cues;moderate assist (50% patient effort)  -NW     Assistive Device (Sit-Stand Transfers) walker, front-wheeled  -NW       Row Name 07/12/24 0957          Stand-Sit Transfer    Stand-Sit [place] (Transfers) verbal cues;moderate assist (50% patient effort)  -NW       Row Name 07/12/24 0957          Gait/Stairs (Locomotion)    [place] Level (Gait) moderate assist (50% patient effort)  -NW     Assistive Device (Gait) walker,  front-wheeled  -NW     Distance in Feet (Gait) 15  15  -NW     Comment, (Gait/Stairs) pt very unsafe able to amb around bed-door however had to help w/ AD and cues for step through gait, pt had to sit in chair due to weakness and safety. pt leaning hard to right  -NW       Row Name 07/12/24 0957          Safety Issues, Functional Mobility    Impairments Affecting Function (Mobility) balance;cognition;pain;strength  -NW       Row Name 07/12/24 0957          Motor Skills    Therapeutic Exercise aerobic  -NW       Row Name 07/12/24 0957          Aerobic Exercise    Time Performed (Aerobic Exercise) AROM BLEs  -NW       Row Name 07/12/24 0957          Positioning and Restraints    Pre-Treatment Position in bed  -NW     Post Treatment Position chair  -NW     In Chair reclined;call light within reach;encouraged to call for assist;with family/caregiver;exit alarm on;notified nsg  -NW               User Key  (r) = Recorded By, (t) = Taken By, (c) = Cosigned By      Initials Name Provider Type    Radha Blanco, PTA Physical Therapist Assistant                    Physical Therapy Education       Title: PT OT SLP Therapies (In Progress)       Topic: Physical Therapy (In Progress)       Point: Mobility training (In Progress)       Learning Progress Summary             Patient Acceptance, TB, NR by JAIME at 7/11/2024 1832    Acceptance, E,TB, VU,DU,NR by CN at 7/11/2024 1412    Acceptance, E,TB, VU,DU,NR by CN at 7/11/2024 1412    Comment: Educated on PT role in pt care.   Family Acceptance, E,TB, VU,DU,NR by CN at 7/11/2024 1412    Comment: Educated on PT role in pt care.                         Point: Home exercise program (In Progress)       Learning Progress Summary             Patient Acceptance, TB, NR by JAIME at 7/11/2024 1832                         Point: Body mechanics (In Progress)       Learning Progress Summary             Patient Acceptance, TB, NR by JAIME at 7/11/2024 1832                         Point: Precautions  (In Progress)       Learning Progress Summary             Patient Acceptance, TB, NR by JAIME at 7/11/2024 1832                                         User Key       Initials Effective Dates Name Provider Type Discipline    JAIME 04/22/24 -  Fatmata Styles, RN Registered Nurse Nurse    NANCY 06/24/24 -  Elvia Oakes, PT Student PT Student PT                  PT Recommendation and Plan     Plan of Care Reviewed With: patient  Progress: declining  Outcome Evaluation: Bed mobility mod x1 w/ lots of cues and using draw sheet. once sitting EOB pt leaning hard to the Right and posteriorly. worked on sitting balance. once pt able to sit. Pt tolerated BLE strengthening ex's has difficulty w/ LLE due to increased knee pain. sit-stand mult attempts mod x1 before pt able to get upright. Pt amb around bed-door, followed w/ chair. very unsafe gait mod x1 help w/ AD and cues for RLE step through, pt had to sit due to weakness and safety before amb back around bed to chair. Pt will need rehab upon d/c for cont strength, mobiltiy and safety.,       Time Calculation:    PT Charges       Row Name 07/12/24 1043             Time Calculation    Start Time 0957  -NW      Stop Time 1044  -NW      Time Calculation (min) 47 min  -NW      PT Received On 07/12/24  -NW      PT Goal Re-Cert Due Date 07/21/24  -NW         Time Calculation- PT    Total Timed Code Minutes- PT 47 minute(s)  -NW         Timed Charges    25831 - PT Therapeutic Exercise Minutes 15  -NW      64760 - Gait Training Minutes  17  -NW      43823 - PT Therapeutic Activity Minutes 15  -NW         Total Minutes    Timed Charges Total Minutes 47  -NW       Total Minutes 47  -NW                User Key  (r) = Recorded By, (t) = Taken By, (c) = Cosigned By      Initials Name Provider Type    NW Radha Barnett, PTA Physical Therapist Assistant                  Therapy Charges for Today       Code Description Service Date Service Provider Modifiers Qty    29240301629 HC PT THER  PROC EA 15 MIN 7/12/2024 Radha Barnett, PTA GP 1    23450818145 HC GAIT TRAINING EA 15 MIN 7/12/2024 Radha Barnett, PTA GP 1    91623347949 HC PT THERAPEUTIC ACT EA 15 MIN 7/12/2024 Radha Barnett, PTA GP 1            PT G-Codes  Outcome Measure Options: AM-PAC 6 Clicks Basic Mobility (PT)  AM-PAC 6 Clicks Score (PT): 14  AM-PAC 6 Clicks Score (OT): 15    Radha Barnett PTA  7/12/2024

## 2024-07-12 NOTE — PROGRESS NOTES
[facility] Medicine Services  INPATIENT PROGRESS NOTE    Length of Stay: 1  Date of Admission: 7/10/2024  Primary Care Physician: Betty Mcguire MD    Subjective   Chief Complaint: Slurred speech    SASHA Pennington presented to [facility] emergency room 7/10/2024 with slurred speech for the past 3 days.  He was initially seen at [facility] on 7/9 with similar symptoms.  CT scan of the head was negative and patient left AMA.  He presented to our facility on 7/10 noting tongue deviation to the right, coughing when attempting to drink water and slight right-sided facial drooping.  Patient reported he went to the bathroom earlier on the day of admission, twisted his leg and fell injuring his left knee.  He denied any other injuries.  He reports a history of coronary artery disease, peripheral vascular disease, diabetes mellitus, tobacco use.  Patient reported he has been off of his insulin for several months due to cost.  He reported leg circulation evaluated by Dr. Poole vascular surgery June 2024 with recommendations for arteriogram 7/22/24 due to abnormal SHELBY.  Patient was noted to have slurring of speech.  He moved extremities equally.  WBC 12.89, glucose 110, 466, 308, creatinine 0.67, urinalysis too numerous to count WBC, trace bacteria, moderate yeast.  X-ray left knee moderate tricompartmental osteoarthritis with small knee effusion.  No acute osseous injury.  Normal saline, insulin given in ER.        Today  Sitting up in bed.  Speech some better today.  Physical therapy notes patient unsafe with gait mobility needs help with directions and cues for right lower extremity step through due to weakness.  SNF recommended and referral made to [facility].  Planes of left knee pain and imaging studies shows effusion.  Will consult orthopedics.  Diabetic educator saw today and discussed insulin and patient unable to afford Lantus insulin but  agreeable to cheaper insulin that he can afford from [employer].  He will follow-up with Dr. Joelle Mcguire after discharge to assist with medications.  No complaints of chest pain or palpitations.  Continue physical therapy occupational therapy.  Lisinopril increased today.    Review of Systems   Constitutional:  Positive for activity change. Negative for fatigue and fever.   HENT:  Negative for congestion.    Eyes:  Negative for photophobia and visual disturbance.   Respiratory:  Negative for cough, shortness of breath and wheezing.    Cardiovascular:  Negative for chest pain, palpitations and leg swelling.   Gastrointestinal:  Negative for constipation, diarrhea, nausea and vomiting.   Endocrine: Negative for cold intolerance, heat intolerance and polyuria.   Genitourinary:  Negative for dysuria, frequency and urgency.   Musculoskeletal:  Positive for gait problem (Left knee pain).   Skin:  Negative for color change, pallor, rash and wound.   Allergic/Immunologic: Negative for immunocompromised state.   Neurological:  Positive for speech difficulty (Slurred speech) and weakness. Negative for light-headedness.   Hematological:  Negative for adenopathy. Does not bruise/bleed easily.   Psychiatric/Behavioral:  Negative for agitation, behavioral problems and confusion.         All pertinent negatives and positives are as above. All other systems have been reviewed and are negative unless otherwise stated.     Objective    Temp:  [97 °F (36.1 °C)-98.3 °F (36.8 °C)] 98.2 °F (36.8 °C)  Heart Rate:  [74-87] 79  Resp:  [18] 18  BP: (131-169)/(72-96) 131/73    Physical Exam  Vitals and nursing note reviewed.   Constitutional:       Comments: Patient sitting up in bed.  No oxygen use. Wife in room.   HENT:      Head: Normocephalic and atraumatic.      Nose: No congestion.      Mouth/Throat:      Pharynx: Oropharynx is clear. No oropharyngeal exudate or posterior oropharyngeal erythema.   Eyes:      Extraocular Movements:  Extraocular movements intact.      Pupils: Pupils are equal, round, and reactive to light.   Cardiovascular:      Rate and Rhythm: Normal rate and regular rhythm.      Heart sounds: No murmur heard.     Comments: Normal sinus rhythm 86 on telemetry.  Pulmonary:      Breath sounds: No wheezing, rhonchi or rales.      Comments: No oxygen in use.  Abdominal:      Palpations: Abdomen is soft.      Tenderness: There is no abdominal tenderness.   Genitourinary:     Comments: Voiding.  Musculoskeletal:         General: Tenderness (Left knee) present.      Cervical back: Normal range of motion and neck supple.   Skin:     General: Skin is warm and dry.   Neurological:      General: No focal deficit present.      Mental Status: He is alert and oriented to person, place, and time.      Comments: Slurred speech but improved today, mild right facial drooping.  Follows commands.  Moves extremities.   Psychiatric:         Mood and Affect: Mood normal.         Behavior: Behavior normal.           Results Review:  I have reviewed the labs, radiology results, and diagnostic studies.    Laboratory Data:      Results from last 7 days   Lab Units 07/12/24  0402 07/11/24  0539 07/10/24  1657   WBC 10*3/mm3 11.37* 13.25* 12.89*   HEMOGLOBIN g/dL 14.3 14.0 17.3   HEMATOCRIT % 39.8 40.7 47.9   PLATELETS 10*3/mm3 205 218 224        Results from last 7 days   Lab Units 07/12/24  0402 07/11/24  0450 07/10/24  1657   SODIUM mmol/L 134* 135* 133*   POTASSIUM mmol/L 3.9 3.5 4.5   CHLORIDE mmol/L 101 102 96*   CO2 mmol/L 23.0 22.0 23.0   BUN mg/dL 9 10 15   CREATININE mg/dL 0.50* 0.42* 0.67*   GLUCOSE mg/dL 254* 233* 466*   CALCIUM mg/dL 8.7 8.5* 9.1   ALT (SGPT) U/L  --   --  5         Culture Data:      Microbiology Results (last 10 days)       Procedure Component Value - Date/Time    Urine Culture - Urine, Urine, Clean Catch [[id number]]  (Normal) Collected: 07/10/24 1847    Lab Status: Preliminary result Specimen: Urine, Clean Catch Updated:  07/12/24 1100     Urine Culture No growth              Radiology Data:   Imaging Results (Last 72 Hours)       Procedure Component Value Units Date/Time    CT Angiogram Neck [[id number]] Collected: 07/11/24 1243     Updated: 07/11/24 1303    Narrative:      EXAMINATION: CT ANGIOGRAM NECK-      7/11/2024 10:42 AM     HISTORY: Stroke, follow up     In order to have a CT radiation dose as low as reasonably achievable  Automated Exposure Control was utilized for adjustment of the mA and/or  KV according to patient size.     Total DLP = 877.44 mGy.cm     The CT angiography of the neck is performed after intravenous contrast  enhancement.     Images are acquired in axial plane and subsequent 2D reconstruction  coronal and sagittal planes and 3D maximum intensity projection  reconstruction.     There is no previous similar study for comparison. The correlation made  with MR imaging of the brain and CT angiography of the head performed  today.     Atheromatous changes of the limited visualized aortic arch is noted. No  aneurysmal dilatation or dissection.     Atheromatous plaques are seen at the origin of the brachiocephalic, left  common carotid and left subclavian arteries. No significant stenosis.     The brachiocephalic trunk divides into normal appearing right subclavian  and right common carotid arteries.     Both common carotid arteries have a normal course and caliber throughout  the neck. No areas of focal stenosis or aneurysmal dilatation.     There is noncalcific plaque in the distal 3 cm length of the right  common carotid artery extending into the bifurcation. There is 30%  stenosis of the long segment of the distal right common carotid artery.  The plaque extends into the bifurcation and along the posterior aspect  of the proximal carotid bulb and approximately 70% stenosis of the bulb.  The remaining left carotid bulb and left internal carotid artery is  normal. The right external carotid artery is normal.  Normal branches.     There is a noncalcific plaque in the distal left common carotid artery  extending no significant stenosis of the common carotid artery or the  carotid bulb. There is a small rounded hyperdensity along the anterior  aspect of the base of the carotid bulb which probably represent a small  calcification in the plaque. I do not believe this represent an  ulcerated plaque. The remaining left internal carotid artery is normal  in course and caliber. There is 50% stenosis of the origin of the left  external carotid artery. Subsequent normal branches.     There is normal origin of both vertebral arteries. There is an area of  60% stenosis of the distal V1 segment of the left vertebral artery  before it enters the C6 foramen transversarium. There is approximately  50% focal stenosis of the right vertebral artery in the foramen  transversarium of C6. Remaining vertebral artery bilaterally are normal  in course and caliber. Normal size, right dominant, vertebral artery  enter the foramen magnum and subsequent to join to make a normal size  basilar artery.     The limited visualized soft tissues of the neck are unremarkable.  Heterogeneous thyroid gland is seen with probable nodules on both sides,  right more than the left.     Limited visualized lungs show a small spiculated nodule in the right  upper lobe, image #108 in series 2, measuring 8 mm in diameter. This  need to be further evaluated with CT scan of the chest.       Impression:      1. Atheromatous changes of the carotid and vertebral arteries. 70% focal  stenosis of the right carotid bulb 60% focal stenosis of the distal V1  segment of the left vertebral artery and 50% stenosis of the distal V1  segment of the right vertebral artery before entering the foramen  transversarium of C6. The details are given above.  2. NASCET criteria were utilized for estimation of carotid arterial  stenosis.           This report was signed and finalized on  7/11/2024 1:00 PM by Dr. Patrick Whitley MD.       CT Angiogram Head w AI Analysis of LVO [[id number]] Collected: 07/11/24 1232     Updated: 07/11/24 1246    Narrative:      EXAMINATION: CT ANGIOGRAM HEAD W AI ANALYSIS OF LVO-     7/11/2024 10:42 AM     HISTORY: Stroke, follow up     CT angiography of the head is performed before and after intravenous  contrast enhancement.     Images are acquired in axial plane and subsequent 2D reconstruction in  coronal and sagittal planes and 3D maximum intensity projection  reconstruction.     There is no previous similar study for comparison. The correlation made  with MR imaging of the brain performed earlier today.     Unenhanced images of the brain show moderate diffuse chronic ischemic  and atrophic changes. No acute intracranial abnormality. No acute bony  abnormality.     The post enhancement images show normal size internal carotid arteries  at the skull base and in the carotid canal bilaterally.     Atheromatous changes are seen in the internal carotid arteries in the  cavernous sinus bilaterally. No areas of flow-limiting stenosis or  aneurysmal dilatation.     Normal size suprasellar internal carotid artery bilaterally is seen  dividing into anterior and middle cerebral arteries bilaterally. There  is moderate irregularity and mild diffuse narrowing of the proximal M1  segment of the left middle cerebral artery. No flow-limiting stenosis.  There is also an area of focal stenosis in the distal M1 segment of the  right middle cerebral artery. However I believe this is due to the  tortuosity of the vessel and not a true stenosis. The A1 segment of the  right LEATHA is relatively smaller than the left. However A2 and A3  segments bilaterally are symmetrical. There is subsequent normal  insular, opercular and cortical branches of the middle cerebral arteries  bilaterally. No areas of flow-limiting stenosis or aneurysmal  dilatation.     Normal size vertebral arteries enter  the foramen magnum and join to make  a normal size basilar artery which subsequently divides into normal.  Posterior cerebral arteries bilaterally. No areas of focal stenosis or  aneurysmal dilatation.       Impression:      1. Moderate irregularity and diffuse narrowing of the M1 segment of the  left middle cerebral artery with less than 50% stenosis. The remaining  intracranial circulation is unremarkable as detailed above. No foci of  flow-limiting stenosis or aneurysmal dilatation.  2. The NASCET criteria were utilized for estimation of carotid arterial  stenosis.                    This report was signed and finalized on 7/11/2024 12:43 PM by Dr. Patrick Whitley MD.       MRI Brain Without Contrast [[id number]] Collected: 07/11/24 1222     Updated: 07/11/24 1229    Narrative:      MRI BRAIN WO CONTRAST- 7/11/2024 10:20 AM     HISTORY: stroke like symptoms, dysarthria. tongue deviation;  R47.81-Slurred speech; E11.65-Type 2 diabetes mellitus with  hyperglycemia; R13.10-Dysphagia, unspecified     TECHNIQUE: Multisequence, multiplanar MRI of the brain without contrast     COMPARISON: CT scans dated 7/11/2024     FINDINGS:     There are 2 small foci of diffusion restriction. This includes a  posterior limb left internal capsule lacunar infarct (series 203-image  19) as well what appears to be a tiny cortical infarct in the right  postcentral gyrus (series 203-image 25, series 302-image 94). Moderate  chronic small vessel ischemic changes. No intra-axial or extra-axial  hemorrhage. No intracranial mass lesion or mass effect. The ventricles,  cortical sulci and basal cisterns are symmetric and age appropriate.  Posterior fossa structures are unremarkable. Pituitary gland and sella  are unremarkable. The major intracranial flow-voids are preserved.  Orbital contents are unremarkable. The paranasal sinuses are clear.  Mastoid air cells are clear. Incidentally noted scalp trichilemmal  cysts.       Impression:          1.  There are 2 small foci of acute ischemia, a posterior limb left  internal capsule lacunar infarct and a second tiny cortical infarct in  the right postcentral gyrus at the vertex.  2.  Chronic small vessel ischemic changes.     This report was signed and finalized on 7/11/2024 12:26 PM by Dr Mekhi Malone.       XR Knee 3 View Left [[id number]] Collected: 07/10/24 1751     Updated: 07/10/24 1755    Narrative:      XR KNEE 3 VW LEFT-     HISTORY: Knee pain/fall and injury     COMPARISON: 9/13/2022     FINDINGS: 3 views of the left knee are obtained.     There is moderate tricompartmental osteoarthritis with bony spurring  greatest of the lateral femoral condyle. Osteopenia is suspected. Small  knee joint effusion. No acute fracture or dislocation. Mild vascular  calcification.       Impression:      1. Moderate tricompartmental osteoarthritis with small knee joint  effusion. No acute osseous injury.        This report was signed and finalized on 7/10/2024 5:52 PM by Dr. Ragini Soto MD.               Intake/Output    Intake/Output Summary (Last 24 hours) at 7/12/2024 1524  Last data filed at 7/12/2024 0900  Gross per 24 hour   Intake 240 ml   Output 850 ml   Net -610 ml       Scheduled Meds  aspirin, 81 mg, Oral, Daily   Or  aspirin, 300 mg, Rectal, Daily  atorvastatin, 80 mg, Oral, Nightly  cefTRIAXone, 1,000 mg, Intravenous, Q24H  cyanocobalamin, 1,000 mcg, Intramuscular, Daily  enoxaparin, 40 mg, Subcutaneous, Daily  insulin glargine, 20 Units, Subcutaneous, Nightly  insulin lispro, 2-7 Units, Subcutaneous, 4x Daily AC & at Bedtime  lisinopril, 20 mg, Oral, Q24H  sodium chloride, 10 mL, Intravenous, Q12H        I have reviewed the patient current medications.     Assessment/Plan     Active Hospital Problems    Diagnosis     **Acute ischemic stroke left  internal capsule lacunar and  right postcentral gyrus at the vertex.     B12 deficiency     Moderate tricompartmental osteoarthritis left knee with small  knee joint  effusion.     Acute stroke due to ischemia     Acute focal neurological deficit     UTI (urinary tract infection), bacterial     Uncontrolled type 2 diabetes mellitus with hyperglycemia     PAD (peripheral artery disease)     Coronary artery disease involving native coronary artery of native heart without angina pectoris     Essential hypertension        Treatment Plan:    1.  Acute ischemic stroke left internal capsule lacunar and right postcentral gyrus at the vertex.  Presented with 3-day history of slurred speech.  CT head noted mild cerebral atrophy and white matter gliosis.  No acute cerebral pathology.  Aspirin, statin ordered on admission.  SCDs for deep vein thrombosis prophylaxis.  No lytic therapy due to patient presented 3 to 4 days after onset of symptoms.    MRI of the brain 7/11 noted 2 small foci of acute ischemia, posterior limb left internal capsule or lacunar infarct and second tiny cortical infarct right postcenteral gyrus at the vertex chronic small vessel ischemic changes.  CTA head moderate irregularity and diffuse narrowing of M1 segment of left middle cerebral artery with less than 50% stenosis.  No foci of flow-limiting stenosis or aneurysmal dilatation.  CTA neck atheromatous changes of carotid and vertebral arteries.  70% focal stenosis right carotid bulb, 60% focal stenosis distal D1 segment left vertebral artery and 50% stenosis distal V1 segment right vertebral artery before entering the foramen transversarium C6.  ECHO pendig.    Physical therapy, Occupational Therapy, speech therapy consulted.  Regular diet with thin liquids and medications whole.  OT notes maximal assist for toileting tasks.  Some right upper extremity weakness.  Will need OT intervention to address mobility, activity tolerance, balance, coordination.  Physical therapy patient unable to get upright.  Ambulated to door followed with chair.  Unsafe gait and moderate assist x 1.  SNF recommended.  Referral  to [facility].    TSH 0.888, free T41.29.  Lipid panel LDL 38 at goal less than 70.  Triglycerides 175, cholesterol 104.    2.  Abnormal urinalysis.  Too numerous to count WBC, trace bacteria, moderate yeast.  Diflucan given.  Rocephin started.  Urine culture turned 7/12 no growth.     3.  Diabetes mellitus type 2, poorly controlled with hyperglycemia.  Hemoglobin A1c 13.2.  Patient has not taken insulin recently due to cost.  Accu Checks with sliding scale insulin coverage.  Glucoses 278, 282, 254.  Will increase Lantus to 20 units nightly.  Diabetic educator consulted and discussed with Zelda Joaquin today.  Wife in contact with [employer] about paying for additional medications.  Unable to afford $106 for Lantus from meds to beds.  Prefers the $24.99 option  for 70/30 insulin from [facility].  Patient will follow-up with Dr. Betty Mcguire after discharge.    4.  Primary hypertension blood pressure 131/73, 167/96.  Lisinopril started 7/11 and increased to 20 mg orally daily today.    5.  Peripheral artery disease.  Follows with Dr. Poole with plans for right lower extremity angiogram 7/22/2024.    6.  History of coronary artery disease.  Continue aspirin and statin.    7.  Hypokalemia.  Potassium 3.5 on 7/11, replaced.  Potassium 3.9 today.  Repeat BMP in AM.     8.  Lovenox and SCDs for deep vein thrombosis prophylaxis.    9.  Social service for discharge planning.  Referral to [facility].  Await bed offer.    Medical Decision Making  Number and Complexity of problems: 7  Acute ischemic stroke: Acute, high complexity poses threat to life and bodily function  Abnormal urinalysis: Acute, moderate complexity stable  Diabetes mellitus type 2 with hyperglycemia: Chronic, high complexity, not at baseline  Primary hypertension: Chronic, high complexity, not at baseline  Peripheral artery disease: Chronic, high complexity needs intervention  History of coronary artery disease: Chronic, moderate complexity,  stable  Hypokalemia: Acute, moderate complexity, not at baseline    Differential Diagnosis: None    Conditions and Status        Condition is unchanged.     [facility] Data  External documents reviewed: Care everywhere.  ER visit [facility] 7/9/2024.  [facility] reviewed vascular surgery office visits.  Cardiac tracing (EKG, telemetry) interpretation: Normal sinus rhythm 73 on telemetry  Radiology interpretation: Reviewed radiology interpretation MRI of the brain, CTA head neck.  X-ray left knee  Labs reviewed:   BMP 7/12/2024.  Repeat BMP in AM.  CBC  7/12/24.   Hemoglobin A1c, lipid panel, TSH  Urine culture negative.    Any tests that were considered but not ordered: None     Decision rules/scores evaluated (example [id number], [place], etc): None     Discussed with: Dr. Paz patient and wife.     Care Planning  Shared decision making: Dr. Paz, patient and wife.  Patient agrees to neurology consult, aspirin, statin, insulin, addition of blood pressure medications, physical therapy, referral to [facility].   Code status and discussions: Full code  Patient surrogate decision maker is his wife, Kavya    Disposition  Social Determinants of Health that impact treatment or disposition: None  I expect the patient to be discharged to [facility] if bed offers and insurance approves.    Electronically signed by CLAUDIO Guzmán, 07/12/24, 15:24 CDT.    The above documentation resulted from a face-to-face encounter by me Brenda SNYDER, [facility].

## 2024-07-12 NOTE — CONSULTS
"Diabetes Education  Assessment/Teaching    Patient Name:  Anival Pennington  YOB: 1955  MRN: [id number]  Admit Date:  7/10/2024      Assessment Date:  7/12/2024  Flowsheet Row Most Recent Value   General Information     Height 160 cm (63\")   Height Method Stated   Weight 77.1 kg (170 lb)   Weight Method Bed scale   Pregnancy Assessment    Diabetes History    Education Preferences    Nutrition Information    Assessment Topics    DM Goals                   Other Comments:  Pt awake and alert.  Admitted with CVA and A1C of 13.2%.  Pt has not been able to pay for Insulin \"for a while\".  A1C was12.2% 9/14/23.  Pt has PCP and wife states they have been in contact with [facility] about paying for additional meds.  Pt tells me \"I sleep all the time\".  Wife seems interested and supportive but tells me they could not afford $106 dollars for Lantus from our Meds to Bed and would prefer the $24.99 option for 70/30 Insulin from [facility]. But then the option of patient going to Rehab is still there also.  Pt will receive his Insulin there.  Wife agrees and hopefully she will be able to get help with finances. Given syringe samples and glucometer sample for home use, when needed.        Electronically signed by:  Darlene Joaquin RN  07/12/24 09:19 CDT  "

## 2024-07-12 NOTE — CASE MANAGEMENT/SOCIAL WORK
Discharge Planning Assessment  [facility]     Patient Name: Anival Pennington  MRN: [id number]  Today's Date: 7/12/2024    Admit Date: 7/10/2024        Discharge Needs Assessment       Row Name 07/12/24 1220       Living Environment    People in Home child(jeffrey), adult;spouse    Potentially Unsafe Housing Conditions none    In the past 12 months has the electric, gas, oil, or water company threatened to shut off services in your home? No    Primary Care Provided by self    Provides Primary Care For no one    Family Caregiver if Needed child(jeffrey), adult;spouse    Quality of Family Relationships helpful;involved    Able to Return to Prior Arrangements other (see comments)    Living Arrangement Comments Rehab placement       Resource/Environmental Concerns    Resource/Environmental Concerns none    Transportation Concerns none       Transportation Needs    In the past 12 months, has lack of transportation kept you from medical appointments or from getting medications? no    In the past 12 months, has lack of transportation kept you from meetings, work, or from getting things needed for daily living? No       Food Insecurity    Within the past 12 months, you worried that your food would run out before you got the money to buy more. Never true    Within the past 12 months, the food you bought just didn't last and you didn't have money to get more. Never true       Transition Planning    Patient/Family Anticipates Transition to inpatient rehabilitation facility;long-term care facility    Patient/Family Anticipated Services at Transition skilled nursing;rehabilitation services    Transportation Anticipated health plan transportation       Discharge Needs Assessment    Readmission Within the Last 30 Days no previous admission in last 30 days    Equipment Currently Used at Home glucometer;walker, standard    Concerns to be Addressed adjustment to diagnosis/illness    Anticipated Changes Related to Illness inability to care for  self    Equipment Needed After Discharge other (see comments)    Outpatient/Agency/Support Group Needs skilled nursing facility    Discharge Facility/Level of Care Needs nursing facility, skilled    Provided Post Acute Provider List? Yes    Post Acute Provider List Nursing Home    Delivered To Patient;Support Person    Method of Delivery In person    Discharge Coordination/Progress SW spoke to pt and spouse about dc planning and rehab recommendations. Pt/spouse prefer to stay close to home and request a referral be sent to [facility]. Referral is being sent to [facility]. If bed offered, precert will be started.                   Discharge Plan    No documentation.                 Continued Care and Services - Admitted Since 7/10/2024       Destination       Service Provider Request Status Selected Services Address Phone Fax Patient Preferred    [facility] Pending - Request Sent N/A [address] [facility] [id number] [phone] --                     Demographic Summary    No documentation.                  Functional Status    No documentation.                  Psychosocial    No documentation.                  Abuse/Neglect    No documentation.                  Legal    No documentation.                  Substance Abuse    No documentation.                  Patient Forms    No documentation.                     RAMBO Hickman

## 2024-07-12 NOTE — DISCHARGE PLACEMENT REQUEST
"Art Pennington (68 y.o. Male)       Date of Birth   1955    Social Security Number       Address   [address] [facility] [address] [facility] [address]    Home Phone   [phone]    MRN   [id number]       Zoroastrianism   Faith    Marital Status                               Admission Date   7/10/24    Admission Type   Emergency    Admitting Provider   Patrice Paz MD    Attending Provider   Patrice Paz MD    Department, Room/Bed   [facility] 3A, 351/1       Discharge Date       Discharge Disposition       Discharge Destination                                 Attending Provider: Patrice Paz MD    Allergies: Aspirin, Bee Venom    Isolation: None   Infection: None   Code Status: CPR    Ht: 160 cm (63\")   Wt: 77.1 kg (170 lb)    Admission Cmt: None   Principal Problem: Acute ischemic stroke left  internal capsule lacunar and  right postcentral gyrus at the vertex. [I63.512]                   Active Insurance as of 7/10/2024       Primary Coverage       Payor Plan Insurance Group Employer/Plan Group    HUMANA MEDICARE REPLACEMENT HUMANA MED ADV PPO [id number]       Payor Plan Address Payor Plan Phone Number Payor Plan Fax Number Effective Dates    [address] [address] [phone]  1/1/2023 - None Entered    [facility] [address]         Subscriber Name Subscriber Birth Date Member ID       ART PENNINGTON 1955 [id number]                     Emergency Contacts        (Rel.) Home Phone Work Phone Mobile Phone    DEVORAH PENNINGTON (Spouse) [phone] -- --    AAMIR PENNINGTON (Daughter) [phone] -- --              Insurance Information                  HUMANA MEDICARE REPLACEMENT/HUMANA MED ADV PPO Phone: [phone]    Subscriber: Art Pennington Subscriber#: [id number]    Group#: [id number] Precert#: --             History & Physical        Ady Allen MD at 07/10/24 2231              [facility] Medicine Services  HISTORY AND " PHYSICAL    Date of Admission: 7/10/2024  Primary Care Physician: Betty Mcguire MD    Subjective   Primary Historian: Patient    Chief Complaint: Slurred speech    History of Present Illness  68 year old male with PMH of CAD, PVD, DM , smoking, that presents to the ER with complaints of slurred speech of sudden onset for about 3 days. Tongue deviates slightly to the right and he coughs with attempting to drink water. Extremity strength and sensation appears normal.    He is also having elevated blood sugar and has been off insulin for several months due to cost. Wife also reports that he has problems with leg circulation, he was evaluated by vascular surgery in June and recommended for arteriogram due to abnormal SHELBY.     Review of Systems   Otherwise complete ROS reviewed and negative except as mentioned in the HPI.    Past Medical History:   Past Medical History:   Diagnosis Date    Cancer     leukemia    Coronary artery disease     diabetes with circulatory complications     Hyperlipidemia     Hypertension     Obesity (BMI 30-39.9)     Tobacco abuse      Past Surgical History:  Past Surgical History:   Procedure Laterality Date    CARDIAC CATHETERIZATION N/A 3/28/2019    Procedure: Left Heart Cath;  Surgeon: Vincent Pan MD;  Location:  [facility];  Service: Cardiovascular    CARDIAC CATHETERIZATION Bilateral 3/28/2019    Procedure: Stent JOSSIE coronary;  Surgeon: Vincent Pan MD;  Location:  [facility] CATH [facility];  Service: Cardiovascular    CARDIAC CATHETERIZATION N/A 3/28/2019    Procedure: Left ventriculography;  Surgeon: Vincent Pan MD;  Location:  [facility] CATH [facility];  Service: Cardiovascular    CORONARY STENT PLACEMENT      TONSILLECTOMY       Social History:  reports that he has been smoking cigarettes. He started smoking about 55 years ago. He has a 83.3 pack-year smoking history. He has never used smokeless tobacco. He reports that he does not currently use  "alcohol. He reports that he does not currently use drugs after having used the following drugs: Marijuana.    Family History: family history includes Cancer in his father; Heart attack in his brother and mother; Heart disease in his brother and mother; No Known Problems in his brother; Valvular heart disease in his mother.       Allergies:  Allergies   Allergen Reactions    Aspirin Other (See Comments)     Tightness in throat with high dose asa.  Can take the 81 mg without difficutly    Bee Venom Anaphylaxis       Medications:  Prior to Admission medications    Medication Sig Start Date End Date Taking? Authorizing Provider   aspirin 81 MG chewable tablet Chew 1 tablet Daily.    ProviderMerly MD   nitroglycerin (NITROSTAT) 0.4 MG SL tablet Place 1 tablet under the tongue Every 5 (Five) Minutes As Needed for Chest Pain. Take no more than 3 doses in 15 minutes. 7/20/22   Betty Mgcuire MD   rosuvastatin (Crestor) 10 MG tablet Take 1 tablet by mouth Daily. To prevent heart attacks and control cholesterol 6/20/24   Joanie Devlin APRN   aspirin 81 MG EC tablet Take 1 tablet by mouth Daily.  7/10/24  ProviderMerly MD     I have utilized all available immediate resources to obtain, update, or review the patient's current medications (including all prescriptions, over-the-counter products, herbals, cannabis/cannabidiol products, and vitamin/mineral/dietary (nutritional) supplements).    Objective     Vital Signs: /79 (BP Location: Right arm, Patient Position: Lying)   Pulse 86   Temp 98.8 °F (37.1 °C) (Oral)   Resp 18   Ht 157.5 cm (62\")   Wt 77.1 kg (169 lb 15.6 oz)   SpO2 100%   BMI 31.09 kg/m²   Physical Exam  Constitutional:       Appearance: He is well-developed. He is not ill-appearing.   HENT:      Head: Normocephalic and atraumatic.      Right Ear: External ear normal.      Left Ear: External ear normal.      Nose: Nose normal.      Mouth/Throat:      Mouth: Mucous membranes are dry. "   Eyes:      General:         Right eye: No discharge.         Left eye: No discharge.      Extraocular Movements: Extraocular movements intact.      Conjunctiva/sclera: Conjunctivae normal.      Pupils: Pupils are equal, round, and reactive to light.   Neck:      Vascular: No JVD.   Cardiovascular:      Rate and Rhythm: Regular rhythm. Tachycardia present.      Heart sounds: Normal heart sounds. No murmur heard.  Pulmonary:      Effort: Pulmonary effort is normal. No respiratory distress.      Breath sounds: Normal breath sounds. No wheezing or rales.   Chest:      Chest wall: No tenderness.   Abdominal:      General: Bowel sounds are normal. There is no distension.      Palpations: Abdomen is soft.      Tenderness: There is no abdominal tenderness. There is no guarding or rebound.   Musculoskeletal:         General: No tenderness or deformity. Normal range of motion.      Cervical back: Normal range of motion and neck supple. No rigidity.      Right lower leg: No edema.      Left lower leg: No edema.   Skin:     General: Skin is warm and dry.      Capillary Refill: Capillary refill takes less than 2 seconds.      Findings: No rash.   Neurological:      Mental Status: He is alert and oriented to person, place, and time.      Cranial Nerves: Cranial nerve deficit present.      Sensory: No sensory deficit.      Motor: Weakness present. No abnormal muscle tone.      Deep Tendon Reflexes: Reflexes normal.   Psychiatric:         Mood and Affect: Mood normal.         Behavior: Behavior normal.     Results Reviewed:  Lab Results (last 24 hours)       Procedure Component Value Units Date/Time    Urinalysis With Microscopic If Indicated (No Culture) - Urine, Clean Catch [[id number]]  (Abnormal) Collected: 07/10/24 1847    Specimen: Urine, Clean Catch Updated: 07/10/24 1921     Color, UA Yellow     Appearance, UA Cloudy     pH, UA 6.5     Specific Gravity, UA >1.030     Glucose, UA >=1000 mg/dL (3+)     Ketones, UA 15 mg/dL  (1+)     Bilirubin, UA Negative     Blood, UA Trace     Protein,  mg/dL (2+)     Leuk Esterase, UA Moderate (2+)     Nitrite, UA Negative     Urobilinogen, UA 1.0 E.U./dL    Urinalysis, Microscopic Only - Urine, Clean Catch [[id number]]  (Abnormal) Collected: 07/10/24 1847    Specimen: Urine, Clean Catch Updated: 07/10/24 1921     RBC, UA 0-2 /HPF      WBC, UA Too Numerous to Count /HPF      Bacteria, UA Trace /HPF      Squamous Epithelial Cells, UA 3-6 /HPF      Yeast, UA       Moderate/2+ Budding Yeast w/Hyphae     /HPF     Hyaline Casts, UA None Seen /LPF      Methodology Manual Light Microscopy    POC Glucose Once [[id number]]  (Abnormal) Collected: 07/10/24 1855    Specimen: Blood Updated: 07/10/24 1907     Glucose 308 mg/dL      Comment: : [id number] Faviola ColeMeter ID: [id number]       Comprehensive Metabolic Panel [[id number]]  (Abnormal) Collected: 07/10/24 1657    Specimen: Blood Updated: 07/10/24 1742     Glucose 466 mg/dL      BUN 15 mg/dL      Creatinine 0.67 mg/dL      Sodium 133 mmol/L      Potassium 4.5 mmol/L      Comment: Slight hemolysis detected by analyzer. Result may be falsely elevated.        Chloride 96 mmol/L      CO2 23.0 mmol/L      Calcium 9.1 mg/dL      Total Protein 6.8 g/dL      Albumin 3.8 g/dL      ALT (SGPT) 5 U/L      AST (SGOT) 13 U/L      Comment: Slight hemolysis detected by analyzer. Result may be falsely elevated.        Alkaline Phosphatase 92 U/L      Total Bilirubin 0.5 mg/dL      Globulin 3.0 gm/dL      A/G Ratio 1.3 g/dL      BUN/Creatinine Ratio 22.4     Anion Gap 14.0 mmol/L      eGFR 102.3 mL/min/1.73     Narrative:      GFR Normal >60  Chronic Kidney Disease <60  Kidney Failure <15      Acetone [[id number]]  (Normal) Collected: 07/10/24 1657    Specimen: Blood Updated: 07/10/24 1734     Acetone Negative    Magnesium [[id number]]  (Normal) Collected: 07/10/24 1657    Specimen: Blood Updated: 07/10/24 1725     Magnesium 1.9 mg/dL     Blood Gas, Arterial -  [[id number]]  (Abnormal) Collected: 07/10/24 1723    Specimen: Arterial Blood Updated: 07/10/24 1724     Site Right Radial     Damien's Test Positive     pH, Arterial 7.472 pH units      Comment: 83 Value above reference range        pCO2, Arterial 29.1 mm Hg      Comment: 84 Value below reference range        pO2, Arterial 80.7 mm Hg      Comment: 84 Value below reference range        HCO3, Arterial 21.3 mmol/L      Base Excess, Arterial -1.0 mmol/L      Comment: 84 Value below reference range        O2 Saturation, Arterial 98.1 %      Temperature 37.0     Barometric Pressure for Blood Gas 752 mmHg      Modality Room Air     Ventilator Mode NA     Collected by [id number]     Comment: Meter: [id number]     :  Ana Malave, MONTEZ        pCO2, Temperature Corrected 29.1 mm Hg      pH, Temp Corrected 7.472 pH Units      pO2, Temperature Corrected 80.7 mm Hg     POC Glucose Once [[id number]]  (Abnormal) Collected: 07/10/24 1706    Specimen: Blood Updated: 07/10/24 1718     Glucose 510 mg/dL      Comment: : [id number] Gisselle Simons ID: [id number]       CBC & Differential [[id number]]  (Abnormal) Collected: 07/10/24 1657    Specimen: Blood Updated: 07/10/24 1708    Narrative:      The following orders were created for panel order CBC & Differential.  Procedure                               Abnormality         Status                     ---------                               -----------         ------                     CBC Auto Differential[[id number]]        Abnormal            Final result                 Please view results for these tests on the individual orders.    CBC Auto Differential [[id number]]  (Abnormal) Collected: 07/10/24 1657    Specimen: Blood Updated: 07/10/24 1708     WBC 12.89 10*3/mm3      RBC 5.35 10*6/mm3      Hemoglobin 17.3 g/dL      Hematocrit 47.9 %      MCV 89.5 fL      MCH 32.3 pg      MCHC 36.1 g/dL      RDW 12.3 %      RDW-SD 39.9 fl      MPV 11.1 fL      Platelets 224  10*3/mm3      Neutrophil % 79.5 %      Lymphocyte % 11.0 %      Monocyte % 8.1 %      Eosinophil % 0.5 %      Basophil % 0.5 %      Immature Grans % 0.4 %      Neutrophils, Absolute 10.26 10*3/mm3      Lymphocytes, Absolute 1.42 10*3/mm3      Monocytes, Absolute 1.04 10*3/mm3      Eosinophils, Absolute 0.06 10*3/mm3      Basophils, Absolute 0.06 10*3/mm3      Immature Grans, Absolute 0.05 10*3/mm3      nRBC 0.0 /100 WBC     POC Glucose Once [[id number]]  (Abnormal) Collected: 07/10/24 1514    Specimen: Blood Updated: 07/10/24 1525     Glucose 378 mg/dL      Comment: : [id number] Inna SaraMeter ID: [id number]             Imaging Results (Last 24 Hours)       Procedure Component Value Units Date/Time    XR Knee 3 View Left [[id number]] Collected: 07/10/24 1751     Updated: 07/10/24 1755    Narrative:      XR KNEE 3 VW LEFT-     HISTORY: Knee pain/fall and injury     COMPARISON: 9/13/2022     FINDINGS: 3 views of the left knee are obtained.     There is moderate tricompartmental osteoarthritis with bony spurring  greatest of the lateral femoral condyle. Osteopenia is suspected. Small  knee joint effusion. No acute fracture or dislocation. Mild vascular  calcification.       Impression:      1. Moderate tricompartmental osteoarthritis with small knee joint  effusion. No acute osseous injury.        This report was signed and finalized on 7/10/2024 5:52 PM by Dr. Ragini Soto MD.             I have personally reviewed and interpreted the radiology studies and ECG obtained at time of admission.     Assessment / Plan   Assessment:   Active Hospital Problems    Diagnosis     **Acute focal neurological deficit     UTI (urinary tract infection), bacterial     Uncontrolled type 2 diabetes mellitus with hyperglycemia     PAD (peripheral artery disease)     Coronary artery disease involving native coronary artery of native heart without angina pectoris     Essential hypertension      Treatment Plan  The patient will be  admitted to my service here at [facility].   Admit to neurofloor  Vitals every 4 hours  Neurochecks every 4 hours  Bedside swallow failed will remain n.p.o.  IV fluids normal saline 75 cc an hour    Slurred speech concerning for recent stroke  Neurology consult  ASA/Lipitor  PT/OT/SLP  MRI brain would be indicated, alert reports metallic implant, will need clarification  CT brain, CTA brain and neck  Lipid panel and A1C  Blood sugar control  Smoking cessation advised    DM 2 Uncontrolled  A1C in AM  Lantus 10 units nightly  Humalog low dose sliding scale AC and HS  IVF bolus given in ER, will continue with NS at 75 cc/hour    Pyuria, asymptomatic  Continue to monitor for symptoms    DVT prophylaxis > Lovenox 40 mg SQ daily    Medical Decision Making  Number and Complexity of problems: 4 complex medical problems  Differential Diagnosis: see above    Conditions and Status        Condition is unchanged.     [facility] Data  External documents reviewed: [employer]  Cardiac tracing (EKG, telemetry) interpretation: EKG pending  Radiology interpretation: see above  Labs reviewed: see above  Any tests that were considered but not ordered: none     Decision rules/scores evaluated (example [id number], Wells, etc): N/A     Discussed with: Patient and wife at bedside     Care Planning  Shared decision making: Patient  Code status and discussions: Full code    Disposition  Social Determinants of Health that impact treatment or disposition: none  Estimated length of stay is to be determined.     I confirmed that the patient's advanced care plan is present, code status is documented, and a surrogate decision maker is listed in the patient's medical record.     The patient's surrogate decision maker is family, see records.     The patient was seen and examined by me on 7/10/2024 at 2231.    Electronically signed by Ady Allen MD, 07/10/24, 22:31 CDT.              Electronically signed by Ady Allen,  MD at 07/11/24 0723       Vital Signs (last day)       Date/Time Temp Temp src Pulse Resp BP Patient Position SpO2    07/12/24 1141 98.2 (36.8) Oral 79 18 131/73 Sitting 95    07/12/24 0748 98.3 (36.8) Oral 87 18 167/96 Lying 97    07/12/24 0319 98.1 (36.7) Oral 74 18 153/72 Lying 99    07/11/24 2317 97.5 (36.4) Oral 86 18 169/86 Lying 99    07/11/24 1818 97 (36.1) Oral 87 18 145/76 -- 96    07/11/24 1226 97.6 (36.4) Oral 78 18 150/74 -- 96    07/11/24 1017 -- -- -- -- 148/81 -- --    07/11/24 0726 98.8 (37.1) Oral 94 18 148/81 Lying 98    07/11/24 0412 99 (37.2) Oral 80 18 153/91 Lying 98    07/11/24 0023 99 (37.2) Oral 85 18 152/77 Lying 97          Current Facility-Administered Medications   Medication Dose Route Frequency Provider Last Rate Last Admin    acetaminophen (TYLENOL) tablet 650 mg  650 mg Oral Q4H PRN Ady Allen MD   650 mg at 07/12/24 1022    Or    acetaminophen (TYLENOL) suppository 650 mg  650 mg Rectal Q4H PRN Ady Allen MD        aspirin chewable tablet 81 mg  81 mg Oral Daily Aurelia Urbano APRN   81 mg at 07/12/24 0839    Or    aspirin suppository 300 mg  300 mg Rectal Daily Aurelia Urbano APRN        atorvastatin (LIPITOR) tablet 80 mg  80 mg Oral Nightly Ady Allen MD   80 mg at 07/11/24 2118    cefTRIAXone (ROCEPHIN) 1,000 mg in sodium chloride 0.9 % 100 mL MBP  1,000 mg Intravenous Q24H Brenda Delgado APRN 200 mL/hr at 07/12/24 0838 1,000 mg at 07/12/24 0838    cyanocobalamin injection 1,000 mcg  1,000 mcg Intramuscular Daily Romansh, Aurelia D, APRN   1,000 mcg at 07/12/24 1015    dextrose (D50W) (25 g/50 mL) IV injection 25 g  25 g Intravenous Q15 Min PRN Ady Allen MD        dextrose (GLUTOSE) oral gel 15 g  15 g Oral Q15 Min PRN Ady Allen MD        Enoxaparin Sodium (LOVENOX) syringe 40 mg  40 mg Subcutaneous Daily Ady Allen MD   40 mg at 07/12/24 0838    glucagon (GLUCAGEN) injection 1 mg  1  mg Intramuscular Q15 Min PRN Ady Allen MD        insulin glargine (LANTUS, SEMGLEE) injection 15 Units  15 Units Subcutaneous Nightly Brenda Delgado APRN        Insulin Lispro (humaLOG) injection 2-7 Units  2-7 Units Subcutaneous 4x Daily AC & at Bedtime Ady Allen MD   4 Units at 07/12/24 0838    lisinopril (PRINIVIL,ZESTRIL) tablet 20 mg  20 mg Oral Q24H Brenda Delgado APRN   20 mg at 07/12/24 0839    nicotine (NICODERM CQ) 14 MG/24HR patch 1 patch  1 patch Transdermal Daily PRN Aurelia Urbano APRN        nitroglycerin (NITROSTAT) SL tablet 0.4 mg  0.4 mg Sublingual Q5 Min PRN Ady Allen MD        ondansetron (ZOFRAN) injection 4 mg  4 mg Intravenous Q6H PRN Ady Allen MD        sodium chloride 0.9 % flush 10 mL  10 mL Intravenous PRN Ady Allen MD        sodium chloride 0.9 % flush 10 mL  10 mL Intravenous Q12H Ady Allen MD   10 mL at 07/11/24 0932    sodium chloride 0.9 % flush 10 mL  10 mL Intravenous PRN Ady Allen MD        sodium chloride 0.9 % infusion 40 mL  40 mL Intravenous PRN Ady Allen MD        sodium chloride 0.9 % infusion  75 mL/hr Intravenous Continuous Ady Allen MD 75 mL/hr at 07/12/24 0638 75 mL/hr at 07/12/24 0638     Operative/Procedure Notes (last 7 days)  Notes from 07/05/24 1228 through 07/12/24 1228   No notes of this type exist for this encounter.          Physician Progress Notes (last 24 hours)        Madina Neal APRN at 07/12/24 1050            Neurology Progress Note      Chief Complaint:  Stroke  Length of Stay:  1   Subjective     Subjective: Overall, patient is feeling better.  He complains that he did not sleep much last night due to the nursing staff waking him up for neurological exams.  No acute neurological changes since last assessment yesterday.  PT/OT are recommending skilled nursing facility at discharge.  Patient and wife are  amendable to this.  Patient has a history of vitamin B-12 deficiency and remains with low serum B12 on lab result.    Medications:  Current Facility-Administered Medications   Medication Dose Route Frequency Provider Last Rate Last Admin    acetaminophen (TYLENOL) tablet 650 mg  650 mg Oral Q4H PRN Ady Allen MD   650 mg at 07/12/24 1022    Or    acetaminophen (TYLENOL) suppository 650 mg  650 mg Rectal Q4H PRN Ady Allen MD        aspirin chewable tablet 81 mg  81 mg Oral Daily Aurelia Urbano APRN   81 mg at 07/12/24 0839    Or    aspirin suppository 300 mg  300 mg Rectal Daily Aurelia Urbano APRN        atorvastatin (LIPITOR) tablet 80 mg  80 mg Oral Nightly Ady Allen MD   80 mg at 07/11/24 2118    cefTRIAXone (ROCEPHIN) 1,000 mg in sodium chloride 0.9 % 100 mL MBP  1,000 mg Intravenous Q24H Brenda Delgado APRN 200 mL/hr at 07/12/24 0838 1,000 mg at 07/12/24 0838    cyanocobalamin injection 1,000 mcg  1,000 mcg Intramuscular Daily Aurelia Urbano APRN   1,000 mcg at 07/12/24 1015    dextrose (D50W) (25 g/50 mL) IV injection 25 g  25 g Intravenous Q15 Min PRN Ady Allen MD        dextrose (GLUTOSE) oral gel 15 g  15 g Oral Q15 Min PRN Ady Allen MD        Enoxaparin Sodium (LOVENOX) syringe 40 mg  40 mg Subcutaneous Daily Ady Allen MD   40 mg at 07/12/24 0838    glucagon (GLUCAGEN) injection 1 mg  1 mg Intramuscular Q15 Min PRN Ady Allen MD        insulin glargine (LANTUS, SEMGLEE) injection 15 Units  15 Units Subcutaneous Nightly Brenda Delgado APRN        Insulin Lispro (humaLOG) injection 2-7 Units  2-7 Units Subcutaneous 4x Daily AC & at Bedtime Ady Allen MD   4 Units at 07/12/24 0838    lisinopril (PRINIVIL,ZESTRIL) tablet 20 mg  20 mg Oral Q24H Brenda Delgado, CLAUDIO   20 mg at 07/12/24 0839    nicotine (NICODERM CQ) 14 MG/24HR patch 1 patch  1 patch Transdermal Daily PRN Yolie,  CLAUDIO Ortiz        nitroglycerin (NITROSTAT) SL tablet 0.4 mg  0.4 mg Sublingual Q5 Min PRN Ady Allen MD        ondansetron (ZOFRAN) injection 4 mg  4 mg Intravenous Q6H PRN Ady Allen MD        sodium chloride 0.9 % flush 10 mL  10 mL Intravenous PRN Ady Allen MD        sodium chloride 0.9 % flush 10 mL  10 mL Intravenous Q12H Ady Allen MD   10 mL at 07/11/24 0932    sodium chloride 0.9 % flush 10 mL  10 mL Intravenous PRN Ady Allen MD        sodium chloride 0.9 % infusion 40 mL  40 mL Intravenous PRN Ady Allen MD        sodium chloride 0.9 % infusion  75 mL/hr Intravenous Continuous Ady Allen MD 75 mL/hr at 07/12/24 0638 75 mL/hr at 07/12/24 0638             Objective      Vital Signs  Temp:  [97 °F (36.1 °C)-98.3 °F (36.8 °C)] 98.3 °F (36.8 °C)  Heart Rate:  [74-87] 87  Resp:  [18] 18  BP: (145-169)/(72-96) 167/96    Physical Exam:  Appears older than stated age.  Chronically ill-appearing.  Lying in hospital bed with wife at bedside at time of exam.  HEENT:  neck is supple. Endentulous and without dentures.  CVS:  RRR  Lungs:  CTA - B/L  Abd:  NT/ND  Ext:  no edema.  Fingernails are long and nicotine stained.  Left knee is swollen and painful to move.  Skin:  no rashes    Pertinent Neuro Exam:  No acute neurological changes since last assessment.    Mental Status:    -Awake, Alert, Oriented X 3.   -No word finding difficulties  -No aphasia  -Moderate dysarthria with ~ 50% words intelligible.   -Follows simple and complex commands     CN II:  Visual fields full.  Pupils equally reactive to light  CN III, IV, VI:  Extraocular Muscles full with no signs of nystagmus  CN V:  Facial sensory is symmetric with no asymmetries.  CN VII:  Facial motor asymmetric with slight right sided facial droop noted.   CN VIII:  Gross hearing intact bilaterally  CN IX:  Palate elevates symmetrically  CN X:  Palate elevates  symmetrically  CN XI:  Shoulder shrug symmetric  CN XII:  Tongue--slight tongue deviation to right     Motor: (strength out of 5:  1= minimal movement, 2 = movement in plane of gravity, 3 = movement against gravity, 4 = movement against some resistance, 5 = full strength)     -Right Upper Ext: Proximal:5- Distal: 5-  RUE slightly weaker than LUE. Slight pronation noted to RUE.  -Left Upper Ext: Proximal: 5Distal: 5     -Right Lower Ext: Proximal: 4 Distal: 4  -Left Lower Ext: patient would not move LLE due to complaints of left knee pain with ROM. PT rates 3+/5.     DTR:  -Right              Biceps: 2+       Triceps: 2+      Brachioradialis: 2+              Patella: 2+       Ankle: 2+         Neg Babinski  -Left              Biceps: 2+       Triceps: 2+      Brachioradialis: 2+              Patella: 2+       Ankle: 2+         Neg Babinski     Sensory:  -Intact to light touch, pinprick, temperature, pain, and proprioception     Coordination:  -Finger to nose intact without ataxia noted.      Gait  -Not assessed due to safety/fall risk and complaints of left knee pain.   Last nurse assessment:  Interval:  (hand off with kellee Pt beening uncorporative)  1a. Level of Consciousness: 0-->Alert, keenly responsive  1b. LOC Questions: 0-->Answers both questions correctly  1c. LOC Commands: 0-->Performs both tasks correctly  2. Best Gaze: 0-->Normal  3. Visual: 0-->No visual loss  4. Facial Palsy: 1-->Minor paralysis (flattened nasolabial fold, asymmetry on smiling)  5a. Motor Arm, Left: 0-->No drift, limb holds 90 (or 45) degrees for full 10 secs  5b. Motor Arm, Right: 0-->No drift, limb holds 90 (or 45) degrees for full 10 secs  6a. Motor Leg, Left: 0-->No drift, leg holds 30 degree position for full 5 secs  6b. Motor Leg, Right: 0-->No drift, leg holds 30 degree position for full 5 secs  7. Limb Ataxia: 0-->Absent  8. Sensory: 0-->Normal, no sensory loss  9. Best Language: 1-->Mild-to-moderate aphasia, some obvious loss of  fluency or facility of comprehension, without significant limitation on ideas expressed or form of expression. Reduction of speech and/or comprehension, however, makes conversation. . . (see row details)  10. Dysarthria: 1-->Mild-to-moderate dysarthria, patient slurs at least some words and, at worst, can be understood with some difficulty  11. Extinction and Inattention (formerly Neglect): 0-->No abnormality    Total (NIH Stroke Scale): 3       Results Review:      Labs:  Result Review:  I have personally reviewed the results from the time of this admission to 7/12/2024 10:57 CDT and agree with these findings:  [x]  Laboratory list / accordion  []  Microbiology  [x]  Radiology  [x]  EKG/Telemetry   [x]  Cardiology/Vascular   []  Pathology  []  Old records  []  Other:  Most notable findings include: Serum B12 level of 171. TTE= left ventricular ejection fraction 56 to 60%.  Left ventricular wall segments are hypokinetic. Saline test negative.      Imaging:  CTA head and Neck: 1. Atheromatous changes of the carotid and vertebral arteries. 70% focal   stenosis of the right carotid bulb 60% focal stenosis of the distal V1   segment of the left vertebral artery and 50% stenosis of the distal V1   segment of the right vertebral artery before entering the foramen   transversarium of C6. The details are given above.   2. NASCET criteria were utilized for estimation of carotid arterial   stenosis.     MRI Brain Without Contrast    Result Date: 7/11/2024  MRI BRAIN WO CONTRAST- 7/11/2024 10:20 AM  HISTORY: stroke like symptoms, dysarthria. tongue deviation; R47.81-Slurred speech; E11.65-Type 2 diabetes mellitus with hyperglycemia; R13.10-Dysphagia, unspecified  TECHNIQUE: Multisequence, multiplanar MRI of the brain without contrast  COMPARISON: CT scans dated 7/11/2024  FINDINGS:  There are 2 small foci of diffusion restriction. This includes a posterior limb left internal capsule lacunar infarct (series 203-image 19) as  well what appears to be a tiny cortical infarct in the right postcentral gyrus (series 203-image 25, series 302-image 94). Moderate chronic small vessel ischemic changes. No intra-axial or extra-axial hemorrhage. No intracranial mass lesion or mass effect. The ventricles, cortical sulci and basal cisterns are symmetric and age appropriate. Posterior fossa structures are unremarkable. Pituitary gland and sella are unremarkable. The major intracranial flow-voids are preserved. Orbital contents are unremarkable. The paranasal sinuses are clear. Mastoid air cells are clear. Incidentally noted scalp trichilemmal cysts.      Impression:  1.  There are 2 small foci of acute ischemia, a posterior limb left internal capsule lacunar infarct and a second tiny cortical infarct in the right postcentral gyrus at the vertex. 2.  Chronic small vessel ischemic changes.  This report was signed and finalized on 7/11/2024 12:26 PM by Dr Mekhi Malone.           Assessment/Plan        Hospital Problem List      Acute ischemic stroke left  internal capsule lacunar and  right postcentral gyrus at the vertex.    Essential hypertension    Coronary artery disease involving native coronary artery of native heart without angina pectoris    PAD (peripheral artery disease)    Acute focal neurological deficit    UTI (urinary tract infection), bacterial    Uncontrolled type 2 diabetes mellitus with hyperglycemia    Acute stroke due to ischemia    Impression:  Stroke--posterior limb left internal capsule lacunar infarct and a second tiny cortical infarct in the right postcentral gyrus at the vertex per MRI of the brain.  Again, no lytics given due to the fact the patient did not present for treatment until 3 to 4 days after symptom onset.  HTN  HLD--LDL 38  DM, uncontrolled with A1C=13.2%  PVD  Tobacco abuse (1.5+ PPD)  Hx of medical noncompliance  UTI  Vitamin B12 deficiency, chronic    Plan:  Continue with Lipitor and 81 mg of aspirin.  (On  Crestor 10 mg at home).  Need tighter control of blood glucose. Patient has been seen by diabetic educator while here with recommendations for cheaper insulin alternatives. He will need follow up with his PCP, Dr. Betty Mcguire after D/C.   Continue to follow closely with Dr. Poole in vascular for PVD and right arteriogram on 7/22/2024.   Smoking cessation discussed at length. Patient is now willing to quit smoking (wife will quit with him). Denies need for nicotine patch at this time.  UTI---to be addressed by attending.  Replace Vitamin B12 (injection ordered) and FU with PCP at discharge.   PT/OT/SLP consulted. Plan is for SNF/rehab at discharge.   Neuro will sign off for now. Patient to FU in outpatient neurology in 4-6 weeks.   Further recommendations per Dr. DEYSI Bell.       Medical Decision Making    Number/Complexity of Problems  Moderate  1 undiagnosed new problem with uncertain prognosis -   1 acute illness with systemic symptoms -   High  1 acute or chronic illness that pose a threat to life/body function -   High     MDM Data  Moderate - 1/3 categories  Extensive - 2/3 categories    Category 1: 3 of the following  Review of external notes  Review of results  Ordering of each unique test  Independent historian  Category 2:  Independent interpretation of test (ex: imaging)  Category 3:  Discussion of management with another provider    Extensive     Treatment Plan  Moderate - Prescription Drug management  High  Drug therapy requiring intensive monitoring for toxicity  Decision regarding hospitalization or escalation of care  De-escalate care/DNR decisions  High       CLAUDIO Maurer  07/12/24  10:51 CDT      Electronically signed by Madina Neal APRN at 07/12/24 1109       Brenda Delgado APRN at 07/11/24 1427       Attestation signed by Patrice Paz MD at 07/11/24 9533    I have reviewed this documentation and agree.                      [facility]  Hospital Medicine Services  INPATIENT PROGRESS NOTE    Length of Stay: 0  Date of Admission: 7/10/2024  Primary Care Physician: Betty Mcguire MD    Subjective   Chief Complaint: Slurred speech    HPI     Anival Pennington presented to [facility] emergency room 7/10/2024 with slurred speech for the past 3 days.  He was initially seen at [facility] on 7/9 with similar symptoms.  CT scan of the head was negative and patient left AMA.  He presented to our facility on 7/10 noting tongue deviation to the right, coughing when attempting to drink water and slight right-sided facial drooping.  Patient reported he went to the bathroom earlier on the day of admission, twisted his leg and fell injuring his left knee.  He denied any other injuries.  He reports a history of coronary artery disease, peripheral vascular disease, diabetes mellitus, tobacco use.  Patient reported he has been off of his insulin for several months due to cost.  He reported leg circulation evaluated by Dr. Poole vascular surgery June 2024 with recommendations for arteriogram 7/22/24 due to abnormal SHELBY.  Patient was noted to have slurring of speech.  He moved extremities equally.  WBC 12.89, glucose 110, 466, 308, creatinine 0.67, urinalysis too numerous to count WBC, trace bacteria, moderate yeast.  X-ray left knee moderate tricompartmental osteoarthritis with small knee effusion.  No acute osseous injury.  Normal saline, insulin given in ER.        Today  Sitting up in bed.  Patient does have slurring of speech and word finding issues.  Mild right facial drooping.  He follows commands and moves extremities.  Patient is reluctant to move left lower extremity due to knee pain.  Denies complaints of chest pain or palpitations.  Denies nausea, vomiting or abdominal.  Speech therapy flared for thin liquids.  Occupational Therapy noted intermittent posterior lean when seated.  Related from bed to chair with walker and cues for  safety.  Right upper extremity weakness during testing.  Nation equal and minimally impaired.  Skilled OT. Consider SNF at discharge.      Review of Systems   Constitutional:  Positive for activity change. Negative for fatigue and fever.   HENT:  Negative for congestion.    Eyes:  Negative for photophobia and visual disturbance.   Respiratory:  Negative for cough, shortness of breath and wheezing.    Cardiovascular:  Negative for chest pain, palpitations and leg swelling.   Gastrointestinal:  Negative for constipation, diarrhea, nausea and vomiting.   Endocrine: Negative for cold intolerance, heat intolerance and polyuria.   Genitourinary:  Negative for dysuria, frequency and urgency.   Musculoskeletal:  Positive for gait problem (Left knee pain).   Skin:  Negative for color change, pallor, rash and wound.   Allergic/Immunologic: Negative for immunocompromised state.   Neurological:  Positive for speech difficulty (Slurred speech) and weakness. Negative for light-headedness.   Hematological:  Negative for adenopathy. Does not bruise/bleed easily.   Psychiatric/Behavioral:  Negative for agitation, behavioral problems and confusion.         All pertinent negatives and positives are as above. All other systems have been reviewed and are negative unless otherwise stated.     Objective    Temp:  [97.6 °F (36.4 °C)-99 °F (37.2 °C)] 97.6 °F (36.4 °C)  Heart Rate:  [78-97] 78  Resp:  [18-19] 18  BP: (123-169)/(68-91) 150/74    Physical Exam  Vitals and nursing note reviewed.   Constitutional:       Comments: Patient sitting up in bed.  No oxygen use.  No visitors in room.   HENT:      Head: Normocephalic and atraumatic.      Nose: No congestion.      Mouth/Throat:      Pharynx: Oropharynx is clear. No oropharyngeal exudate or posterior oropharyngeal erythema.   Eyes:      Extraocular Movements: Extraocular movements intact.      Pupils: Pupils are equal, round, and reactive to light.   Cardiovascular:      Rate and Rhythm:  Normal rate and regular rhythm.      Heart sounds: No murmur heard.     Comments: Normal sinus rhythm 73 on telemetry.  Pulmonary:      Breath sounds: No wheezing, rhonchi or rales.      Comments: No oxygen in use.  Abdominal:      Palpations: Abdomen is soft.      Tenderness: There is no abdominal tenderness.   Genitourinary:     Comments: Voiding.  Musculoskeletal:         General: Tenderness (Left knee) present.      Cervical back: Normal range of motion and neck supple.   Skin:     General: Skin is warm and dry.   Neurological:      General: No focal deficit present.      Mental Status: He is alert and oriented to person, place, and time.      Comments: Slurred speech, mild right facial drooping.  Follows commands.  Moves extremities.   Psychiatric:         Mood and Affect: Mood normal.         Behavior: Behavior normal.           Results Review:  I have reviewed the labs, radiology results, and diagnostic studies.    Laboratory Data:      Results from last 7 days   Lab Units 07/11/24  0539 07/10/24  1657   WBC 10*3/mm3 13.25* 12.89*   HEMOGLOBIN g/dL 14.0 17.3   HEMATOCRIT % 40.7 47.9   PLATELETS 10*3/mm3 218 224        Results from last 7 days   Lab Units 07/11/24  0450 07/10/24  1657   SODIUM mmol/L 135* 133*   POTASSIUM mmol/L 3.5 4.5   CHLORIDE mmol/L 102 96*   CO2 mmol/L 22.0 23.0   BUN mg/dL 10 15   CREATININE mg/dL 0.42* 0.67*   GLUCOSE mg/dL 233* 466*   CALCIUM mg/dL 8.5* 9.1   ALT (SGPT) U/L  --  5         Culture Data:      Microbiology Results (last 10 days)       ** No results found for the last 240 hours. **              Radiology Data:   Imaging Results (Last 72 Hours)       Procedure Component Value Units Date/Time    CT Angiogram Neck [[id number]] Collected: 07/11/24 1243     Updated: 07/11/24 1303    Narrative:      EXAMINATION: CT ANGIOGRAM NECK-      7/11/2024 10:42 AM     HISTORY: Stroke, follow up     In order to have a CT radiation dose as low as reasonably achievable  Automated Exposure  Control was utilized for adjustment of the mA and/or  KV according to patient size.     Total DLP = 877.44 mGy.cm     The CT angiography of the neck is performed after intravenous contrast  enhancement.     Images are acquired in axial plane and subsequent 2D reconstruction  coronal and sagittal planes and 3D maximum intensity projection  reconstruction.     There is no previous similar study for comparison. The correlation made  with MR imaging of the brain and CT angiography of the head performed  today.     Atheromatous changes of the limited visualized aortic arch is noted. No  aneurysmal dilatation or dissection.     Atheromatous plaques are seen at the origin of the brachiocephalic, left  common carotid and left subclavian arteries. No significant stenosis.     The brachiocephalic trunk divides into normal appearing right subclavian  and right common carotid arteries.     Both common carotid arteries have a normal course and caliber throughout  the neck. No areas of focal stenosis or aneurysmal dilatation.     There is noncalcific plaque in the distal 3 cm length of the right  common carotid artery extending into the bifurcation. There is 30%  stenosis of the long segment of the distal right common carotid artery.  The plaque extends into the bifurcation and along the posterior aspect  of the proximal carotid bulb and approximately 70% stenosis of the bulb.  The remaining left carotid bulb and left internal carotid artery is  normal. The right external carotid artery is normal. Normal branches.     There is a noncalcific plaque in the distal left common carotid artery  extending no significant stenosis of the common carotid artery or the  carotid bulb. There is a small rounded hyperdensity along the anterior  aspect of the base of the carotid bulb which probably represent a small  calcification in the plaque. I do not believe this represent an  ulcerated plaque. The remaining left internal carotid artery is  normal  in course and caliber. There is 50% stenosis of the origin of the left  external carotid artery. Subsequent normal branches.     There is normal origin of both vertebral arteries. There is an area of  60% stenosis of the distal V1 segment of the left vertebral artery  before it enters the C6 foramen transversarium. There is approximately  50% focal stenosis of the right vertebral artery in the foramen  transversarium of C6. Remaining vertebral artery bilaterally are normal  in course and caliber. Normal size, right dominant, vertebral artery  enter the foramen magnum and subsequent to join to make a normal size  basilar artery.     The limited visualized soft tissues of the neck are unremarkable.  Heterogeneous thyroid gland is seen with probable nodules on both sides,  right more than the left.     Limited visualized lungs show a small spiculated nodule in the right  upper lobe, image #108 in series 2, measuring 8 mm in diameter. This  need to be further evaluated with CT scan of the chest.       Impression:      1. Atheromatous changes of the carotid and vertebral arteries. 70% focal  stenosis of the right carotid bulb 60% focal stenosis of the distal V1  segment of the left vertebral artery and 50% stenosis of the distal V1  segment of the right vertebral artery before entering the foramen  transversarium of C6. The details are given above.  2. NASCET criteria were utilized for estimation of carotid arterial  stenosis.           This report was signed and finalized on 7/11/2024 1:00 PM by Dr. Patrick Whitley MD.       CT Angiogram Head w AI Analysis of LVO [[id number]] Collected: 07/11/24 1232     Updated: 07/11/24 1246    Narrative:      EXAMINATION: CT ANGIOGRAM HEAD W AI ANALYSIS OF LVO-     7/11/2024 10:42 AM     HISTORY: Stroke, follow up     CT angiography of the head is performed before and after intravenous  contrast enhancement.     Images are acquired in axial plane and subsequent 2D  reconstruction in  coronal and sagittal planes and 3D maximum intensity projection  reconstruction.     There is no previous similar study for comparison. The correlation made  with MR imaging of the brain performed earlier today.     Unenhanced images of the brain show moderate diffuse chronic ischemic  and atrophic changes. No acute intracranial abnormality. No acute bony  abnormality.     The post enhancement images show normal size internal carotid arteries  at the skull base and in the carotid canal bilaterally.     Atheromatous changes are seen in the internal carotid arteries in the  cavernous sinus bilaterally. No areas of flow-limiting stenosis or  aneurysmal dilatation.     Normal size suprasellar internal carotid artery bilaterally is seen  dividing into anterior and middle cerebral arteries bilaterally. There  is moderate irregularity and mild diffuse narrowing of the proximal M1  segment of the left middle cerebral artery. No flow-limiting stenosis.  There is also an area of focal stenosis in the distal M1 segment of the  right middle cerebral artery. However I believe this is due to the  tortuosity of the vessel and not a true stenosis. The A1 segment of the  right LEATHA is relatively smaller than the left. However A2 and A3  segments bilaterally are symmetrical. There is subsequent normal  insular, opercular and cortical branches of the middle cerebral arteries  bilaterally. No areas of flow-limiting stenosis or aneurysmal  dilatation.     Normal size vertebral arteries enter the foramen magnum and join to make  a normal size basilar artery which subsequently divides into normal.  Posterior cerebral arteries bilaterally. No areas of focal stenosis or  aneurysmal dilatation.       Impression:      1. Moderate irregularity and diffuse narrowing of the M1 segment of the  left middle cerebral artery with less than 50% stenosis. The remaining  intracranial circulation is unremarkable as detailed above. No  foci of  flow-limiting stenosis or aneurysmal dilatation.  2. The NASCET criteria were utilized for estimation of carotid arterial  stenosis.                    This report was signed and finalized on 7/11/2024 12:43 PM by Dr. Patrick Whitley MD.       MRI Brain Without Contrast [[id number]] Collected: 07/11/24 1222     Updated: 07/11/24 1229    Narrative:      MRI BRAIN WO CONTRAST- 7/11/2024 10:20 AM     HISTORY: stroke like symptoms, dysarthria. tongue deviation;  R47.81-Slurred speech; E11.65-Type 2 diabetes mellitus with  hyperglycemia; R13.10-Dysphagia, unspecified     TECHNIQUE: Multisequence, multiplanar MRI of the brain without contrast     COMPARISON: CT scans dated 7/11/2024     FINDINGS:     There are 2 small foci of diffusion restriction. This includes a  posterior limb left internal capsule lacunar infarct (series 203-image  19) as well what appears to be a tiny cortical infarct in the right  postcentral gyrus (series 203-image 25, series 302-image 94). Moderate  chronic small vessel ischemic changes. No intra-axial or extra-axial  hemorrhage. No intracranial mass lesion or mass effect. The ventricles,  cortical sulci and basal cisterns are symmetric and age appropriate.  Posterior fossa structures are unremarkable. Pituitary gland and sella  are unremarkable. The major intracranial flow-voids are preserved.  Orbital contents are unremarkable. The paranasal sinuses are clear.  Mastoid air cells are clear. Incidentally noted scalp trichilemmal  cysts.       Impression:         1.  There are 2 small foci of acute ischemia, a posterior limb left  internal capsule lacunar infarct and a second tiny cortical infarct in  the right postcentral gyrus at the vertex.  2.  Chronic small vessel ischemic changes.     This report was signed and finalized on 7/11/2024 12:26 PM by Dr Mekhi Malone.       XR Knee 3 View Left [[id number]] Collected: 07/10/24 1751     Updated: 07/10/24 1755    Narrative:      XR KNEE 3  VW LEFT-     HISTORY: Knee pain/fall and injury     COMPARISON: 9/13/2022     FINDINGS: 3 views of the left knee are obtained.     There is moderate tricompartmental osteoarthritis with bony spurring  greatest of the lateral femoral condyle. Osteopenia is suspected. Small  knee joint effusion. No acute fracture or dislocation. Mild vascular  calcification.       Impression:      1. Moderate tricompartmental osteoarthritis with small knee joint  effusion. No acute osseous injury.        This report was signed and finalized on 7/10/2024 5:52 PM by Dr. Ragini Soto MD.               Intake/Output    Intake/Output Summary (Last 24 hours) at 7/11/2024 1427  Last data filed at 7/11/2024 0900  Gross per 24 hour   Intake 240 ml   Output 525 ml   Net -285 ml       Scheduled Meds  aspirin, 81 mg, Oral, Daily   Or  aspirin, 300 mg, Rectal, Daily  atorvastatin, 80 mg, Oral, Nightly  enoxaparin, 40 mg, Subcutaneous, Daily  insulin glargine, 10 Units, Subcutaneous, Nightly  insulin lispro, 2-7 Units, Subcutaneous, 4x Daily AC & at Bedtime  sodium chloride, 10 mL, Intravenous, Q12H        I have reviewed the patient current medications.     Assessment/Plan     Active Hospital Problems    Diagnosis     **Acute ischemic stroke left  internal capsule lacunar and  right postcentral gyrus at the vertex.     Acute focal neurological deficit     UTI (urinary tract infection), bacterial     Uncontrolled type 2 diabetes mellitus with hyperglycemia     PAD (peripheral artery disease)     Coronary artery disease involving native coronary artery of native heart without angina pectoris     Essential hypertension        Treatment Plan:    1.  Acute ischemic stroke.  Presented with 3-day history of slurred speech.  CT head/9 mild cerebral atrophy and white matter gliosis.  No acute cerebral pathology.  Aspirin, statin ordered on admission.  SCDs for deep vein thrombosis prophylaxis.    MRI of the brain 7/11 2 small foci of acute ischemia,  posterior limb left internal capsule or lacunar infarct and second tiny cortical infarct right postcenteral gyrus at the vertex chronic small vessel ischemic changes.  CTA head moderate irregularity and diffuse narrowing of M1 segment of left middle cerebral artery with less than 50% stenosis.  No foci of flow-limiting stenosis or aneurysmal dilatation.  CTA neck atheromatous changes of carotid and vertebral arteries.  70% focal stenosis right carotid bulb, 60% focal stenosis distal D1 segment left vertebral artery and 50% stenosis distal V1 segment right vertebral artery before entering the foramen transversarium C6.  ECHO pendig.    Physical therapy, Occupational Therapy, speech therapy consulted.  Regular diet with thin liquids and medications whole.  OT notes maximal assist for toileting tasks.  Some right upper extremity weakness.  Will OT intervention to address fix mobility, activity tolerance, balance, coordination.    TSH 0.888, free T41.29.  Lipid panel LDL 38 at goal less than 70.  Triglycerides 175, cholesterol 104.    2.  Abnormal urinalysis.  Too numerous to count WBC, trace bacteria, moderate yeast.  Await culture.  Give Diflucan.    3.  Diabetes mellitus type 2, poorly followed with hyperglycemia.  Hemoglobin A1c 13.2.  Patient has not taken insulin recently due to cost.  Accu Checks with sliding scale insulin coverage.  Glucoses 318, 251, 275.  Lantus 10 units nightly.    4.  Primary hypertension blood pressure 150/74.  No home blood pressure medications listed.  Consider losartan 25 and lisinopril 10.    5.  Peripheral artery disease.  Follows with Dr. Poole with plans for right lower extremity angiogram 7/22/2024.    6.  History of coronary artery disease.  Continue aspirin and statin.    7.  Hypokalemia.  Potassium 3.5.  Replace.  Repeat BMP in AM.    8.  Lovenox and SCDs for deep vein thrombosis prophylaxis.    Medical Decision Making  Number and Complexity of problems: 7  Acute ischemic  stroke: Acute, high complexity poses threat to life and bodily function  Abnormal urinalysis: Acute, moderate complexity  Diabetes mellitus type 2 with hyperglycemia: Chronic, high complexity, not at baseline  Primary hypertension: Chronic, high complexity, not at baseline  Peripheral artery disease: Chronic, high complexity needs intervention  History of coronary artery disease: Chronic, moderate complexity, stable  For kalemia: Acute, moderate complexity, not at baseline    Differential Diagnosis: None    Conditions and Status        Condition is unchanged.     [facility] Data  External documents reviewed: Care everywhere.  ER visit [facility] 7/9/2024.  [facility] reviewed vascular surgery office visits.  Cardiac tracing (EKG, telemetry) interpretation: Normal sinus rhythm 73 on telemetry  Radiology interpretation: Reviewed radiology interpretation MRI of the brain, CTA head neck.  X-ray left knee  Labs reviewed:   BMP 7/11/2024.  Repeat BMP in AM.  CBC  7/11/24.  Repeat CBC in AM.  Hemoglobin A1c, lipid panel, TSH    Any tests that were considered but not ordered: None     Decision rules/scores evaluated (example [id number], [place], etc): None     Discussed with: Dr. Paz and patient.     Care Planning  Shared decision making: Dr. Paz patient.  Patient agrees to neurology consult, MRI, echocardiogram, aspirin, statin, insulin for glucose control, follow-up with PCP for improved glycemic control  Code status and discussions: Full code  Patient surrogate decision maker is his wife, Kavya    Disposition  Social Determinants of Health that impact treatment or disposition: None  I expect the patient to be discharged to home with home health versus rehab in 1-2 days.     Electronically signed by CLAUDIO Guzmán, 07/11/24, 14:27 CDT.    The above documentation resulted from a face-to-face encounter by stu SNYDER, [facility].        Electronically signed by Patrice aPz MD at 07/11/24  1856       Consult Notes (last 24 hours)  Notes from 07/11/24 1228 through 07/12/24 1228   No notes of this type exist for this encounter.          Physical Therapy Notes (last 24 hours)        Elvia Oakes, PT Student at 07/11/24 1537  Version 1 of 1      Attestation signed by Puneet Brand PT DPT at 07/11/24 1538    I reviewed the documentation and agree.                   Goal Outcome Evaluation:  Plan of Care Reviewed With: patient, spouse        Progress: no change  Outcome Evaluation: PT eval complete. Pt alert and oriented x4. Pt found in fowlers position with wife at bedside and agreeable to therapy. Pt reports being ind with ADLs and gait prior to this and able to ambulate short and long distances. Speech also remains slurred at this time. Pt mod A sup to sit for trunk and LLE assist. Pt MMT found to be LLE 3+/5 grossly secondary to pain in L knee and 4/5 RLE grossly. Pt CGA for dynamic sitting balance secondary to posterior LOB a few times. Pt mod A to stand with use of FWW with heavy posterior lean needing cueing to stand upright. Pt ambulated to BR with CGA needing mod A-max A for toileting and doffing dirty brief and donning clean brief. Pt with one LOB in BR needing mod A to correct. Pt CGA back to bed and min A for LLE back into bed. Pt needing multiple v/c throughout session for safety with ambulation and management of FWW with min A to steer FWW as needed. Pt able to following commands 75% of the time and needing v/c to stay on task and cue into safety with OOB activities. Pt demonstrates decreased functional strength and endurance from reported baseline. Pt left in room in fowlers position with all needs met and nsg in room. Pt would benefit from PT to address these strength and endurance deficits for a safe d/c. Anticipate d/c to SNF.      Anticipated Discharge Disposition (PT): skilled nursing facility                          Electronically signed by Puneet Brand, PT DPT at  24 1538       Elvia Oakes, PT Student at 24 1538  Version 1 of 1      Attestation signed by Puneet Brand, PT DPT at 24 1540    I reviewed the documentation and agree.                   Patient Name: Anival Pennington  : 1955    MRN: [id number]                              Today's Date: 2024       Admit Date: 7/10/2024    Visit Dx:     ICD-10-CM ICD-9-CM   1. Slurred speech  R47.81 784.59   2. Hyperglycemia due to diabetes mellitus  E11.65 250.02   3. Dysphagia, unspecified type  R13.10 787.20   4. Impaired mobility [Z74.09]  Z74.09 799.89     Patient Active Problem List   Diagnosis    ST elevation myocardial infarction (STEMI)    Tobacco abuse    Type 2 diabetes mellitus with circulatory disorder, without long-term current use of insulin    Essential hypertension    Coronary artery disease involving native coronary artery of native heart without angina pectoris    Mixed hyperlipidemia    Class 2 severe obesity due to excess calories with serious comorbidity and body mass index (BMI) of 36.0 to 36.9 in adult    S/P coronary artery stent placement    History of MI (myocardial infarction)    Diabetes mellitus due to underlying condition with circulatory complication    Dizziness    Heavy smoker (more than 20 cigarettes per day)    Cellulitis of face    Obesity (BMI 30-39.9)    PAD (peripheral artery disease)    Preop testing    Acute focal neurological deficit    UTI (urinary tract infection), bacterial    Uncontrolled type 2 diabetes mellitus with hyperglycemia    Acute ischemic stroke left  internal capsule lacunar and  right postcentral gyrus at the vertex.    Acute stroke due to ischemia     Past Medical History:   Diagnosis Date    Acute ischemic left MCA stroke 2024    Cancer     leukemia    Coronary artery disease     diabetes with circulatory complications     Hyperlipidemia     Hypertension     Obesity (BMI 30-39.9)     Tobacco abuse      Past Surgical History:    Procedure Laterality Date    CARDIAC CATHETERIZATION N/A 3/28/2019    Procedure: Left Heart Cath;  Surgeon: Vincent Pan MD;  Location:  [facility];  Service: Cardiovascular    CARDIAC CATHETERIZATION Bilateral 3/28/2019    Procedure: Stent JOSSIE coronary;  Surgeon: Vincent Pan MD;  Location:  [facility];  Service: Cardiovascular    CARDIAC CATHETERIZATION N/A 3/28/2019    Procedure: Left ventriculography;  Surgeon: Vincent Pan MD;  Location:  [facility];  Service: Cardiovascular    CORONARY STENT PLACEMENT      TONSILLECTOMY        General Information       Row Name 07/11/24 1412 07/11/24 1048       Physical Therapy Time and Intention    Document Type evaluation  CC: slurred speech; Dx: acute focal neurological deficit  -TOD (r) CN (t) TOD (c) evaluation  CC: slurred speech; Dx: acute focal neurological deficit  -TOD (r) CN (t) TOD (c)    Mode of Treatment physical therapy  -TOD (r) CN (t) TOD (c) physical therapy  -TOD (r) CN (t) TOD (c)      Row Name 07/11/24 1412 07/11/24 1048       General Information    Patient Profile Reviewed yes  -TOD (r) CN (t) TOD (c) yes  -TOD (r) CN (t) TOD (c)    Prior Level of Function independent:;feeding;grooming;dressing;bathing;all household mobility;community mobility;ADL's  -TOD (r) CN (t) TOD (c) --    Existing Precautions/Restrictions fall  -TOD (r) CN (t) TOD (c) fall  -TOD (r) CN (t) TOD (c)    Barriers to Rehab medically complex  -TOD (r) CN (t) TOD (c) --      Row Name 07/11/24 1412 07/11/24 1048       Living Environment    People in Home spouse;child(jeffrey), adult  SON AND GF  -TOD (r) CN (t) TOD (c) spouse  -TOD (r) CN (t) TOD (c)      Row Name 07/11/24 1412 07/11/24 1048       Home Main Entrance    Number of Stairs, Main Entrance three  -TOD (r) CN (t) TOD (c) two  -TOD (r) CN (t) TOD (c)    Stair Railings, Main Entrance none  -TOD (r) CN (t) TOD (c) none  -TOD (r) NANCY (t) TOD (c)      Row Name 07/11/24 1412 07/11/24 1048       Stairs Within  Home, Primary    Number of Stairs, Within Home, Primary none  -TOD (r) CN (t) TOD (c) none  -TOD (r) CN (t) TOD (c)      Row Name 07/11/24 1412          Cognition    Orientation Status (Cognition) oriented x 4  -TOD (r) CN (t) TOD (c)       Row Name 07/11/24 1412          Safety Issues, Functional Mobility    Safety Issues Affecting Function (Mobility) ability to follow commands;at risk behavior observed;awareness of need for assistance;impulsivity;insight into deficits/self-awareness;judgment;positioning of assistive device;problem-solving;safety precaution awareness;safety precautions follow-through/compliance;sequencing abilities  -TOD (r) CN (t) TOD (c)     Impairments Affecting Function (Mobility) balance;cognition;coordination;endurance/activity tolerance;pain;postural/trunk control;range of motion (ROM);strength  -TOD (r) CN (t) TOD (c)     Cognitive Impairments, Mobility Safety/Performance awareness, need for assistance;attention;impulsivity;insight into deficits/self-awareness;judgment;problem-solving/reasoning;safety precaution awareness;safety precaution follow-through;sequencing abilities  -TOD (r) CN (t) TOD (c)               User Key  (r) = Recorded By, (t) = Taken By, (c) = Cosigned By      Initials Name Provider Type    Puneet Francois, PT DPT Physical Therapist    Elvia Salinas, PT Student PT Student                   Mobility       Row Name 07/11/24 1412          Bed Mobility    Bed Mobility scooting/bridging;supine-sit;sit-supine  -TOD (r) CN (t) TOD (c)     Scooting/Bridging [place] (Bed Mobility) moderate assist (50% patient effort);maximum assist (25% patient effort)  -TOD (r) CN (t) TOD (c)     Supine-Sit [place] (Bed Mobility) minimum assist (75% patient effort);moderate assist (50% patient effort)  -TOD (r) CN (t) TOD (c)     Sit-Supine [place] (Bed Mobility) minimum assist (75% patient effort);contact guard  -TOD (r) CN (t) TOD (c)     Assistive Device (Bed Mobility) bed rails;draw  sheet;head of bed elevated  -TOD (r) CN (t) TOD (c)       [facility] Name 07/11/24 1412          Sit-Stand Transfer    Sit-Stand [place] (Transfers) moderate assist (50% patient effort)  -TOD (r) CN (t) TOD (c)     Assistive Device (Sit-Stand Transfers) walker, front-wheeled  -TOD (r) CN (t) TOD (c)       [facility] Name 07/11/24 1412          Gait/Stairs (Locomotion)    [place] Level (Gait) contact guard;minimum assist (75% patient effort)  -TOD (r) CN (t) TOD (c)     Assistive Device (Gait) walker, front-wheeled  -TOD (r) CN (t) TOD (c)     Distance in Feet (Gait) 20  -TOD (r) CN (t) TOD (c)     Deviations/Abnormal Patterns (Gait) base of support, wide;gait speed decreased;stride length decreased  -TOD (r) CN (t) TOD (c)               User Key  (r) = Recorded By, (t) = Taken By, (c) = Cosigned By      Initials Name Provider Type    Puneet Francois, PT DPT Physical Therapist    Elvia Salinas, PT Student PT Student                   Obj/Interventions       Row Name 07/11/24 1412          Range of Motion Comprehensive    Comment, General Range of Motion LLE ROM limited 20% by pain and swelling. RLE ROM limited 10%  -TOD (r) CN (t) TOD (c)       Row Name 07/11/24 1412          Strength Comprehensive (MMT)    Comment, General Manual Muscle Testing (MMT) Assessment LLE 3+/5 grossly secondary to pain in L knee and 4/5 RLE grossly.  -TOD (r) CN (t) TOD (c)       Row Name 07/11/24 1412          Balance    Balance Assessment sitting static balance;sitting dynamic balance;standing static balance;standing dynamic balance  -TOD (r) CN (t) TOD (c)     Static Sitting Balance supervision;contact guard  -TOD (r) CN (t) TOD (c)     Dynamic Sitting Balance supervision;contact guard  -TOD (r) CN (t) TOD (c)     Position, Sitting Balance unsupported;sitting edge of bed  -TOD (r) CN (t) TOD (c)     Static Standing Balance contact guard;minimal assist  -TOD (r) CN (t) TOD (c)     Dynamic Standing Balance minimal assist  -TOD (r) CN (t) TOD (c)      Position/Device Used, Standing Balance walker, front-wheeled  -TOD (r) CN (t) TOD (c)       Row Name 07/11/24 1412          Sensory Assessment (Somatosensory)    Sensory Assessment (Somatosensory) LE sensation intact  -TOD (r) CN (t) TOD (c)               User Key  (r) = Recorded By, (t) = Taken By, (c) = Cosigned By      Initials Name Provider Type    Puneet Francois, PT DPT Physical Therapist    Elvia Salinas, PT Student PT Student                   Goals/Plan       Row Name 07/11/24 1412          Bed Mobility Goal 1 (PT)    Activity/Assistive Device (Bed Mobility Goal 1, PT) sit to supine/supine to sit  -TOD (r) CN (t) TOD (c)     [place] Level/Cues Needed (Bed Mobility Goal 1, PT) standby assist  -TOD (r) CN (t) TOD (c)     Time Frame (Bed Mobility Goal 1, PT) long term goal (LTG);10 days  -TOD (r) CN (t) TOD (c)     Progress/Outcomes (Bed Mobility Goal 1, PT) new goal  -TOD (r) CN (t) TOD (c)       Row Name 07/11/24 1412          Transfer Goal 1 (PT)    Activity/Assistive Device (Transfer Goal 1, PT) sit-to-stand/stand-to-sit;bed-to-chair/chair-to-bed  -TOD     [place] Level/Cues Needed (Transfer Goal 1, PT) standby assist  -TOD (r) CN (t) TOD (c)     Time Frame (Transfer Goal 1, PT) long term goal (LTG);10 days  -TOD (r) CN (t) TOD (c)       Row Name 07/11/24 1412          Gait Training Goal 1 (PT)    Activity/Assistive Device (Gait Training Goal 1, PT) gait (walking locomotion);assistive device use;decrease fall risk;improve balance and speed;increase endurance/gait distance;walker, rolling  -TDO (r) CN (t) TOD (c)     [place] Level (Gait Training Goal 1, PT) standby assist  -TOD (r) CN (t) TOD (c)     Distance (Gait Training Goal 1, PT) 50  -TOD (r) CN (t) TOD (c)     Time Frame (Gait Training Goal 1, PT) long term goal (LTG);10 days  -TOD (hipolito) NANCY (christiana) TOD (roel)     Progress/Outcome (Gait Training Goal 1, PT) new goal  -TOD (hipolito) NANCY (christiana) TOD (roel)       Row Name 07/11/24 8058          Therapy Assessment/Plan (PT)     Planned Therapy Interventions (PT) balance training;bed mobility training;gait training;home exercise program;strengthening;ROM (range of motion);postural re-education;patient/family education;neuromuscular re-education;transfer training;motor coordination training  -TOD               User Key  (r) = Recorded By, (t) = Taken By, (c) = Cosigned By      Initials Name Provider Type    Puneet Francois, PT DPT Physical Therapist    Elvia Salinas, PT Student PT Student                   Clinical Impression       Row Name 07/11/24 1412          Pain    Pretreatment Pain Rating 0/10 - no pain  -TOD (r) CN (t) TOD (c)       Row Name 07/11/24 1412          Plan of Care Review    Plan of Care Reviewed With patient;spouse  -TOD (r) CN (t) TOD (c)     Progress no change  -TOD (r) CN (t) TOD (c)     Outcome Evaluation PT eval complete. Pt alert and oriented x4. Pt found in fowlers position with wife at bedside and agreeable to therapy. Pt reports being ind with ADLs and gait prior to this and able to ambulate short and long distances. Speech also remains slurred at this time. Pt mod A sup to sit for trunk and LLE assist. Pt MMT found to be LLE 3+/5 grossly secondary to pain in L knee and 4/5 RLE grossly. Pt CGA for dynamic sitting balance secondary to posterior LOB a few times. Pt mod A to stand with use of FWW with heavy posterior lean needing cueing to stand upright. Pt ambulated to BR with CGA needing mod A-max A for toileting and doffing dirty brief and donning clean brief. Pt with one LOB in BR needing mod A to correct. Pt CGA back to bed and min A for LLE back into bed. Pt needing multiple v/c throughout session for safety with ambulation and management of FWW with min A to steer FWW as needed. Pt able to following commands 75% of the time and needing v/c to stay on task and cue into safety with OOB activities. Pt demonstrates decreased functional strength and endurance from reported baseline. Pt left in room in  fowlers position with all needs met and nsg in room. Pt would benefit from PT to address these strength and endurance deficits for a safe d/c. Anticipate d/c to SNF.  -TOD (r) NANCY (t) TOD (c)       Row Name 07/11/24 1412          Therapy Assessment/Plan (PT)    Patient/Family Therapy Goals Statement (PT) not stated  -TOD (r) NANCY (t) TOD (c)     Rehab Potential (PT) good, to achieve stated therapy goals  -TOD (r) NANCY (t) TOD (c)     Criteria for Skilled Interventions Met (PT) yes;skilled treatment is necessary  -TOD (r) NANCY (t) TOD (c)     Therapy Frequency (PT) 2 times/day  -TOD (r) CN (t) TOD (c)     Predicted Duration of Therapy Intervention (PT) until d/c  -TOD (r) NANCY (t) TOD (c)       Row Name 07/11/24 1412          Positioning and Restraints    Pre-Treatment Position in bed  -TOD (r) NANCY (t) TOD (c)     Post Treatment Position bed  -TOD (r) NANCY (t) TOD (c)     In Bed notified nsg;fowlers;call light within reach;side rails up x2;encouraged to call for assist;with other staff;with nsg;with family/caregiver  -TOD (r) CN (t) TOD (c)               User Key  (r) = Recorded By, (t) = Taken By, (c) = Cosigned By      Initials Name Provider Type    Puneet Francois, PT DPT Physical Therapist    Elvia Salinas, PT Student PT Student                   Outcome Measures       Row Name 07/11/24 1412 07/11/24 0800       How much help from another person do you currently need...    Turning from your back to your side while in flat bed without using bedrails? 3  -TOD (r) CN (t) TOD (c) 3  -JM    Moving from lying on back to sitting on the side of a flat bed without bedrails? 3  -TOD (r) CN (t) TOD (c) 3  -JM    Moving to and from a bed to a chair (including a wheelchair)? 3  -TOD (r) CN (t) TOD (c) 3  -JM    Standing up from a chair using your arms (e.g., wheelchair, bedside chair)? 3  -TOD (r) NANCY (t) TOD (c) 3  -JM    Climbing 3-5 steps with a railing? 2  -TOD (r) NANCY (t) TOD (c) 2  -JM    To walk in hospital room? 3  -TOD (r) NANCY (t) TOD (c) 3  -JM     AM-PAC 6 Clicks Score (PT) 17  -TOD (r) CN (t) 17  -JM    Highest Level of Mobility Goal 5 --> Static standing  -TOD (r) CN (t) 5 --> Static standing  -JM      Row Name 07/11/24 1412 07/11/24 0754       Functional Assessment    Outcome Measure Options AM-PAC 6 Clicks Basic Mobility (PT)  -TOD (r) CN (t) TOD (c) AM-PAC 6 Clicks Daily Activity (OT)  -LS              User Key  (r) = Recorded By, (t) = Taken By, (c) = Cosigned By      Initials Name Provider Type    TOD Puneet Brand, PT DPT Physical Therapist    Shanice Almaraz, OTR/L Occupational Therapist    Fatmata Whitmore, RN Registered Nurse    Elvia Salinas, PT Student PT Student                                 Physical Therapy Education       Title: PT OT SLP Therapies (In Progress)       Topic: Physical Therapy (In Progress)       Point: Mobility training (Done)       Learning Progress Summary             Patient Acceptance, E,TB, VU,DU,NR by CN at 7/11/2024 1412    Acceptance, E,TB, VU,DU,NR by CN at 7/11/2024 1412    Comment: Educated on PT role in pt care.   Family Acceptance, E,TB, VU,DU,NR by CN at 7/11/2024 1412    Comment: Educated on PT role in pt care.                         Point: Home exercise program (Not Started)       Learner Progress:  Not documented in this visit.              Point: Body mechanics (Not Started)       Learner Progress:  Not documented in this visit.              Point: Precautions (Not Started)       Learner Progress:  Not documented in this visit.                              User Key       Initials Effective Dates Name Provider Type Discipline     06/24/24 -  Elvia Oakes, PT Student PT Student PT                  PT Recommendation and Plan     Plan of Care Reviewed With: patient, spouse  Progress: no change  Outcome Evaluation: PT eval complete. Pt alert and oriented x4. Pt found in fowlers position with wife at bedside and agreeable to therapy. Pt reports being ind with ADLs and gait prior to this and able  to ambulate short and long distances. Speech also remains slurred at this time. Pt mod A sup to sit for trunk and LLE assist. Pt MMT found to be LLE 3+/5 grossly secondary to pain in L knee and 4/5 RLE grossly. Pt CGA for dynamic sitting balance secondary to posterior LOB a few times. Pt mod A to stand with use of FWW with heavy posterior lean needing cueing to stand upright. Pt ambulated to BR with CGA needing mod A-max A for toileting and doffing dirty brief and donning clean brief. Pt with one LOB in BR needing mod A to correct. Pt CGA back to bed and min A for LLE back into bed. Pt needing multiple v/c throughout session for safety with ambulation and management of FWW with min A to steer FWW as needed. Pt able to following commands 75% of the time and needing v/c to stay on task and cue into safety with OOB activities. Pt demonstrates decreased functional strength and endurance from reported baseline. Pt left in room in fowlers position with all needs met and nsg in room. Pt would benefit from PT to address these strength and endurance deficits for a safe d/c. Anticipate d/c to SNF.     Time Calculation:         PT Charges       Row Name 07/11/24 1412             Time Calculation    Start Time 1412  -TOD (r) CN (t) TOD (c)      Stop Time 1453  -TOD (r) CN (t) TOD (c)      Time Calculation (min) 41 min  -TOD (r) CN (t)      PT Received On 07/11/24  -TOD (r) CN (t) TOD (c)      PT Goal Re-Cert Due Date 07/21/24  -TOD (r) CN (t) TOD (c)                User Key  (r) = Recorded By, (t) = Taken By, (c) = Cosigned By      Initials Name Provider Type    Puneet Francois, PT DPT Physical Therapist    Elvia Salinas, PT Student PT Student                      PT G-Codes  Outcome Measure Options: AM-PAC 6 Clicks Basic Mobility (PT)  AM-PAC 6 Clicks Score (PT): 17  AM-PAC 6 Clicks Score (OT): 15  PT Discharge Summary  Anticipated Discharge Disposition (PT): skilled nursing facility    Elvia Oakes, DEBORA  Student  2024      Electronically signed by Puneet Brand, PT DPT at 24 1540       Radha Barnett PTA at 24 1043  Version 1 of 1         Goal Outcome Evaluation:  Plan of Care Reviewed With: patient        Progress: declining  Outcome Evaluation: Bed mobility mod x1 w/ lots of cues and using draw sheet. once sitting EOB pt leaning hard to the Right and posteriorly. worked on sitting balance. once pt able to sit. Pt tolerated BLE strengthening ex's has difficulty w/ LLE due to increased knee pain. sit-stand mult attempts mod x1 before pt able to get upright. Pt amb around bed-door, followed w/ chair. very unsafe gait mod x1 help w/ AD and cues for RLE step through, pt had to sit due to weakness and safety before amb back around bed to chair. Pt will need rehab upon d/c for cont strength, mobiltiy and safety.,                                 Electronically signed by Radha Barnett PTA at 24 1043       Radha Barnett PTA at 24 1044  Version 1 of 1         Acute Care - Physical Therapy Treatment Note   [place]     Patient Name: Anival Pennington  : 1955  MRN: [id number]  Today's Date: 2024      Visit Dx:     ICD-10-CM ICD-9-CM   1. Slurred speech  R47.81 784.59   2. Hyperglycemia due to diabetes mellitus  E11.65 250.02   3. Dysphagia, unspecified type  R13.10 787.20   4. Impaired mobility [Z74.09]  Z74.09 799.89     Patient Active Problem List   Diagnosis    ST elevation myocardial infarction (STEMI)    Tobacco abuse    Type 2 diabetes mellitus with circulatory disorder, without long-term current use of insulin    Essential hypertension    Coronary artery disease involving native coronary artery of native heart without angina pectoris    Mixed hyperlipidemia    Class 2 severe obesity due to excess calories with serious comorbidity and body mass index (BMI) of 36.0 to 36.9 in adult    S/P coronary artery stent placement    History of MI (myocardial infarction)     Diabetes mellitus due to underlying condition with circulatory complication    Dizziness    Heavy smoker (more than 20 cigarettes per day)    Cellulitis of face    Obesity (BMI 30-39.9)    PAD (peripheral artery disease)    Preop testing    Acute focal neurological deficit    UTI (urinary tract infection), bacterial    Uncontrolled type 2 diabetes mellitus with hyperglycemia    Acute ischemic stroke left  internal capsule lacunar and  right postcentral gyrus at the vertex.    Acute stroke due to ischemia     Past Medical History:   Diagnosis Date    Acute ischemic left MCA stroke 7/11/2024    Cancer     leukemia    Coronary artery disease     diabetes with circulatory complications     Hyperlipidemia     Hypertension     Obesity (BMI 30-39.9)     Tobacco abuse      Past Surgical History:   Procedure Laterality Date    CARDIAC CATHETERIZATION N/A 3/28/2019    Procedure: Left Heart Cath;  Surgeon: Vincent Pan MD;  Location:  [facility];  Service: Cardiovascular    CARDIAC CATHETERIZATION Bilateral 3/28/2019    Procedure: Stent JOSSIE coronary;  Surgeon: Vincent Pan MD;  Location:  [facility];  Service: Cardiovascular    CARDIAC CATHETERIZATION N/A 3/28/2019    Procedure: Left ventriculography;  Surgeon: Vincent Pan MD;  Location:  [facility];  Service: Cardiovascular    CORONARY STENT PLACEMENT      TONSILLECTOMY       PT Assessment (Last 12 Hours)       PT Evaluation and Treatment       Row Name 07/12/24 0957          Physical Therapy Time and Intention    Subjective Information complains of;pain  -NW     Document Type therapy note (daily note)  -NW     Mode of Treatment physical therapy  -NW     Comment L knee swollen and hurting/ R side weakness  -NW       Row Name 07/12/24 0957          General Information    Existing Precautions/Restrictions fall  -NW     Limitations/Impairments safety/cognitive  -NW       Row Name 07/12/24 0957          Pain     Posttreatment Pain Rating 7/10  -NW     Pain Location - Side/Orientation Left  -NW     Pain Location - knee  -NW       Row Name 07/12/24 0957          Bed Mobility    Supine-Sit [place] (Bed Mobility) verbal cues;moderate assist (50% patient effort)  -NW     Sit-Supine [place] (Bed Mobility) --  chair  -NW     Assistive Device (Bed Mobility) bed rails;draw sheet  -NW     Comment, (Bed Mobility) lots of extra time and cues  pt w/ R lean and posterior  -NW       Row Name 07/12/24 0957          Sit-Stand Transfer    Sit-Stand [place] (Transfers) verbal cues;moderate assist (50% patient effort)  -NW     Assistive Device (Sit-Stand Transfers) walker, front-wheeled  -NW       Row Name 07/12/24 0957          Stand-Sit Transfer    Stand-Sit [place] (Transfers) verbal cues;moderate assist (50% patient effort)  -NW       Row Name 07/12/24 0957          Gait/Stairs (Locomotion)    [place] Level (Gait) moderate assist (50% patient effort)  -NW     Assistive Device (Gait) walker, front-wheeled  -NW     Distance in Feet (Gait) 15  15  -NW     Comment, (Gait/Stairs) pt very unsafe able to amb around bed-door however had to help w/ AD and cues for step through gait, pt had to sit in chair due to weakness and safety. pt leaning hard to right  -NW       Row Name 07/12/24 0957          Safety Issues, Functional Mobility    Impairments Affecting Function (Mobility) balance;cognition;pain;strength  -NW       Row Name 07/12/24 0957          Motor Skills    Therapeutic Exercise aerobic  -NW       Row Name 07/12/24 0957          Aerobic Exercise    Time Performed (Aerobic Exercise) AROM BLEs  -NW       Row Name 07/12/24 0957          Positioning and Restraints    Pre-Treatment Position in bed  -NW     Post Treatment Position chair  -NW     In Chair reclined;call light within reach;encouraged to call for assist;with family/caregiver;exit alarm on;notified ns  -               User Key  (r) = Recorded By, (t) =  Taken By, (c) = Cosigned By      Initials Name Provider Type    NW Radha Barnett, PTA Physical Therapist Assistant                    Physical Therapy Education       Title: PT OT SLP Therapies (In Progress)       Topic: Physical Therapy (In Progress)       Point: Mobility training (In Progress)       Learning Progress Summary             Patient Acceptance, TB, NR by  at 7/11/2024 1832    Acceptance, E,TB, VU,DU,NR by NANCY at 7/11/2024 1412    Acceptance, E,TB, VU,DU,NR by CN at 7/11/2024 1412    Comment: Educated on PT role in pt care.   Family Acceptance, E,TB, VU,DU,NR by NANCY at 7/11/2024 1412    Comment: Educated on PT role in pt care.                         Point: Home exercise program (In Progress)       Learning Progress Summary             Patient Acceptance, TB, NR by  at 7/11/2024 1832                         Point: Body mechanics (In Progress)       Learning Progress Summary             Patient Acceptance, TB, NR by  at 7/11/2024 1832                         Point: Precautions (In Progress)       Learning Progress Summary             Patient Acceptance, TB, NR by  at 7/11/2024 1832                                         User Key       Initials Effective Dates Name Provider Type Discipline     04/22/24 -  Fatmata Styles, RN Registered Nurse Nurse    NANCY 06/24/24 -  Elvia Oakes PT Student PT Student PT                  PT Recommendation and Plan     Plan of Care Reviewed With: patient  Progress: declining  Outcome Evaluation: Bed mobility mod x1 w/ lots of cues and using draw sheet. once sitting EOB pt leaning hard to the Right and posteriorly. worked on sitting balance. once pt able to sit. Pt tolerated BLE strengthening ex's has difficulty w/ LLE due to increased knee pain. sit-stand mult attempts mod x1 before pt able to get upright. Pt amb around bed-door, followed w/ chair. very unsafe gait mod x1 help w/ AD and cues for RLE step through, pt had to sit due to weakness and safety  before amb back around bed to chair. Pt will need rehab upon d/c for cont strength, mobiltiy and safety.,       Time Calculation:    PT Charges       Row Name 07/12/24 1043             Time Calculation    Start Time 0957  -NW      Stop Time 1044  -NW      Time Calculation (min) 47 min  -NW      PT Received On 07/12/24  -NW      PT Goal Re-Cert Due Date 07/21/24  -NW         Time Calculation- PT    Total Timed Code Minutes- PT 47 minute(s)  -NW         Timed Charges    20855 - PT Therapeutic Exercise Minutes 15  -NW      40067 - Gait Training Minutes  17  -NW      55261 - PT Therapeutic Activity Minutes 15  -NW         Total Minutes    Timed Charges Total Minutes 47  -NW       Total Minutes 47  -NW                User Key  (r) = Recorded By, (t) = Taken By, (c) = Cosigned By      Initials Name Provider Type    Radha Blanco PTA Physical Therapist Assistant                  Therapy Charges for Today       Code Description Service Date Service Provider Modifiers Qty    72525471539 HC PT THER PROC EA 15 MIN 7/12/2024 Radha Barnett PTA GP 1    27493368195 HC GAIT TRAINING EA 15 MIN 7/12/2024 Radha Barnett, PTA GP 1    03994086334 HC PT THERAPEUTIC ACT EA 15 MIN 7/12/2024 Radha Barnett, STORM GP 1            PT G-Codes  Outcome Measure Options: AM-PAC 6 Clicks Basic Mobility (PT)  AM-PAC 6 Clicks Score (PT): 14  AM-PAC 6 Clicks Score (OT): 15    Radha Barnett PTA  7/12/2024      Electronically signed by Radha Barnett PTA at 07/12/24 1045       Occupational Therapy Notes (last 24 hours)  Notes from 07/11/24 1228 through 07/12/24 1228   No notes exist for this encounter.       Speech Language Pathology Notes (last 24 hours)  Notes from 07/11/24 1228 through 07/12/24 1228   No notes exist for this encounter.       Respiratory Therapy Notes (last 24 hours)  Notes from 07/11/24 1228 through 07/12/24 1228   No notes exist for this encounter.

## 2024-07-12 NOTE — CASE MANAGEMENT/SOCIAL WORK
Continued Stay Note   [facility]     Patient Name: Anival Pennington  MRN: [id number]  Today's Date: 7/12/2024    Admit Date: 7/10/2024        Discharge Plan       Row Name 07/12/24 1658       Plan    Plan Comments [facility] pt and precert has been started with [employer]. Await insurance decision.    Final Discharge Disposition Code 03 - skilled nursing facility (SNF)                   Discharge Codes    No documentation.                       RAMBO Hickman

## 2024-07-12 NOTE — PLAN OF CARE
Goal Outcome Evaluation:  Plan of Care Reviewed With: patient, spouse        Progress: (S) declining  Outcome Evaluation: OT tx completed. Pt in fowlers upon therapist arrival; A&Ox3; Flat affect; c/o 6/10 R knee pain; Pt's spouse also present. Pt demo'd significant decline in fxl status this date as compared to initial eval yesterday. Pt required Max A for sit>stand with constant verbal/visual/tactile cues for sequencing and body mechanics. Pt demo'd significant R posterolateral lean in standing and was unable to correct with cueing or physical assistance therefore ambulating to BR was not attempted. Since Pt unable to ambulate this date, Pt performed oral hygiene while seated at bedside table with SBA after set-up requiring consant verbal/visual cues to redirect attention to task and for sequencing. Pt continues to require skilled OT intervention in order to address remaining deficits in fxl mobility, fxl activity tolerance, balance, coordination, and use of adaptive techniques/equipment during performance of BADLs. Recommend SNF at discharge.      Anticipated Discharge Disposition (OT): skilled nursing facility

## 2024-07-13 LAB
ANION GAP SERPL CALCULATED.3IONS-SCNC: 8 MMOL/L (ref 5–15)
BACTERIA SPEC AEROBE CULT: ABNORMAL
BUN SERPL-MCNC: 11 MG/DL (ref 8–23)
BUN/CREAT SERPL: 17.7 (ref 7–25)
CALCIUM SPEC-SCNC: 8.7 MG/DL (ref 8.6–10.5)
CHLORIDE SERPL-SCNC: 104 MMOL/L (ref 98–107)
CO2 SERPL-SCNC: 24 MMOL/L (ref 22–29)
CREAT SERPL-MCNC: 0.62 MG/DL (ref 0.76–1.27)
DEPRECATED RDW RBC AUTO: 41.1 FL (ref 37–54)
EGFRCR SERPLBLD CKD-EPI 2021: 104.8 ML/MIN/1.73
ERYTHROCYTE [DISTWIDTH] IN BLOOD BY AUTOMATED COUNT: 12.2 % (ref 12.3–15.4)
GLUCOSE BLDC GLUCOMTR-MCNC: 184 MG/DL (ref 70–130)
GLUCOSE BLDC GLUCOMTR-MCNC: 188 MG/DL (ref 70–130)
GLUCOSE BLDC GLUCOMTR-MCNC: 210 MG/DL (ref 70–130)
GLUCOSE BLDC GLUCOMTR-MCNC: 309 MG/DL (ref 70–130)
GLUCOSE SERPL-MCNC: 191 MG/DL (ref 65–99)
HCT VFR BLD AUTO: 40 % (ref 37.5–51)
HGB BLD-MCNC: 13.8 G/DL (ref 13–17.7)
MAGNESIUM SERPL-MCNC: 1.8 MG/DL (ref 1.6–2.4)
MCH RBC QN AUTO: 31.8 PG (ref 26.6–33)
MCHC RBC AUTO-ENTMCNC: 34.5 G/DL (ref 31.5–35.7)
MCV RBC AUTO: 92.2 FL (ref 79–97)
PLATELET # BLD AUTO: 202 10*3/MM3 (ref 140–450)
PMV BLD AUTO: 11.3 FL (ref 6–12)
POTASSIUM SERPL-SCNC: 3.5 MMOL/L (ref 3.5–5.2)
RBC # BLD AUTO: 4.34 10*6/MM3 (ref 4.14–5.8)
SODIUM SERPL-SCNC: 136 MMOL/L (ref 136–145)
WBC NRBC COR # BLD AUTO: 11.77 10*3/MM3 (ref 3.4–10.8)

## 2024-07-13 PROCEDURE — 25010000002 CYANOCOBALAMIN PER 1000 MCG: Performed by: CLINICAL NURSE SPECIALIST

## 2024-07-13 PROCEDURE — 83735 ASSAY OF MAGNESIUM: CPT | Performed by: NURSE PRACTITIONER

## 2024-07-13 PROCEDURE — 85027 COMPLETE CBC AUTOMATED: CPT | Performed by: FAMILY MEDICINE

## 2024-07-13 PROCEDURE — 63710000001 INSULIN LISPRO (HUMAN) PER 5 UNITS: Performed by: FAMILY MEDICINE

## 2024-07-13 PROCEDURE — 25810000003 SODIUM CHLORIDE 0.9 % SOLUTION: Performed by: FAMILY MEDICINE

## 2024-07-13 PROCEDURE — 25010000002 ENOXAPARIN PER 10 MG: Performed by: FAMILY MEDICINE

## 2024-07-13 PROCEDURE — 63710000001 INSULIN GLARGINE PER 5 UNITS: Performed by: NURSE PRACTITIONER

## 2024-07-13 PROCEDURE — 82948 REAGENT STRIP/BLOOD GLUCOSE: CPT

## 2024-07-13 PROCEDURE — 97116 GAIT TRAINING THERAPY: CPT

## 2024-07-13 PROCEDURE — 80048 BASIC METABOLIC PNL TOTAL CA: CPT | Performed by: FAMILY MEDICINE

## 2024-07-13 PROCEDURE — 25010000002 CEFTRIAXONE PER 250 MG: Performed by: NURSE PRACTITIONER

## 2024-07-13 RX ORDER — LISINOPRIL 20 MG/1
40 TABLET ORAL
Status: DISCONTINUED | OUTPATIENT
Start: 2024-07-14 | End: 2024-07-15 | Stop reason: HOSPADM

## 2024-07-13 RX ORDER — LISINOPRIL 10 MG/1
10 TABLET ORAL ONCE
Status: COMPLETED | OUTPATIENT
Start: 2024-07-13 | End: 2024-07-13

## 2024-07-13 RX ORDER — FLUCONAZOLE 150 MG/1
150 TABLET ORAL EVERY 24 HOURS
Qty: 3 TABLET | Refills: 0 | Status: COMPLETED | OUTPATIENT
Start: 2024-07-13 | End: 2024-07-15

## 2024-07-13 RX ORDER — POTASSIUM CHLORIDE 750 MG/1
40 CAPSULE, EXTENDED RELEASE ORAL ONCE
Status: COMPLETED | OUTPATIENT
Start: 2024-07-13 | End: 2024-07-13

## 2024-07-13 RX ADMIN — ENOXAPARIN SODIUM 40 MG: 100 INJECTION SUBCUTANEOUS at 08:48

## 2024-07-13 RX ADMIN — LISINOPRIL 10 MG: 10 TABLET ORAL at 14:52

## 2024-07-13 RX ADMIN — INSULIN LISPRO 5 UNITS: 100 INJECTION, SOLUTION INTRAVENOUS; SUBCUTANEOUS at 21:47

## 2024-07-13 RX ADMIN — INSULIN LISPRO 2 UNITS: 100 INJECTION, SOLUTION INTRAVENOUS; SUBCUTANEOUS at 08:48

## 2024-07-13 RX ADMIN — POTASSIUM CHLORIDE 40 MEQ: 750 CAPSULE, EXTENDED RELEASE ORAL at 14:52

## 2024-07-13 RX ADMIN — LISINOPRIL 20 MG: 20 TABLET ORAL at 08:48

## 2024-07-13 RX ADMIN — INSULIN LISPRO 2 UNITS: 100 INJECTION, SOLUTION INTRAVENOUS; SUBCUTANEOUS at 11:43

## 2024-07-13 RX ADMIN — CYANOCOBALAMIN 1000 MCG: 1000 INJECTION, SOLUTION INTRAMUSCULAR at 08:48

## 2024-07-13 RX ADMIN — SODIUM CHLORIDE 75 ML/HR: 9 INJECTION, SOLUTION INTRAVENOUS at 08:47

## 2024-07-13 RX ADMIN — INSULIN LISPRO 3 UNITS: 100 INJECTION, SOLUTION INTRAVENOUS; SUBCUTANEOUS at 17:36

## 2024-07-13 RX ADMIN — SODIUM CHLORIDE 1000 MG: 900 INJECTION INTRAVENOUS at 08:47

## 2024-07-13 RX ADMIN — Medication 10 ML: at 21:47

## 2024-07-13 RX ADMIN — Medication 10 ML: at 08:48

## 2024-07-13 RX ADMIN — INSULIN GLARGINE 25 UNITS: 100 INJECTION, SOLUTION SUBCUTANEOUS at 21:47

## 2024-07-13 RX ADMIN — ASPIRIN 81 MG CHEWABLE TABLET 81 MG: 81 TABLET CHEWABLE at 08:48

## 2024-07-13 RX ADMIN — FLUCONAZOLE 150 MG: 150 TABLET ORAL at 17:36

## 2024-07-13 RX ADMIN — ATORVASTATIN CALCIUM 80 MG: 40 TABLET ORAL at 21:47

## 2024-07-13 NOTE — THERAPY TREATMENT NOTE
Acute Care - Physical Therapy Treatment Note  [facility]     Patient Name: Anival Pennington  : 1955  MRN: [id number]  Today's Date: 2024      Visit Dx:     ICD-10-CM ICD-9-CM   1. Slurred speech  R47.81 784.59   2. Hyperglycemia due to diabetes mellitus  E11.65 250.02   3. Dysphagia, unspecified type  R13.10 787.20   4. Impaired mobility [Z74.09]  Z74.09 799.89     Patient Active Problem List   Diagnosis    ST elevation myocardial infarction (STEMI)    Tobacco abuse    Type 2 diabetes mellitus with circulatory disorder, without long-term current use of insulin    Essential hypertension    Coronary artery disease involving native coronary artery of native heart without angina pectoris    Mixed hyperlipidemia    Class 2 severe obesity due to excess calories with serious comorbidity and body mass index (BMI) of 36.0 to 36.9 in adult    S/P coronary artery stent placement    History of MI (myocardial infarction)    Diabetes mellitus due to underlying condition with circulatory complication    Dizziness    Heavy smoker (more than 20 cigarettes per day)    Cellulitis of face    Obesity (BMI 30-39.9)    PAD (peripheral artery disease)    Preop testing    Acute focal neurological deficit    UTI (urinary tract infection), bacterial    Uncontrolled type 2 diabetes mellitus with hyperglycemia    Acute ischemic stroke left  internal capsule lacunar and  right postcentral gyrus at the vertex.    Acute stroke due to ischemia    B12 deficiency    Moderate tricompartmental osteoarthritis left knee with small knee joint  effusion.     Past Medical History:   Diagnosis Date    Acute ischemic left MCA stroke 2024    Cancer     leukemia    Coronary artery disease     diabetes with circulatory complications     Hyperlipidemia     Hypertension     Obesity (BMI 30-39.9)     Tobacco abuse      Past Surgical History:   Procedure Laterality Date    CARDIAC CATHETERIZATION N/A 3/28/2019    Procedure: Left Heart Cath;  Surgeon:  Vincent Pan MD;  Location:  [facility];  Service: Cardiovascular    CARDIAC CATHETERIZATION Bilateral 3/28/2019    Procedure: Stent JOSSIE coronary;  Surgeon: Vincent Pan MD;  Location:  [facility];  Service: Cardiovascular    CARDIAC CATHETERIZATION N/A 3/28/2019    Procedure: Left ventriculography;  Surgeon: Vincent Pan MD;  Location:  [facility];  Service: Cardiovascular    CORONARY STENT PLACEMENT      TONSILLECTOMY       PT Assessment (Last 12 Hours)       PT Evaluation and Treatment       Row Name 07/13/24 0958          Physical Therapy Time and Intention    Subjective Information complains of;pain;weakness;fatigue  -     Document Type therapy note (daily note)  -     Mode of Treatment physical therapy  -AH       Row Name 07/13/24 0958          General Information    Existing Precautions/Restrictions fall  -     Limitations/Impairments safety/cognitive  -AH       Row Name 07/13/24 0958          Pain    Additional Documentation Pain Scale: Word Pre/Post-Treatment (Group)  -AH       Row Name 07/13/24 0958          Pain Scale: Word Pre/Post-Treatment    Pain: Word Scale, Pretreatment 0 - no pain  at rest  -     Posttreatment Pain Rating 4 - moderate pain  with mobility  -     Pain Location - Side/Orientation Left  -     Pain Location - knee  -AH       Row Name 07/13/24 0958          Bed Mobility    Supine-Sit [place] (Bed Mobility) verbal cues;moderate assist (50% patient effort)  -     Sit-Supine [place] (Bed Mobility) moderate assist (50% patient effort)  -     Assistive Device (Bed Mobility) bed rails;draw sheet  -AH       Row Name 07/13/24 0958          Transfers    Comment, (Transfers) stood several times  -AH       Row Name 07/13/24 0958          Sit-Stand Transfer    Sit-Stand [place] (Transfers) verbal cues;moderate assist (50% patient effort)  -     Assistive Device (Sit-Stand Transfers) walker, front-wheeled   -[facility] Name 07/13/24 0958          Stand-Sit Transfer    Stand-Sit [place] (Transfers) verbal cues;minimum assist (75% patient effort)  -[facility] Name 07/13/24 0958          Gait/Stairs (Locomotion)    [place] Level (Gait) moderate assist (50% patient effort);maximum assist (25% patient effort);verbal cues  -     Assistive Device (Gait) walker, front-wheeled  -     Distance in Feet (Gait) 15  -     Bilateral Gait Deviations knee buckling bilaterally   -[facility] Name 07/13/24 0958          Safety Issues, Functional Mobility    Impairments Affecting Function (Mobility) balance;cognition;pain;strength  -[facility] Name 07/13/24 0958          Motor Skills    Comments, Therapeutic Exercise BLE LAQ, cues to stay on task  -     Additional Documentation Comments, Therapeutic Exercise (Row)  -AH       Row Name 07/13/24 0958          Plan of Care Review    Plan of Care Reviewed With patient;spouse  -     Progress no change  -     Outcome Evaluation pt trans to EOB min assist, BLE LAQ cues to stay on task, sit-stand min-mod, pt amb 15 feet rwx min-mod-max, BLE buckled when pt was to EOB, able to get pt safely on EOB max assist, pt stood several times mod 2, took side steps to R, cues for attention to R side, pt would benefit from rehab  -[facility] 07/13/24 0958          Positioning and Restraints    Pre-Treatment Position in bed  -     Post Treatment Position bed  -     In Bed fowlers;call light within reach;encouraged to call for assist;exit alarm on;with family/caregiver  [facility]               User Key  (r) = Recorded By, (t) = Taken By, (c) = Cosigned By      Initials Name Provider Type     Elvira Emerson, PTA Physical Therapist Assistant                    Physical Therapy Education       Title: PT OT SLP Therapies (In Progress)       Topic: Physical Therapy (In Progress)       Point: Mobility training (In Progress)       Learning Progress Summary             Patient  Acceptance, TB, NR by JAIME at 7/11/2024 1832    Acceptance, E,TB, VU,DU,NR by NANCY at 7/11/2024 1412    Acceptance, E,TB, VU,DU,NR by CN at 7/11/2024 1412    Comment: Educated on PT role in pt care.   Family Acceptance, E,TB, VU,DU,NR by CN at 7/11/2024 1412    Comment: Educated on PT role in pt care.                         Point: Home exercise program (In Progress)       Learning Progress Summary             Patient Acceptance, TB, NR by JAIME at 7/11/2024 1832                         Point: Body mechanics (In Progress)       Learning Progress Summary             Patient Acceptance, TB, NR by JAIME at 7/11/2024 1832                         Point: Precautions (In Progress)       Learning Progress Summary             Patient Acceptance, TB, NR by JAIME at 7/11/2024 1832                                         User Key       Initials Effective Dates Name Provider Type Discipline    JAIME 04/22/24 -  Fatmata Styles, RN Registered Nurse Nurse    NANCY 06/24/24 -  Elvia Oakes, DEBORA Student PT Student PT                  PT Recommendation and Plan     Plan of Care Reviewed With: patient, spouse  Progress: no change  Outcome Evaluation: pt trans to EOB min assist, BLE LAQ cues to stay on task, sit-stand min-mod, pt amb 15 feet rwx min-mod-max, BLE buckled when pt was to EOB, able to get pt safely on EOB max assist, pt stood several times mod 2, took side steps to R, cues for attention to R side, pt would benefit from rehab   Outcome Measures       Row Name 07/13/24 1000             How much help from another person do you currently need...    Turning from your back to your side while in flat bed without using bedrails? 3  -AH      Moving from lying on back to sitting on the side of a flat bed without bedrails? 3  -AH      Moving to and from a bed to a chair (including a wheelchair)? 2  -AH      Standing up from a chair using your arms (e.g., wheelchair, bedside chair)? 2  -AH      Climbing 3-5 steps with a railing? 1  -AH      To walk  in hospital room? 2  -AH      AM-PAC 6 Clicks Score (PT) 13  -      Highest Level of Mobility Goal 4 --> Transfer to chair/commode  -         Functional Assessment    Outcome Measure Options AM-PAC 6 Clicks Basic Mobility (PT)  -                User Key  (r) = Recorded By, (t) = Taken By, (c) = Cosigned By      Initials Name Provider Type     Elvira Emerson PTA Physical Therapist Assistant                     Time Calculation:    PT Charges       Row Name 07/13/24 1031             Time Calculation    Start Time 0958  -      Stop Time 1023  -      Time Calculation (min) 25 min  -      PT Received On 07/13/24  -         Time Calculation- PT    Total Timed Code Minutes- PT 25 minute(s)  -         Timed Charges    95578 - Gait Training Minutes  25  -         Total Minutes    Timed Charges Total Minutes 25  -       Total Minutes 25  -                User Key  (r) = Recorded By, (t) = Taken By, (c) = Cosigned By      Initials Name Provider Type     Elvira Emerson PTA Physical Therapist Assistant                  Therapy Charges for Today       Code Description Service Date Service Provider Modifiers Qty    98841843646 HC GAIT TRAINING EA 15 MIN 7/13/2024 Elvira Emerson PTA GP 2            PT G-Codes  Outcome Measure Options: AM-PAC 6 Clicks Basic Mobility (PT)  AM-PAC 6 Clicks Score (PT): 13  AM-PAC 6 Clicks Score (OT): 12    Elvira Emerson PTA  7/13/2024

## 2024-07-13 NOTE — CONSULTS
Inpatient Orthopedic Surgery Consult  Consult performed by: Mich Max MD  Consult ordered by: Brenda Delgado APRN  Reason for consult: left knee pain  Assessment/Recommendations: Orthopaedic Inpatient Consultation    NAME:  Anival Pennington   : 1955  MRN: [id number]    7/10/2024  4:01 PM    Requesting Physician: Giovani    CHIEF COMPLAINT:  left knee pain      HISTORY OF PRESENT ILLNESS:   The patient is a 68 y.o. male admitted with CVA who injured his left knee after a twisting injury at home on the day of admission. X-rays show arthritis.  Pain is located in the left knee, rated a 2/5, dull and constant, worse with movement, better with rest and medication.  There are no associated symptoms.  Ice has helped his swelling.    Past Medical History:    Past Medical History:  2024: Acute ischemic left MCA stroke  No date: Cancer      Comment:  leukemia  No date: Coronary artery disease  No date: diabetes with circulatory complications  No date: Hyperlipidemia  No date: Hypertension  No date: Obesity (BMI 30-39.9)  No date: Tobacco abuse    Past Surgical History:    Past Surgical History:  3/28/2019: CARDIAC CATHETERIZATION; N/A      Comment:  Procedure: Left Heart Cath;  Surgeon: Vincent Pan MD;               Location:  [facility];  Service:                Cardiovascular  3/28/2019: CARDIAC CATHETERIZATION; Bilateral      Comment:  Procedure: Stent JOSSIE coronary;  Surgeon: Vincent Pan MD;  Location:  [facility];  Service:                Cardiovascular  3/28/2019: CARDIAC CATHETERIZATION; N/A      Comment:  Procedure: Left ventriculography;  Surgeon: Vincent Pan MD;  Location:  [facility];                 Service: Cardiovascular  No date: CORONARY STENT PLACEMENT  No date: TONSILLECTOMY    Current Medications:   Prior to Admission medications :  Medication aspirin 81 MG chewable tablet, Sig Chew 1  tablet Daily., Start Date , End Date , Taking? , Authorizing Provider Provider, MD Merly    Medication nitroglycerin (NITROSTAT) 0.4 MG SL tablet, Sig Place 1 tablet under the tongue Every 5 (Five) Minutes As Needed for Chest Pain. Take no more than 3 doses in 15 minutes.Patient not taking: Reported on 7/11/2024, Start Date 7/20/22, End Date , Taking? , Authorizing Provider Betty Mcguire MD    Medication rosuvastatin (Crestor) 10 MG tablet, Sig Take 1 tablet by mouth Daily. To prevent heart attacks and control cholesterol, Start Date 6/20/24, End Date , Taking? , Authorizing Provider Joanie Devlin APRN        Allergies:  Aspirin and Bee venom    Social History:   Social History    Socioeconomic History      Marital status:     Tobacco Use      Smoking status: Every Day        Packs/day: 1.50        Years: 1.5 packs/day for 55.5 years (83.3 ttl pk-yrs)        Types: Cigarettes        Start date: 1969      Smokeless tobacco: Never    Vaping Use      Vaping status: Never Used    Substance and Sexual Activity      Alcohol use: Not Currently        Comment: quit 2010      Drug use: Not Currently        Types: Marijuana        Comment: occ      Sexual activity: Defer      Family History:   Review of patient's family history indicates:  Problem: Valvular heart disease      Relation: Mother          Age of Onset: (Not Specified)  Problem: Heart attack      Relation: Mother          Age of Onset: (Not Specified)  Problem: Heart disease      Relation: Mother          Age of Onset: (Not Specified)  Problem: Heart disease      Relation: Brother          Age of Onset: (Not Specified)  Problem: Heart attack      Relation: Brother          Age of Onset: (Not Specified)  Problem: Cancer      Relation: Father          Age of Onset: (Not Specified)  Problem: No Known Problems      Relation: Brother          Age of Onset: (Not Specified)      REVIEW OF SYSTEMS:  14 point review of systems has been reviewed from the  patient's emergency room visit, reviewed with the patient on today's date with no new changes.    PHYSICAL EXAM:      Physical Examination:  Vitals: ---------------------------------------------------------------------------------               07/12/24 07/13/24 07/13/24 07/13/24                      1836             0008             0411             0808         ---------------------------------------------------------------------------------   BP:          119/66           155/82           144/86           173/81         BP Location:    Left arm         Left arm         Left arm         Left arm        Patient Position:      Lying            Lying            Lying            Lying         Pulse:         73               72               77               96           Resp:          18               18               16               16           Temp:   98.5 °F (36.9 °C)97.6 °F (36.4 °C)98.3 °F (36.8 °C)98.1 °F (36.7 °C)   TempSrc:      Oral             Oral             Oral             Oral          SpO2:          96%              97%              98%             100%          Weight:                                                                        Height:                                                                       ---------------------------------------------------------------------------------  General:  Appears stated age, no distress.  Orientation:  Alert and oriented to time, place, and person.  Mood and Affect:  Cooperative and pleasant.  Gait:  Resting comfortably in bed.  Cardiovascular:  Symmetric 1-2 plus pulses in upper and lower extremities.  Lymph:  No cervical or inguinal lymphadenopathy noted.  Sensation:  Grossly intact to light touch.  DTR:  Normal, no pathologic reflexes.  Coordination/balance:  Normal    Musculoskeletal:  Right upper extremity exam:  There is no tenderness to  palpation about the shoulder, elbow, wrist or hand.  Full motion.  Stability normal with provocative tests, 5/5 strength, and skin is normal.      Left upper extremity exam:  There is no tenderness to palpation about the shoulder, elbow, wrist or hand. Full motion.  Stability normal with provocative tests, 5/5 strength, and skin is normal.     Right lower extremity exam:  There is no tenderness to palpation about the hip, knee, ankle or foot.  Full motion  Stability normal with provocative tests, 5/5 strength, and skin is normal.     Left lower extremity exam:  knee effusion, tender globally, difficulty with extension, strength 4/5, skin is normal.      DATA:    LAB RESULTS:  Lab             07/13/24     07/12/24     07/11/24     07/10/24                       0407          0402          0539          1657          WBC          11.77*       11.37*       13.25*       12.89*        HEMOGLOBIN   13.8         14.3         14.0         17.3          HEMATOCRIT   40.0         39.8         40.7         47.9          PLATELETS    202          205          218          224           NEUTROS ABS   --           --           --          10.26*        IMMATURE GR*  --           --           --          0.05          LYMPHS ABS    --           --           --          1.42          MONOS ABS     --           --           --          1.04*         EOS ABS       --           --           --          0.06          MCV          92.2         89.2         90.2         89.5          Lab             07/13/24     07/12/24     07/11/24     07/10/24                       0407          0402          0450          1657          SODIUM       136          134*         135*         133*          POTASSIUM    3.5          3.9          3.5          4.5           CHLORIDE     104          101          102          96*           CO2          24.0         23.0         22.0         23.0          ANION GAP    8.0          10.0         11.0         14.0           BUN          11           9            10           15            CREATININE   0.62*        0.50*        0.42*        0.67*         EGFR         104.8        111.8        117.8        102.3         GLUCOSE      191*         254*         233*         466*          CALCIUM      8.7          8.7          8.5*         9.1           MAGNESIUM     --           --           --          1.9           HEMOGLOBIN *  --           --          13.20*        --           TSH           --           --          0.888         --           Lab             07/10/24                       1657          TOTAL PROTE* 6.8           ALBUMIN      3.8           GLOBULIN     3.0           ALT (SGPT)   5             AST (SGOT)   13            BILIRUBIN    0.5           ALK PHOS     92                Lab             07/11/24                       0450          CHOLESTEROL  104           LDL CHOL     38            HDL CHOL     37*           TRIGLYCERID* 175*          Lab             07/11/24                       0838          VITAMIN B 12 171*          Lab             07/10/24                       1723          PH, ARTERIAL 7.472*        PCO2, ARTER* 29.1*         PO2 ART      80.7*         O2 SATURATI* 98.1          HCO3 ART     21.3          BASE EXCESS* -1.0*         Brief Urine Lab Results  (Last result in the past 365 days)      Color   Clarity   Blood   Leuk Est   Nitrite   Protein   CREAT   Urine   HCG        07/10/24 1847 Yellow   Cloudy   Trace   Moderate (2+)   Negative   100 mg/dL (2+)             Microbiology Results (last 10 days)     Procedure Component Value - Date/Time    Urine Culture - Urine, Urine, Clean Catch [[id number]]  (Normal)   Collected: 07/10/24 1847    Lab Status: Preliminary result Specimen: Urine, Clean Catch Updated:   07/12/24 1100     Urine Culture No growth           ----------------------------------------------------------------------------------------------------------------------  I have reviewed  the radiology images above and agree with the findings dictated below    Radiology: Adult Transthoracic Echo Complete W/ Cont if Necessary Per Protocol    Result Date: 7/12/2024  ·  Left ventricular ejection fraction appears to be 56 - 60%. ·  The following left ventricular wall segments are hypokinetic: apical septal and apex hypokinetic. ·  Left ventricular diastolic function was normal. ·  Saline test results are negative. ·  Estimated right ventricular systolic pressure from tricuspid regurgitation is normal (<35 mmHg).     CT Angiogram Neck    Result Date: 7/11/2024  EXAMINATION: CT ANGIOGRAM NECK-   7/11/2024 10:42 AM  HISTORY: Stroke, follow up  In order to have a CT radiation dose as low as reasonably achievable Automated Exposure Control was utilized for adjustment of the mA and/or KV according to patient size.  Total DLP = 877.44 mGy.cm  The CT angiography of the neck is performed after intravenous contrast enhancement.  Images are acquired in axial plane and subsequent 2D reconstruction coronal and sagittal planes and 3D maximum intensity projection reconstruction.  There is no previous similar study for comparison. The correlation made with MR imaging of the brain and CT angiography of the head performed today.  Atheromatous changes of the limited visualized aortic arch is noted. No aneurysmal dilatation or dissection.  Atheromatous plaques are seen at the origin of the brachiocephalic, left common carotid and left subclavian arteries. No significant stenosis.  The brachiocephalic trunk divides into normal appearing right subclavian and right common carotid arteries.  Both common carotid arteries have a normal course and caliber throughout the neck. No areas of focal stenosis or aneurysmal dilatation.  There is noncalcific plaque in the distal 3 cm length of the right common carotid artery extending into the bifurcation. There is 30% stenosis of the long segment of the distal right common carotid artery.  The plaque extends into the bifurcation and along the posterior aspect of the proximal carotid bulb and approximately 70% stenosis of the bulb. The remaining left carotid bulb and left internal carotid artery is normal. The right external carotid artery is normal. Normal branches.  There is a noncalcific plaque in the distal left common carotid artery extending no significant stenosis of the common carotid artery or the carotid bulb. There is a small rounded hyperdensity along the anterior aspect of the base of the carotid bulb which probably represent a small calcification in the plaque. I do not believe this represent an ulcerated plaque. The remaining left internal carotid artery is normal in course and caliber. There is 50% stenosis of the origin of the left external carotid artery. Subsequent normal branches.  There is normal origin of both vertebral arteries. There is an area of 60% stenosis of the distal V1 segment of the left vertebral artery before it enters the C6 foramen transversarium. There is approximately 50% focal stenosis of the right vertebral artery in the foramen transversarium of C6. Remaining vertebral artery bilaterally are normal in course and caliber. Normal size, right dominant, vertebral artery enter the foramen magnum and subsequent to join to make a normal size basilar artery.  The limited visualized soft tissues of the neck are unremarkable. Heterogeneous thyroid gland is seen with probable nodules on both sides, right more than the left.  Limited visualized lungs show a small spiculated nodule in the right upper lobe, image #108 in series 2, measuring 8 mm in diameter. This need to be further evaluated with CT scan of the chest.      1. Atheromatous changes of the carotid and vertebral arteries. 70% focal stenosis of the right carotid bulb 60% focal stenosis of the distal V1 segment of the left vertebral artery and 50% stenosis of the distal V1 segment of the right vertebral artery  before entering the foramen transversarium of C6. The details are given above. 2. NASCET criteria were utilized for estimation of carotid arterial stenosis.    This report was signed and finalized on 7/11/2024 1:00 PM by Dr. Patrick Whitley MD.      CT Angiogram Head w AI Analysis of LVO    Result Date: 7/11/2024  EXAMINATION: CT ANGIOGRAM HEAD W AI ANALYSIS OF LVO-  7/11/2024 10:42 AM  HISTORY: Stroke, follow up  CT angiography of the head is performed before and after intravenous contrast enhancement.  Images are acquired in axial plane and subsequent 2D reconstruction in coronal and sagittal planes and 3D maximum intensity projection reconstruction.  There is no previous similar study for comparison. The correlation made with MR imaging of the brain performed earlier today.  Unenhanced images of the brain show moderate diffuse chronic ischemic and atrophic changes. No acute intracranial abnormality. No acute bony abnormality.  The post enhancement images show normal size internal carotid arteries at the skull base and in the carotid canal bilaterally.  Atheromatous changes are seen in the internal carotid arteries in the cavernous sinus bilaterally. No areas of flow-limiting stenosis or aneurysmal dilatation.  Normal size suprasellar internal carotid artery bilaterally is seen dividing into anterior and middle cerebral arteries bilaterally. There is moderate irregularity and mild diffuse narrowing of the proximal M1 segment of the left middle cerebral artery. No flow-limiting stenosis. There is also an area of focal stenosis in the distal M1 segment of the right middle cerebral artery. However I believe this is due to the tortuosity of the vessel and not a true stenosis. The A1 segment of the right LEATHA is relatively smaller than the left. However A2 and A3 segments bilaterally are symmetrical. There is subsequent normal insular, opercular and cortical branches of the middle cerebral arteries bilaterally. No  areas of flow-limiting stenosis or aneurysmal dilatation.  Normal size vertebral arteries enter the foramen magnum and join to make a normal size basilar artery which subsequently divides into normal. Posterior cerebral arteries bilaterally. No areas of focal stenosis or aneurysmal dilatation.      1. Moderate irregularity and diffuse narrowing of the M1 segment of the left middle cerebral artery with less than 50% stenosis. The remaining intracranial circulation is unremarkable as detailed above. No foci of flow-limiting stenosis or aneurysmal dilatation. 2. The NASCET criteria were utilized for estimation of carotid arterial stenosis.       This report was signed and finalized on 7/11/2024 12:43 PM by Dr. Patrick Whitley MD.      MRI Brain Without Contrast    Result Date: 7/11/2024  MRI BRAIN WO CONTRAST- 7/11/2024 10:20 AM  HISTORY: stroke like symptoms, dysarthria. tongue deviation; R47.81-Slurred speech; E11.65-Type 2 diabetes mellitus with hyperglycemia; R13.10-Dysphagia, unspecified  TECHNIQUE: Multisequence, multiplanar MRI of the brain without contrast  COMPARISON: CT scans dated 7/11/2024  FINDINGS:  There are 2 small foci of diffusion restriction. This includes a posterior limb left internal capsule lacunar infarct (series 203-image 19) as well what appears to be a tiny cortical infarct in the right postcentral gyrus (series 203-image 25, series 302-image 94). Moderate chronic small vessel ischemic changes. No intra-axial or extra-axial hemorrhage. No intracranial mass lesion or mass effect. The ventricles, cortical sulci and basal cisterns are symmetric and age appropriate. Posterior fossa structures are unremarkable. Pituitary gland and sella are unremarkable. The major intracranial flow-voids are preserved. Orbital contents are unremarkable. The paranasal sinuses are clear. Mastoid air cells are clear. Incidentally noted scalp trichilemmal cysts.       1.  There are 2 small foci of acute ischemia, a  posterior limb left internal capsule lacunar infarct and a second tiny cortical infarct in the right postcentral gyrus at the vertex. 2.  Chronic small vessel ischemic changes.  This report was signed and finalized on 2024 12:26 PM by Dr Mekhi Malone.      XR Knee 3 View Left    Result Date: 7/10/2024  XR KNEE 3 VW LEFT-  HISTORY: Knee pain/fall and injury  COMPARISON: 2022  FINDINGS: 3 views of the left knee are obtained.  There is moderate tricompartmental osteoarthritis with bony spurring greatest of the lateral femoral condyle. Osteopenia is suspected. Small knee joint effusion. No acute fracture or dislocation. Mild vascular calcification.      1. Moderate tricompartmental osteoarthritis with small knee joint effusion. No acute osseous injury.   This report was signed and finalized on 7/10/2024 5:52 PM by Dr. Ragini Soto MD.      CT HEAD/BRAIN WITHOUT CONTRAST                                Department of Radiology                         RADIOLOGICAL CONSULTATION REPORT ================================================================================   Pt Name: ART LUCERO                          MPI: [id number]            : 1955   Pt. Room #: ER                            Visit #: [id number]            Pt Status: E   Exam: [id number]                            Completed: 2024 05:35 PM   Ordering MD: RIKI BO                        MRN: [id number] CT Brain without Contrast History: slurred speech x 1 day (Hx) /  AP (DICOM Hx) Technique: Transaxial CT was performed without contrast from the skull base to the vertex. Coronal and sagittal reformatted images were obtained. The examination was performed with the adjustment of mA according to the patient's size and/or the use of iterative reconstruction technique. Comparison: none Findings: Scattered bilateral white matter hypodensity is present, consistent with gliosis. Mild cerebral atrophy is present. The lateral  ventricles are mildly dilated. No hemorrhage or edema-producing mass. The fourth ventricle is midline. The paranasal sinuses are clear. No skull fracture. The mastoid air cells are well developed and well aerated. Impression: 1. Mild cerebral atrophy and white matter gliosis. 2. No acute cerebral pathology. Electronically signed by: Ashly Rey on 2024 17:42:38           Dictated By: ASHLY REY MD           2024 05:43 PM Electronically Signed By: ASHLY REY M.D. on 2024 5:43:02 PM    XR Chest 1 View                                Department of Radiology                         RADIOLOGICAL CONSULTATION REPORT ================================================================================   Pt Name: ART LUCERO                          MPI: [id number]            : 1955   Pt. Room #: ER                            Visit #: [id number]            Pt Status: E   Exam: [id number]                          Completed: 2024 05:37 PM   Ordering MD: KARLEE MORSE                      MRN: [id number] Chest, 1  view History: Altered mental status Findings: The heart size is normal. The lungs are clear. There is no pleural effusion or pneumothorax identified. The osseous structures are normal. Impression: 1. No acute pulmonary disease. Electronically signed by: Ashly Rey on 2024 17:40:48           Dictated By: ASHLY REY MD           2024 05:41 PM Electronically Signed By: ASHLY REY M.D. on 2024 5:41:01 PM       ----------------------------------------------------------------------------------------------------------------------  Assessment:      Moderate tricompartmental osteoarthritis left knee with small knee joint  effusion.    Acute ischemic stroke left  internal capsule lacunar and  right postcentral gyrus at the vertex.    Essential hypertension    Coronary artery disease involving native coronary artery of native heart without  angina pectoris    PAD (peripheral artery disease)    Acute focal neurological deficit    UTI (urinary tract infection), bacterial    Uncontrolled type 2 diabetes mellitus with hyperglycemia    Acute stroke due to ischemia    B12 deficiency          Plan:  1) no acute treatment needed - ice, compression  2) WBAT  3) if continued pain and swelling after discharge, can f/u as outpatient for treatment (injection, therapy, etc)    Electronically signed by Mich Max MD on 7/13/2024 at 08:47 CDT

## 2024-07-13 NOTE — PLAN OF CARE
Goal Outcome Evaluation:  Plan of Care Reviewed With: patient, spouse        Progress: no change  Outcome Evaluation: pt trans to EOB min assist, BLE LAQ cues to stay on task, sit-stand min-mod, pt amb 15 feet rwx min-mod-max, BLE buckled when pt was to EOB, able to get pt safely on EOB max assist, pt stood several times mod 2, took side steps to R, cues for attention to R side, pt would benefit from rehab

## 2024-07-13 NOTE — PLAN OF CARE
Goal Outcome Evaluation:      Patient A/O x2 (self, place or sometimes situation). Patient had no complaints of pain this shift. Patient had slowed, slurred speech with some garbling and word searching at shift change. Patient had right leg drift, intermittent problems following some commands, and appeared dazed at times--unchanged from previous established assessments. Patient family has a lot of questions for the physician about prognosis, diagnosis, and onset high glucose vs stroke symptoms. Will update oncoming dayshift nurse at bedside shift report in the a.m. as appropriate.

## 2024-07-13 NOTE — PLAN OF CARE
Goal Outcome Evaluation:      Pt A&Ox2. His NIH score is a 3. No complaints of pain He still has right leg drift and intermittent problems following commands. Speech is still garbled. Fluids were D/C'd.

## 2024-07-13 NOTE — PROGRESS NOTES
[facility] Medicine Services  INPATIENT PROGRESS NOTE    Length of Stay: 2  Date of Admission: 7/10/2024  Primary Care Physician: Betty Mcguire MD    Subjective   Chief Complaint: Slurred speech    HPI     Anival Pennington presented to [facility] emergency room 7/10/2024 with slurred speech for the past 3 days.  He was initially seen at [facility] on 7/9 with similar symptoms.  CT scan of the head was negative and patient left AMA.  He presented to our facility on 7/10 noting tongue deviation to the right, coughing when attempting to drink water and slight right-sided facial drooping.  Patient reported he went to the bathroom earlier on the day of admission, twisted his leg and fell injuring his left knee.  He denied any other injuries.  He reports a history of coronary artery disease, peripheral vascular disease, diabetes mellitus, tobacco use.  Patient reported he has been off of his insulin for several months due to cost.  He reported leg circulation evaluated by Dr. Poole vascular surgery June 2024 with recommendations for arteriogram 7/22/24 due to abnormal SHELBY.  Patient was noted to have slurring of speech.  He moved extremities equally.  WBC 12.89, glucose 110, 466, 308, creatinine 0.67, urinalysis too numerous to count WBC, trace bacteria, moderate yeast.  X-ray left knee moderate tricompartmental osteoarthritis with small knee effusion.  No acute osseous injury.  Normal saline, insulin given in ER.        Today  Sitting up in bed.  Speech still with some slurring and hesitation.  Patient unsafe with gait mobility per physical therapy.  Walked 15 feet with left lower extremity buckling.  Patient reports left knee pain and swelling.  Dr. Max, orthopedics recommends ice compression, no acute treatment.  Weightbearing as tolerated.  Can follow-up as outpatient if continues with pain and swelling.  Patient agrees to SNF and referral to [facility].  Discussed insulin with both patient and wife.  Wife plans to discuss with Dr. Betty Mcguire regarding assistance with medication.  Will increase lisinopril dose.    Review of Systems   Constitutional:  Positive for activity change. Negative for fatigue and fever.   HENT:  Negative for congestion.    Eyes:  Negative for photophobia and visual disturbance.   Respiratory:  Negative for cough, shortness of breath and wheezing.    Cardiovascular:  Negative for chest pain, palpitations and leg swelling.   Gastrointestinal:  Negative for constipation, diarrhea, nausea and vomiting.   Endocrine: Negative for cold intolerance, heat intolerance and polyuria.   Genitourinary:  Negative for dysuria, frequency and urgency.   Musculoskeletal:  Positive for gait problem (Left knee pain).   Skin:  Negative for color change, pallor, rash and wound.   Allergic/Immunologic: Negative for immunocompromised state.   Neurological:  Positive for speech difficulty (Slurred speech) and weakness. Negative for light-headedness.   Hematological:  Negative for adenopathy. Does not bruise/bleed easily.   Psychiatric/Behavioral:  Negative for agitation, behavioral problems and confusion.         All pertinent negatives and positives are as above. All other systems have been reviewed and are negative unless otherwise stated.     Objective    Temp:  [97.6 °F (36.4 °C)-98.5 °F (36.9 °C)] 98.3 °F (36.8 °C)  Heart Rate:  [72-96] 86  Resp:  [16-18] 16  BP: (119-173)/(66-86) 156/81    Physical Exam  Vitals and nursing note reviewed.   Constitutional:       Comments: Patient sitting up in bed.  No oxygen use. Wife in room.   HENT:      Head: Normocephalic and atraumatic.      Nose: No congestion.      Mouth/Throat:      Pharynx: Oropharynx is clear. No oropharyngeal exudate or posterior oropharyngeal erythema.   Eyes:      Extraocular Movements: Extraocular movements intact.      Pupils: Pupils are equal, round, and reactive to light.    Cardiovascular:      Rate and Rhythm: Normal rate and regular rhythm.      Heart sounds: No murmur heard.     Comments: Normal sinus rhythm 61-81 on telemetry.  Pulmonary:      Breath sounds: No wheezing, rhonchi or rales.      Comments: No oxygen in use.  Abdominal:      Palpations: Abdomen is soft.      Tenderness: There is no abdominal tenderness.   Genitourinary:     Comments: Voiding.  Musculoskeletal:         General: Tenderness (Left knee) present.      Cervical back: Normal range of motion and neck supple.      Comments: Ice compress to left knee   Skin:     General: Skin is warm and dry.   Neurological:      General: No focal deficit present.      Mental Status: He is alert and oriented to person, place, and time.      Comments: Slurred speech but improved today, mild right facial drooping.  Follows commands.  Moves extremities.   Psychiatric:         Mood and Affect: Mood normal.         Behavior: Behavior normal.           Results Review:  I have reviewed the labs, radiology results, and diagnostic studies.    Laboratory Data:      Results from last 7 days   Lab Units 07/13/24  0407 07/12/24  0402 07/11/24  0539 07/10/24  1657   WBC 10*3/mm3 11.77* 11.37* 13.25* 12.89*   HEMOGLOBIN g/dL 13.8 14.3 14.0 17.3   HEMATOCRIT % 40.0 39.8 40.7 47.9   PLATELETS 10*3/mm3 202 205 218 224        Results from last 7 days   Lab Units 07/13/24  0407 07/12/24  0402 07/11/24  0450 07/10/24  1657   SODIUM mmol/L 136 134* 135* 133*   POTASSIUM mmol/L 3.5 3.9 3.5 4.5   CHLORIDE mmol/L 104 101 102 96*   CO2 mmol/L 24.0 23.0 22.0 23.0   BUN mg/dL 11 9 10 15   CREATININE mg/dL 0.62* 0.50* 0.42* 0.67*   GLUCOSE mg/dL 191* 254* 233* 466*   CALCIUM mg/dL 8.7 8.7 8.5* 9.1   ALT (SGPT) U/L  --   --   --  5         Culture Data:      Microbiology Results (last 10 days)       Procedure Component Value - Date/Time    Urine Culture - Urine, Urine, Clean Catch [[id number]]  (Abnormal) Collected: 07/10/24 1847    Lab Status: Final result  Specimen: Urine, Clean Catch Updated: 07/13/24 1138     Urine Culture Yeast isolated     Comment: No further workup.       Narrative:      Colonization of the urinary tract without infection is common. Treatment is discouraged unless the patient is symptomatic, pregnant, or undergoing an invasive urologic procedure.              Radiology Data:   Imaging Results (Last 72 Hours)       Procedure Component Value Units Date/Time    CT Angiogram Neck [[id number]] Collected: 07/11/24 1243     Updated: 07/11/24 1303    Narrative:      EXAMINATION: CT ANGIOGRAM NECK-      7/11/2024 10:42 AM     HISTORY: Stroke, follow up     In order to have a CT radiation dose as low as reasonably achievable  Automated Exposure Control was utilized for adjustment of the mA and/or  KV according to patient size.     Total DLP = 877.44 mGy.cm     The CT angiography of the neck is performed after intravenous contrast  enhancement.     Images are acquired in axial plane and subsequent 2D reconstruction  coronal and sagittal planes and 3D maximum intensity projection  reconstruction.     There is no previous similar study for comparison. The correlation made  with MR imaging of the brain and CT angiography of the head performed  today.     Atheromatous changes of the limited visualized aortic arch is noted. No  aneurysmal dilatation or dissection.     Atheromatous plaques are seen at the origin of the brachiocephalic, left  common carotid and left subclavian arteries. No significant stenosis.     The brachiocephalic trunk divides into normal appearing right subclavian  and right common carotid arteries.     Both common carotid arteries have a normal course and caliber throughout  the neck. No areas of focal stenosis or aneurysmal dilatation.     There is noncalcific plaque in the distal 3 cm length of the right  common carotid artery extending into the bifurcation. There is 30%  stenosis of the long segment of the distal right common carotid  artery.  The plaque extends into the bifurcation and along the posterior aspect  of the proximal carotid bulb and approximately 70% stenosis of the bulb.  The remaining left carotid bulb and left internal carotid artery is  normal. The right external carotid artery is normal. Normal branches.     There is a noncalcific plaque in the distal left common carotid artery  extending no significant stenosis of the common carotid artery or the  carotid bulb. There is a small rounded hyperdensity along the anterior  aspect of the base of the carotid bulb which probably represent a small  calcification in the plaque. I do not believe this represent an  ulcerated plaque. The remaining left internal carotid artery is normal  in course and caliber. There is 50% stenosis of the origin of the left  external carotid artery. Subsequent normal branches.     There is normal origin of both vertebral arteries. There is an area of  60% stenosis of the distal V1 segment of the left vertebral artery  before it enters the C6 foramen transversarium. There is approximately  50% focal stenosis of the right vertebral artery in the foramen  transversarium of C6. Remaining vertebral artery bilaterally are normal  in course and caliber. Normal size, right dominant, vertebral artery  enter the foramen magnum and subsequent to join to make a normal size  basilar artery.     The limited visualized soft tissues of the neck are unremarkable.  Heterogeneous thyroid gland is seen with probable nodules on both sides,  right more than the left.     Limited visualized lungs show a small spiculated nodule in the right  upper lobe, image #108 in series 2, measuring 8 mm in diameter. This  need to be further evaluated with CT scan of the chest.       Impression:      1. Atheromatous changes of the carotid and vertebral arteries. 70% focal  stenosis of the right carotid bulb 60% focal stenosis of the distal V1  segment of the left vertebral artery and 50%  stenosis of the distal V1  segment of the right vertebral artery before entering the foramen  transversarium of C6. The details are given above.  2. NASCET criteria were utilized for estimation of carotid arterial  stenosis.           This report was signed and finalized on 7/11/2024 1:00 PM by Dr. Patrick Whitley MD.       CT Angiogram Head w AI Analysis of LVO [[id number]] Collected: 07/11/24 1232     Updated: 07/11/24 1246    Narrative:      EXAMINATION: CT ANGIOGRAM HEAD W AI ANALYSIS OF LVO-     7/11/2024 10:42 AM     HISTORY: Stroke, follow up     CT angiography of the head is performed before and after intravenous  contrast enhancement.     Images are acquired in axial plane and subsequent 2D reconstruction in  coronal and sagittal planes and 3D maximum intensity projection  reconstruction.     There is no previous similar study for comparison. The correlation made  with MR imaging of the brain performed earlier today.     Unenhanced images of the brain show moderate diffuse chronic ischemic  and atrophic changes. No acute intracranial abnormality. No acute bony  abnormality.     The post enhancement images show normal size internal carotid arteries  at the skull base and in the carotid canal bilaterally.     Atheromatous changes are seen in the internal carotid arteries in the  cavernous sinus bilaterally. No areas of flow-limiting stenosis or  aneurysmal dilatation.     Normal size suprasellar internal carotid artery bilaterally is seen  dividing into anterior and middle cerebral arteries bilaterally. There  is moderate irregularity and mild diffuse narrowing of the proximal M1  segment of the left middle cerebral artery. No flow-limiting stenosis.  There is also an area of focal stenosis in the distal M1 segment of the  right middle cerebral artery. However I believe this is due to the  tortuosity of the vessel and not a true stenosis. The A1 segment of the  right LEATHA is relatively smaller than the left.  However A2 and A3  segments bilaterally are symmetrical. There is subsequent normal  insular, opercular and cortical branches of the middle cerebral arteries  bilaterally. No areas of flow-limiting stenosis or aneurysmal  dilatation.     Normal size vertebral arteries enter the foramen magnum and join to make  a normal size basilar artery which subsequently divides into normal.  Posterior cerebral arteries bilaterally. No areas of focal stenosis or  aneurysmal dilatation.       Impression:      1. Moderate irregularity and diffuse narrowing of the M1 segment of the  left middle cerebral artery with less than 50% stenosis. The remaining  intracranial circulation is unremarkable as detailed above. No foci of  flow-limiting stenosis or aneurysmal dilatation.  2. The NASCET criteria were utilized for estimation of carotid arterial  stenosis.                    This report was signed and finalized on 7/11/2024 12:43 PM by Dr. Patrick Whitley MD.       MRI Brain Without Contrast [[id number]] Collected: 07/11/24 1222     Updated: 07/11/24 1229    Narrative:      MRI BRAIN WO CONTRAST- 7/11/2024 10:20 AM     HISTORY: stroke like symptoms, dysarthria. tongue deviation;  R47.81-Slurred speech; E11.65-Type 2 diabetes mellitus with  hyperglycemia; R13.10-Dysphagia, unspecified     TECHNIQUE: Multisequence, multiplanar MRI of the brain without contrast     COMPARISON: CT scans dated 7/11/2024     FINDINGS:     There are 2 small foci of diffusion restriction. This includes a  posterior limb left internal capsule lacunar infarct (series 203-image  19) as well what appears to be a tiny cortical infarct in the right  postcentral gyrus (series 203-image 25, series 302-image 94). Moderate  chronic small vessel ischemic changes. No intra-axial or extra-axial  hemorrhage. No intracranial mass lesion or mass effect. The ventricles,  cortical sulci and basal cisterns are symmetric and age appropriate.  Posterior fossa structures are  unremarkable. Pituitary gland and sella  are unremarkable. The major intracranial flow-voids are preserved.  Orbital contents are unremarkable. The paranasal sinuses are clear.  Mastoid air cells are clear. Incidentally noted scalp trichilemmal  cysts.       Impression:         1.  There are 2 small foci of acute ischemia, a posterior limb left  internal capsule lacunar infarct and a second tiny cortical infarct in  the right postcentral gyrus at the vertex.  2.  Chronic small vessel ischemic changes.     This report was signed and finalized on 7/11/2024 12:26 PM by Dr Mekhi Malone.       XR Knee 3 View Left [[id number]] Collected: 07/10/24 1751     Updated: 07/10/24 1755    Narrative:      XR KNEE 3 VW LEFT-     HISTORY: Knee pain/fall and injury     COMPARISON: 9/13/2022     FINDINGS: 3 views of the left knee are obtained.     There is moderate tricompartmental osteoarthritis with bony spurring  greatest of the lateral femoral condyle. Osteopenia is suspected. Small  knee joint effusion. No acute fracture or dislocation. Mild vascular  calcification.       Impression:      1. Moderate tricompartmental osteoarthritis with small knee joint  effusion. No acute osseous injury.        This report was signed and finalized on 7/10/2024 5:52 PM by Dr. Ragini Soto MD.               Intake/Output    Intake/Output Summary (Last 24 hours) at 7/13/2024 1407  Last data filed at 7/13/2024 0449  Gross per 24 hour   Intake 4337.21 ml   Output 450 ml   Net 3887.21 ml       Scheduled Meds  aspirin, 81 mg, Oral, Daily   Or  aspirin, 300 mg, Rectal, Daily  atorvastatin, 80 mg, Oral, Nightly  cefTRIAXone, 1,000 mg, Intravenous, Q24H  cyanocobalamin, 1,000 mcg, Intramuscular, Daily  enoxaparin, 40 mg, Subcutaneous, Daily  insulin glargine, 25 Units, Subcutaneous, Nightly  insulin lispro, 2-7 Units, Subcutaneous, 4x Daily AC & at Bedtime  lisinopril, 10 mg, Oral, Once  [START ON 7/14/2024] lisinopril, 40 mg, Oral, Q24H  sodium  chloride, 10 mL, Intravenous, Q12H        I have reviewed the patient current medications.     Assessment/Plan     Active Hospital Problems    Diagnosis     **Acute ischemic stroke left  internal capsule lacunar and  right postcentral gyrus at the vertex.     B12 deficiency     Moderate tricompartmental osteoarthritis left knee with small knee joint  effusion.     Acute stroke due to ischemia     Acute focal neurological deficit     UTI (urinary tract infection), bacterial     Uncontrolled type 2 diabetes mellitus with hyperglycemia     PAD (peripheral artery disease)     Coronary artery disease involving native coronary artery of native heart without angina pectoris     Essential hypertension        Treatment Plan:    1.  Acute ischemic stroke left internal capsule lacunar and right postcentral gyrus at the vertex.  Presented with 3-day history of slurred speech.  CT head noted mild cerebral atrophy and white matter gliosis.  No acute cerebral pathology.  Aspirin, statin ordered on admission.  SCDs for deep vein thrombosis prophylaxis.  No lytic therapy given due to patient presented 3 to 4 days after onset of symptoms.    MRI of the brain 7/11 noted 2 small foci of acute ischemia, posterior limb left internal capsule or lacunar infarct and second tiny cortical infarct right postcenteral gyrus at the vertex chronic small vessel ischemic changes.  CTA head moderate irregularity and diffuse narrowing of M1 segment of left middle cerebral artery with less than 50% stenosis.  No foci of flow-limiting stenosis or aneurysmal dilatation.  CTA neck atheromatous changes of carotid and vertebral arteries.  70% focal stenosis right carotid bulb, 60% focal stenosis distal D1 segment left vertebral artery and 50% stenosis distal V1 segment right vertebral artery before entering the foramen transversarium C6.  ECHO 7/11/2024 EF 56-60%.  Left ventricular diastolic function normal.  Saline test negative.    Physical therapy,  Occupational Therapy, speech therapy consulted.  Regular diet with thin liquids and medications whole.  OT notes maximal assist for toileting tasks.  Some right upper extremity weakness.  Will need OT intervention to address mobility, activity tolerance, balance, coordination.  Physical therapy patient moderate assist to ambulate 15 feet with rolling walker.  Extremity buckled on edge of bed.  Took steps to side and needs cues for attention.  Rehab recommended for unsafe gait and moderate assistance.  SNF recommended, patient agreeable and referral to [facility].  Bed offer pending insurance approval.    TSH 0.888, free T41.29.  Lipid panel LDL 38 at goal less than 70.  Triglycerides 175, cholesterol 104.    2.  Abnormal urinalysis.  Too numerous to count WBC, trace bacteria, moderate yeast.  Diflucan given.  Rocephin started.  Urine culture yeast.    3.  Diabetes mellitus type 2, poorly controlled with hyperglycemia.  Hemoglobin A1c 13.2.  Patient has not taken insulin recently due to cost.  Accu Checks with sliding scale insulin coverage.  Glucoses 184, 188, 191.  Increase Lantus to 25 units nightly.  Diabetic educator consulted and discussed with CLAUDIO Avendaño and wife in contact with [facility] about assisting paying for additional medications.  Unable to afford $106 for Lantus from meds to beds.  Prefers the $24.99 option  for 70/30 insulin from [facility].  Patient will follow-up with Dr. Betty Mcguire after discharge and assist with medications.    4.  Primary hypertension blood pressure 156/81, 173/81.  Will give additional dose of lisinopril 10 mg orally today and increase lisinopril to 40 mg orally daily.    5.  Peripheral artery disease.  Follows with Dr. Poole with plans for right lower extremity angiogram 7/22/2024.    6.  History of coronary artery disease.  Continue aspirin and statin.    7.  Hypokalemia.  Potassium 3.5 on 7/11, replaced.  Potassium 3.5 today.  Replace potassium 40 mEq x 1  dose.  Check magnesium level.  BMP in AM.    8.  Left knee pain.  X-ray shows moderate tricompartmental osteoarthritis with small knee joint effusion.  No acute osseous injury.  Orthopedics consulted and Dr. Max recommends no acute treatment.  Ice compression.  Weightbearing as tolerated.  If continued with pain and swelling after discharge follow-up as outpatient.    9.  Lovenox and SCDs for deep vein thrombosis prophylaxis.    10.  Social service for discharge planning.  Referral to [facility] bed offered and insurance approval.    Medical Decision Making  Number and Complexity of problems: 8  Acute ischemic stroke: Acute, high complexity poses threat to life and bodily function not at baseline  Abnormal urinalysis: Acute, moderate complexity stable  Diabetes mellitus type 2 with hyperglycemia: Chronic, high complexity, not at baseline  Primary hypertension: Chronic, high complexity, not at baseline  Peripheral artery disease: Chronic, high complexity needs intervention  History of coronary artery disease: Chronic, moderate complexity, stable  Hypokalemia: Acute, moderate complexity, not at baseline  Left knee pain with osteoarthritis and small knee joint effusion: Acute, moderate complexity    Differential Diagnosis: None    Conditions and Status       Condition is improving     MDM Data  External documents reviewed: Care everywhere.  ER visit [facility] 7/9/2024.  [facility] reviewed vascular surgery office visits.  Cardiac tracing (EKG, telemetry) interpretation: Normal sinus rhythm 73 on telemetry  Radiology interpretation: Reviewed radiology interpretation MRI of the brain, CTA head neck.  X-ray left knee  Labs reviewed:   BMP 7/13/2024.  Repeat BMP in AM.  CBC  7/13/24.   Hemoglobin A1c, lipid panel, TSH  Urine culture negative.    Any tests that were considered but not ordered: None     Decision rules/scores evaluated (example [id number], [place], etc): None     Discussed with: Dr. Paz,  patient and wife.     Care Planning  Shared decision making: Dr. Paz, patient and wife.  Patient agrees to neurology consult, aspirin, statin, insulin, addition of blood pressure medications, physical therapy, referral to [facility].   Code status and discussions: Full code  Patient surrogate decision maker is his wife, Kvaya    Disposition  Social Determinants of Health that impact treatment or disposition: None  I expect the patient to be discharged to [facility] if  insurance approves.    Electronically signed by CLAUDIO Guzmán, 07/13/24, 14:07 CDT.    The above documentation resulted from a face-to-face encounter by me Brenda SNYDER, [facility].

## 2024-07-14 LAB
ANION GAP SERPL CALCULATED.3IONS-SCNC: 11 MMOL/L (ref 5–15)
BUN SERPL-MCNC: 11 MG/DL (ref 8–23)
BUN/CREAT SERPL: 20.8 (ref 7–25)
CALCIUM SPEC-SCNC: 8.7 MG/DL (ref 8.6–10.5)
CHLORIDE SERPL-SCNC: 102 MMOL/L (ref 98–107)
CO2 SERPL-SCNC: 23 MMOL/L (ref 22–29)
CREAT SERPL-MCNC: 0.53 MG/DL (ref 0.76–1.27)
EGFRCR SERPLBLD CKD-EPI 2021: 109.8 ML/MIN/1.73
GLUCOSE BLDC GLUCOMTR-MCNC: 189 MG/DL (ref 70–130)
GLUCOSE BLDC GLUCOMTR-MCNC: 247 MG/DL (ref 70–130)
GLUCOSE BLDC GLUCOMTR-MCNC: 254 MG/DL (ref 70–130)
GLUCOSE BLDC GLUCOMTR-MCNC: 290 MG/DL (ref 70–130)
GLUCOSE SERPL-MCNC: 184 MG/DL (ref 65–99)
POTASSIUM SERPL-SCNC: 3.8 MMOL/L (ref 3.5–5.2)
SODIUM SERPL-SCNC: 136 MMOL/L (ref 136–145)

## 2024-07-14 PROCEDURE — 80048 BASIC METABOLIC PNL TOTAL CA: CPT | Performed by: FAMILY MEDICINE

## 2024-07-14 PROCEDURE — 63710000001 INSULIN LISPRO (HUMAN) PER 5 UNITS: Performed by: FAMILY MEDICINE

## 2024-07-14 PROCEDURE — 25010000002 ENOXAPARIN PER 10 MG: Performed by: FAMILY MEDICINE

## 2024-07-14 PROCEDURE — 82948 REAGENT STRIP/BLOOD GLUCOSE: CPT

## 2024-07-14 PROCEDURE — 63710000001 INSULIN GLARGINE PER 5 UNITS: Performed by: NURSE PRACTITIONER

## 2024-07-14 PROCEDURE — 25010000002 CEFTRIAXONE PER 250 MG: Performed by: NURSE PRACTITIONER

## 2024-07-14 PROCEDURE — 97530 THERAPEUTIC ACTIVITIES: CPT

## 2024-07-14 PROCEDURE — 25010000002 CYANOCOBALAMIN PER 1000 MCG: Performed by: CLINICAL NURSE SPECIALIST

## 2024-07-14 RX ORDER — NAPROXEN 500 MG/1
500 TABLET ORAL 2 TIMES DAILY WITH MEALS
Status: COMPLETED | OUTPATIENT
Start: 2024-07-14 | End: 2024-07-15

## 2024-07-14 RX ORDER — PANTOPRAZOLE SODIUM 40 MG/10ML
40 INJECTION, POWDER, LYOPHILIZED, FOR SOLUTION INTRAVENOUS
Status: DISCONTINUED | OUTPATIENT
Start: 2024-07-14 | End: 2024-07-15 | Stop reason: HOSPADM

## 2024-07-14 RX ADMIN — LISINOPRIL 40 MG: 20 TABLET ORAL at 08:23

## 2024-07-14 RX ADMIN — INSULIN LISPRO 4 UNITS: 100 INJECTION, SOLUTION INTRAVENOUS; SUBCUTANEOUS at 11:49

## 2024-07-14 RX ADMIN — INSULIN GLARGINE 30 UNITS: 100 INJECTION, SOLUTION SUBCUTANEOUS at 20:48

## 2024-07-14 RX ADMIN — ASPIRIN 81 MG CHEWABLE TABLET 81 MG: 81 TABLET CHEWABLE at 08:23

## 2024-07-14 RX ADMIN — INSULIN LISPRO 3 UNITS: 100 INJECTION, SOLUTION INTRAVENOUS; SUBCUTANEOUS at 16:37

## 2024-07-14 RX ADMIN — CYANOCOBALAMIN 1000 MCG: 1000 INJECTION, SOLUTION INTRAMUSCULAR at 08:22

## 2024-07-14 RX ADMIN — PANTOPRAZOLE SODIUM 40 MG: 40 INJECTION, POWDER, FOR SOLUTION INTRAVENOUS at 08:23

## 2024-07-14 RX ADMIN — INSULIN LISPRO 4 UNITS: 100 INJECTION, SOLUTION INTRAVENOUS; SUBCUTANEOUS at 20:47

## 2024-07-14 RX ADMIN — ATORVASTATIN CALCIUM 80 MG: 40 TABLET ORAL at 20:47

## 2024-07-14 RX ADMIN — Medication 10 ML: at 08:24

## 2024-07-14 RX ADMIN — NAPROXEN 500 MG: 500 TABLET ORAL at 17:54

## 2024-07-14 RX ADMIN — FLUCONAZOLE 150 MG: 150 TABLET ORAL at 16:37

## 2024-07-14 RX ADMIN — SODIUM CHLORIDE 1000 MG: 900 INJECTION INTRAVENOUS at 08:24

## 2024-07-14 RX ADMIN — Medication 10 ML: at 20:47

## 2024-07-14 RX ADMIN — NAPROXEN 500 MG: 500 TABLET ORAL at 08:22

## 2024-07-14 RX ADMIN — ENOXAPARIN SODIUM 40 MG: 100 INJECTION SUBCUTANEOUS at 08:21

## 2024-07-14 RX ADMIN — INSULIN LISPRO 2 UNITS: 100 INJECTION, SOLUTION INTRAVENOUS; SUBCUTANEOUS at 08:21

## 2024-07-14 NOTE — THERAPY TREATMENT NOTE
Acute Care - Physical Therapy Treatment Note  [facility]     Patient Name: Anival Pennington  : 1955  MRN: [id number]  Today's Date: 2024      Visit Dx:     ICD-10-CM ICD-9-CM   1. Slurred speech  R47.81 784.59   2. Hyperglycemia due to diabetes mellitus  E11.65 250.02   3. Dysphagia, unspecified type  R13.10 787.20   4. Impaired mobility [Z74.09]  Z74.09 799.89     Patient Active Problem List   Diagnosis    ST elevation myocardial infarction (STEMI)    Tobacco abuse    Type 2 diabetes mellitus with circulatory disorder, without long-term current use of insulin    Essential hypertension    Coronary artery disease involving native coronary artery of native heart without angina pectoris    Mixed hyperlipidemia    Class 2 severe obesity due to excess calories with serious comorbidity and body mass index (BMI) of 36.0 to 36.9 in adult    S/P coronary artery stent placement    History of MI (myocardial infarction)    Diabetes mellitus due to underlying condition with circulatory complication    Dizziness    Heavy smoker (more than 20 cigarettes per day)    Cellulitis of face    Obesity (BMI 30-39.9)    PAD (peripheral artery disease)    Preop testing    Acute focal neurological deficit    UTI (urinary tract infection), bacterial    Uncontrolled type 2 diabetes mellitus with hyperglycemia    Acute ischemic stroke left  internal capsule lacunar and  right postcentral gyrus at the vertex.    Acute stroke due to ischemia    B12 deficiency    Moderate tricompartmental osteoarthritis left knee with small knee joint  effusion.     Past Medical History:   Diagnosis Date    Acute ischemic left MCA stroke 2024    Cancer     leukemia    Coronary artery disease     diabetes with circulatory complications     Hyperlipidemia     Hypertension     Obesity (BMI 30-39.9)     Tobacco abuse      Past Surgical History:   Procedure Laterality Date    CARDIAC CATHETERIZATION N/A 3/28/2019    Procedure: Left Heart Cath;  Surgeon:  Vincent Pan MD;  Location:  [facility];  Service: Cardiovascular    CARDIAC CATHETERIZATION Bilateral 3/28/2019    Procedure: Stent JOSSIE coronary;  Surgeon: Vincent Pan MD;  Location:  [facility];  Service: Cardiovascular    CARDIAC CATHETERIZATION N/A 3/28/2019    Procedure: Left ventriculography;  Surgeon: Vincent Pan MD;  Location:  [facility];  Service: Cardiovascular    CORONARY STENT PLACEMENT      TONSILLECTOMY       PT Assessment (Last 12 Hours)       PT Evaluation and Treatment       [facility] Name 07/14/24 0953 07/14/24 0913       Physical Therapy Time and Intention    Subjective Information complains of;weakness;fatigue;pain  - --  -    Document Type therapy note (daily note)  - --    Mode of Treatment physical therapy  - --    Session Not Performed -- other (see comments)  -    Comment, Session Not Performed -- still eating breakfast  -[facility] Name 07/14/24 0845          Physical Therapy Time and Intention    Subjective Information --  -     Session Not Performed other (see comments)  -     Comment, Session Not Performed breakfast  -[facility] Name 07/14/24 0953          General Information    Existing Precautions/Restrictions fall  R weakness/inattention, L knee swollen and painful  -     Limitations/Impairments safety/cognitive  -[facility] Name 07/14/24 0953          Pain    Pain Intervention(s) Cold applied;Repositioned  -[facility] Name 07/14/24 0953          Pain Scale: Word Pre/Post-Treatment    Pain: Word Scale, Pretreatment 0 - no pain  at rest  -     Posttreatment Pain Rating 4 - moderate pain  with mobility  -     Pain Location - Side/Orientation Left  -     Pain Location - knee  -[facility] Name 07/14/24 0953          Bed Mobility    Supine-Sit [place] (Bed Mobility) verbal cues;moderate assist (50% patient effort)  -     Sit-Supine [place] (Bed Mobility) moderate assist (50% patient effort)   -     Assistive Device (Bed Mobility) bed rails;draw sheet  -[facility] Name 07/14/24 0953          Transfers    Comment, (Transfers) stood x 6  -[facility] Name 07/14/24 0953          Sit-Stand Transfer    Sit-Stand [place] (Transfers) verbal cues;moderate assist (50% patient effort)  -     Assistive Device (Sit-Stand Transfers) walker, front-wheeled  -[facility] Name 07/14/24 0953          Stand-Sit Transfer    Stand-Sit [place] (Transfers) verbal cues;minimum assist (75% patient effort)  -[facility] Name 07/14/24 0953          Gait/Stairs (Locomotion)    [place] Level (Gait) moderate assist (50% patient effort);verbal cues  -     Assistive Device (Gait) walker, front-wheeled  -     Distance in Feet (Gait) 2  side steps to L at BS, pt required assist to wt shift to move feet  -     Bilateral Gait Deviations knee buckling bilaterally  -[facility] Name 07/14/24 0953          Safety Issues, Functional Mobility    Impairments Affecting Function (Mobility) balance;cognition;pain;strength  -[facility] Name 07/14/24 0953          Motor Skills    Comments, Therapeutic Exercise BLE A-AAROM x 10 reps  -[facility] Name 07/14/24 0953          Plan of Care Review    Plan of Care Reviewed With patient;spouse  -     Progress no change  -     Outcome Evaluation pt c/o swollen and painful L knee, pt with decreased awareness of right side, will attend to R side with cues, pt trans to EOB mod assist, BLE A-AAROM, sit-stand mod assist, pt with LOB posterior upon first standing, practiced sit-stand x 6 reps, took few side steps with rwx to L, min-mod, pt required assist to wt shift and cues to move feet, trans back to bed mod assist, pt would benefit from [facility]  -[facility] Name 07/14/24 0953          Positioning and Restraints    Pre-Treatment Position in bed  -     Post Treatment Position bed  -     In Bed fowlers;call light within reach;encouraged to call for assist;exit alarm on;with  family/caregiver;notified [facility]  -               User Key  (r) = Recorded By, (t) = Taken By, (c) = Cosigned By      Initials Name Provider Type    Elvira Tan, PTA Physical Therapist Assistant                    Physical Therapy Education       Title: PT OT SLP Therapies (In Progress)       Topic: Physical Therapy (In Progress)       Point: Mobility training (In Progress)       Learning Progress Summary             Patient Acceptance, TB, NR by  at 7/11/2024 1832    Acceptance, E,TB, VU,DU,NR by CN at 7/11/2024 1412    Acceptance, E,TB, VU,DU,NR by CN at 7/11/2024 1412    Comment: Educated on PT role in pt care.   Family Acceptance, E,TB, VU,DU,NR by NANCY at 7/11/2024 1412    Comment: Educated on PT role in pt care.                         Point: Home exercise program (In Progress)       Learning Progress Summary             Patient Acceptance, TB, NR by  at 7/11/2024 1832                         Point: Body mechanics (In Progress)       Learning Progress Summary             Patient Acceptance, TB, NR by  at 7/11/2024 1832                         Point: Precautions (In Progress)       Learning Progress Summary             Patient Acceptance, TB, NR by  at 7/11/2024 1832                                         User Key       Initials Effective Dates Name Provider Type Discipline     04/22/24 -  Fatmata Styles, RN Registered Nurse Nurse    NANCY 06/24/24 -  Elvia Oakes, DEBORA Student PT Student PT                  PT Recommendation and Plan     Plan of Care Reviewed With: patient, spouse  Progress: no change  Outcome Evaluation: pt c/o swollen and painful L knee, pt with decreased awareness of right side, will attend to R side with cues, pt trans to EOB mod assist, BLE A-AAROM, sit-stand mod assist, pt with LOB posterior upon first standing, practiced sit-stand x 6 reps, took few side steps with rwx to L, min-mod, pt required assist to wt shift and cues to move feet, trans back to bed mod assist, pt  would benefit from SNF   Outcome Measures       Row Name 07/14/24 1000 07/13/24 1000          How much help from another person do you currently need...    Turning from your back to your side while in flat bed without using bedrails? 3  -AH 3  -AH     Moving from lying on back to sitting on the side of a flat bed without bedrails? 3  -AH 3  -AH     Moving to and from a bed to a chair (including a wheelchair)? 2  -AH 2  -AH     Standing up from a chair using your arms (e.g., wheelchair, bedside chair)? 2  -AH 2  -AH     Climbing 3-5 steps with a railing? 1  -AH 1  -AH     To walk in hospital room? 2  -AH 2  -AH     AM-PAC 6 Clicks Score (PT) 13  -AH 13  -AH     Highest Level of Mobility Goal 4 --> Transfer to chair/commode  -AH 4 --> Transfer to chair/commode  -AH        Functional Assessment    Outcome Measure Options AM-PAC 6 Clicks Basic Mobility (PT)  -AH AM-PAC 6 Clicks Basic Mobility (PT)  -               User Key  (r) = Recorded By, (t) = Taken By, (c) = Cosigned By      Initials Name Provider Type     Elvira Emerson PTA Physical Therapist Assistant                     Time Calculation:    PT Charges       Row Name 07/14/24 1027             Time Calculation    Start Time 0953  -      Stop Time 1020  -      Time Calculation (min) 27 min  -      PT Received On 07/14/24  -         Time Calculation- PT    Total Timed Code Minutes- PT 27 minute(s)  -         Timed Charges    98704 - PT Therapeutic Activity Minutes 27  -AH         Total Minutes    Timed Charges Total Minutes 27  -AH       Total Minutes 27  -AH                User Key  (r) = Recorded By, (t) = Taken By, (c) = Cosigned By      Initials Name Provider Type    Elvira Tan PTA Physical Therapist Assistant                  Therapy Charges for Today       Code Description Service Date Service Provider Modifiers Qty    28558063100  GAIT TRAINING EA 15 MIN 7/13/2024 Elvira Emerson PTA GP 2    [id number]  PT THERAPEUTIC ACT EA 15  MIN 7/14/2024 Elvira Emerson, PTA GP 2            PT G-Codes  Outcome Measure Options: AM-PAC 6 Clicks Basic Mobility (PT)  AM-PAC 6 Clicks Score (PT): 13  AM-PAC 6 Clicks Score (OT): 12    Elvira Emerson, STORM  7/14/2024

## 2024-07-14 NOTE — PLAN OF CARE
Goal Outcome Evaluation:        Problem: Adult Inpatient Plan of Care  Goal: Plan of Care Review  Outcome: Ongoing, Progressing  Goal: Patient-Specific Goal (Individualized)  Outcome: Ongoing, Progressing  Goal: Absence of Hospital-Acquired Illness or Injury  Outcome: Ongoing, Progressing  Intervention: Identify and Manage Fall Risk  Description: Perform standard risk assessment on admission using a validated tool or comprehensive approach appropriate to the patient; reassess fall risk frequently, with change in status or transfer to another level of care.  Communicate fall injury risk to interprofessional healthcare team.  Determine need for increased observation, equipment and environmental modification, such as low bed, signage and supportive, nonskid footwear.  Adjust safety measures to individual developmental age, stage and identified risk factors.  Reinforce the importance of safety and physical activity with patient and family.  Perform regular intentional rounding to assess need for position change, pain assessment and personal needs, including assistance with toileting.  Recent Flowsheet Documentation  Taken 7/14/2024 0400 by Kerwin Byers RN  Safety Promotion/Fall Prevention: safety round/check completed  Taken 7/14/2024 0300 by Kerwin Byers RN  Safety Promotion/Fall Prevention: safety round/check completed  Taken 7/14/2024 0200 by Kerwin Byers RN  Safety Promotion/Fall Prevention: safety round/check completed  Taken 7/14/2024 0100 by Kerwin Byers RN  Safety Promotion/Fall Prevention: safety round/check completed  Taken 7/14/2024 0000 by Kerwin Byers RN  Safety Promotion/Fall Prevention: safety round/check completed  Taken 7/13/2024 2300 by Kerwin Byers RN  Safety Promotion/Fall Prevention:   safety round/check completed   fall prevention program maintained  Taken 7/13/2024 2200 by Kerwin Byers RN  Safety Promotion/Fall Prevention: safety round/check completed  Taken 7/13/2024 2100 by Kerwin Byers  RN  Safety Promotion/Fall Prevention:   safety round/check completed   fall prevention program maintained  Taken 7/13/2024 2000 by Kerwin Byers RN  Safety Promotion/Fall Prevention: safety round/check completed  Taken 7/13/2024 1900 by Kerwin Byers RN  Safety Promotion/Fall Prevention:   safety round/check completed   fall prevention program maintained  Intervention: Prevent Skin Injury  Description: Perform a screening for skin injury risk, such as pressure or moisture associated skin damage on admission and at regular intervals throughout hospital stay.  Keep all areas of skin (especially folds) clean and dry.  Maintain adequate skin hydration.  Relieve and redistribute pressure and protect bony prominences; implement measures based on patient-specific risk factors.  Match turning and repositioning schedule to clinical condition.  Encourage weight shift frequently; assist with reposition if unable to complete independently.  Float heels off bed; avoid pressure on the Achilles tendon.  Keep skin free from extended contact with medical devices.  Encourage functional activity and mobility, as early as tolerated.  Use aids (e.g., slide boards, mechanical lift) during transfer.  Recent Flowsheet Documentation  Taken 7/14/2024 0300 by Kerwin Byers RN  Body Position: position changed independently  Taken 7/14/2024 0100 by Kerwin Byers RN  Body Position: position changed independently  Taken 7/13/2024 2300 by Kerwin Byers RN  Body Position: position changed independently  Taken 7/13/2024 2100 by Kerwin Byers RN  Body Position: position changed independently  Taken 7/13/2024 2000 by Kerwin Byers RN  Body Position: position changed independently  Skin Protection: adhesive use limited  Taken 7/13/2024 1900 by Kerwin Byers RN  Body Position: position changed independently  Intervention: Prevent and Manage VTE (Venous Thromboembolism) Risk  Description: Assess for VTE (venous thromboembolism) risk.  Encourage and  assist with early ambulation.  Initiate and maintain compression or other therapy, as indicated, based on identified risk in accordance with organizational protocol and provider order.  Encourage both active and passive leg exercises while in bed, if unable to ambulate.  Recent Flowsheet Documentation  Taken 7/13/2024 2000 by Kerwin Byers, RN  VTE Prevention/Management: (see MAR) other (see comments)  Goal: Optimal Comfort and Wellbeing  Outcome: Ongoing, Progressing  Intervention: Provide Person-Centered Care  Description: Use a family-focused approach to care.  Develop trust and rapport by proactively providing information, encouraging questions, addressing concerns and offering reassurance.  Acknowledge emotional response to hospitalization.  Recognize and utilize personal coping strategies.  Honor spiritual and cultural preferences.  Recent Flowsheet Documentation  Taken 7/13/2024 2000 by Kerwin Byers RN  Trust Relationship/Rapport:   care explained   reassurance provided   questions answered  Goal: Readiness for Transition of Care  Outcome: Ongoing, Progressing

## 2024-07-14 NOTE — PROGRESS NOTES
[facility] Medicine Services  INPATIENT PROGRESS NOTE    Length of Stay: 3  Date of Admission: 7/10/2024  Primary Care Physician: Betty Mcguire MD    Subjective   Chief Complaint: Slurred speech    SASHA Pennington presented to [facility] emergency room 7/10/2024 with slurred speech for the past 3 days.  He was initially seen at [facility] on 7/9 with similar symptoms.  CT scan of the head was negative and patient left AMA.  He presented to our facility on 7/10 noting tongue deviation to the right, coughing when attempting to drink water and slight right-sided facial drooping.  Patient reported he went to the bathroom earlier on the day of admission, twisted his leg and fell injuring his left knee.  He denied any other injuries.  He reports a history of coronary artery disease, peripheral vascular disease, diabetes mellitus, tobacco use.  Patient reported he has been off of his insulin for several months due to cost.  He reported leg circulation evaluated by Dr. Poole vascular surgery June 2024 with recommendations for arteriogram 7/22/24 due to abnormal SHELBY.  Patient was noted to have slurring of speech.  He moved extremities equally.  WBC 12.89, glucose 110, 466, 308, creatinine 0.67, urinalysis too numerous to count WBC, trace bacteria, moderate yeast.  X-ray left knee moderate tricompartmental osteoarthritis with small knee effusion.  No acute osseous injury.  Normal saline, insulin given in ER.        Today  Sitting up in bed.  Wife in room.  Speech still with some hesitation and some slurring.  Able to understand most conversation.  Patient still with moderate assistance to ambulate able to sit and stand and take a few steps with rolling walker.  Quires consistent cues to move feet.  Orthopedics recommends ice and weightbearing as tolerated to right knee.  Follow-up with orthopedics as outpatient if needed.  Referral to [facility]  waiting insurance approval.  Lisinopril increased to 40 mg today and insulin increased to 30 mg nightly.  Discussed with patient and wife.  Wife plans to discuss with Dr. Betty Mcguire medication assistance.    Review of Systems   Constitutional:  Positive for activity change. Negative for fatigue and fever.   HENT:  Negative for congestion.    Eyes:  Negative for photophobia and visual disturbance.   Respiratory:  Negative for cough, shortness of breath and wheezing.    Cardiovascular:  Negative for chest pain, palpitations and leg swelling.   Gastrointestinal:  Negative for constipation, diarrhea, nausea and vomiting.   Endocrine: Negative for cold intolerance, heat intolerance and polyuria.   Genitourinary:  Negative for dysuria, frequency and urgency.   Musculoskeletal:  Positive for gait problem (Left knee pain).   Skin:  Negative for color change, pallor, rash and wound.   Allergic/Immunologic: Negative for immunocompromised state.   Neurological:  Positive for speech difficulty (Slurred speech) and weakness. Negative for light-headedness.   Hematological:  Negative for adenopathy. Does not bruise/bleed easily.   Psychiatric/Behavioral:  Negative for agitation, behavioral problems and confusion.         All pertinent negatives and positives are as above. All other systems have been reviewed and are negative unless otherwise stated.     Objective    Temp:  [98 °F (36.7 °C)-99.6 °F (37.6 °C)] 98.1 °F (36.7 °C)  Heart Rate:  [75-85] 82  Resp:  [16-18] 16  BP: (129-155)/(70-83) 129/70    Physical Exam  Vitals and nursing note reviewed.   Constitutional:       Comments: Patient sitting up in bed.  No oxygen use. Wife in room.   HENT:      Head: Normocephalic and atraumatic.      Nose: No congestion.      Mouth/Throat:      Pharynx: Oropharynx is clear. No oropharyngeal exudate or posterior oropharyngeal erythema.   Eyes:      Extraocular Movements: Extraocular movements intact.      Pupils: Pupils are equal, round,  and reactive to light.   Cardiovascular:      Rate and Rhythm: Normal rate and regular rhythm.      Heart sounds: No murmur heard.     Comments: Normal sinus rhythm 65-82 on telemetry.  Pulmonary:      Breath sounds: No wheezing, rhonchi or rales.      Comments: No oxygen in use.  Abdominal:      Palpations: Abdomen is soft.      Tenderness: There is no abdominal tenderness.   Genitourinary:     Comments: Voiding.  Musculoskeletal:         General: Tenderness (Left knee) present.      Cervical back: Normal range of motion and neck supple.      Comments: Ice compress to left knee   Skin:     General: Skin is warm and dry.   Neurological:      General: No focal deficit present.      Mental Status: He is alert and oriented to person, place, and time.      Comments: Slurred speech improved today, mild right facial drooping.  Follows commands.  Moves extremities.   Psychiatric:         Mood and Affect: Mood normal.         Behavior: Behavior normal.           Results Review:  I have reviewed the labs, radiology results, and diagnostic studies.    Laboratory Data:      Results from last 7 days   Lab Units 07/13/24  0407 07/12/24  0402 07/11/24  0539 07/10/24  1657   WBC 10*3/mm3 11.77* 11.37* 13.25* 12.89*   HEMOGLOBIN g/dL 13.8 14.3 14.0 17.3   HEMATOCRIT % 40.0 39.8 40.7 47.9   PLATELETS 10*3/mm3 202 205 218 224        Results from last 7 days   Lab Units 07/14/24  0512 07/13/24  0407 07/12/24  0402 07/11/24  0450 07/10/24  1657   SODIUM mmol/L 136 136 134* 135* 133*   POTASSIUM mmol/L 3.8 3.5 3.9 3.5 4.5   CHLORIDE mmol/L 102 104 101 102 96*   CO2 mmol/L 23.0 24.0 23.0 22.0 23.0   BUN mg/dL 11 11 9 10 15   CREATININE mg/dL 0.53* 0.62* 0.50* 0.42* 0.67*   GLUCOSE mg/dL 184* 191* 254* 233* 466*   CALCIUM mg/dL 8.7 8.7 8.7 8.5* 9.1   ALT (SGPT) U/L  --   --   --   --  5         Culture Data:      Microbiology Results (last 10 days)       Procedure Component Value - Date/Time    Urine Culture - Urine, Urine, Clean Catch  [[id number]]  (Abnormal) Collected: 07/10/24 1847    Lab Status: Final result Specimen: Urine, Clean Catch Updated: 07/13/24 1139     Urine Culture Yeast isolated     Comment: No further workup.       Narrative:      Colonization of the urinary tract without infection is common. Treatment is discouraged unless the patient is symptomatic, pregnant, or undergoing an invasive urologic procedure.              Radiology Data:   Imaging Results (Last 72 Hours)       Procedure Component Value Units Date/Time    CT Angiogram Neck [[id number]] Collected: 07/11/24 1243     Updated: 07/11/24 1303    Narrative:      EXAMINATION: CT ANGIOGRAM NECK-      7/11/2024 10:42 AM     HISTORY: Stroke, follow up     In order to have a CT radiation dose as low as reasonably achievable  Automated Exposure Control was utilized for adjustment of the mA and/or  KV according to patient size.     Total DLP = 877.44 mGy.cm     The CT angiography of the neck is performed after intravenous contrast  enhancement.     Images are acquired in axial plane and subsequent 2D reconstruction  coronal and sagittal planes and 3D maximum intensity projection  reconstruction.     There is no previous similar study for comparison. The correlation made  with MR imaging of the brain and CT angiography of the head performed  today.     Atheromatous changes of the limited visualized aortic arch is noted. No  aneurysmal dilatation or dissection.     Atheromatous plaques are seen at the origin of the brachiocephalic, left  common carotid and left subclavian arteries. No significant stenosis.     The brachiocephalic trunk divides into normal appearing right subclavian  and right common carotid arteries.     Both common carotid arteries have a normal course and caliber throughout  the neck. No areas of focal stenosis or aneurysmal dilatation.     There is noncalcific plaque in the distal 3 cm length of the right  common carotid artery extending into the bifurcation. There  is 30%  stenosis of the long segment of the distal right common carotid artery.  The plaque extends into the bifurcation and along the posterior aspect  of the proximal carotid bulb and approximately 70% stenosis of the bulb.  The remaining left carotid bulb and left internal carotid artery is  normal. The right external carotid artery is normal. Normal branches.     There is a noncalcific plaque in the distal left common carotid artery  extending no significant stenosis of the common carotid artery or the  carotid bulb. There is a small rounded hyperdensity along the anterior  aspect of the base of the carotid bulb which probably represent a small  calcification in the plaque. I do not believe this represent an  ulcerated plaque. The remaining left internal carotid artery is normal  in course and caliber. There is 50% stenosis of the origin of the left  external carotid artery. Subsequent normal branches.     There is normal origin of both vertebral arteries. There is an area of  60% stenosis of the distal V1 segment of the left vertebral artery  before it enters the C6 foramen transversarium. There is approximately  50% focal stenosis of the right vertebral artery in the foramen  transversarium of C6. Remaining vertebral artery bilaterally are normal  in course and caliber. Normal size, right dominant, vertebral artery  enter the foramen magnum and subsequent to join to make a normal size  basilar artery.     The limited visualized soft tissues of the neck are unremarkable.  Heterogeneous thyroid gland is seen with probable nodules on both sides,  right more than the left.     Limited visualized lungs show a small spiculated nodule in the right  upper lobe, image #108 in series 2, measuring 8 mm in diameter. This  need to be further evaluated with CT scan of the chest.       Impression:      1. Atheromatous changes of the carotid and vertebral arteries. 70% focal  stenosis of the right carotid bulb 60% focal  stenosis of the distal V1  segment of the left vertebral artery and 50% stenosis of the distal V1  segment of the right vertebral artery before entering the foramen  transversarium of C6. The details are given above.  2. NASCET criteria were utilized for estimation of carotid arterial  stenosis.           This report was signed and finalized on 7/11/2024 1:00 PM by Dr. Patrick Whitley MD.       CT Angiogram Head w AI Analysis of LVO [[id number]] Collected: 07/11/24 1232     Updated: 07/11/24 1246    Narrative:      EXAMINATION: CT ANGIOGRAM HEAD W AI ANALYSIS OF LVO-     7/11/2024 10:42 AM     HISTORY: Stroke, follow up     CT angiography of the head is performed before and after intravenous  contrast enhancement.     Images are acquired in axial plane and subsequent 2D reconstruction in  coronal and sagittal planes and 3D maximum intensity projection  reconstruction.     There is no previous similar study for comparison. The correlation made  with MR imaging of the brain performed earlier today.     Unenhanced images of the brain show moderate diffuse chronic ischemic  and atrophic changes. No acute intracranial abnormality. No acute bony  abnormality.     The post enhancement images show normal size internal carotid arteries  at the skull base and in the carotid canal bilaterally.     Atheromatous changes are seen in the internal carotid arteries in the  cavernous sinus bilaterally. No areas of flow-limiting stenosis or  aneurysmal dilatation.     Normal size suprasellar internal carotid artery bilaterally is seen  dividing into anterior and middle cerebral arteries bilaterally. There  is moderate irregularity and mild diffuse narrowing of the proximal M1  segment of the left middle cerebral artery. No flow-limiting stenosis.  There is also an area of focal stenosis in the distal M1 segment of the  right middle cerebral artery. However I believe this is due to the  tortuosity of the vessel and not a true  stenosis. The A1 segment of the  right LEATHA is relatively smaller than the left. However A2 and A3  segments bilaterally are symmetrical. There is subsequent normal  insular, opercular and cortical branches of the middle cerebral arteries  bilaterally. No areas of flow-limiting stenosis or aneurysmal  dilatation.     Normal size vertebral arteries enter the foramen magnum and join to make  a normal size basilar artery which subsequently divides into normal.  Posterior cerebral arteries bilaterally. No areas of focal stenosis or  aneurysmal dilatation.       Impression:      1. Moderate irregularity and diffuse narrowing of the M1 segment of the  left middle cerebral artery with less than 50% stenosis. The remaining  intracranial circulation is unremarkable as detailed above. No foci of  flow-limiting stenosis or aneurysmal dilatation.  2. The NASCET criteria were utilized for estimation of carotid arterial  stenosis.                    This report was signed and finalized on 7/11/2024 12:43 PM by Dr. Patrick Whitley MD.               Intake/Output    Intake/Output Summary (Last 24 hours) at 7/14/2024 1240  Last data filed at 7/14/2024 0800  Gross per 24 hour   Intake 240 ml   Output 2300 ml   Net -2060 ml       Scheduled Meds  aspirin, 81 mg, Oral, Daily   Or  aspirin, 300 mg, Rectal, Daily  atorvastatin, 80 mg, Oral, Nightly  enoxaparin, 40 mg, Subcutaneous, Daily  fluconazole, 150 mg, Oral, Q24H  insulin glargine, 30 Units, Subcutaneous, Nightly  insulin lispro, 2-7 Units, Subcutaneous, 4x Daily AC & at Bedtime  lisinopril, 40 mg, Oral, Q24H  naproxen, 500 mg, Oral, BID With Meals  pantoprazole, 40 mg, Intravenous, Q AM  sodium chloride, 10 mL, Intravenous, Q12H        I have reviewed the patient current medications.     Assessment/Plan     Active Hospital Problems    Diagnosis     **Acute ischemic stroke left  internal capsule lacunar and  right postcentral gyrus at the vertex.     B12 deficiency     Moderate  tricompartmental osteoarthritis left knee with small knee joint  effusion.     Acute stroke due to ischemia     Acute focal neurological deficit     UTI (urinary tract infection), bacterial     Uncontrolled type 2 diabetes mellitus with hyperglycemia     PAD (peripheral artery disease)     Coronary artery disease involving native coronary artery of native heart without angina pectoris     Essential hypertension        Treatment Plan:    1.  Acute ischemic stroke left internal capsule lacunar and right postcentral gyrus at the vertex.  Presented with 3-day history of slurred speech.  CT head noted mild cerebral atrophy and white matter gliosis.  No acute cerebral pathology.  Aspirin, statin ordered on admission.  SCDs for deep vein thrombosis prophylaxis.  No lytic therapy given due to patient presented 3 to 4 days after onset of symptoms.    MRI of the brain 7/11 noted 2 small foci of acute ischemia, posterior limb left internal capsule or lacunar infarct and second tiny cortical infarct right postcenteral gyrus at the vertex chronic small vessel ischemic changes.  CTA head moderate irregularity and diffuse narrowing of M1 segment of left middle cerebral artery with less than 50% stenosis.  No foci of flow-limiting stenosis or aneurysmal dilatation.  CTA neck atheromatous changes of carotid and vertebral arteries.  70% focal stenosis right carotid bulb, 60% focal stenosis distal D1 segment left vertebral artery and 50% stenosis distal V1 segment right vertebral artery before entering the foramen transversarium C6.  ECHO 7/11/2024 EF 56-60%.  Left ventricular diastolic function normal.  Saline test negative.    Physical therapy, Occupational Therapy, speech therapy consulted.  Regular diet with thin liquids and medications whole.  OT notes maximal assist for toileting tasks.  Some right upper extremity weakness.  Will need OT intervention to address mobility, activity tolerance, balance, coordination.  Physical  therapy patient moderate assist to ambulate a few feet with rolling walker.  Extremity buckled on edge of bed.  Took steps to side and needs cues for attention.  Rehab recommended for unsafe gait and moderate assistance.  SNF recommended, patient agreeable and referral to [facility].  Bed offer pending insurance approval.    TSH 0.888, free T41.29.  Lipid panel LDL 38 at goal less than 70.  Triglycerides 175, cholesterol 104.    2.  Abnormal urinalysis.  Too numerous to count WBC, trace bacteria, moderate yeast.  Diflucan given.  Rocephin started.  Urine culture yeast.    3.  Diabetes mellitus type 2, poorly controlled with hyperglycemia.  Hemoglobin A1c 13.2.  Patient has not taken insulin recently due to cost.  Accu Checks with sliding scale insulin coverage.  Glucoses 254, 189, 184..  Increase Lantus to 30 units nightly.  Diabetic educator consulted and discussed with CLAUDIO Avendaño and wife in contact with [facility] about assisting paying for additional medications.  Unable to afford $106 for Lantus from meds to beds.  Prefers the $24.99 option  for 70/30 insulin from [facility].  Patient will follow-up with Dr. Betty Mcguire after discharge and assist with medications.    4.  Primary hypertension blood pressure 129/70, 155/81.  Lisinopril increased to 40 mg orally daily.    5.  Peripheral artery disease.  Follows with Dr. Poole with plans for right lower extremity angiogram 7/22/2024.    6.  History of coronary artery disease.  Aspirin and statin continued.    7.  Hypokalemia.  Potassium 3.5 on 7/11, replaced.  Potassium 3.8 today. Magnesium level 1.8    8.  Left knee pain.  X-ray shows moderate tricompartmental osteoarthritis with small knee joint effusion.  No acute osseous injury.  Orthopedics consulted and Dr. Max recommends no acute treatment.  Ice compression.  Weightbearing as tolerated.  If continued with pain and swelling after discharge follow-up as outpatient.    9.  Lovenox and SCDs for  deep vein thrombosis prophylaxis.    10.  Social service for discharge planning.  [facility] bed offered pending insurance approval.    Medical Decision Making  Number and Complexity of problems: 8  Acute ischemic stroke: Acute, high complexity poses threat to life and bodily function not at baseline  Abnormal urinalysis: Acute, moderate complexity stable  Diabetes mellitus type 2 with hyperglycemia: Chronic, high complexity, not at baseline  Primary hypertension: Chronic, high complexity, not at baseline, improving  Peripheral artery disease: Chronic, high complexity needs intervention  History of coronary artery disease: Chronic, moderate complexity, stable  Hypokalemia: Acute, moderate complexity, not at baseline  Left knee pain with osteoarthritis and small knee joint effusion: Acute, moderate complexity    Differential Diagnosis: None    Conditions and Status       Condition is improving     [facility] Data  External documents reviewed: Care everywhere.  ER visit [facility] 7/9/2024.  [facility] reviewed vascular surgery office visits.  Cardiac tracing (EKG, telemetry) interpretation: Normal sinus rhythm 73 on telemetry  Radiology interpretation: Reviewed radiology interpretation MRI of the brain, CTA head neck.  X-ray left knee  Labs reviewed:   BMP 7/14/2024.  Repeat BMP in AM.  CBC  7/13/2024.  Repeat CBC in AM.  Hemoglobin A1c, lipid panel, TSH  Urine culture yeast    Any tests that were considered but not ordered: None     Decision rules/scores evaluated (example [id number], [place], etc): None     Discussed with: Dr. Paz, patient and wife.     Care Planning  Shared decision making: Dr. Paz, patient and wife.  Patient agrees to neurology consult, aspirin, statin, insulin, addition of blood pressure medications, physical therapy, referral to [facility].   Code status and discussions: Full code  Patient surrogate decision maker is his wife, Kavya    Disposition  Social Determinants of Health that  impact treatment or disposition: None  I expect the patient to be discharged to [facility] if  insurance approves.    Electronically signed by CLAUDIO Guzmán, 07/14/24, 12:40 CDT.    The above documentation resulted from a face-to-face encounter by me Brenda SNYDER, [facility].

## 2024-07-14 NOTE — PLAN OF CARE
Goal Outcome Evaluation:  Plan of Care Reviewed With: patient, spouse        Progress: no change  Outcome Evaluation: pt c/o swollen and painful L knee, pt with decreased awareness of right side, will attend to R side with cues, pt trans to EOB mod assist, BLE A-AAROM, sit-stand mod assist, pt with LOB posterior upon first standing, practiced sit-stand x 6 reps, took few side steps with rwx to L, min-mod, pt required assist to wt shift and cues to move feet, trans back to bed mod assist, pt would benefit from SNF

## 2024-07-15 VITALS
HEIGHT: 63 IN | SYSTOLIC BLOOD PRESSURE: 142 MMHG | RESPIRATION RATE: 18 BRPM | HEART RATE: 78 BPM | TEMPERATURE: 98.1 F | OXYGEN SATURATION: 97 % | BODY MASS INDEX: 30.12 KG/M2 | WEIGHT: 170 LBS | DIASTOLIC BLOOD PRESSURE: 77 MMHG

## 2024-07-15 LAB
ANION GAP SERPL CALCULATED.3IONS-SCNC: 9 MMOL/L (ref 5–15)
BUN SERPL-MCNC: 19 MG/DL (ref 8–23)
BUN/CREAT SERPL: 32.2 (ref 7–25)
CALCIUM SPEC-SCNC: 8.8 MG/DL (ref 8.6–10.5)
CHLORIDE SERPL-SCNC: 103 MMOL/L (ref 98–107)
CO2 SERPL-SCNC: 23 MMOL/L (ref 22–29)
CREAT SERPL-MCNC: 0.59 MG/DL (ref 0.76–1.27)
EGFRCR SERPLBLD CKD-EPI 2021: 106.3 ML/MIN/1.73
GLUCOSE BLDC GLUCOMTR-MCNC: 167 MG/DL (ref 70–130)
GLUCOSE BLDC GLUCOMTR-MCNC: 197 MG/DL (ref 70–130)
GLUCOSE BLDC GLUCOMTR-MCNC: 212 MG/DL (ref 70–130)
GLUCOSE SERPL-MCNC: 193 MG/DL (ref 65–99)
POTASSIUM SERPL-SCNC: 3.8 MMOL/L (ref 3.5–5.2)
SODIUM SERPL-SCNC: 135 MMOL/L (ref 136–145)

## 2024-07-15 PROCEDURE — 97530 THERAPEUTIC ACTIVITIES: CPT

## 2024-07-15 PROCEDURE — 63710000001 INSULIN LISPRO (HUMAN) PER 5 UNITS: Performed by: FAMILY MEDICINE

## 2024-07-15 PROCEDURE — 97116 GAIT TRAINING THERAPY: CPT

## 2024-07-15 PROCEDURE — 82948 REAGENT STRIP/BLOOD GLUCOSE: CPT

## 2024-07-15 PROCEDURE — 63710000001 INSULIN LISPRO (HUMAN) PER 5 UNITS: Performed by: NURSE PRACTITIONER

## 2024-07-15 PROCEDURE — 25010000002 ENOXAPARIN PER 10 MG: Performed by: FAMILY MEDICINE

## 2024-07-15 PROCEDURE — 97110 THERAPEUTIC EXERCISES: CPT

## 2024-07-15 PROCEDURE — 80048 BASIC METABOLIC PNL TOTAL CA: CPT | Performed by: FAMILY MEDICINE

## 2024-07-15 PROCEDURE — 97535 SELF CARE MNGMENT TRAINING: CPT

## 2024-07-15 RX ORDER — LISINOPRIL 40 MG/1
40 TABLET ORAL
Start: 2024-07-16

## 2024-07-15 RX ORDER — INSULIN LISPRO 100 [IU]/ML
2-7 INJECTION, SOLUTION INTRAVENOUS; SUBCUTANEOUS
Status: DISCONTINUED | OUTPATIENT
Start: 2024-07-15 | End: 2024-07-15 | Stop reason: HOSPADM

## 2024-07-15 RX ORDER — ATORVASTATIN CALCIUM 80 MG/1
80 TABLET, FILM COATED ORAL NIGHTLY
Start: 2024-07-15

## 2024-07-15 RX ORDER — INSULIN LISPRO 100 [IU]/ML
2-7 INJECTION, SOLUTION INTRAVENOUS; SUBCUTANEOUS
Start: 2024-07-15

## 2024-07-15 RX ORDER — NICOTINE 21 MG/24HR
1 PATCH, TRANSDERMAL 24 HOURS TRANSDERMAL DAILY PRN
Start: 2024-07-15

## 2024-07-15 RX ADMIN — PANTOPRAZOLE SODIUM 40 MG: 40 INJECTION, POWDER, FOR SOLUTION INTRAVENOUS at 06:28

## 2024-07-15 RX ADMIN — ASPIRIN 81 MG CHEWABLE TABLET 81 MG: 81 TABLET CHEWABLE at 08:17

## 2024-07-15 RX ADMIN — INSULIN LISPRO 3 UNITS: 100 INJECTION, SOLUTION INTRAVENOUS; SUBCUTANEOUS at 11:17

## 2024-07-15 RX ADMIN — Medication 10 ML: at 06:28

## 2024-07-15 RX ADMIN — FLUCONAZOLE 150 MG: 150 TABLET ORAL at 13:04

## 2024-07-15 RX ADMIN — Medication 10 ML: at 08:19

## 2024-07-15 RX ADMIN — NAPROXEN 500 MG: 500 TABLET ORAL at 08:18

## 2024-07-15 RX ADMIN — LISINOPRIL 40 MG: 20 TABLET ORAL at 08:18

## 2024-07-15 RX ADMIN — ENOXAPARIN SODIUM 40 MG: 100 INJECTION SUBCUTANEOUS at 08:18

## 2024-07-15 RX ADMIN — INSULIN LISPRO 2 UNITS: 100 INJECTION, SOLUTION INTRAVENOUS; SUBCUTANEOUS at 08:17

## 2024-07-15 NOTE — PLAN OF CARE
Goal Outcome Evaluation:         Patient A/O x2-3 this shift (thought it was 2017 at shift change [facility]). Patient had no complaints of pain this shift. Patient able to get some good sleep between rounding/care. Family stayed at bedside overnight. Personal items and call button in reach. Will update oncoming dayshift nurse at bedside shift report in the a.m. as appropriate.

## 2024-07-15 NOTE — CASE MANAGEMENT/SOCIAL WORK
Continued Stay Note  [facility]     Patient Name: Anival Lucero  MRN: [id number]  Today's Date: 7/15/2024    Admit Date: 7/10/2024    Plan: [facility]   Discharge Plan       Row Name 07/15/24 1106       Plan    Plan [facility]    Patient/Family in Agreement with Plan yes    Final Discharge Disposition Code 03 - skilled nursing facility (SNF)    Final Note Pt is being discharged to [facility] today, spoke with Lucie at facility.  Pt to be admitted at SNF level care.  Informed DEVORAH LUCERO Spouse [phone], family to transport via car.    [facility]     [phone]  Fax: [phone] or [phone] (efax)        Row Name 07/15/24 0900       Plan    Plan [facility] upon ins. precert approval    Patient/Family in Agreement with Plan yes    Plan Comments Waiting on ins. precert approval for pt going to [facility], will inform upon completion.                   Discharge Codes    No documentation.                       NELI CohenW

## 2024-07-15 NOTE — DISCHARGE PLACEMENT REQUEST
"Art Pennington (68 y.o. Male)       Date of Birth   1955    Social Security Number       Address   [address] [address]    Home Phone   [phone]    MRN   [id number]       Judaism   Confucianist    Marital Status                               Admission Date   7/10/24    Admission Type   Emergency    Admitting Provider   Miguel Dillard DO    Attending Provider   Miguel Dillard DO    Department, Room/Bed   [facility] 3A, 351/1       Discharge Date       Discharge Disposition   Skilled Nursing Facility ([place] - External)    Discharge Destination                                 Attending Provider: Miguel Dillard DO    Allergies: Aspirin, Bee Venom    Isolation: None   Infection: None   Code Status: CPR    Ht: 160 cm (63\")   Wt: 77.1 kg (170 lb)    Admission Cmt: None   Principal Problem: Acute ischemic stroke left  internal capsule lacunar and  right postcentral gyrus at the vertex. [I63.512]                   Active Insurance as of 7/10/2024       Primary Coverage       Payor Plan Insurance Group Employer/Plan Group    HUMANA MEDICARE REPLACEMENT HUMANA MED ADV PPO [id number]       Payor Plan Address Payor Plan Phone Number Payor Plan Fax Number Effective Dates    [address] [address] [phone]  1/1/2023 - None Entered    [facility], located within [facility] [address]         Subscriber Name Subscriber Birth Date Member ID       ART PENNINGTON 1955 [id number]                     Emergency Contacts        (Rel.) Home Phone Work Phone Mobile Phone    DEVORAH PENNINGTON (Spouse) [phone] -- --    AAMIR PENNINGTON (Daughter) [phone] -- --                 Discharge Summary        Brenda Delgado, APRN at 07/15/24 1107                [facility]  DISCHARGE SUMMARY       Date of Admission: 7/10/2024  Date of Discharge:  7/15/2024  Primary Care Physician: Betty Mcguire MD    Presenting Problem/History of Present " Illness:  Slurred speech    Final Discharge Diagnoses:  Active Hospital Problems    Diagnosis     **Acute ischemic stroke left  internal capsule lacunar and  right postcentral gyrus at the vertex.     B12 deficiency     Moderate tricompartmental osteoarthritis left knee with small knee joint  effusion.     Acute stroke due to ischemia     Acute focal neurological deficit     UTI (urinary tract infection), bacterial     Uncontrolled type 2 diabetes mellitus with hyperglycemia     PAD (peripheral artery disease)     Coronary artery disease involving native coronary artery of native heart without angina pectoris     Essential hypertension        Consults: Dr. Bell, neurology    Procedures Performed: None    Pertinent Test Results:   Results for orders placed during the hospital encounter of 07/10/24    Adult Transthoracic Echo Complete W/ Cont if Necessary Per Protocol    Interpretation Summary    Left ventricular ejection fraction appears to be 56 - 60%.    The following left ventricular wall segments are hypokinetic: apical septal and apex hypokinetic.    Left ventricular diastolic function was normal.    Saline test results are negative.    Estimated right ventricular systolic pressure from tricuspid regurgitation is normal (<35 mmHg).      Imaging Results (All)       Procedure Component Value Units Date/Time    CT Angiogram Neck [[id number]] Collected: 07/11/24 1243     Updated: 07/11/24 1303    Narrative:      EXAMINATION: CT ANGIOGRAM NECK-      7/11/2024 10:42 AM     HISTORY: Stroke, follow up     In order to have a CT radiation dose as low as reasonably achievable  Automated Exposure Control was utilized for adjustment of the mA and/or  KV according to patient size.     Total DLP = 877.44 mGy.cm     The CT angiography of the neck is performed after intravenous contrast  enhancement.     Images are acquired in axial plane and subsequent 2D reconstruction  coronal and sagittal planes and 3D maximum intensity  projection  reconstruction.     There is no previous similar study for comparison. The correlation made  with MR imaging of the brain and CT angiography of the head performed  today.     Atheromatous changes of the limited visualized aortic arch is noted. No  aneurysmal dilatation or dissection.     Atheromatous plaques are seen at the origin of the brachiocephalic, left  common carotid and left subclavian arteries. No significant stenosis.     The brachiocephalic trunk divides into normal appearing right subclavian  and right common carotid arteries.     Both common carotid arteries have a normal course and caliber throughout  the neck. No areas of focal stenosis or aneurysmal dilatation.     There is noncalcific plaque in the distal 3 cm length of the right  common carotid artery extending into the bifurcation. There is 30%  stenosis of the long segment of the distal right common carotid artery.  The plaque extends into the bifurcation and along the posterior aspect  of the proximal carotid bulb and approximately 70% stenosis of the bulb.  The remaining left carotid bulb and left internal carotid artery is  normal. The right external carotid artery is normal. Normal branches.     There is a noncalcific plaque in the distal left common carotid artery  extending no significant stenosis of the common carotid artery or the  carotid bulb. There is a small rounded hyperdensity along the anterior  aspect of the base of the carotid bulb which probably represent a small  calcification in the plaque. I do not believe this represent an  ulcerated plaque. The remaining left internal carotid artery is normal  in course and caliber. There is 50% stenosis of the origin of the left  external carotid artery. Subsequent normal branches.     There is normal origin of both vertebral arteries. There is an area of  60% stenosis of the distal V1 segment of the left vertebral artery  before it enters the C6 foramen transversarium. There  is approximately  50% focal stenosis of the right vertebral artery in the foramen  transversarium of C6. Remaining vertebral artery bilaterally are normal  in course and caliber. Normal size, right dominant, vertebral artery  enter the foramen magnum and subsequent to join to make a normal size  basilar artery.     The limited visualized soft tissues of the neck are unremarkable.  Heterogeneous thyroid gland is seen with probable nodules on both sides,  right more than the left.     Limited visualized lungs show a small spiculated nodule in the right  upper lobe, image #108 in series 2, measuring 8 mm in diameter. This  need to be further evaluated with CT scan of the chest.       Impression:      1. Atheromatous changes of the carotid and vertebral arteries. 70% focal  stenosis of the right carotid bulb 60% focal stenosis of the distal V1  segment of the left vertebral artery and 50% stenosis of the distal V1  segment of the right vertebral artery before entering the foramen  transversarium of C6. The details are given above.  2. NASCET criteria were utilized for estimation of carotid arterial  stenosis.           This report was signed and finalized on 7/11/2024 1:00 PM by Dr. Patrick Whitley MD.       CT Angiogram Head w AI Analysis of LVO [[id number]] Collected: 07/11/24 1232     Updated: 07/11/24 1246    Narrative:      EXAMINATION: CT ANGIOGRAM HEAD W AI ANALYSIS OF LVO-     7/11/2024 10:42 AM     HISTORY: Stroke, follow up     CT angiography of the head is performed before and after intravenous  contrast enhancement.     Images are acquired in axial plane and subsequent 2D reconstruction in  coronal and sagittal planes and 3D maximum intensity projection  reconstruction.     There is no previous similar study for comparison. The correlation made  with MR imaging of the brain performed earlier today.     Unenhanced images of the brain show moderate diffuse chronic ischemic  and atrophic changes. No acute  intracranial abnormality. No acute bony  abnormality.     The post enhancement images show normal size internal carotid arteries  at the skull base and in the carotid canal bilaterally.     Atheromatous changes are seen in the internal carotid arteries in the  cavernous sinus bilaterally. No areas of flow-limiting stenosis or  aneurysmal dilatation.     Normal size suprasellar internal carotid artery bilaterally is seen  dividing into anterior and middle cerebral arteries bilaterally. There  is moderate irregularity and mild diffuse narrowing of the proximal M1  segment of the left middle cerebral artery. No flow-limiting stenosis.  There is also an area of focal stenosis in the distal M1 segment of the  right middle cerebral artery. However I believe this is due to the  tortuosity of the vessel and not a true stenosis. The A1 segment of the  right LEATHA is relatively smaller than the left. However A2 and A3  segments bilaterally are symmetrical. There is subsequent normal  insular, opercular and cortical branches of the middle cerebral arteries  bilaterally. No areas of flow-limiting stenosis or aneurysmal  dilatation.     Normal size vertebral arteries enter the foramen magnum and join to make  a normal size basilar artery which subsequently divides into normal.  Posterior cerebral arteries bilaterally. No areas of focal stenosis or  aneurysmal dilatation.       Impression:      1. Moderate irregularity and diffuse narrowing of the M1 segment of the  left middle cerebral artery with less than 50% stenosis. The remaining  intracranial circulation is unremarkable as detailed above. No foci of  flow-limiting stenosis or aneurysmal dilatation.  2. The NASCET criteria were utilized for estimation of carotid arterial  stenosis.   This report was signed and finalized on 7/11/2024 12:43 PM by Dr. Patrick Whitley MD.       MRI Brain Without Contrast [[id number]] Collected: 07/11/24 1222     Updated: 07/11/24 1227     Narrative:      MRI BRAIN WO CONTRAST- 7/11/2024 10:20 AM     HISTORY: stroke like symptoms, dysarthria. tongue deviation;  R47.81-Slurred speech; E11.65-Type 2 diabetes mellitus with  hyperglycemia; R13.10-Dysphagia, unspecified     TECHNIQUE: Multisequence, multiplanar MRI of the brain without contrast     COMPARISON: CT scans dated 7/11/2024     FINDINGS:     There are 2 small foci of diffusion restriction. This includes a  posterior limb left internal capsule lacunar infarct (series 203-image  19) as well what appears to be a tiny cortical infarct in the right  postcentral gyrus (series 203-image 25, series 302-image 94). Moderate  chronic small vessel ischemic changes. No intra-axial or extra-axial  hemorrhage. No intracranial mass lesion or mass effect. The ventricles,  cortical sulci and basal cisterns are symmetric and age appropriate.  Posterior fossa structures are unremarkable. Pituitary gland and sella  are unremarkable. The major intracranial flow-voids are preserved.  Orbital contents are unremarkable. The paranasal sinuses are clear.  Mastoid air cells are clear. Incidentally noted scalp trichilemmal  cysts.       Impression:         1.  There are 2 small foci of acute ischemia, a posterior limb left  internal capsule lacunar infarct and a second tiny cortical infarct in  the right postcentral gyrus at the vertex.  2.  Chronic small vessel ischemic changes.     This report was signed and finalized on 7/11/2024 12:26 PM by Dr Mekhi Malone.       XR Knee 3 View Left [[id number]] Collected: 07/10/24 1751     Updated: 07/10/24 1755    Narrative:      XR KNEE 3 VW LEFT-     HISTORY: Knee pain/fall and injury     COMPARISON: 9/13/2022     FINDINGS: 3 views of the left knee are obtained.     There is moderate tricompartmental osteoarthritis with bony spurring  greatest of the lateral femoral condyle. Osteopenia is suspected. Small  knee joint effusion. No acute fracture or dislocation. Mild  vascular  calcification.       Impression:      1. Moderate tricompartmental osteoarthritis with small knee joint  effusion. No acute osseous injury.        This report was signed and finalized on 7/10/2024 5:52 PM by Dr. Ragini Soto MD.             LAB RESULTS:      Lab 07/13/24  0407 07/12/24  0402 07/11/24  0539 07/10/24  1657   WBC 11.77* 11.37* 13.25* 12.89*   HEMOGLOBIN 13.8 14.3 14.0 17.3   HEMATOCRIT 40.0 39.8 40.7 47.9   PLATELETS 202 205 218 224   NEUTROS ABS  --   --   --  10.26*   IMMATURE GRANS (ABS)  --   --   --  0.05   LYMPHS ABS  --   --   --  1.42   MONOS ABS  --   --   --  1.04*   EOS ABS  --   --   --  0.06   MCV 92.2 89.2 90.2 89.5         Lab 07/15/24  0411 07/14/24  0512 07/13/24  1449 07/13/24  0407 07/12/24  0402 07/11/24  0450 07/10/24  1657   SODIUM 135* 136  --  136 134* 135* 133*   POTASSIUM 3.8 3.8  --  3.5 3.9 3.5 4.5   CHLORIDE 103 102  --  104 101 102 96*   CO2 23.0 23.0  --  24.0 23.0 22.0 23.0   ANION GAP 9.0 11.0  --  8.0 10.0 11.0 14.0   BUN 19 11  --  11 9 10 15   CREATININE 0.59* 0.53*  --  0.62* 0.50* 0.42* 0.67*   EGFR 106.3 109.8  --  104.8 111.8 117.8 102.3   GLUCOSE 193* 184*  --  191* 254* 233* 466*   CALCIUM 8.8 8.7  --  8.7 8.7 8.5* 9.1   MAGNESIUM  --   --  1.8  --   --   --  1.9   HEMOGLOBIN A1C  --   --   --   --   --  13.20*  --    TSH  --   --   --   --   --  0.888  --          Lab 07/10/24  1657   TOTAL PROTEIN 6.8   ALBUMIN 3.8   GLOBULIN 3.0   ALT (SGPT) 5   AST (SGOT) 13   BILIRUBIN 0.5   ALK PHOS 92             Lab 07/11/24  0450   CHOLESTEROL 104   LDL CHOL 38   HDL CHOL 37*   TRIGLYCERIDES 175*         Lab 07/11/24  0838   VITAMIN B 12 171*         Lab 07/10/24  1723   PH, ARTERIAL 7.472*   PCO2, ARTERIAL 29.1*   PO2 ART 80.7*   O2 SATURATION ART 98.1   HCO3 ART 21.3   BASE EXCESS ART -1.0*     Brief Urine Lab Results  (Last result in the past 365 days)        Color   Clarity   Blood   Leuk Est   Nitrite   Protein   CREAT   Urine HCG        07/10/24  1847 Yellow   Cloudy   Trace   Moderate (2+)   Negative   100 mg/dL (2+)                 Microbiology Results (last 10 days)       Procedure Component Value - Date/Time    Urine Culture - Urine, Urine, Clean Catch [[id number]]  (Abnormal) Collected: 07/10/24 1847    Lab Status: Final result Specimen: Urine, Clean Catch Updated: 07/13/24 1139     Urine Culture Yeast isolated     Comment: No further workup.       Narrative:      Colonization of the urinary tract without infection is common. Treatment is discouraged unless the patient is symptomatic, pregnant, or undergoing an invasive urologic procedure.            Hospital Course: Anival Pennington presented to [facility] emergency room 7/10/2024 with slurred speech for the past 3 days.  He was initially seen at [facility] on 7/9 with similar symptoms.  CT scan of the head was negative and patient left AMA.  He presented to our facility on 7/10 noting tongue deviation to the right, coughing when attempting to drink water and slight right-sided facial drooping.  Patient reported he went to the bathroom earlier on the day of admission, twisted his leg and fell injuring his left knee.  He denied any other injuries.  He reports a history of coronary artery disease, peripheral vascular disease, diabetes mellitus, tobacco use.  Patient reported he has been off of his insulin for several months due to cost.  He reported leg circulation evaluated by Dr. Poole vascular surgery June 2024 with recommendations for arteriogram 7/22/24 due to abnormal SHELBY.  Patient was noted to have slurring of speech.  He moved extremities equally.  WBC 12.89, glucose 110, 466, 308, creatinine 0.67, urinalysis too numerous to count WBC, trace bacteria, moderate yeast.  X-ray left knee moderate tricompartmental osteoarthritis with small knee effusion.  No acute osseous injury.  Normal saline, insulin given in ER.     He was admitted to the neurology floor with acute ischemic  stroke left internal capsule lacunar and right postcentral gyrus at the vertex.  He presented with 3-day history of slurred speech.  CT head noted mild cerebral atrophy and white matter gliosis.  No acute cerebral pathology.  Aspirin, statin ordered on admission.  SCDs ordered for deep vein thrombosis prophylaxis.  No lytic therapy given due to patient presented 3 to 4 days after onset of symptoms.  Neurology consulted and he was followed by Dr. Bell.     MRI of the brain 7/11 noted 2 small foci of acute ischemia, posterior limb left internal capsule or lacunar infarct and second tiny cortical infarct right postcenteral gyrus at the vertex chronic small vessel ischemic changes.  CTA head moderate irregularity and diffuse narrowing of M1 segment of left middle cerebral artery with less than 50% stenosis.  No foci of flow-limiting stenosis or aneurysmal dilatation.  CTA neck atheromatous changes of carotid and vertebral arteries.  70% focal stenosis right carotid bulb, 60% focal stenosis distal D1 segment left vertebral artery and 50% stenosis distal V1 segment right vertebral artery before entering the foramen transversarium C6.  ECHO 7/11/2024 EF 56-60%.  Left ventricular diastolic function normal.  Saline test negative.     Physical therapy, Occupational Therapy, speech therapy consulted.  Regular diet with thin liquids and medications whole.  OT notes maximal assist for toileting tasks.  Some right upper extremity weakness.  Will need OT intervention to address mobility, activity tolerance, balance, coordination.  Physical therapy patient moderate assist to ambulate a few feet with rolling walker.  Extremity buckled on edge of bed.  Took steps to side and needs cues for attention.  Rehab recommended for unsafe gait and moderate assistance.  SNF recommended, patient agreeable and referral to [facility].  Bed offered and insurance approved on 7/15/2024.     TSH 0.888, free T41.29.  Lipid panel LDL 38 at goal  less than 70.  Triglycerides 175, cholesterol 104.     Abnormal urinalysis with too numerous to count WBC, trace bacteria, moderate yeast.  Diflucan given.  Rocephin started.  Urine culture yeast.     Diabetes mellitus type 2, poorly controlled with hyperglycemia.  Hemoglobin A1c 13.2.  Patient has not taken insulin recently due to cost.  Accu Checks with sliding scale insulin coverage.  Lantus insulin started and uptitrated to 40 units nightly throughout hospitalization.  Glucoses 167, 193, 197. Diabetic educator consulted and discussed with CLAUDIO Avendaño and wife in contact with [facility] about assistance program paying for additional medications.  Unable to afford $106 for Lantus from meds to beds.  Prefers the $24.99 option  for 70/30 insulin from [facility].  Patient will follow-up with Dr. Betty Mcguire after discharge and assist with medications take early insulin.    Patient with primary hypertension and had not taken medications recently.  Lisinopril initiated and increased to 40 mg orally daily throughout hospitalization.  Most recent blood pressure 143/85 prior to discharge.    He has a history of peripheral artery disease and follows with Dr. Poole with plans for right lower extremity arteriogram on 7/22/2024.    Aspirin and statin continued for history of coronary artery disease.    Potassium 3.5 on 7/11 and replaced.  Follow-up potassium level 3.8.  Magnesium level 1.8     Patient fell at home and reported left knee pain.  X-ray reported moderate tricompartmental osteoarthritis with small knee joint effusion.  No acute osseous injury.  Orthopedics consulted and Dr. Max recommends no acute treatment.  Ice compression.  Weightbearing as tolerated.  If continued with pain and swelling after discharge follow-up as outpatient.     Lovenox and SCDs for deep vein thrombosis prophylaxis.    Social service consulted for discharge planning.  [facility] offered bed and insurance approved on 7/15/2024    "  On 7/15/2024, he will be discharged to [facility] for ongoing physical therapy, Occupational Therapy and speech therapy prior to consideration for discharge home.  Patient will remain on aspirin 81 mg orally daily, atorvastatin increased to 80 mg orally nightly.  Discussed smoking cessation.  Nicotine patch as needed.  He has been started on Lantus 40 units nightly and may uptitrate pending glucoses.  Lisinopril 40 mg orally daily added for blood pressure.  Patient will follow-up with Dr. Betty Mcguire in 1 to 2 weeks on 7/24/2024 to follow-up on diabetes and hypertension.  Patient will need assistance with medications and insulin after discharge.  Follow-up with neurology in 4 weeks in office will contact patient with date and time of appointment.        Physical Exam on Discharge:  /77 (BP Location: Right arm, Patient Position: Sitting)   Pulse 78   Temp 98.1 °F (36.7 °C) (Oral)   Resp 18   Ht 160 cm (63\")   Wt 77.1 kg (170 lb)   SpO2 97%   BMI 30.11 kg/m²   Physical Exam  Vitals and nursing note reviewed.   Constitutional:       Comments: Sitting up in bed.  No oxygen in use.  Wife in room.   HENT:      Head: Normocephalic and atraumatic.      Nose: No congestion.      Mouth/Throat:      Pharynx: Oropharynx is clear. No oropharyngeal exudate or posterior oropharyngeal erythema.   Eyes:      Extraocular Movements: Extraocular movements intact.      Pupils: Pupils are equal, round, and reactive to light.   Cardiovascular:      Rate and Rhythm: Normal rate and regular rhythm.      Heart sounds: No murmur heard.     Comments: Normal sinus rhythm 61-78 on telemetry.  Pulmonary:      Breath sounds: No wheezing, rhonchi or rales.      Comments: No oxygen in use  Abdominal:      Palpations: Abdomen is soft.      Tenderness: There is no abdominal tenderness.   Genitourinary:     Comments: Voiding.  Musculoskeletal:         General: Tenderness (Left knee) present.      Cervical back: Normal range of motion " and neck supple.   Skin:     General: Skin is warm and dry.   Neurological:      Mental Status: He is alert and oriented to person, place, and time.      Comments: Speech slurred but understandable, slow with hesitation.  Moves extremities.  Follows commands.  Mild right facial drooping.  Lower extremity weakness and discoordination with activity.   Psychiatric:         Mood and Affect: Mood normal.         Behavior: Behavior normal.         Condition on Discharge: Stable for discharge to [facility]    Discharge Disposition:  Skilled Nursing Facility ([place] - External)    Discharge Medications:     Discharge Medications        New Medications        Instructions Start Date   atorvastatin 80 MG tablet  Commonly known as: LIPITOR   80 mg, Oral, Nightly      insulin glargine 100 UNIT/ML injection  Commonly known as: LANTUS, SEMGLEE   40 Units, Subcutaneous, Nightly      Insulin Lispro 100 UNIT/ML injection  Commonly known as: humaLOG   2-7 Units, Subcutaneous, 3 Times Daily With Meals.  Glucose 150-199 2 units, 200-249 3 units, 250-299 4 units, 300-349 5 units, 350-400 6 units, greater than 400 7 units and call provider      lisinopril 40 MG tablet  Commonly known as: PRINIVIL,ZESTRIL   40 mg, Oral, Every 24 Hours Scheduled   Start Date: July 16, 2024     nicotine 14 MG/24HR patch  Commonly known as: NICODERM CQ   1 patch, Transdermal, Daily PRN             Continue These Medications        Instructions Start Date   aspirin 81 MG chewable tablet   81 mg, Oral, Daily      nitroglycerin 0.4 MG SL tablet  Commonly known as: NITROSTAT   0.4 mg, Sublingual, Every 5 Minutes PRN, Take no more than 3 doses in 15 minutes.             Stop These Medications      rosuvastatin 10 MG tablet  Commonly known as: Crestor            Discharge Diet:   Diet Instructions       Diet: Diabetic Diets; Consistent Carbohydrate; Regular (IDDSI 7); Thin (IDDSI 0)      Discharge Diet: Diabetic Diets    Diabetic Diet: Consistent Carbohydrate     Texture: Regular (IDDSI 7)    Fluid Consistency: Thin (IDDSI 0)            Activity at Discharge:   Activity Instructions       Activity as Tolerated              Discharge instructions:  1.  For worsening speech difficulty, extremity weakness seek medical attention.  2.  Aspirin 81 mg orally daily indefinitely  3.  Atorvastatin 80 mg orally nightly  4.  Lisinopril 40 mg orally daily  5.  Lantus 40 units nightly  6.  Accu-Cheks with sliding scale insulin coverage 3 times daily with meals.  Glucose 150-199 2 units, 200-249 3 units, 250-299 4 units, 300-349 5 units, 350-400 6 units, greater than 400 7 units and call provider  7.  Discussed smoking cessation  8.  Follow-up with Dr. Keiry Mcguire 7/20/2024 needs assistance with insulin and medication assistance program  9.  Follow-up with neurology 4 weeks.  Office will contact patient with date and time of appointment.  10.  Follow-up with Dr. 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  Office will contact patient with date and time of appointment.  Follow-up with Dr. Max as needed for left knee effusion    Test Results Pending at Discharge: None    Electronically signed by CLAUDIO Guzmán, 07/15/24, 11:38 CDT.    Time: 35 minutes.  Discussed with Dr. Dillard, patient, and wife.    The above documentation resulted from a face-to-face encounter by me Brenda SNYDER, [facility].           Electronically signed by Brenda Delgado APRN at 07/15/24 1386

## 2024-07-15 NOTE — CASE MANAGEMENT/SOCIAL WORK
Continued Stay Note   Ashok     Patient Name: Anival Pennington  MRN: [id number]  Today's Date: 7/15/2024    Admit Date: 7/10/2024    Plan: [facility] upon ins. precert approval   Discharge Plan       Row Name 07/15/24 0900       Plan    Plan [facility] upon ins. precert approval    Patient/Family in Agreement with Plan yes    Plan Comments Waiting on ins. precert approval for pt going to [facility], will inform upon completion.                   Discharge Codes    No documentation.                       NELI CohenW

## 2024-07-15 NOTE — DISCHARGE PLACEMENT REQUEST
"Art Pennington (68 y.o. Male)       Date of Birth   1955    Social Security Number       Address   [address] [address]    Home Phone   [phone]    MRN   [id number]       Christian   Nondenominational    Marital Status                               Admission Date   7/10/24    Admission Type   Emergency    Admitting Provider   Miguel Dillard DO    Attending Provider   Miguel Dillard DO    Department, Room/Bed   [facility] 3A, 351/1       Discharge Date       Discharge Disposition       Discharge Destination                                 Attending Provider: Miguel Dillard DO    Allergies: Aspirin, Bee Venom    Isolation: None   Infection: None   Code Status: CPR    Ht: 160 cm (63\")   Wt: 77.1 kg (170 lb)    Admission Cmt: None   Principal Problem: Acute ischemic stroke left  internal capsule lacunar and  right postcentral gyrus at the vertex. [I63.512]                   Active Insurance as of 7/10/2024       Primary Coverage       Payor Plan Insurance Group Employer/Plan Group    HUMANA MEDICARE REPLACEMENT HUMANA MED ADV PPO [id number]       Payor Plan Address Payor Plan Phone Number Payor Plan Fax Number Effective Dates    [address] [address] [phone]  1/1/2023 - None Entered    [facility] [id number]         Subscriber Name Subscriber Birth Date Member ID       ART PENNINGTON 1955 [id number]                     Emergency Contacts        (Rel.) Home Phone Work Phone Mobile Phone    DEVORAH PENNINGTON (Spouse) [phone] -- --    AAMIR PENNINGTON (Daughter) [phone] -- --                 History & Physical        Ady Allen MD at 07/10/24 2231              [facility] Medicine Services  HISTORY AND PHYSICAL    Date of Admission: 7/10/2024  Primary Care Physician: Betty Mcguire MD    Subjective   Primary Historian: Patient    Chief Complaint: Slurred speech    History of Present Illness  68 year old " male with PMH of CAD, PVD, DM , smoking, that presents to the ER with complaints of slurred speech of sudden onset for about 3 days. Tongue deviates slightly to the right and he coughs with attempting to drink water. Extremity strength and sensation appears normal.    He is also having elevated blood sugar and has been off insulin for several months due to cost. Wife also reports that he has problems with leg circulation, he was evaluated by vascular surgery in June and recommended for arteriogram due to abnormal SHELBY.     Review of Systems   Otherwise complete ROS reviewed and negative except as mentioned in the HPI.    Past Medical History:   Past Medical History:   Diagnosis Date    Cancer     leukemia    Coronary artery disease     diabetes with circulatory complications     Hyperlipidemia     Hypertension     Obesity (BMI 30-39.9)     Tobacco abuse      Past Surgical History:  Past Surgical History:   Procedure Laterality Date    CARDIAC CATHETERIZATION N/A 3/28/2019    Procedure: Left Heart Cath;  Surgeon: Vincent Pan MD;  Location:  [facility];  Service: Cardiovascular    CARDIAC CATHETERIZATION Bilateral 3/28/2019    Procedure: Stent JOSSIE coronary;  Surgeon: Vincent Pan MD;  Location:  [facility] CATH [facility];  Service: Cardiovascular    CARDIAC CATHETERIZATION N/A 3/28/2019    Procedure: Left ventriculography;  Surgeon: Vincent Pan MD;  Location:  [facility] CATH [facility];  Service: Cardiovascular    CORONARY STENT PLACEMENT      TONSILLECTOMY       Social History:  reports that he has been smoking cigarettes. He started smoking about 55 years ago. He has a 83.3 pack-year smoking history. He has never used smokeless tobacco. He reports that he does not currently use alcohol. He reports that he does not currently use drugs after having used the following drugs: Marijuana.    Family History: family history includes Cancer in his father; Heart attack in his brother and mother;  "Heart disease in his brother and mother; No Known Problems in his brother; Valvular heart disease in his mother.       Allergies:  Allergies   Allergen Reactions    Aspirin Other (See Comments)     Tightness in throat with high dose asa.  Can take the 81 mg without difficutly    Bee Venom Anaphylaxis       Medications:  Prior to Admission medications    Medication Sig Start Date End Date Taking? Authorizing Provider   aspirin 81 MG chewable tablet Chew 1 tablet Daily.    ProviderMerly MD   nitroglycerin (NITROSTAT) 0.4 MG SL tablet Place 1 tablet under the tongue Every 5 (Five) Minutes As Needed for Chest Pain. Take no more than 3 doses in 15 minutes. 7/20/22   Betty Mcguire MD   rosuvastatin (Crestor) 10 MG tablet Take 1 tablet by mouth Daily. To prevent heart attacks and control cholesterol 6/20/24   Joanie Devlin APRN   aspirin 81 MG EC tablet Take 1 tablet by mouth Daily.  7/10/24  Provider, MD Merly     I have utilized all available immediate resources to obtain, update, or review the patient's current medications (including all prescriptions, over-the-counter products, herbals, cannabis/cannabidiol products, and vitamin/mineral/dietary (nutritional) supplements).    Objective     Vital Signs: /79 (BP Location: Right arm, Patient Position: Lying)   Pulse 86   Temp 98.8 °F (37.1 °C) (Oral)   Resp 18   Ht 157.5 cm (62\")   Wt 77.1 kg (169 lb 15.6 oz)   SpO2 100%   BMI 31.09 kg/m²   Physical Exam  Constitutional:       Appearance: He is well-developed. He is not ill-appearing.   HENT:      Head: Normocephalic and atraumatic.      Right Ear: External ear normal.      Left Ear: External ear normal.      Nose: Nose normal.      Mouth/Throat:      Mouth: Mucous membranes are dry.   Eyes:      General:         Right eye: No discharge.         Left eye: No discharge.      Extraocular Movements: Extraocular movements intact.      Conjunctiva/sclera: Conjunctivae normal.      Pupils: Pupils " are equal, round, and reactive to light.   Neck:      Vascular: No JVD.   Cardiovascular:      Rate and Rhythm: Regular rhythm. Tachycardia present.      Heart sounds: Normal heart sounds. No murmur heard.  Pulmonary:      Effort: Pulmonary effort is normal. No respiratory distress.      Breath sounds: Normal breath sounds. No wheezing or rales.   Chest:      Chest wall: No tenderness.   Abdominal:      General: Bowel sounds are normal. There is no distension.      Palpations: Abdomen is soft.      Tenderness: There is no abdominal tenderness. There is no guarding or rebound.   Musculoskeletal:         General: No tenderness or deformity. Normal range of motion.      Cervical back: Normal range of motion and neck supple. No rigidity.      Right lower leg: No edema.      Left lower leg: No edema.   Skin:     General: Skin is warm and dry.      Capillary Refill: Capillary refill takes less than 2 seconds.      Findings: No rash.   Neurological:      Mental Status: He is alert and oriented to person, place, and time.      Cranial Nerves: Cranial nerve deficit present.      Sensory: No sensory deficit.      Motor: Weakness present. No abnormal muscle tone.      Deep Tendon Reflexes: Reflexes normal.   Psychiatric:         Mood and Affect: Mood normal.         Behavior: Behavior normal.     Results Reviewed:  Lab Results (last 24 hours)       Procedure Component Value Units Date/Time    Urinalysis With Microscopic If Indicated (No Culture) - Urine, Clean Catch [[id number]]  (Abnormal) Collected: 07/10/24 1847    Specimen: Urine, Clean Catch Updated: 07/10/24 1921     Color, UA Yellow     Appearance, UA Cloudy     pH, UA 6.5     Specific Gravity, UA >1.030     Glucose, UA >=1000 mg/dL (3+)     Ketones, UA 15 mg/dL (1+)     Bilirubin, UA Negative     Blood, UA Trace     Protein,  mg/dL (2+)     Leuk Esterase, UA Moderate (2+)     Nitrite, UA Negative     Urobilinogen, UA 1.0 E.U./dL    Urinalysis, Microscopic Only -  Urine, Clean Catch [[id number]]  (Abnormal) Collected: 07/10/24 1847    Specimen: Urine, Clean Catch Updated: 07/10/24 1921     RBC, UA 0-2 /HPF      WBC, UA Too Numerous to Count /HPF      Bacteria, UA Trace /HPF      Squamous Epithelial Cells, UA 3-6 /HPF      Yeast, UA       Moderate/2+ Budding Yeast w/Hyphae     /HPF     Hyaline Casts, UA None Seen /LPF      Methodology Manual Light Microscopy    POC Glucose Once [[id number]]  (Abnormal) Collected: 07/10/24 1855    Specimen: Blood Updated: 07/10/24 1907     Glucose 308 mg/dL      Comment: : [id number] Faviola ColeMeter ID: [id number]       Comprehensive Metabolic Panel [[id number]]  (Abnormal) Collected: 07/10/24 1657    Specimen: Blood Updated: 07/10/24 1742     Glucose 466 mg/dL      BUN 15 mg/dL      Creatinine 0.67 mg/dL      Sodium 133 mmol/L      Potassium 4.5 mmol/L      Comment: Slight hemolysis detected by analyzer. Result may be falsely elevated.        Chloride 96 mmol/L      CO2 23.0 mmol/L      Calcium 9.1 mg/dL      Total Protein 6.8 g/dL      Albumin 3.8 g/dL      ALT (SGPT) 5 U/L      AST (SGOT) 13 U/L      Comment: Slight hemolysis detected by analyzer. Result may be falsely elevated.        Alkaline Phosphatase 92 U/L      Total Bilirubin 0.5 mg/dL      Globulin 3.0 gm/dL      A/G Ratio 1.3 g/dL      BUN/Creatinine Ratio 22.4     Anion Gap 14.0 mmol/L      eGFR 102.3 mL/min/1.73     Narrative:      GFR Normal >60  Chronic Kidney Disease <60  Kidney Failure <15      Acetone [[id number]]  (Normal) Collected: 07/10/24 1657    Specimen: Blood Updated: 07/10/24 1734     Acetone Negative    Magnesium [[id number]]  (Normal) Collected: 07/10/24 1657    Specimen: Blood Updated: 07/10/24 1725     Magnesium 1.9 mg/dL     Blood Gas, Arterial - [[id number]]  (Abnormal) Collected: 07/10/24 1723    Specimen: Arterial Blood Updated: 07/10/24 1724     Site Right Radial     Damien's Test Positive     pH, Arterial 7.472 pH units      Comment: 83 Value above  reference range        pCO2, Arterial 29.1 mm Hg      Comment: 84 Value below reference range        pO2, Arterial 80.7 mm Hg      Comment: 84 Value below reference range        HCO3, Arterial 21.3 mmol/L      Base Excess, Arterial -1.0 mmol/L      Comment: 84 Value below reference range        O2 Saturation, Arterial 98.1 %      Temperature 37.0     Barometric Pressure for Blood Gas 752 mmHg      Modality Room Air     Ventilator Mode NA     Collected by [id number]     Comment: Meter: [id number]     :  Ana Malave, MONTEZ        pCO2, Temperature Corrected 29.1 mm Hg      pH, Temp Corrected 7.472 pH Units      pO2, Temperature Corrected 80.7 mm Hg     POC Glucose Once [[id number]]  (Abnormal) Collected: 07/10/24 1706    Specimen: Blood Updated: 07/10/24 1718     Glucose 510 mg/dL      Comment: : [id number] Gisselle MedinaMeter ID: [id number]       CBC & Differential [[id number]]  (Abnormal) Collected: 07/10/24 1657    Specimen: Blood Updated: 07/10/24 1708    Narrative:      The following orders were created for panel order CBC & Differential.  Procedure                               Abnormality         Status                     ---------                               -----------         ------                     CBC Auto Differential[[id number]]        Abnormal            Final result                 Please view results for these tests on the individual orders.    CBC Auto Differential [[id number]]  (Abnormal) Collected: 07/10/24 1657    Specimen: Blood Updated: 07/10/24 1708     WBC 12.89 10*3/mm3      RBC 5.35 10*6/mm3      Hemoglobin 17.3 g/dL      Hematocrit 47.9 %      MCV 89.5 fL      MCH 32.3 pg      MCHC 36.1 g/dL      RDW 12.3 %      RDW-SD 39.9 fl      MPV 11.1 fL      Platelets 224 10*3/mm3      Neutrophil % 79.5 %      Lymphocyte % 11.0 %      Monocyte % 8.1 %      Eosinophil % 0.5 %      Basophil % 0.5 %      Immature Grans % 0.4 %      Neutrophils, Absolute 10.26 10*3/mm3      Lymphocytes,  Absolute 1.42 10*3/mm3      Monocytes, Absolute 1.04 10*3/mm3      Eosinophils, Absolute 0.06 10*3/mm3      Basophils, Absolute 0.06 10*3/mm3      Immature Grans, Absolute 0.05 10*3/mm3      nRBC 0.0 /100 WBC     POC Glucose Once [[id number]]  (Abnormal) Collected: 07/10/24 1514    Specimen: Blood Updated: 07/10/24 1525     Glucose 378 mg/dL      Comment: : [id number] Inna Youngter ID: [id number]             Imaging Results (Last 24 Hours)       Procedure Component Value Units Date/Time    XR Knee 3 View Left [[id number]] Collected: 07/10/24 1751     Updated: 07/10/24 1755    Narrative:      XR KNEE 3 VW LEFT-     HISTORY: Knee pain/fall and injury     COMPARISON: 9/13/2022     FINDINGS: 3 views of the left knee are obtained.     There is moderate tricompartmental osteoarthritis with bony spurring  greatest of the lateral femoral condyle. Osteopenia is suspected. Small  knee joint effusion. No acute fracture or dislocation. Mild vascular  calcification.       Impression:      1. Moderate tricompartmental osteoarthritis with small knee joint  effusion. No acute osseous injury.        This report was signed and finalized on 7/10/2024 5:52 PM by Dr. Ragini Soto MD.             I have personally reviewed and interpreted the radiology studies and ECG obtained at time of admission.     Assessment / Plan   Assessment:   Active Hospital Problems    Diagnosis     **Acute focal neurological deficit     UTI (urinary tract infection), bacterial     Uncontrolled type 2 diabetes mellitus with hyperglycemia     PAD (peripheral artery disease)     Coronary artery disease involving native coronary artery of native heart without angina pectoris     Essential hypertension      Treatment Plan  The patient will be admitted to my service here at [facility].   Admit to neurofloor  Vitals every 4 hours  Neurochecks every 4 hours  Bedside swallow failed will remain n.p.o.  IV fluids normal saline 75 cc an  hour    Slurred speech concerning for recent stroke  Neurology consult  ASA/Lipitor  PT/OT/SLP  MRI brain would be indicated, alert reports metallic implant, will need clarification  CT brain, CTA brain and neck  Lipid panel and A1C  Blood sugar control  Smoking cessation advised    DM 2 Uncontrolled  A1C in AM  Lantus 10 units nightly  Humalog low dose sliding scale AC and HS  IVF bolus given in ER, will continue with NS at 75 cc/hour    Pyuria, asymptomatic  Continue to monitor for symptoms    DVT prophylaxis > Lovenox 40 mg SQ daily    Medical Decision Making  Number and Complexity of problems: 4 complex medical problems  Differential Diagnosis: see above    Conditions and Status        Condition is unchanged.     MDM Data  External documents reviewed: [employer] EHR  Cardiac tracing (EKG, telemetry) interpretation: EKG pending  Radiology interpretation: see above  Labs reviewed: see above  Any tests that were considered but not ordered: none     Decision rules/scores evaluated (example [id number], Wells, etc): N/A     Discussed with: Patient and wife at bedside     Care Planning  Shared decision making: Patient  Code status and discussions: Full code    Disposition  Social Determinants of Health that impact treatment or disposition: none  Estimated length of stay is to be determined.     I confirmed that the patient's advanced care plan is present, code status is documented, and a surrogate decision maker is listed in the patient's medical record.     The patient's surrogate decision maker is family, see records.     The patient was seen and examined by me on 7/10/2024 at 2231.    Electronically signed by Ady Allen MD, 07/10/24, 22:31 CDT.              Electronically signed by Ady Allen MD at 07/11/24 0723          Physician Progress Notes (last 48 hours)        Brenda Delgado APRN at 07/14/24 1240       Attestation signed by Patrice Paz MD at 07/14/24 1910    I have reviewed  this documentation and agree.                      [facility] Medicine Services  INPATIENT PROGRESS NOTE    Length of Stay: 3  Date of Admission: 7/10/2024  Primary Care Physician: Betty Mcguire MD    Subjective   Chief Complaint: Slurred speech    HPI     Anival Pennington presented to [facility] emergency room 7/10/2024 with slurred speech for the past 3 days.  He was initially seen at [facility], operated by [facility] on 7/9 with similar symptoms.  CT scan of the head was negative and patient left AMA.  He presented to our facility on 7/10 noting tongue deviation to the right, coughing when attempting to drink water and slight right-sided facial drooping.  Patient reported he went to the bathroom earlier on the day of admission, twisted his leg and fell injuring his left knee.  He denied any other injuries.  He reports a history of coronary artery disease, peripheral vascular disease, diabetes mellitus, tobacco use.  Patient reported he has been off of his insulin for several months due to cost.  He reported leg circulation evaluated by Dr. Poole vascular surgery June 2024 with recommendations for arteriogram 7/22/24 due to abnormal SHELBY.  Patient was noted to have slurring of speech.  He moved extremities equally.  WBC 12.89, glucose 110, 466, 308, creatinine 0.67, urinalysis too numerous to count WBC, trace bacteria, moderate yeast.  X-ray left knee moderate tricompartmental osteoarthritis with small knee effusion.  No acute osseous injury.  Normal saline, insulin given in ER.        Today  Sitting up in bed.  Wife in room.  Speech still with some hesitation and some slurring.  Able to understand most conversation.  Patient still with moderate assistance to ambulate able to sit and stand and take a few steps with rolling walker.  Quires consistent cues to move feet.  Orthopedics recommends ice and weightbearing as tolerated to right knee.  Follow-up with orthopedics as  outpatient if needed.  Referral to [facility] insurance approval.  Lisinopril increased to 40 mg today and insulin increased to 30 mg nightly.  Discussed with patient and wife.  Wife plans to discuss with Dr. Betty Mcguire medication assistance.    Review of Systems   Constitutional:  Positive for activity change. Negative for fatigue and fever.   HENT:  Negative for congestion.    Eyes:  Negative for photophobia and visual disturbance.   Respiratory:  Negative for cough, shortness of breath and wheezing.    Cardiovascular:  Negative for chest pain, palpitations and leg swelling.   Gastrointestinal:  Negative for constipation, diarrhea, nausea and vomiting.   Endocrine: Negative for cold intolerance, heat intolerance and polyuria.   Genitourinary:  Negative for dysuria, frequency and urgency.   Musculoskeletal:  Positive for gait problem (Left knee pain).   Skin:  Negative for color change, pallor, rash and wound.   Allergic/Immunologic: Negative for immunocompromised state.   Neurological:  Positive for speech difficulty (Slurred speech) and weakness. Negative for light-headedness.   Hematological:  Negative for adenopathy. Does not bruise/bleed easily.   Psychiatric/Behavioral:  Negative for agitation, behavioral problems and confusion.         All pertinent negatives and positives are as above. All other systems have been reviewed and are negative unless otherwise stated.     Objective    Temp:  [98 °F (36.7 °C)-99.6 °F (37.6 °C)] 98.1 °F (36.7 °C)  Heart Rate:  [75-85] 82  Resp:  [16-18] 16  BP: (129-155)/(70-83) 129/70    Physical Exam  Vitals and nursing note reviewed.   Constitutional:       Comments: Patient sitting up in bed.  No oxygen use. Wife in room.   HENT:      Head: Normocephalic and atraumatic.      Nose: No congestion.      Mouth/Throat:      Pharynx: Oropharynx is clear. No oropharyngeal exudate or posterior oropharyngeal erythema.   Eyes:      Extraocular Movements: Extraocular  movements intact.      Pupils: Pupils are equal, round, and reactive to light.   Cardiovascular:      Rate and Rhythm: Normal rate and regular rhythm.      Heart sounds: No murmur heard.     Comments: Normal sinus rhythm 65-82 on telemetry.  Pulmonary:      Breath sounds: No wheezing, rhonchi or rales.      Comments: No oxygen in use.  Abdominal:      Palpations: Abdomen is soft.      Tenderness: There is no abdominal tenderness.   Genitourinary:     Comments: Voiding.  Musculoskeletal:         General: Tenderness (Left knee) present.      Cervical back: Normal range of motion and neck supple.      Comments: Ice compress to left knee   Skin:     General: Skin is warm and dry.   Neurological:      General: No focal deficit present.      Mental Status: He is alert and oriented to person, place, and time.      Comments: Slurred speech improved today, mild right facial drooping.  Follows commands.  Moves extremities.   Psychiatric:         Mood and Affect: Mood normal.         Behavior: Behavior normal.           Results Review:  I have reviewed the labs, radiology results, and diagnostic studies.    Laboratory Data:      Results from last 7 days   Lab Units 07/13/24  0407 07/12/24  0402 07/11/24  0539 07/10/24  1657   WBC 10*3/mm3 11.77* 11.37* 13.25* 12.89*   HEMOGLOBIN g/dL 13.8 14.3 14.0 17.3   HEMATOCRIT % 40.0 39.8 40.7 47.9   PLATELETS 10*3/mm3 202 205 218 224        Results from last 7 days   Lab Units 07/14/24  0512 07/13/24  0407 07/12/24  0402 07/11/24  0450 07/10/24  1657   SODIUM mmol/L 136 136 134* 135* 133*   POTASSIUM mmol/L 3.8 3.5 3.9 3.5 4.5   CHLORIDE mmol/L 102 104 101 102 96*   CO2 mmol/L 23.0 24.0 23.0 22.0 23.0   BUN mg/dL 11 11 9 10 15   CREATININE mg/dL 0.53* 0.62* 0.50* 0.42* 0.67*   GLUCOSE mg/dL 184* 191* 254* 233* 466*   CALCIUM mg/dL 8.7 8.7 8.7 8.5* 9.1   ALT (SGPT) U/L  --   --   --   --  5         Culture Data:      Microbiology Results (last 10 days)       Procedure Component Value -  Date/Time    Urine Culture - Urine, Urine, Clean Catch [[id number]]  (Abnormal) Collected: 07/10/24 1847    Lab Status: Final result Specimen: Urine, Clean Catch Updated: 07/13/24 1139     Urine Culture Yeast isolated     Comment: No further workup.       Narrative:      Colonization of the urinary tract without infection is common. Treatment is discouraged unless the patient is symptomatic, pregnant, or undergoing an invasive urologic procedure.              Radiology Data:   Imaging Results (Last 72 Hours)       Procedure Component Value Units Date/Time    CT Angiogram Neck [[id number]] Collected: 07/11/24 1243     Updated: 07/11/24 1303    Narrative:      EXAMINATION: CT ANGIOGRAM NECK-      7/11/2024 10:42 AM     HISTORY: Stroke, follow up     In order to have a CT radiation dose as low as reasonably achievable  Automated Exposure Control was utilized for adjustment of the mA and/or  KV according to patient size.     Total DLP = 877.44 mGy.cm     The CT angiography of the neck is performed after intravenous contrast  enhancement.     Images are acquired in axial plane and subsequent 2D reconstruction  coronal and sagittal planes and 3D maximum intensity projection  reconstruction.     There is no previous similar study for comparison. The correlation made  with MR imaging of the brain and CT angiography of the head performed  today.     Atheromatous changes of the limited visualized aortic arch is noted. No  aneurysmal dilatation or dissection.     Atheromatous plaques are seen at the origin of the brachiocephalic, left  common carotid and left subclavian arteries. No significant stenosis.     The brachiocephalic trunk divides into normal appearing right subclavian  and right common carotid arteries.     Both common carotid arteries have a normal course and caliber throughout  the neck. No areas of focal stenosis or aneurysmal dilatation.     There is noncalcific plaque in the distal 3 cm length of the  right  common carotid artery extending into the bifurcation. There is 30%  stenosis of the long segment of the distal right common carotid artery.  The plaque extends into the bifurcation and along the posterior aspect  of the proximal carotid bulb and approximately 70% stenosis of the bulb.  The remaining left carotid bulb and left internal carotid artery is  normal. The right external carotid artery is normal. Normal branches.     There is a noncalcific plaque in the distal left common carotid artery  extending no significant stenosis of the common carotid artery or the  carotid bulb. There is a small rounded hyperdensity along the anterior  aspect of the base of the carotid bulb which probably represent a small  calcification in the plaque. I do not believe this represent an  ulcerated plaque. The remaining left internal carotid artery is normal  in course and caliber. There is 50% stenosis of the origin of the left  external carotid artery. Subsequent normal branches.     There is normal origin of both vertebral arteries. There is an area of  60% stenosis of the distal V1 segment of the left vertebral artery  before it enters the C6 foramen transversarium. There is approximately  50% focal stenosis of the right vertebral artery in the foramen  transversarium of C6. Remaining vertebral artery bilaterally are normal  in course and caliber. Normal size, right dominant, vertebral artery  enter the foramen magnum and subsequent to join to make a normal size  basilar artery.     The limited visualized soft tissues of the neck are unremarkable.  Heterogeneous thyroid gland is seen with probable nodules on both sides,  right more than the left.     Limited visualized lungs show a small spiculated nodule in the right  upper lobe, image #108 in series 2, measuring 8 mm in diameter. This  need to be further evaluated with CT scan of the chest.       Impression:      1. Atheromatous changes of the carotid and vertebral  arteries. 70% focal  stenosis of the right carotid bulb 60% focal stenosis of the distal V1  segment of the left vertebral artery and 50% stenosis of the distal V1  segment of the right vertebral artery before entering the foramen  transversarium of C6. The details are given above.  2. NASCET criteria were utilized for estimation of carotid arterial  stenosis.           This report was signed and finalized on 7/11/2024 1:00 PM by Dr. Patrick Whitley MD.       CT Angiogram Head w AI Analysis of LVO [[id number]] Collected: 07/11/24 1232     Updated: 07/11/24 1246    Narrative:      EXAMINATION: CT ANGIOGRAM HEAD W AI ANALYSIS OF LVO-     7/11/2024 10:42 AM     HISTORY: Stroke, follow up     CT angiography of the head is performed before and after intravenous  contrast enhancement.     Images are acquired in axial plane and subsequent 2D reconstruction in  coronal and sagittal planes and 3D maximum intensity projection  reconstruction.     There is no previous similar study for comparison. The correlation made  with MR imaging of the brain performed earlier today.     Unenhanced images of the brain show moderate diffuse chronic ischemic  and atrophic changes. No acute intracranial abnormality. No acute bony  abnormality.     The post enhancement images show normal size internal carotid arteries  at the skull base and in the carotid canal bilaterally.     Atheromatous changes are seen in the internal carotid arteries in the  cavernous sinus bilaterally. No areas of flow-limiting stenosis or  aneurysmal dilatation.     Normal size suprasellar internal carotid artery bilaterally is seen  dividing into anterior and middle cerebral arteries bilaterally. There  is moderate irregularity and mild diffuse narrowing of the proximal M1  segment of the left middle cerebral artery. No flow-limiting stenosis.  There is also an area of focal stenosis in the distal M1 segment of the  right middle cerebral artery. However I believe  this is due to the  tortuosity of the vessel and not a true stenosis. The A1 segment of the  right LEATHA is relatively smaller than the left. However A2 and A3  segments bilaterally are symmetrical. There is subsequent normal  insular, opercular and cortical branches of the middle cerebral arteries  bilaterally. No areas of flow-limiting stenosis or aneurysmal  dilatation.     Normal size vertebral arteries enter the foramen magnum and join to make  a normal size basilar artery which subsequently divides into normal.  Posterior cerebral arteries bilaterally. No areas of focal stenosis or  aneurysmal dilatation.       Impression:      1. Moderate irregularity and diffuse narrowing of the M1 segment of the  left middle cerebral artery with less than 50% stenosis. The remaining  intracranial circulation is unremarkable as detailed above. No foci of  flow-limiting stenosis or aneurysmal dilatation.  2. The NASCET criteria were utilized for estimation of carotid arterial  stenosis.                    This report was signed and finalized on 7/11/2024 12:43 PM by Dr. Patrick Whitley MD.               Intake/Output    Intake/Output Summary (Last 24 hours) at 7/14/2024 1240  Last data filed at 7/14/2024 0800  Gross per 24 hour   Intake 240 ml   Output 2300 ml   Net -2060 ml       Scheduled Meds  aspirin, 81 mg, Oral, Daily   Or  aspirin, 300 mg, Rectal, Daily  atorvastatin, 80 mg, Oral, Nightly  enoxaparin, 40 mg, Subcutaneous, Daily  fluconazole, 150 mg, Oral, Q24H  insulin glargine, 30 Units, Subcutaneous, Nightly  insulin lispro, 2-7 Units, Subcutaneous, 4x Daily AC & at Bedtime  lisinopril, 40 mg, Oral, Q24H  naproxen, 500 mg, Oral, BID With Meals  pantoprazole, 40 mg, Intravenous, Q AM  sodium chloride, 10 mL, Intravenous, Q12H        I have reviewed the patient current medications.     Assessment/Plan     Active Hospital Problems    Diagnosis     **Acute ischemic stroke left  internal capsule lacunar and  right  postcentral gyrus at the vertex.     B12 deficiency     Moderate tricompartmental osteoarthritis left knee with small knee joint  effusion.     Acute stroke due to ischemia     Acute focal neurological deficit     UTI (urinary tract infection), bacterial     Uncontrolled type 2 diabetes mellitus with hyperglycemia     PAD (peripheral artery disease)     Coronary artery disease involving native coronary artery of native heart without angina pectoris     Essential hypertension        Treatment Plan:    1.  Acute ischemic stroke left internal capsule lacunar and right postcentral gyrus at the vertex.  Presented with 3-day history of slurred speech.  CT head noted mild cerebral atrophy and white matter gliosis.  No acute cerebral pathology.  Aspirin, statin ordered on admission.  SCDs for deep vein thrombosis prophylaxis.  No lytic therapy given due to patient presented 3 to 4 days after onset of symptoms.    MRI of the brain 7/11 noted 2 small foci of acute ischemia, posterior limb left internal capsule or lacunar infarct and second tiny cortical infarct right postcenteral gyrus at the vertex chronic small vessel ischemic changes.  CTA head moderate irregularity and diffuse narrowing of M1 segment of left middle cerebral artery with less than 50% stenosis.  No foci of flow-limiting stenosis or aneurysmal dilatation.  CTA neck atheromatous changes of carotid and vertebral arteries.  70% focal stenosis right carotid bulb, 60% focal stenosis distal D1 segment left vertebral artery and 50% stenosis distal V1 segment right vertebral artery before entering the foramen transversarium C6.  ECHO 7/11/2024 EF 56-60%.  Left ventricular diastolic function normal.  Saline test negative.    Physical therapy, Occupational Therapy, speech therapy consulted.  Regular diet with thin liquids and medications whole.  OT notes maximal assist for toileting tasks.  Some right upper extremity weakness.  Will need OT intervention to address  mobility, activity tolerance, balance, coordination.  Physical therapy patient moderate assist to ambulate a few feet with rolling walker.  Extremity buckled on edge of bed.  Took steps to side and needs cues for attention.  Rehab recommended for unsafe gait and moderate assistance.  SNF recommended, patient agreeable and referral to [facility].  Bed offer pending insurance approval.    TSH 0.888, free T41.29.  Lipid panel LDL 38 at goal less than 70.  Triglycerides 175, cholesterol 104.    2.  Abnormal urinalysis.  Too numerous to count WBC, trace bacteria, moderate yeast.  Diflucan given.  Rocephin started.  Urine culture yeast.    3.  Diabetes mellitus type 2, poorly controlled with hyperglycemia.  Hemoglobin A1c 13.2.  Patient has not taken insulin recently due to cost.  Accu Checks with sliding scale insulin coverage.  Glucoses 254, 189, 184..  Increase Lantus to 30 units nightly.  Diabetic educator consulted and discussed with CLAUDIO Avendaño and wife in contact with [facility] about assisting paying for additional medications.  Unable to afford $106 for Lantus from meds to beds.  Prefers the $24.99 option  for 70/30 insulin from [facility].  Patient will follow-up with Dr. Betty Mcguire after discharge and assist with medications.    4.  Primary hypertension blood pressure 129/70, 155/81.  Lisinopril increased to 40 mg orally daily.    5.  Peripheral artery disease.  Follows with Dr. Poole with plans for right lower extremity angiogram 7/22/2024.    6.  History of coronary artery disease.  Aspirin and statin continued.    7.  Hypokalemia.  Potassium 3.5 on 7/11, replaced.  Potassium 3.8 today. Magnesium level 1.8    8.  Left knee pain.  X-ray shows moderate tricompartmental osteoarthritis with small knee joint effusion.  No acute osseous injury.  Orthopedics consulted and Dr. Max recommends no acute treatment.  Ice compression.  Weightbearing as tolerated.  If continued with pain and swelling after  discharge follow-up as outpatient.    9.  Lovenox and SCDs for deep vein thrombosis prophylaxis.    10.  Social service for discharge planning.  [facility] bed offered pending insurance approval.    Medical Decision Making  Number and Complexity of problems: 8  Acute ischemic stroke: Acute, high complexity poses threat to life and bodily function not at baseline  Abnormal urinalysis: Acute, moderate complexity stable  Diabetes mellitus type 2 with hyperglycemia: Chronic, high complexity, not at baseline  Primary hypertension: Chronic, high complexity, not at baseline, improving  Peripheral artery disease: Chronic, high complexity needs intervention  History of coronary artery disease: Chronic, moderate complexity, stable  Hypokalemia: Acute, moderate complexity, not at baseline  Left knee pain with osteoarthritis and small knee joint effusion: Acute, moderate complexity    Differential Diagnosis: None    Conditions and Status       Condition is improving     MDM Data  External documents reviewed: Care everywhere.  ER visit [facility] [place], operated by [facility] 7/9/2024.  [facility] reviewed vascular surgery office visits.  Cardiac tracing (EKG, telemetry) interpretation: Normal sinus rhythm 73 on telemetry  Radiology interpretation: Reviewed radiology interpretation MRI of the brain, CTA head neck.  X-ray left knee  Labs reviewed:   BMP 7/14/2024.  Repeat BMP in AM.  CBC  7/13/2024.  Repeat CBC in AM.  Hemoglobin A1c, lipid panel, TSH  Urine culture yeast    Any tests that were considered but not ordered: None     Decision rules/scores evaluated (example [id number], [place], etc): None     Discussed with: Dr. Paz, patient and wife.     Care Planning  Shared decision making: Dr. Paz, patient and wife.  Patient agrees to neurology consult, aspirin, statin, insulin, addition of blood pressure medications, physical therapy, referral to [facility].   Code status and discussions: Full code  Patient surrogate decision maker is his  wife, Kavya    Disposition  Social Determinants of Health that impact treatment or disposition: None  I expect the patient to be discharged to [facility] if  insurance approves.    Electronically signed by CLAUDIO Guzmán, 07/14/24, 12:40 CDT.    The above documentation resulted from a face-to-face encounter by me Brenda SNYDER, [facility].        Electronically signed by Patrice Paz MD at 07/14/24 1910       Brenda Delgado APRN at 07/13/24 1401       Attestation signed by Patrice Paz MD at 07/14/24 1910    I have reviewed this documentation and agree.                      [facility] Medicine Services  INPATIENT PROGRESS NOTE    Length of Stay: 2  Date of Admission: 7/10/2024  Primary Care Physician: Betty Mcguire MD    Subjective   Chief Complaint: Slurred speech    HPI     Anival Pennington presented to [facility] emergency room 7/10/2024 with slurred speech for the past 3 days.  He was initially seen at [facility] on 7/9 with similar symptoms.  CT scan of the head was negative and patient left AMA.  He presented to our facility on 7/10 noting tongue deviation to the right, coughing when attempting to drink water and slight right-sided facial drooping.  Patient reported he went to the bathroom earlier on the day of admission, twisted his leg and fell injuring his left knee.  He denied any other injuries.  He reports a history of coronary artery disease, peripheral vascular disease, diabetes mellitus, tobacco use.  Patient reported he has been off of his insulin for several months due to cost.  He reported leg circulation evaluated by Dr. Poole vascular surgery June 2024 with recommendations for arteriogram 7/22/24 due to abnormal SHELBY.  Patient was noted to have slurring of speech.  He moved extremities equally.  WBC 12.89, glucose 110, 466, 308, creatinine 0.67, urinalysis too numerous to count WBC, trace bacteria,  moderate yeast.  X-ray left knee moderate tricompartmental osteoarthritis with small knee effusion.  No acute osseous injury.  Normal saline, insulin given in ER.        Today  Sitting up in bed.  Speech still with some slurring and hesitation.  Patient unsafe with gait mobility per physical therapy.  Walked 15 feet with left lower extremity buckling.  Patient reports left knee pain and swelling.  Dr. Max, orthopedics recommends ice compression, no acute treatment.  Weightbearing as tolerated.  Can follow-up as outpatient if continues with pain and swelling.  Patient agrees to SNF and referral to [facility].  Discussed insulin with both patient and wife.  Wife plans to discuss with Dr. Betty Mcguire regarding assistance with medication.  Will increase lisinopril dose.    Review of Systems   Constitutional:  Positive for activity change. Negative for fatigue and fever.   HENT:  Negative for congestion.    Eyes:  Negative for photophobia and visual disturbance.   Respiratory:  Negative for cough, shortness of breath and wheezing.    Cardiovascular:  Negative for chest pain, palpitations and leg swelling.   Gastrointestinal:  Negative for constipation, diarrhea, nausea and vomiting.   Endocrine: Negative for cold intolerance, heat intolerance and polyuria.   Genitourinary:  Negative for dysuria, frequency and urgency.   Musculoskeletal:  Positive for gait problem (Left knee pain).   Skin:  Negative for color change, pallor, rash and wound.   Allergic/Immunologic: Negative for immunocompromised state.   Neurological:  Positive for speech difficulty (Slurred speech) and weakness. Negative for light-headedness.   Hematological:  Negative for adenopathy. Does not bruise/bleed easily.   Psychiatric/Behavioral:  Negative for agitation, behavioral problems and confusion.         All pertinent negatives and positives are as above. All other systems have been reviewed and are negative unless otherwise stated.     Objective     Temp:  [97.6 °F (36.4 °C)-98.5 °F (36.9 °C)] 98.3 °F (36.8 °C)  Heart Rate:  [72-96] 86  Resp:  [16-18] 16  BP: (119-173)/(66-86) 156/81    Physical Exam  Vitals and nursing note reviewed.   Constitutional:       Comments: Patient sitting up in bed.  No oxygen use. Wife in room.   HENT:      Head: Normocephalic and atraumatic.      Nose: No congestion.      Mouth/Throat:      Pharynx: Oropharynx is clear. No oropharyngeal exudate or posterior oropharyngeal erythema.   Eyes:      Extraocular Movements: Extraocular movements intact.      Pupils: Pupils are equal, round, and reactive to light.   Cardiovascular:      Rate and Rhythm: Normal rate and regular rhythm.      Heart sounds: No murmur heard.     Comments: Normal sinus rhythm 61-81 on telemetry.  Pulmonary:      Breath sounds: No wheezing, rhonchi or rales.      Comments: No oxygen in use.  Abdominal:      Palpations: Abdomen is soft.      Tenderness: There is no abdominal tenderness.   Genitourinary:     Comments: Voiding.  Musculoskeletal:         General: Tenderness (Left knee) present.      Cervical back: Normal range of motion and neck supple.      Comments: Ice compress to left knee   Skin:     General: Skin is warm and dry.   Neurological:      General: No focal deficit present.      Mental Status: He is alert and oriented to person, place, and time.      Comments: Slurred speech but improved today, mild right facial drooping.  Follows commands.  Moves extremities.   Psychiatric:         Mood and Affect: Mood normal.         Behavior: Behavior normal.           Results Review:  I have reviewed the labs, radiology results, and diagnostic studies.    Laboratory Data:      Results from last 7 days   Lab Units 07/13/24  0407 07/12/24  0402 07/11/24  0539 07/10/24  1657   WBC 10*3/mm3 11.77* 11.37* 13.25* 12.89*   HEMOGLOBIN g/dL 13.8 14.3 14.0 17.3   HEMATOCRIT % 40.0 39.8 40.7 47.9   PLATELETS 10*3/mm3 202 205 218 224        Results from last 7 days   Lab  Units 07/13/24  0407 07/12/24  0402 07/11/24  0450 07/10/24  1657   SODIUM mmol/L 136 134* 135* 133*   POTASSIUM mmol/L 3.5 3.9 3.5 4.5   CHLORIDE mmol/L 104 101 102 96*   CO2 mmol/L 24.0 23.0 22.0 23.0   BUN mg/dL 11 9 10 15   CREATININE mg/dL 0.62* 0.50* 0.42* 0.67*   GLUCOSE mg/dL 191* 254* 233* 466*   CALCIUM mg/dL 8.7 8.7 8.5* 9.1   ALT (SGPT) U/L  --   --   --  5         Culture Data:      Microbiology Results (last 10 days)       Procedure Component Value - Date/Time    Urine Culture - Urine, Urine, Clean Catch [[id number]]  (Abnormal) Collected: 07/10/24 1847    Lab Status: Final result Specimen: Urine, Clean Catch Updated: 07/13/24 1139     Urine Culture Yeast isolated     Comment: No further workup.       Narrative:      Colonization of the urinary tract without infection is common. Treatment is discouraged unless the patient is symptomatic, pregnant, or undergoing an invasive urologic procedure.              Radiology Data:   Imaging Results (Last 72 Hours)       Procedure Component Value Units Date/Time    CT Angiogram Neck [[id number]] Collected: 07/11/24 1243     Updated: 07/11/24 1303    Narrative:      EXAMINATION: CT ANGIOGRAM NECK-      7/11/2024 10:42 AM     HISTORY: Stroke, follow up     In order to have a CT radiation dose as low as reasonably achievable  Automated Exposure Control was utilized for adjustment of the mA and/or  KV according to patient size.     Total DLP = 877.44 mGy.cm     The CT angiography of the neck is performed after intravenous contrast  enhancement.     Images are acquired in axial plane and subsequent 2D reconstruction  coronal and sagittal planes and 3D maximum intensity projection  reconstruction.     There is no previous similar study for comparison. The correlation made  with MR imaging of the brain and CT angiography of the head performed  today.     Atheromatous changes of the limited visualized aortic arch is noted. No  aneurysmal dilatation or dissection.      Atheromatous plaques are seen at the origin of the brachiocephalic, left  common carotid and left subclavian arteries. No significant stenosis.     The brachiocephalic trunk divides into normal appearing right subclavian  and right common carotid arteries.     Both common carotid arteries have a normal course and caliber throughout  the neck. No areas of focal stenosis or aneurysmal dilatation.     There is noncalcific plaque in the distal 3 cm length of the right  common carotid artery extending into the bifurcation. There is 30%  stenosis of the long segment of the distal right common carotid artery.  The plaque extends into the bifurcation and along the posterior aspect  of the proximal carotid bulb and approximately 70% stenosis of the bulb.  The remaining left carotid bulb and left internal carotid artery is  normal. The right external carotid artery is normal. Normal branches.     There is a noncalcific plaque in the distal left common carotid artery  extending no significant stenosis of the common carotid artery or the  carotid bulb. There is a small rounded hyperdensity along the anterior  aspect of the base of the carotid bulb which probably represent a small  calcification in the plaque. I do not believe this represent an  ulcerated plaque. The remaining left internal carotid artery is normal  in course and caliber. There is 50% stenosis of the origin of the left  external carotid artery. Subsequent normal branches.     There is normal origin of both vertebral arteries. There is an area of  60% stenosis of the distal V1 segment of the left vertebral artery  before it enters the C6 foramen transversarium. There is approximately  50% focal stenosis of the right vertebral artery in the foramen  transversarium of C6. Remaining vertebral artery bilaterally are normal  in course and caliber. Normal size, right dominant, vertebral artery  enter the foramen magnum and subsequent to join to make a normal  size  basilar artery.     The limited visualized soft tissues of the neck are unremarkable.  Heterogeneous thyroid gland is seen with probable nodules on both sides,  right more than the left.     Limited visualized lungs show a small spiculated nodule in the right  upper lobe, image #108 in series 2, measuring 8 mm in diameter. This  need to be further evaluated with CT scan of the chest.       Impression:      1. Atheromatous changes of the carotid and vertebral arteries. 70% focal  stenosis of the right carotid bulb 60% focal stenosis of the distal V1  segment of the left vertebral artery and 50% stenosis of the distal V1  segment of the right vertebral artery before entering the foramen  transversarium of C6. The details are given above.  2. NASCET criteria were utilized for estimation of carotid arterial  stenosis.           This report was signed and finalized on 7/11/2024 1:00 PM by Dr. Patrick Whitley MD.       CT Angiogram Head w AI Analysis of LVO [[id number]] Collected: 07/11/24 1232     Updated: 07/11/24 1246    Narrative:      EXAMINATION: CT ANGIOGRAM HEAD W AI ANALYSIS OF LVO-     7/11/2024 10:42 AM     HISTORY: Stroke, follow up     CT angiography of the head is performed before and after intravenous  contrast enhancement.     Images are acquired in axial plane and subsequent 2D reconstruction in  coronal and sagittal planes and 3D maximum intensity projection  reconstruction.     There is no previous similar study for comparison. The correlation made  with MR imaging of the brain performed earlier today.     Unenhanced images of the brain show moderate diffuse chronic ischemic  and atrophic changes. No acute intracranial abnormality. No acute bony  abnormality.     The post enhancement images show normal size internal carotid arteries  at the skull base and in the carotid canal bilaterally.     Atheromatous changes are seen in the internal carotid arteries in the  cavernous sinus bilaterally. No  areas of flow-limiting stenosis or  aneurysmal dilatation.     Normal size suprasellar internal carotid artery bilaterally is seen  dividing into anterior and middle cerebral arteries bilaterally. There  is moderate irregularity and mild diffuse narrowing of the proximal M1  segment of the left middle cerebral artery. No flow-limiting stenosis.  There is also an area of focal stenosis in the distal M1 segment of the  right middle cerebral artery. However I believe this is due to the  tortuosity of the vessel and not a true stenosis. The A1 segment of the  right ELATHA is relatively smaller than the left. However A2 and A3  segments bilaterally are symmetrical. There is subsequent normal  insular, opercular and cortical branches of the middle cerebral arteries  bilaterally. No areas of flow-limiting stenosis or aneurysmal  dilatation.     Normal size vertebral arteries enter the foramen magnum and join to make  a normal size basilar artery which subsequently divides into normal.  Posterior cerebral arteries bilaterally. No areas of focal stenosis or  aneurysmal dilatation.       Impression:      1. Moderate irregularity and diffuse narrowing of the M1 segment of the  left middle cerebral artery with less than 50% stenosis. The remaining  intracranial circulation is unremarkable as detailed above. No foci of  flow-limiting stenosis or aneurysmal dilatation.  2. The NASCET criteria were utilized for estimation of carotid arterial  stenosis.                    This report was signed and finalized on 7/11/2024 12:43 PM by Dr. Patrick Whitley MD.       MRI Brain Without Contrast [[id number]] Collected: 07/11/24 1222     Updated: 07/11/24 1229    Narrative:      MRI BRAIN WO CONTRAST- 7/11/2024 10:20 AM     HISTORY: stroke like symptoms, dysarthria. tongue deviation;  R47.81-Slurred speech; E11.65-Type 2 diabetes mellitus with  hyperglycemia; R13.10-Dysphagia, unspecified     TECHNIQUE: Multisequence, multiplanar MRI of the  brain without contrast     COMPARISON: CT scans dated 7/11/2024     FINDINGS:     There are 2 small foci of diffusion restriction. This includes a  posterior limb left internal capsule lacunar infarct (series 203-image  19) as well what appears to be a tiny cortical infarct in the right  postcentral gyrus (series 203-image 25, series 302-image 94). Moderate  chronic small vessel ischemic changes. No intra-axial or extra-axial  hemorrhage. No intracranial mass lesion or mass effect. The ventricles,  cortical sulci and basal cisterns are symmetric and age appropriate.  Posterior fossa structures are unremarkable. Pituitary gland and sella  are unremarkable. The major intracranial flow-voids are preserved.  Orbital contents are unremarkable. The paranasal sinuses are clear.  Mastoid air cells are clear. Incidentally noted scalp trichilemmal  cysts.       Impression:         1.  There are 2 small foci of acute ischemia, a posterior limb left  internal capsule lacunar infarct and a second tiny cortical infarct in  the right postcentral gyrus at the vertex.  2.  Chronic small vessel ischemic changes.     This report was signed and finalized on 7/11/2024 12:26 PM by Dr Mekhi Malone.       XR Knee 3 View Left [[id number]] Collected: 07/10/24 1751     Updated: 07/10/24 1755    Narrative:      XR KNEE 3 VW LEFT-     HISTORY: Knee pain/fall and injury     COMPARISON: 9/13/2022     FINDINGS: 3 views of the left knee are obtained.     There is moderate tricompartmental osteoarthritis with bony spurring  greatest of the lateral femoral condyle. Osteopenia is suspected. Small  knee joint effusion. No acute fracture or dislocation. Mild vascular  calcification.       Impression:      1. Moderate tricompartmental osteoarthritis with small knee joint  effusion. No acute osseous injury.        This report was signed and finalized on 7/10/2024 5:52 PM by Dr. Ragini Soto MD.               Intake/Output    Intake/Output Summary  (Last 24 hours) at 7/13/2024 1407  Last data filed at 7/13/2024 0449  Gross per 24 hour   Intake 4337.21 ml   Output 450 ml   Net 3887.21 ml       Scheduled Meds  aspirin, 81 mg, Oral, Daily   Or  aspirin, 300 mg, Rectal, Daily  atorvastatin, 80 mg, Oral, Nightly  cefTRIAXone, 1,000 mg, Intravenous, Q24H  cyanocobalamin, 1,000 mcg, Intramuscular, Daily  enoxaparin, 40 mg, Subcutaneous, Daily  insulin glargine, 25 Units, Subcutaneous, Nightly  insulin lispro, 2-7 Units, Subcutaneous, 4x Daily AC & at Bedtime  lisinopril, 10 mg, Oral, Once  [START ON 7/14/2024] lisinopril, 40 mg, Oral, Q24H  sodium chloride, 10 mL, Intravenous, Q12H        I have reviewed the patient current medications.     Assessment/Plan     Active Hospital Problems    Diagnosis     **Acute ischemic stroke left  internal capsule lacunar and  right postcentral gyrus at the vertex.     B12 deficiency     Moderate tricompartmental osteoarthritis left knee with small knee joint  effusion.     Acute stroke due to ischemia     Acute focal neurological deficit     UTI (urinary tract infection), bacterial     Uncontrolled type 2 diabetes mellitus with hyperglycemia     PAD (peripheral artery disease)     Coronary artery disease involving native coronary artery of native heart without angina pectoris     Essential hypertension        Treatment Plan:    1.  Acute ischemic stroke left internal capsule lacunar and right postcentral gyrus at the vertex.  Presented with 3-day history of slurred speech.  CT head noted mild cerebral atrophy and white matter gliosis.  No acute cerebral pathology.  Aspirin, statin ordered on admission.  SCDs for deep vein thrombosis prophylaxis.  No lytic therapy given due to patient presented 3 to 4 days after onset of symptoms.    MRI of the brain 7/11 noted 2 small foci of acute ischemia, posterior limb left internal capsule or lacunar infarct and second tiny cortical infarct right postcenteral gyrus at the vertex chronic small  vessel ischemic changes.  CTA head moderate irregularity and diffuse narrowing of M1 segment of left middle cerebral artery with less than 50% stenosis.  No foci of flow-limiting stenosis or aneurysmal dilatation.  CTA neck atheromatous changes of carotid and vertebral arteries.  70% focal stenosis right carotid bulb, 60% focal stenosis distal D1 segment left vertebral artery and 50% stenosis distal V1 segment right vertebral artery before entering the foramen transversarium C6.  ECHO 7/11/2024 EF 56-60%.  Left ventricular diastolic function normal.  Saline test negative.    Physical therapy, Occupational Therapy, speech therapy consulted.  Regular diet with thin liquids and medications whole.  OT notes maximal assist for toileting tasks.  Some right upper extremity weakness.  Will need OT intervention to address mobility, activity tolerance, balance, coordination.  Physical therapy patient moderate assist to ambulate 15 feet with rolling walker.  Extremity buckled on edge of bed.  Took steps to side and needs cues for attention.  Rehab recommended for unsafe gait and moderate assistance.  SNF recommended, patient agreeable and referral to [facility].  Bed offer pending insurance approval.    TSH 0.888, free T41.29.  Lipid panel LDL 38 at goal less than 70.  Triglycerides 175, cholesterol 104.    2.  Abnormal urinalysis.  Too numerous to count WBC, trace bacteria, moderate yeast.  Diflucan given.  Rocephin started.  Urine culture yeast.    3.  Diabetes mellitus type 2, poorly controlled with hyperglycemia.  Hemoglobin A1c 13.2.  Patient has not taken insulin recently due to cost.  Accu Checks with sliding scale insulin coverage.  Glucoses 184, 188, 191.  Increase Lantus to 25 units nightly.  Diabetic educator consulted and discussed with CLAUDIO Avendaño and wife in contact with [facility] about assisting paying for additional medications.  Unable to afford $106 for Lantus from meds to beds.  Prefers the $24.99  option  for 70/30 insulin from [facility].  Patient will follow-up with Dr. Betty Mcguire after discharge and assist with medications.    4.  Primary hypertension blood pressure 156/81, 173/81.  Will give additional dose of lisinopril 10 mg orally today and increase lisinopril to 40 mg orally daily.    5.  Peripheral artery disease.  Follows with Dr. Poole with plans for right lower extremity angiogram 7/22/2024.    6.  History of coronary artery disease.  Continue aspirin and statin.    7.  Hypokalemia.  Potassium 3.5 on 7/11, replaced.  Potassium 3.5 today.  Replace potassium 40 mEq x 1 dose.  Check magnesium level.  BMP in AM.    8.  Left knee pain.  X-ray shows moderate tricompartmental osteoarthritis with small knee joint effusion.  No acute osseous injury.  Orthopedics consulted and Dr. Max recommends no acute treatment.  Ice compression.  Weightbearing as tolerated.  If continued with pain and swelling after discharge follow-up as outpatient.    9.  Lovenox and SCDs for deep vein thrombosis prophylaxis.    10.  Social service for discharge planning.  Referral to [facility] bed offered and insurance approval.    Medical Decision Making  Number and Complexity of problems: 8  Acute ischemic stroke: Acute, high complexity poses threat to life and bodily function not at baseline  Abnormal urinalysis: Acute, moderate complexity stable  Diabetes mellitus type 2 with hyperglycemia: Chronic, high complexity, not at baseline  Primary hypertension: Chronic, high complexity, not at baseline  Peripheral artery disease: Chronic, high complexity needs intervention  History of coronary artery disease: Chronic, moderate complexity, stable  Hypokalemia: Acute, moderate complexity, not at baseline  Left knee pain with osteoarthritis and small knee joint effusion: Acute, moderate complexity    Differential Diagnosis: None    Conditions and Status       Condition is improving     [facility] Data  External documents reviewed: Care  everywhere.  ER visit [facility] 7/9/2024.  [facility] reviewed vascular surgery office visits.  Cardiac tracing (EKG, telemetry) interpretation: Normal sinus rhythm 73 on telemetry  Radiology interpretation: Reviewed radiology interpretation MRI of the brain, CTA head neck.  X-ray left knee  Labs reviewed:   BMP 7/13/2024.  Repeat BMP in AM.  CBC  7/13/24.   Hemoglobin A1c, lipid panel, TSH  Urine culture negative.    Any tests that were considered but not ordered: None     Decision rules/scores evaluated (example [id number], [place], etc): None     Discussed with: Dr. Paz, patient and wife.     Care Planning  Shared decision making: Dr. Paz, patient and wife.  Patient agrees to neurology consult, aspirin, statin, insulin, addition of blood pressure medications, physical therapy, referral to [facility].   Code status and discussions: Full code  Patient surrogate decision maker is his wife, Kavya    Disposition  Social Determinants of Health that impact treatment or disposition: None  I expect the patient to be discharged to [facility] if  insurance approves.    Electronically signed by CLAUDIO Guzmán, 07/13/24, 14:07 CDT.    The above documentation resulted from a face-to-face encounter by me Brenda SNYDER, [facility].        Electronically signed by Patrice Paz MD at 07/14/24 1910          Physical Therapy Notes (last 48 hours)        Elvira Emerson PTA at 07/13/24 1031  Version 1 of 1         Goal Outcome Evaluation:  Plan of Care Reviewed With: patient, spouse        Progress: no change  Outcome Evaluation: pt trans to EOB min assist, BLE LAQ cues to stay on task, sit-stand min-mod, pt amb 15 feet rwx min-mod-max, BLE buckled when pt was to EOB, able to get pt safely on EOB max assist, pt stood several times mod 2, took side steps to R, cues for attention to R side, pt would benefit from rehab                                 Electronically signed by Elvira Emerson PTA at  24 1031       Elvira Emerson, PTA at 24 1031  Version 1 of 1         Acute Care - Physical Therapy Treatment Note  [facility]     Patient Name: Anival Pennington  : 1955  MRN: [id number]  Today's Date: 2024      Visit Dx:     ICD-10-CM ICD-9-CM   1. Slurred speech  R47.81 784.59   2. Hyperglycemia due to diabetes mellitus  E11.65 250.02   3. Dysphagia, unspecified type  R13.10 787.20   4. Impaired mobility [Z74.09]  Z74.09 799.89     Patient Active Problem List   Diagnosis    ST elevation myocardial infarction (STEMI)    Tobacco abuse    Type 2 diabetes mellitus with circulatory disorder, without long-term current use of insulin    Essential hypertension    Coronary artery disease involving native coronary artery of native heart without angina pectoris    Mixed hyperlipidemia    Class 2 severe obesity due to excess calories with serious comorbidity and body mass index (BMI) of 36.0 to 36.9 in adult    S/P coronary artery stent placement    History of MI (myocardial infarction)    Diabetes mellitus due to underlying condition with circulatory complication    Dizziness    Heavy smoker (more than 20 cigarettes per day)    Cellulitis of face    Obesity (BMI 30-39.9)    PAD (peripheral artery disease)    Preop testing    Acute focal neurological deficit    UTI (urinary tract infection), bacterial    Uncontrolled type 2 diabetes mellitus with hyperglycemia    Acute ischemic stroke left  internal capsule lacunar and  right postcentral gyrus at the vertex.    Acute stroke due to ischemia    B12 deficiency    Moderate tricompartmental osteoarthritis left knee with small knee joint  effusion.     Past Medical History:   Diagnosis Date    Acute ischemic left MCA stroke 2024    Cancer     leukemia    Coronary artery disease     diabetes with circulatory complications     Hyperlipidemia     Hypertension     Obesity (BMI 30-39.9)     Tobacco abuse      Past Surgical History:   Procedure Laterality Date     CARDIAC CATHETERIZATION N/A 3/28/2019    Procedure: Left Heart Cath;  Surgeon: Vincent Pan MD;  Location:  [facility];  Service: Cardiovascular    CARDIAC CATHETERIZATION Bilateral 3/28/2019    Procedure: Stent JOSSIE coronary;  Surgeon: Vincent Pan MD;  Location:  [facility] CATH [facility];  Service: Cardiovascular    CARDIAC CATHETERIZATION N/A 3/28/2019    Procedure: Left ventriculography;  Surgeon: Vincent Pan MD;  Location:  [facility] CATH INVAS[facility];  Service: Cardiovascular    CORONARY STENT PLACEMENT      TONSILLECTOMY       PT Assessment (Last 12 Hours)       PT Evaluation and Treatment       Row Name 07/13/24 0958          Physical Therapy Time and Intention    Subjective Information complains of;pain;weakness;fatigue  -     Document Type therapy note (daily note)  -     Mode of Treatment physical therapy  -AH       Row Name 07/13/24 0958          General Information    Existing Precautions/Restrictions fall  -     Limitations/Impairments safety/cognitive  -AH       Row Name 07/13/24 0958          Pain    Additional Documentation Pain Scale: Word Pre/Post-Treatment (Group)  -AH       Row Name 07/13/24 0958          Pain Scale: Word Pre/Post-Treatment    Pain: Word Scale, Pretreatment 0 - no pain  at rest  -     Posttreatment Pain Rating 4 - moderate pain  with mobility  -     Pain Location - Side/Orientation Left  -     Pain Location - knee  -[facility] Name 07/13/24 0958          Bed Mobility    Supine-Sit [place] (Bed Mobility) verbal cues;moderate assist (50% patient effort)  -     Sit-Supine [place] (Bed Mobility) moderate assist (50% patient effort)  -     Assistive Device (Bed Mobility) bed rails;draw sheet  -AH       Row Name 07/13/24 0958          Transfers    Comment, (Transfers) stood several times  -AH       Row Name 07/13/24 0958          Sit-Stand Transfer    Sit-Stand [place] (Transfers) verbal cues;moderate assist (50% patient  effort)  -     Assistive Device (Sit-Stand Transfers) walker, front-wheeled  -[facility] Name 07/13/24 0958          Stand-Sit Transfer    Stand-Sit [place] (Transfers) verbal cues;minimum assist (75% patient effort)  -[facility] Name 07/13/24 0958          Gait/Stairs (Locomotion)    [place] Level (Gait) moderate assist (50% patient effort);maximum assist (25% patient effort);verbal cues  -     Assistive Device (Gait) walker, front-wheeled  -     Distance in Feet (Gait) 15  -     Bilateral Gait Deviations knee buckling bilaterally   -[facility] Name 07/13/24 0958          Safety Issues, Functional Mobility    Impairments Affecting Function (Mobility) balance;cognition;pain;strength  -AH       Row Name 07/13/24 0958          Motor Skills    Comments, Therapeutic Exercise BLE LAQ, cues to stay on task  -     Additional Documentation Comments, Therapeutic Exercise (Row)  -AH       Row Name 07/13/24 0958          Plan of Care Review    Plan of Care Reviewed With patient;spouse  -     Progress no change  -     Outcome Evaluation pt trans to EOB min assist, BLE LAQ cues to stay on task, sit-stand min-mod, pt amb 15 feet rwx min-mod-max, BLE buckled when pt was to EOB, able to get pt safely on EOB max assist, pt stood several times mod 2, took side steps to R, cues for attention to R side, pt would benefit from rehab  -[facility] 07/13/24 0958          Positioning and Restraints    Pre-Treatment Position in bed  [facility]     Post Treatment Position bed  -     In Bed fowlers;call light within reach;encouraged to call for assist;exit alarm on;with family/caregiver  [facility]               User Key  (r) = Recorded By, (t) = Taken By, (c) = Cosigned By      Initials Name Provider Type     Elvira Emerson, PTA Physical Therapist Assistant                    Physical Therapy Education       Title: PT OT SLP Therapies (In Progress)       Topic: Physical Therapy (In Progress)       Point: Mobility  training (In Progress)       Learning Progress Summary             Patient Acceptance, TB, NR by JAIME at 7/11/2024 1832    Acceptance, E,TB, VU,DU,NR by CN at 7/11/2024 1412    Acceptance, E,TB, VU,DU,NR by CN at 7/11/2024 1412    Comment: Educated on PT role in pt care.   Family Acceptance, E,TB, VU,DU,NR by CN at 7/11/2024 1412    Comment: Educated on PT role in pt care.                         Point: Home exercise program (In Progress)       Learning Progress Summary             Patient Acceptance, TB, NR by JAIME at 7/11/2024 1832                         Point: Body mechanics (In Progress)       Learning Progress Summary             Patient Acceptance, TB, NR by JAIME at 7/11/2024 1832                         Point: Precautions (In Progress)       Learning Progress Summary             Patient Acceptance, TB, NR by JAIME at 7/11/2024 1832                                         User Key       Initials Effective Dates Name Provider Type Discipline    JAIME 04/22/24 -  Fatmata Styles, RN Registered Nurse Nurse    NANCY 06/24/24 -  Elvia Oakes, DEBORA Student PT Student PT                  PT Recommendation and Plan     Plan of Care Reviewed With: patient, spouse  Progress: no change  Outcome Evaluation: pt trans to EOB min assist, BLE LAQ cues to stay on task, sit-stand min-mod, pt amb 15 feet rwx min-mod-max, BLE buckled when pt was to EOB, able to get pt safely on EOB max assist, pt stood several times mod 2, took side steps to R, cues for attention to R side, pt would benefit from rehab   Outcome Measures       Row Name 07/13/24 1000             How much help from another person do you currently need...    Turning from your back to your side while in flat bed without using bedrails? 3  -AH      Moving from lying on back to sitting on the side of a flat bed without bedrails? 3  -AH      Moving to and from a bed to a chair (including a wheelchair)? 2  -AH      Standing up from a chair using your arms (e.g., wheelchair, bedside  chair)? 2  -      Climbing 3-5 steps with a railing? 1  -      To walk in hospital room? 2  -AH      AM-PAC 6 Clicks Score (PT) 13  -      Highest Level of Mobility Goal 4 --> Transfer to chair/commode  -         Functional Assessment    Outcome Measure Options AM-PAC 6 Clicks Basic Mobility (PT)  -                User Key  (r) = Recorded By, (t) = Taken By, (c) = Cosigned By      Initials Name Provider Type     Elvira Emerson PTA Physical Therapist Assistant                     Time Calculation:    PT Charges       Row Name 07/13/24 1031             Time Calculation    Start Time 0958  -      Stop Time 1023  -      Time Calculation (min) 25 min  -      PT Received On 07/13/24  -         Time Calculation- PT    Total Timed Code Minutes- PT 25 minute(s)  -         Timed Charges    32712 - Gait Training Minutes  25  -AH         Total Minutes    Timed Charges Total Minutes 25  -       Total Minutes 25  -                User Key  (r) = Recorded By, (t) = Taken By, (c) = Cosigned By      Initials Name Provider Type     Elvira Emerson PTA Physical Therapist Assistant                  Therapy Charges for Today       Code Description Service Date Service Provider Modifiers Qty    30309996566 HC GAIT TRAINING EA 15 MIN 7/13/2024 Elvira Emerson PTA GP 2            PT G-Codes  Outcome Measure Options: AM-PAC 6 Clicks Basic Mobility (PT)  AM-PAC 6 Clicks Score (PT): 13  AM-PAC 6 Clicks Score (OT): 12    Elvira Emerson PTA  7/13/2024      Electronically signed by Elvira Emerson PTA at 07/13/24 1032       Elvira Emerson PTA at 07/14/24 1026  Version 1 of 1         Goal Outcome Evaluation:  Plan of Care Reviewed With: patient, spouse        Progress: no change  Outcome Evaluation: pt c/o swollen and painful L knee, pt with decreased awareness of right side, will attend to R side with cues, pt trans to EOB mod assist, BLE A-AAROM, sit-stand mod assist, pt with LOB posterior upon first  standing, practiced sit-stand x 6 reps, took few side steps with rwx to L, min-mod, pt required assist to wt shift and cues to move feet, trans back to bed mod assist, pt would benefit from SNF                                 Electronically signed by Elvira Emerson, PTA at 24 1026       Elvira Emerson, PTA at 24 1027  Version 1 of 1         Acute Care - Physical Therapy Treatment Note  [facility]     Patient Name: Anival Pennington  : 1955  MRN: [id number]  Today's Date: 2024      Visit Dx:     ICD-10-CM ICD-9-CM   1. Slurred speech  R47.81 784.59   2. Hyperglycemia due to diabetes mellitus  E11.65 250.02   3. Dysphagia, unspecified type  R13.10 787.20   4. Impaired mobility [Z74.09]  Z74.09 799.89     Patient Active Problem List   Diagnosis    ST elevation myocardial infarction (STEMI)    Tobacco abuse    Type 2 diabetes mellitus with circulatory disorder, without long-term current use of insulin    Essential hypertension    Coronary artery disease involving native coronary artery of native heart without angina pectoris    Mixed hyperlipidemia    Class 2 severe obesity due to excess calories with serious comorbidity and body mass index (BMI) of 36.0 to 36.9 in adult    S/P coronary artery stent placement    History of MI (myocardial infarction)    Diabetes mellitus due to underlying condition with circulatory complication    Dizziness    Heavy smoker (more than 20 cigarettes per day)    Cellulitis of face    Obesity (BMI 30-39.9)    PAD (peripheral artery disease)    Preop testing    Acute focal neurological deficit    UTI (urinary tract infection), bacterial    Uncontrolled type 2 diabetes mellitus with hyperglycemia    Acute ischemic stroke left  internal capsule lacunar and  right postcentral gyrus at the vertex.    Acute stroke due to ischemia    B12 deficiency    Moderate tricompartmental osteoarthritis left knee with small knee joint  effusion.     Past Medical History:   Diagnosis Date     Acute ischemic left MCA stroke 7/11/2024    Cancer     leukemia    Coronary artery disease     diabetes with circulatory complications     Hyperlipidemia     Hypertension     Obesity (BMI 30-39.9)     Tobacco abuse      Past Surgical History:   Procedure Laterality Date    CARDIAC CATHETERIZATION N/A 3/28/2019    Procedure: Left Heart Cath;  Surgeon: Vincent Pan MD;  Location:  [facility] CATH [facility];  Service: Cardiovascular    CARDIAC CATHETERIZATION Bilateral 3/28/2019    Procedure: Stent JOSSIE coronary;  Surgeon: Vincent Pan MD;  Location:  [facility] CATH [facility] LOC[facility];  Service: Cardiovascular    CARDIAC CATHETERIZATION N/A 3/28/2019    Procedure: Left ventriculography;  Surgeon: Vincent Pan MD;  Location:  [facility] CATH INVAS[facility];  Service: Cardiovascular    CORONARY STENT PLACEMENT      TONSILLECTOMY       PT Assessment (Last 12 Hours)       PT Evaluation and Treatment       Row Name 07/14/24 0953 07/14/24 0913       Physical Therapy Time and Intention    Subjective Information complains of;weakness;fatigue;pain  - --  -    Document Type therapy note (daily note)  - --    Mode of Treatment physical therapy  - --    Session Not Performed -- other (see comments)  -    Comment, Session Not Performed -- still eating breakfast  -      Row Name 07/14/24 0845          Physical Therapy Time and Intention    Subjective Information --  -     Session Not Performed other (see comments)  -     Comment, Session Not Performed breakfast  -       Row Name 07/14/24 0953          General Information    Existing Precautions/Restrictions fall  R weakness/inattention, L knee swollen and painful  -     Limitations/Impairments safety/cognitive  -       Row Name 07/14/24 0953          Pain    Pain Intervention(s) Cold applied;Repositioned  -       Row Name 07/14/24 0953          Pain Scale: Word Pre/Post-Treatment    Pain: Word Scale, Pretreatment 0 - no pain  at rest  -      Posttreatment Pain Rating 4 - moderate pain  with mobility  -     Pain Location - Side/Orientation Left  -     Pain Location - knee  -[facility] Name 07/14/24 0953          Bed Mobility    Supine-Sit Tripp (Bed Mobility) verbal cues;moderate assist (50% patient effort)  -     Sit-Supine Tripp (Bed Mobility) moderate assist (50% patient effort)  -     Assistive Device (Bed Mobility) bed rails;draw sheet  -[facility] Name 07/14/24 0953          Transfers    Comment, (Transfers) stood x 6  -[facility] Name 07/14/24 0953          Sit-Stand Transfer    Sit-Stand Tripp (Transfers) verbal cues;moderate assist (50% patient effort)  -     Assistive Device (Sit-Stand Transfers) walker, front-wheeled  -[facility] Name 07/14/24 0953          Stand-Sit Transfer    Stand-Sit Tripp (Transfers) verbal cues;minimum assist (75% patient effort)  -[facility] Name 07/14/24 0953          Gait/Stairs (Locomotion)    [place] Level (Gait) moderate assist (50% patient effort);verbal cues  -     Assistive Device (Gait) walker, front-wheeled  -     Distance in Feet (Gait) 2  side steps to L at BS, pt required assist to wt shift to move feet  -     Bilateral Gait Deviations knee buckling bilaterally  -[facility] Name 07/14/24 0953          Safety Issues, Functional Mobility    Impairments Affecting Function (Mobility) balance;cognition;pain;strength  -[facility] Name 07/14/24 0953          Motor Skills    Comments, Therapeutic Exercise BLE A-AAROM x 10 reps  -[facility] Name 07/14/24 0953          Plan of Care Review    Plan of Care Reviewed With patient;spouse  -     Progress no change  -     Outcome Evaluation pt c/o swollen and painful L knee, pt with decreased awareness of right side, will attend to R side with cues, pt trans to EOB mod assist, BLE A-AAROM, sit-stand mod assist, pt with LOB posterior upon first standing, practiced sit-stand x 6 reps, took few side steps  with rwx to L, min-mod, pt required assist to wt shift and cues to move feet, trans back to bed mod assist, pt would benefit from [facility]  -       Row Name 07/14/24 0953          Positioning and Restraints    Pre-Treatment Position in bed  -     Post Treatment Position bed  -AH     In Bed fowlers;call light within reach;encouraged to call for assist;exit alarm on;with family/caregiver;notified [facility]  -               User Key  (r) = Recorded By, (t) = Taken By, (c) = Cosigned By      Initials Name Provider Type     Elvira Emerson, PTA Physical Therapist Assistant                    Physical Therapy Education       Title: PT OT SLP Therapies (In Progress)       Topic: Physical Therapy (In Progress)       Point: Mobility training (In Progress)       Learning Progress Summary             Patient Acceptance, TB, NR by  at 7/11/2024 1832    Acceptance, E,TB, VU,DU,NR by NANCY at 7/11/2024 1412    Acceptance, E,TB, VU,DU,NR by NANCY at 7/11/2024 1412    Comment: Educated on PT role in pt care.   Family Acceptance, E,TB, VU,DU,NR by NANCY at 7/11/2024 1412    Comment: Educated on PT role in pt care.                         Point: Home exercise program (In Progress)       Learning Progress Summary             Patient Acceptance, TB, NR by  at 7/11/2024 1832                         Point: Body mechanics (In Progress)       Learning Progress Summary             Patient Acceptance, TB, NR by  at 7/11/2024 1832                         Point: Precautions (In Progress)       Learning Progress Summary             Patient Acceptance, TB, NR by  at 7/11/2024 1832                                         User Key       Initials Effective Dates Name Provider Type Discipline     04/22/24 -  Fatmata Styles, RN Registered Nurse Nurse    NANCY 06/24/24 -  Elvia Oakes, PT Student PT Student PT                  PT Recommendation and Plan     Plan of Care Reviewed With: patient, spouse  Progress: no change  Outcome Evaluation: pt c/o  swollen and painful L knee, pt with decreased awareness of right side, will attend to R side with cues, pt trans to EOB mod assist, BLE A-AAROM, sit-stand mod assist, pt with LOB posterior upon first standing, practiced sit-stand x 6 reps, took few side steps with rwx to L, min-mod, pt required assist to wt shift and cues to move feet, trans back to bed mod assist, pt would benefit from SNF   Outcome Measures       Row Name 07/14/24 1000 07/13/24 1000          How much help from another person do you currently need...    Turning from your back to your side while in flat bed without using bedrails? 3  -AH 3  -AH     Moving from lying on back to sitting on the side of a flat bed without bedrails? 3  -AH 3  -AH     Moving to and from a bed to a chair (including a wheelchair)? 2  -AH 2  -AH     Standing up from a chair using your arms (e.g., wheelchair, bedside chair)? 2  -AH 2  -AH     Climbing 3-5 steps with a railing? 1  -AH 1  -AH     To walk in hospital room? 2  -AH 2  -AH     AM-PAC 6 Clicks Score (PT) 13  -AH 13  -AH     Highest Level of Mobility Goal 4 --> Transfer to chair/commode  -AH 4 --> Transfer to chair/commode  -AH        Functional Assessment    Outcome Measure Options AM-PAC 6 Clicks Basic Mobility (PT)  - AM-PAC 6 Clicks Basic Mobility (PT)  -               User Key  (r) = Recorded By, (t) = Taken By, (c) = Cosigned By      Initials Name Provider Type     Elvira Emerson PTA Physical Therapist Assistant                     Time Calculation:    PT Charges       Row Name 07/14/24 1027             Time Calculation    Start Time 0953  -      Stop Time 1020  -      Time Calculation (min) 27 min  -      PT Received On 07/14/24  -         Time Calculation- PT    Total Timed Code Minutes- PT 27 minute(s)  -         Timed Charges    22308 - PT Therapeutic Activity Minutes 27  -AH         Total Minutes    Timed Charges Total Minutes 27  -       Total Minutes 27  -                User Key   (r) = Recorded By, (t) = Taken By, (c) = Cosigned By      Initials Name Provider Type     Elvira Emerson PTA Physical Therapist Assistant                  Therapy Charges for Today       Code Description Service Date Service Provider Modifiers Qty    18264015239 HC GAIT TRAINING EA 15 MIN 2024 Elvira Emerson PTA GP 2    24668303942 HC PT THERAPEUTIC ACT EA 15 MIN 2024 Elvira Emerson PTA GP 2            PT G-Codes  Outcome Measure Options: AM-PAC 6 Clicks Basic Mobility (PT)  AM-PAC 6 Clicks Score (PT): 13  AM-PAC 6 Clicks Score (OT): 12    Elvira Emerson PTA  2024      Electronically signed by Elvira Emerson PTA at 24 1028       Occupational Therapy Notes (last 48 hours)  Notes from 24 0927 through 07/15/24 09   No notes exist for this encounter.     Elvia Oakes, PT Student   PT Student  Physical Therapy     Therapy Evaluation      Attested     Date of Service: 24  Creation Time: 24     Attested           Attestation signed by Puneet Brand PT DPT at 24 1540     I reviewed the documentation and agree.                Expand All Collapse All    Patient Name: Anival Pennington                       : 1955                        MRN: [id number]                              Today's Date: 2024                                   Admit Date: 7/10/2024                        Visit Dx:   Visit Diagnosis       ICD-10-CM ICD-9-CM   1. Slurred speech  R47.81 784.59   2. Hyperglycemia due to diabetes mellitus  E11.65 250.02   3. Dysphagia, unspecified type  R13.10 787.20   4. Impaired mobility [Z74.09]  Z74.09 799.89         Problem List       Patient Active Problem List   Diagnosis    ST elevation myocardial infarction (STEMI)    Tobacco abuse    Type 2 diabetes mellitus with circulatory disorder, without long-term current use of insulin    Essential hypertension    Coronary artery disease involving native coronary artery of native heart  without angina pectoris    Mixed hyperlipidemia    Class 2 severe obesity due to excess calories with serious comorbidity and body mass index (BMI) of 36.0 to 36.9 in adult    S/P coronary artery stent placement    History of MI (myocardial infarction)    Diabetes mellitus due to underlying condition with circulatory complication    Dizziness    Heavy smoker (more than 20 cigarettes per day)    Cellulitis of face    Obesity (BMI 30-39.9)    PAD (peripheral artery disease)    Preop testing    Acute focal neurological deficit    UTI (urinary tract infection), bacterial    Uncontrolled type 2 diabetes mellitus with hyperglycemia    Acute ischemic stroke left  internal capsule lacunar and  right postcentral gyrus at the vertex.    Acute stroke due to ischemia         Medical History        Past Medical History:   Diagnosis Date    Acute ischemic left MCA stroke 7/11/2024    Cancer       leukemia    Coronary artery disease      diabetes with circulatory complications      Hyperlipidemia      Hypertension      Obesity (BMI 30-39.9)      Tobacco abuse           Surgical History         Past Surgical History:   Procedure Laterality Date    CARDIAC CATHETERIZATION N/A 3/28/2019     Procedure: Left Heart Cath;  Surgeon: Vincent Pan MD;  Location:  [facility];  Service: Cardiovascular    CARDIAC CATHETERIZATION Bilateral 3/28/2019     Procedure: Stent JOSSIE coronary;  Surgeon: Vincent Pan MD;  Location:  [facility];  Service: Cardiovascular    CARDIAC CATHETERIZATION N/A 3/28/2019     Procedure: Left ventriculography;  Surgeon: Vincent Pan MD;  Location:  [facility];  Service: Cardiovascular    CORONARY STENT PLACEMENT        TONSILLECTOMY               General Information         Row Name 07/11/24 1412 07/11/24 1048            Physical Therapy Time and Intention     Document Type evaluation  CC: slurred speech; Dx: acute focal neurological deficit  -TOD (r) NANCY (t) TOD  (c) evaluation  CC: slurred speech; Dx: acute focal neurological deficit  -TOD (r) CN (t) TOD (c)     Mode of Treatment physical therapy  -TOD (r) CN (t) TOD (c) physical therapy  -TOD (r) CN (t) TOD (c)        Row Name 07/11/24 1412 07/11/24 1048            General Information     Patient Profile Reviewed yes  -TOD (r) CN (t) TOD (c) yes  -TOD (r) CN (t) TOD (c)     Prior Level of Function independent:;feeding;grooming;dressing;bathing;all household mobility;community mobility;ADL's  -TOD (r) CN (t) TOD (c) --     Existing Precautions/Restrictions fall  -TOD (r) CN (t) TOD (c) fall  -TOD (r) CN (t) TOD (c)     Barriers to Rehab medically complex  -TOD (r) CN (t) TOD (c) --        Row Name 07/11/24 1412 07/11/24 1048            Living Environment     People in Home spouse;child(jeffrey), adult  SON AND GF  -TOD (r) CN (t) TOD (c) spouse  -TOD (r) CN (t) TOD (c)        Row Name 07/11/24 1412 07/11/24 1048            Home Main Entrance     Number of Stairs, Main Entrance three  -TOD (r) CN (t) TOD (c) two  -TOD (r) CN (t) TOD (c)     Stair Railings, Main Entrance none  -TOD (r) CN (t) TOD (c) none  -TOD (r) CN (t) TOD (c)        Row Name 07/11/24 1412 07/11/24 1048            Stairs Within Home, Primary     Number of Stairs, Within Home, Primary none  -TOD (r) CN (t) TOD (c) none  -TOD (r) CN (t) TOD (c)        Row Name 07/11/24 1412                 Cognition     Orientation Status (Cognition) oriented x 4  -TOD (r) CN (t) TOD (c)          Row Name 07/11/24 1412                 Safety Issues, Functional Mobility     Safety Issues Affecting Function (Mobility) ability to follow commands;at risk behavior observed;awareness of need for assistance;impulsivity;insight into deficits/self-awareness;judgment;positioning of assistive device;problem-solving;safety precaution awareness;safety precautions follow-through/compliance;sequencing abilities  -TOD (r) CN (t) TOD (c)       Impairments Affecting Function (Mobility)  balance;cognition;coordination;endurance/activity tolerance;pain;postural/trunk control;range of motion (ROM);strength  -TOD (r) CN (t) TOD (c)       Cognitive Impairments, Mobility Safety/Performance awareness, need for assistance;attention;impulsivity;insight into deficits/self-awareness;judgment;problem-solving/reasoning;safety precaution awareness;safety precaution follow-through;sequencing abilities  -TOD (r) CN (t) TOD (c)                       User Key  (r) = Recorded By, (t) = Taken By, (c) = Cosigned By        Initials Name Provider Type     Puneet Francois, PT DPT Physical Therapist     Elvia Salinas, PT Student PT Student                            Mobility         Row Name 07/11/24 1412                 Bed Mobility     Bed Mobility scooting/bridging;supine-sit;sit-supine  -TOD (r) CN (t) TOD (c)       Scooting/Bridging [place] (Bed Mobility) moderate assist (50% patient effort);maximum assist (25% patient effort)  -TOD (r) CN (t) TOD (c)       Supine-Sit [place] (Bed Mobility) minimum assist (75% patient effort);moderate assist (50% patient effort)  -TOD (r) CN (t) TOD (c)       Sit-Supine [place] (Bed Mobility) minimum assist (75% patient effort);contact guard  -TOD (r) CN (t) TOD (c)       Assistive Device (Bed Mobility) bed rails;draw sheet;head of bed elevated  -TOD (r) CN (t) TOD (c)          Row Name 07/11/24 1412                 Sit-Stand Transfer     Sit-Stand [place] (Transfers) moderate assist (50% patient effort)  -TOD (r) CN (t) TOD (c)       Assistive Device (Sit-Stand Transfers) walker, front-wheeled  -TOD (r) CN (t) TOD (c)          Row Name 07/11/24 1412                 Gait/Stairs (Locomotion)     [place] Level (Gait) contact guard;minimum assist (75% patient effort)  -TOD (r) CN (t) TOD (c)       Assistive Device (Gait) walker, front-wheeled  -TOD (r) CN (t) TOD (c)       Distance in Feet (Gait) 20  -TOD (r) CN (t) TOD (c)       Deviations/Abnormal Patterns (Gait) base of  support, wide;gait speed decreased;stride length decreased  -TOD (r) CN (t) TOD (c)                       User Key  (r) = Recorded By, (t) = Taken By, (c) = Cosigned By        Initials Name Provider Type     Puneet Francois, PT DPT Physical Therapist     Elvia Salinas, PT Student PT Student                            Obj/Interventions         Row Name 07/11/24 1412                 Range of Motion Comprehensive     Comment, General Range of Motion LLE ROM limited 20% by pain and swelling. RLE ROM limited 10%  -TOD (r) CN (t) TOD (c)          Row Name 07/11/24 1412                 Strength Comprehensive (MMT)     Comment, General Manual Muscle Testing (MMT) Assessment LLE 3+/5 grossly secondary to pain in L knee and 4/5 RLE grossly.  -TOD (r) CN (t) TOD (c)          Row Name 07/11/24 1412                 Balance     Balance Assessment sitting static balance;sitting dynamic balance;standing static balance;standing dynamic balance  -TOD (r) CN (t) TOD (c)       Static Sitting Balance supervision;contact guard  -TOD (r) CN (t) TOD (c)       Dynamic Sitting Balance supervision;contact guard  -TOD (r) CN (t) TOD (c)       Position, Sitting Balance unsupported;sitting edge of bed  -TOD (r) CN (t) TOD (c)       Static Standing Balance contact guard;minimal assist  -TOD (r) CN (t) TOD (c)       Dynamic Standing Balance minimal assist  -TOD (r) CN (t) TOD (c)       Position/Device Used, Standing Balance walker, front-wheeled  -TOD (r) CN (t) TOD (c)          Row Name 07/11/24 1412                 Sensory Assessment (Somatosensory)     Sensory Assessment (Somatosensory) LE sensation intact  -TOD (r) CN (t) TOD (c)                       User Key  (r) = Recorded By, (t) = Taken By, (c) = Cosigned By        Initials Name Provider Type     Puneet Francois, PT DPT Physical Therapist     Elvia Salinas, PT Student PT Student                            Goals/Plan         Row Name 07/11/24 1412                 Bed Mobility Goal 1  (PT)     Activity/Assistive Device (Bed Mobility Goal 1, PT) sit to supine/supine to sit  -TOD (r) CN (t) TOD (c)       [place] Level/Cues Needed (Bed Mobility Goal 1, PT) standby assist  -TOD (r) CN (t) TOD (c)       Time Frame (Bed Mobility Goal 1, PT) long term goal (LTG);10 days  -TOD (r) CN (t) TOD (c)       Progress/Outcomes (Bed Mobility Goal 1, PT) new goal  -TOD (r) CN (t) TOD (c)          Row Name 07/11/24 1412                 Transfer Goal 1 (PT)     Activity/Assistive Device (Transfer Goal 1, PT) sit-to-stand/stand-to-sit;bed-to-chair/chair-to-bed  -TOD       [place] Level/Cues Needed (Transfer Goal 1, PT) standby assist  -TOD (r) CN (t) TOD (c)       Time Frame (Transfer Goal 1, PT) long term goal (LTG);10 days  -TOD (r) CN (t) TOD (c)          Row Name 07/11/24 1412                 Gait Training Goal 1 (PT)     Activity/Assistive Device (Gait Training Goal 1, PT) gait (walking locomotion);assistive device use;decrease fall risk;improve balance and speed;increase endurance/gait distance;walker, rolling  -TOD (r) CN (t) TOD (c)       [place] Level (Gait Training Goal 1, PT) standby assist  -TOD (r) CN (t) TOD (c)       Distance (Gait Training Goal 1, PT) 50  -TOD (r) CN (t) TOD (c)       Time Frame (Gait Training Goal 1, PT) long term goal (LTG);10 days  -TOD (r) CN (t) TOD (c)       Progress/Outcome (Gait Training Goal 1, PT) new goal  -TOD (r) CN (t) TOD (c)          Row Name 07/11/24 1412                 Therapy Assessment/Plan (PT)     Planned Therapy Interventions (PT) balance training;bed mobility training;gait training;home exercise program;strengthening;ROM (range of motion);postural re-education;patient/family education;neuromuscular re-education;transfer training;motor coordination training  -TOD                    User Key  (r) = Recorded By, (t) = Taken By, (c) = Cosigned By        Initials Name Provider Type     Puneet Francois, PT DPT Physical Therapist     Elvia Salinas, PT Student PT  Student                         Clinical Impression         Row Name 07/11/24 1412                 Pain     Pretreatment Pain Rating 0/10 - no pain  -TOD (r) CN (t) TOD (c)          Row Name 07/11/24 1412                 Plan of Care Review     Plan of Care Reviewed With patient;spouse  -TOD (r) CN (t) TOD (c)       Progress no change  -TOD (r) CN (t) TOD (c)       Outcome Evaluation PT eval complete. Pt alert and oriented x4. Pt found in fowlers position with wife at bedside and agreeable to therapy. Pt reports being ind with ADLs and gait prior to this and able to ambulate short and long distances. Speech also remains slurred at this time. Pt mod A sup to sit for trunk and LLE assist. Pt MMT found to be LLE 3+/5 grossly secondary to pain in L knee and 4/5 RLE grossly. Pt CGA for dynamic sitting balance secondary to posterior LOB a few times. Pt mod A to stand with use of FWW with heavy posterior lean needing cueing to stand upright. Pt ambulated to BR with CGA needing mod A-max A for toileting and doffing dirty brief and donning clean brief. Pt with one LOB in BR needing mod A to correct. Pt CGA back to bed and min A for LLE back into bed. Pt needing multiple v/c throughout session for safety with ambulation and management of FWW with min A to steer FWW as needed. Pt able to following commands 75% of the time and needing v/c to stay on task and cue into safety with OOB activities. Pt demonstrates decreased functional strength and endurance from reported baseline. Pt left in room in fowlers position with all needs met and nsg in room. Pt would benefit from PT to address these strength and endurance deficits for a safe d/c. Anticipate d/c to SNF.  -TOD (r) CN (t) TOD (c)          Row Name 07/11/24 1412                 Therapy Assessment/Plan (PT)     Patient/Family Therapy Goals Statement (PT) not stated  -TOD (r) CN (t) TOD (c)       Rehab Potential (PT) good, to achieve stated therapy goals  -TOD (r) CN (t) TOD (c)        Criteria for Skilled Interventions Met (PT) yes;skilled treatment is necessary  -TOD (r) CN (t) TOD (c)       Therapy Frequency (PT) 2 times/day  -TOD (r) CN (t) TOD (c)       Predicted Duration of Therapy Intervention (PT) until d/c  -TOD (r) CN (t) TOD (c)          Row Name 07/11/24 1412                 Positioning and Restraints     Pre-Treatment Position in bed  -TOD (r) CN (t) TOD (c)       Post Treatment Position bed  -TOD (r) CN (t) TOD (c)       In Bed notified nsg;fowlers;call light within reach;side rails up x2;encouraged to call for assist;with other staff;with nsg;with family/caregiver  -TOD (r) CN (t) TOD (c)                       User Key  (r) = Recorded By, (t) = Taken By, (c) = Cosigned By        Initials Name Provider Type     Puneet Francois, PT DPT Physical Therapist     Elvia Salinas, PT Student PT Student                            Outcome Measures         Row Name 07/11/24 1412 07/11/24 0800            How much help from another person do you currently need...     Turning from your back to your side while in flat bed without using bedrails? 3  -TOD (r) CN (t) TOD (c) 3  -JM     Moving from lying on back to sitting on the side of a flat bed without bedrails? 3  -TOD (r) CN (t) TOD (c) 3  -JM     Moving to and from a bed to a chair (including a wheelchair)? 3  -TOD (r) CN (t) TOD (c) 3  -JM     Standing up from a chair using your arms (e.g., wheelchair, bedside chair)? 3  -TOD (r) CN (t) TOD (c) 3  -JM     Climbing 3-5 steps with a railing? 2  -TOD (r) CN (t) TOD (c) 2  -JM     To walk in hospital room? 3  -TOD (r) CN (t) TOD (c) 3  -JM     AM-PAC 6 Clicks Score (PT) 17  -TOD (r) CN (t) 17  -JM     Highest Level of Mobility Goal 5 --> Static standing  -TOD (r) CN (t) 5 --> Static standing  -JM        Row Name 07/11/24 1412 07/11/24 0754            Functional Assessment     Outcome Measure Options AM-PAC 6 Clicks Basic Mobility (PT)  -TOD (r) NANCY (t) TOD (c) AM-PAC 6 Clicks Daily Activity (OT)  -CLARISSE                      User Key  (r) = Recorded By, (t) = Taken By, (c) = Cosigned By        Initials Name Provider Type     Puneet Francois, PT DPT Physical Therapist     Shanice Almaraz, OTR/L Occupational Therapist     Fatmata Whitmore, RN Registered Nurse     Elvia Salinas, PT Student PT Student                          Physical Therapy Education            Title: PT OT SLP Therapies (In Progress)         Topic: Physical Therapy (In Progress)         Point: Mobility training (Done)         Learning Progress Summary               Patient Acceptance, E,TB, VU,DU,NR by CN at 7/11/2024 1412     Acceptance, E,TB, VU,DU,NR by CN at 7/11/2024 1412     Comment: Educated on PT role in pt care.   Family Acceptance, E,TB, VU,DU,NR by CN at 7/11/2024 1412     Comment: Educated on PT role in pt care.                               Point: Home exercise program (Not Started)         Learner Progress:  Not documented in this visit.                  Point: Body mechanics (Not Started)         Learner Progress:  Not documented in this visit.                  Point: Precautions (Not Started)         Learner Progress:  Not documented in this visit.                                      User Key         Initials Effective Dates Name Provider Type Discipline     NANCY 06/24/24 -  Elvia Oakes, PT Student PT Student PT                          PT Recommendation and Plan  Plan of Care Reviewed With: patient, spouse  Progress: no change  Outcome Evaluation: PT eval complete. Pt alert and oriented x4. Pt found in fowlers position with wife at bedside and agreeable to therapy. Pt reports being ind with ADLs and gait prior to this and able to ambulate short and long distances. Speech also remains slurred at this time. Pt mod A sup to sit for trunk and LLE assist. Pt MMT found to be LLE 3+/5 grossly secondary to pain in L knee and 4/5 RLE grossly. Pt CGA for dynamic sitting balance secondary to posterior LOB a few times. Pt mod A to stand  with use of FWW with heavy posterior lean needing cueing to stand upright. Pt ambulated to BR with CGA needing mod A-max A for toileting and doffing dirty brief and donning clean brief. Pt with one LOB in BR needing mod A to correct. Pt CGA back to bed and min A for LLE back into bed. Pt needing multiple v/c throughout session for safety with ambulation and management of FWW with min A to steer FWW as needed. Pt able to following commands 75% of the time and needing v/c to stay on task and cue into safety with OOB activities. Pt demonstrates decreased functional strength and endurance from reported baseline. Pt left in room in fowlers position with all needs met and nsg in room. Pt would benefit from PT to address these strength and endurance deficits for a safe d/c. Anticipate d/c to SNF.      Time Calculation:        PT Charges         Row Name 07/11/24 1412                       Time Calculation     Start Time 1412  -TOD (r) CN (t) TOD (c)         Stop Time 1453  -TOD (r) CN (t) TOD (c)         Time Calculation (min) 41 min  -TOD (r) CN (t)         PT Received On 07/11/24  -TOD (r) CN (t) TOD (c)         PT Goal Re-Cert Due Date 07/21/24  -TOD (r) CN (t) TOD (c)                      User Key  (r) = Recorded By, (t) = Taken By, (c) = Cosigned By        Initials Name Provider Type     Puneet Francois, PT DPT Physical Therapist     Elvia Salinas, PT Student PT Student                             PT G-Codes  Outcome Measure Options: AM-PAC 6 Clicks Basic Mobility (PT)  AM-PAC 6 Clicks Score (PT): 17  AM-PAC 6 Clicks Score (OT): 15  PT Discharge Summary  Anticipated Discharge Disposition (PT): skilled nursing facility     Elvia Oakes, PT Student                        7/11/2024                                Cosigned by: Puneet Brand, PT DPT at 07/11/24 4373     Revision History

## 2024-07-15 NOTE — PLAN OF CARE
Goal Outcome Evaluation:  Plan of Care Reviewed With: patient, spouse        Progress: improving  Outcome Evaluation: OT tx completed. Pt sitting up in chair & c/o fatigue. continues to demo slurred speech & does not engage in much conversation. Pt completed 3 x 10 reps UE strengthening exs with RB in btwn. Pt demos R lateral lean while sitting in chair, unaware of this unless provided with vcs. Pt stood with modA and worked on standing posture as he demos forward flexed posture. Don/doff pants with mod-maxA. c/o L knee pain during activity. Pt would benefit from cont OT tx & rehab at d/c.      Anticipated Discharge Disposition (OT): skilled nursing facility

## 2024-07-15 NOTE — THERAPY DISCHARGE NOTE
Acute Care - Physical Therapy Discharge Summary  [facility]       Patient Name: Anival Pennington  : 1955  MRN: [id number]    Today's Date: 7/15/2024                 Admit Date: 7/10/2024      PT Recommendation and Plan    Visit Dx:    ICD-10-CM ICD-9-CM   1. Slurred speech  R47.81 784.59   2. Hyperglycemia due to diabetes mellitus  E11.65 250.02   3. Dysphagia, unspecified type  R13.10 787.20   4. Impaired mobility [Z74.09]  Z74.09 799.89        Outcome Measures       Row Name 07/15/24 1200 24 1000 24 1000       How much help from another person do you currently need...    Turning from your back to your side while in flat bed without using bedrails? -- 3  -AH 3  -AH    Moving from lying on back to sitting on the side of a flat bed without bedrails? -- 3  -AH 3  -AH    Moving to and from a bed to a chair (including a wheelchair)? -- 2  -AH 2  -AH    Standing up from a chair using your arms (e.g., wheelchair, bedside chair)? -- 2  -AH 2  -AH    Climbing 3-5 steps with a railing? -- 1  -AH 1  -AH    To walk in hospital room? -- 2  -AH 2  -AH    AM-PAC 6 Clicks Score (PT) -- 13  -AH 13  -AH    Highest Level of Mobility Goal -- 4 --> Transfer to chair/commode  -AH 4 --> Transfer to chair/commode  -AH       How much help from another is currently needed...    Putting on and taking off regular lower body clothing? 2  -EC -- --    Bathing (including washing, rinsing, and drying) 2  -EC -- --    Toileting (which includes using toilet bed pan or urinal) 2  -EC -- --    Putting on and taking off regular upper body clothing 2  -EC -- --    Taking care of personal grooming (such as brushing teeth) 3  -EC -- --    Eating meals 3  -EC -- --    AM-PAC 6 Clicks Score (OT) 14  -EC -- --       Functional Assessment    Outcome Measure Options AM-PAC 6 Clicks Daily Activity (OT)  -EC AM-PAC 6 Clicks Basic Mobility (PT)  - AM-[facility] 6 Clicks Basic Mobility (PT)  -              User Key  (r) = Recorded By, (t) = Taken By,  (c) = Cosigned By      Initials Name Provider Type    Elvira Tan PTA Physical Therapist Assistant    Talya Zayas, OTR/L Occupational Therapist                     PT Charges       Row Name 07/15/24 1155             Time Calculation    Start Time 0930  -LY      Stop Time 1014  -LY      Time Calculation (min) 44 min  -LY      PT Received On 07/15/24  -LY         Time Calculation- PT    Total Timed Code Minutes- PT 44 minute(s)  -LY         Timed Charges    16891 - Gait Training Minutes  18  -LY      28783 - PT Therapeutic Activity Minutes 26  -LY         Total Minutes    Timed Charges Total Minutes 44  -LY       Total Minutes 44  -LY                User Key  (r) = Recorded By, (t) = Taken By, (c) = Cosigned By      Initials Name Provider Type    Shanice Yancey PTA Physical Therapist Assistant                     PT Rehab Goals       Row Name 07/15/24 1300             Bed Mobility Goal 1 (PT)    Activity/Assistive Device (Bed Mobility Goal 1, PT) sit to supine/supine to sit  -AB      [place] Level/Cues Needed (Bed Mobility Goal 1, PT) standby assist  -AB      Time Frame (Bed Mobility Goal 1, PT) long term goal (LTG);10 days  -AB      Progress/Outcomes (Bed Mobility Goal 1, PT) goal not met  -AB         Transfer Goal 1 (PT)    Activity/Assistive Device (Transfer Goal 1, PT) sit-to-stand/stand-to-sit;bed-to-chair/chair-to-bed  -AB      [place] Level/Cues Needed (Transfer Goal 1, PT) standby assist  -AB      Time Frame (Transfer Goal 1, PT) long term goal (LTG);10 days  -AB      Progress/Outcome (Transfer Goal 1, PT) goal not met  -AB         Gait Training Goal 1 (PT)    Activity/Assistive Device (Gait Training Goal 1, PT) gait (walking locomotion);assistive device use;decrease fall risk;improve balance and speed;increase endurance/gait distance;walker, rolling  -AB      [place] Level (Gait Training Goal 1, PT) standby assist  -AB      Distance (Gait Training Goal 1, PT) 50  -AB      Time  Frame (Gait Training Goal 1, PT) long term goal (LTG);10 days  -AB      Progress/Outcome (Gait Training Goal 1, PT) goal not met  -AB                User Key  (r) = Recorded By, (t) = Taken By, (c) = Cosigned By      Initials Name Provider Type Discipline    Kayleigh Fitzgerald, PTA Physical Therapist Assistant PT                        PT Discharge Summary  Anticipated Discharge Disposition (PT): skilled nursing facility  Reason for Discharge: Discharge from facility  Outcomes Achieved: Refer to plan of care for updates on goals achieved  Discharge Destination: SNF      Kayleigh Perez PTA   7/15/2024

## 2024-07-15 NOTE — DISCHARGE SUMMARY
[facility] Medicine Services  DISCHARGE SUMMARY       Date of Admission: 7/10/2024  Date of Discharge:  7/15/2024  Primary Care Physician: Betty Mcguire MD    Presenting Problem/History of Present Illness:  Slurred speech    Final Discharge Diagnoses:  Active Hospital Problems    Diagnosis     **Acute ischemic stroke left  internal capsule lacunar and  right postcentral gyrus at the vertex.     B12 deficiency     Moderate tricompartmental osteoarthritis left knee with small knee joint  effusion.     Acute stroke due to ischemia     Acute focal neurological deficit     UTI (urinary tract infection), bacterial     Uncontrolled type 2 diabetes mellitus with hyperglycemia     PAD (peripheral artery disease)     Coronary artery disease involving native coronary artery of native heart without angina pectoris     Essential hypertension        Consults: Dr. Bell, neurology    Procedures Performed: None    Pertinent Test Results:   Results for orders placed during the hospital encounter of 07/10/24    Adult Transthoracic Echo Complete W/ Cont if Necessary Per Protocol    Interpretation Summary    Left ventricular ejection fraction appears to be 56 - 60%.    The following left ventricular wall segments are hypokinetic: apical septal and apex hypokinetic.    Left ventricular diastolic function was normal.    Saline test results are negative.    Estimated right ventricular systolic pressure from tricuspid regurgitation is normal (<35 mmHg).      Imaging Results (All)       Procedure Component Value Units Date/Time    CT Angiogram Neck [[id number]] Collected: 07/11/24 1243     Updated: 07/11/24 1303    Narrative:      EXAMINATION: CT ANGIOGRAM NECK-      7/11/2024 10:42 AM     HISTORY: Stroke, follow up     In order to have a CT radiation dose as low as reasonably achievable  Automated Exposure Control was utilized for adjustment of the mA and/or  KV according to patient size.      Total DLP = 877.44 mGy.cm     The CT angiography of the neck is performed after intravenous contrast  enhancement.     Images are acquired in axial plane and subsequent 2D reconstruction  coronal and sagittal planes and 3D maximum intensity projection  reconstruction.     There is no previous similar study for comparison. The correlation made  with MR imaging of the brain and CT angiography of the head performed  today.     Atheromatous changes of the limited visualized aortic arch is noted. No  aneurysmal dilatation or dissection.     Atheromatous plaques are seen at the origin of the brachiocephalic, left  common carotid and left subclavian arteries. No significant stenosis.     The brachiocephalic trunk divides into normal appearing right subclavian  and right common carotid arteries.     Both common carotid arteries have a normal course and caliber throughout  the neck. No areas of focal stenosis or aneurysmal dilatation.     There is noncalcific plaque in the distal 3 cm length of the right  common carotid artery extending into the bifurcation. There is 30%  stenosis of the long segment of the distal right common carotid artery.  The plaque extends into the bifurcation and along the posterior aspect  of the proximal carotid bulb and approximately 70% stenosis of the bulb.  The remaining left carotid bulb and left internal carotid artery is  normal. The right external carotid artery is normal. Normal branches.     There is a noncalcific plaque in the distal left common carotid artery  extending no significant stenosis of the common carotid artery or the  carotid bulb. There is a small rounded hyperdensity along the anterior  aspect of the base of the carotid bulb which probably represent a small  calcification in the plaque. I do not believe this represent an  ulcerated plaque. The remaining left internal carotid artery is normal  in course and caliber. There is 50% stenosis of the origin of the left  external  carotid artery. Subsequent normal branches.     There is normal origin of both vertebral arteries. There is an area of  60% stenosis of the distal V1 segment of the left vertebral artery  before it enters the C6 foramen transversarium. There is approximately  50% focal stenosis of the right vertebral artery in the foramen  transversarium of C6. Remaining vertebral artery bilaterally are normal  in course and caliber. Normal size, right dominant, vertebral artery  enter the foramen magnum and subsequent to join to make a normal size  basilar artery.     The limited visualized soft tissues of the neck are unremarkable.  Heterogeneous thyroid gland is seen with probable nodules on both sides,  right more than the left.     Limited visualized lungs show a small spiculated nodule in the right  upper lobe, image #108 in series 2, measuring 8 mm in diameter. This  need to be further evaluated with CT scan of the chest.       Impression:      1. Atheromatous changes of the carotid and vertebral arteries. 70% focal  stenosis of the right carotid bulb 60% focal stenosis of the distal V1  segment of the left vertebral artery and 50% stenosis of the distal V1  segment of the right vertebral artery before entering the foramen  transversarium of C6. The details are given above.  2. NASCET criteria were utilized for estimation of carotid arterial  stenosis.           This report was signed and finalized on 7/11/2024 1:00 PM by Dr. Patrick Whitley MD.       CT Angiogram Head w AI Analysis of LVO [[id number]] Collected: 07/11/24 1232     Updated: 07/11/24 1246    Narrative:      EXAMINATION: CT ANGIOGRAM HEAD W AI ANALYSIS OF LVO-     7/11/2024 10:42 AM     HISTORY: Stroke, follow up     CT angiography of the head is performed before and after intravenous  contrast enhancement.     Images are acquired in axial plane and subsequent 2D reconstruction in  coronal and sagittal planes and 3D maximum intensity  projection  reconstruction.     There is no previous similar study for comparison. The correlation made  with MR imaging of the brain performed earlier today.     Unenhanced images of the brain show moderate diffuse chronic ischemic  and atrophic changes. No acute intracranial abnormality. No acute bony  abnormality.     The post enhancement images show normal size internal carotid arteries  at the skull base and in the carotid canal bilaterally.     Atheromatous changes are seen in the internal carotid arteries in the  cavernous sinus bilaterally. No areas of flow-limiting stenosis or  aneurysmal dilatation.     Normal size suprasellar internal carotid artery bilaterally is seen  dividing into anterior and middle cerebral arteries bilaterally. There  is moderate irregularity and mild diffuse narrowing of the proximal M1  segment of the left middle cerebral artery. No flow-limiting stenosis.  There is also an area of focal stenosis in the distal M1 segment of the  right middle cerebral artery. However I believe this is due to the  tortuosity of the vessel and not a true stenosis. The A1 segment of the  right LEATHA is relatively smaller than the left. However A2 and A3  segments bilaterally are symmetrical. There is subsequent normal  insular, opercular and cortical branches of the middle cerebral arteries  bilaterally. No areas of flow-limiting stenosis or aneurysmal  dilatation.     Normal size vertebral arteries enter the foramen magnum and join to make  a normal size basilar artery which subsequently divides into normal.  Posterior cerebral arteries bilaterally. No areas of focal stenosis or  aneurysmal dilatation.       Impression:      1. Moderate irregularity and diffuse narrowing of the M1 segment of the  left middle cerebral artery with less than 50% stenosis. The remaining  intracranial circulation is unremarkable as detailed above. No foci of  flow-limiting stenosis or aneurysmal dilatation.  2. The NASCET  criteria were utilized for estimation of carotid arterial  stenosis.   This report was signed and finalized on 7/11/2024 12:43 PM by Dr. Patrick Whitley MD.       MRI Brain Without Contrast [[id number]] Collected: 07/11/24 1222     Updated: 07/11/24 1229    Narrative:      MRI BRAIN WO CONTRAST- 7/11/2024 10:20 AM     HISTORY: stroke like symptoms, dysarthria. tongue deviation;  R47.81-Slurred speech; E11.65-Type 2 diabetes mellitus with  hyperglycemia; R13.10-Dysphagia, unspecified     TECHNIQUE: Multisequence, multiplanar MRI of the brain without contrast     COMPARISON: CT scans dated 7/11/2024     FINDINGS:     There are 2 small foci of diffusion restriction. This includes a  posterior limb left internal capsule lacunar infarct (series 203-image  19) as well what appears to be a tiny cortical infarct in the right  postcentral gyrus (series 203-image 25, series 302-image 94). Moderate  chronic small vessel ischemic changes. No intra-axial or extra-axial  hemorrhage. No intracranial mass lesion or mass effect. The ventricles,  cortical sulci and basal cisterns are symmetric and age appropriate.  Posterior fossa structures are unremarkable. Pituitary gland and sella  are unremarkable. The major intracranial flow-voids are preserved.  Orbital contents are unremarkable. The paranasal sinuses are clear.  Mastoid air cells are clear. Incidentally noted scalp trichilemmal  cysts.       Impression:         1.  There are 2 small foci of acute ischemia, a posterior limb left  internal capsule lacunar infarct and a second tiny cortical infarct in  the right postcentral gyrus at the vertex.  2.  Chronic small vessel ischemic changes.     This report was signed and finalized on 7/11/2024 12:26 PM by Dr Mekhi Malone.       XR Knee 3 View Left [[id number]] Collected: 07/10/24 1751     Updated: 07/10/24 1755    Narrative:      XR KNEE 3 VW LEFT-     HISTORY: Knee pain/fall and injury     COMPARISON: 9/13/2022     FINDINGS: 3  views of the left knee are obtained.     There is moderate tricompartmental osteoarthritis with bony spurring  greatest of the lateral femoral condyle. Osteopenia is suspected. Small  knee joint effusion. No acute fracture or dislocation. Mild vascular  calcification.       Impression:      1. Moderate tricompartmental osteoarthritis with small knee joint  effusion. No acute osseous injury.        This report was signed and finalized on 7/10/2024 5:52 PM by Dr. Ragini Soto MD.             LAB RESULTS:      Lab 07/13/24  0407 07/12/24  0402 07/11/24  0539 07/10/24  1657   WBC 11.77* 11.37* 13.25* 12.89*   HEMOGLOBIN 13.8 14.3 14.0 17.3   HEMATOCRIT 40.0 39.8 40.7 47.9   PLATELETS 202 205 218 224   NEUTROS ABS  --   --   --  10.26*   IMMATURE GRANS (ABS)  --   --   --  0.05   LYMPHS ABS  --   --   --  1.42   MONOS ABS  --   --   --  1.04*   EOS ABS  --   --   --  0.06   MCV 92.2 89.2 90.2 89.5         Lab 07/15/24  0411 07/14/24  0512 07/13/24  1449 07/13/24  0407 07/12/24  0402 07/11/24  0450 07/10/24  1657   SODIUM 135* 136  --  136 134* 135* 133*   POTASSIUM 3.8 3.8  --  3.5 3.9 3.5 4.5   CHLORIDE 103 102  --  104 101 102 96*   CO2 23.0 23.0  --  24.0 23.0 22.0 23.0   ANION GAP 9.0 11.0  --  8.0 10.0 11.0 14.0   BUN 19 11  --  11 9 10 15   CREATININE 0.59* 0.53*  --  0.62* 0.50* 0.42* 0.67*   EGFR 106.3 109.8  --  104.8 111.8 117.8 102.3   GLUCOSE 193* 184*  --  191* 254* 233* 466*   CALCIUM 8.8 8.7  --  8.7 8.7 8.5* 9.1   MAGNESIUM  --   --  1.8  --   --   --  1.9   HEMOGLOBIN A1C  --   --   --   --   --  13.20*  --    TSH  --   --   --   --   --  0.888  --          Lab 07/10/24  1657   TOTAL PROTEIN 6.8   ALBUMIN 3.8   GLOBULIN 3.0   ALT (SGPT) 5   AST (SGOT) 13   BILIRUBIN 0.5   ALK PHOS 92             Lab 07/11/24  0450   CHOLESTEROL 104   LDL CHOL 38   HDL CHOL 37*   TRIGLYCERIDES 175*         Lab 07/11/24  0838   VITAMIN B 12 171*         Lab 07/10/24  1723   PH, ARTERIAL 7.472*   PCO2, ARTERIAL 29.1*    PO2 ART 80.7*   O2 SATURATION ART 98.1   HCO3 ART 21.3   BASE EXCESS ART -1.0*     Brief Urine Lab Results  (Last result in the past 365 days)        Color   Clarity   Blood   Leuk Est   Nitrite   Protein   CREAT   Urine HCG        07/10/24 1847 Yellow   Cloudy   Trace   Moderate (2+)   Negative   100 mg/dL (2+)                 Microbiology Results (last 10 days)       Procedure Component Value - Date/Time    Urine Culture - Urine, Urine, Clean Catch [[id number]]  (Abnormal) Collected: 07/10/24 1847    Lab Status: Final result Specimen: Urine, Clean Catch Updated: 07/13/24 1139     Urine Culture Yeast isolated     Comment: No further workup.       Narrative:      Colonization of the urinary tract without infection is common. Treatment is discouraged unless the patient is symptomatic, pregnant, or undergoing an invasive urologic procedure.            Hospital Course: Anival Pennington presented to [facility] emergency room 7/10/2024 with slurred speech for the past 3 days.  He was initially seen at [facility] on 7/9 with similar symptoms.  CT scan of the head was negative and patient left AMA.  He presented to our facility on 7/10 noting tongue deviation to the right, coughing when attempting to drink water and slight right-sided facial drooping.  Patient reported he went to the bathroom earlier on the day of admission, twisted his leg and fell injuring his left knee.  He denied any other injuries.  He reports a history of coronary artery disease, peripheral vascular disease, diabetes mellitus, tobacco use.  Patient reported he has been off of his insulin for several months due to cost.  He reported leg circulation evaluated by Dr. Poole vascular surgery June 2024 with recommendations for arteriogram 7/22/24 due to abnormal SHELBY.  Patient was noted to have slurring of speech.  He moved extremities equally.  WBC 12.89, glucose 110, 466, 308, creatinine 0.67, urinalysis too numerous to count  WBC, trace bacteria, moderate yeast.  X-ray left knee moderate tricompartmental osteoarthritis with small knee effusion.  No acute osseous injury.  Normal saline, insulin given in ER.     He was admitted to the neurology floor with acute ischemic stroke left internal capsule lacunar and right postcentral gyrus at the vertex.  He presented with 3-day history of slurred speech.  CT head noted mild cerebral atrophy and white matter gliosis.  No acute cerebral pathology.  Aspirin, statin ordered on admission.  SCDs ordered for deep vein thrombosis prophylaxis.  No lytic therapy given due to patient presented 3 to 4 days after onset of symptoms.  Neurology consulted and he was followed by Dr. Bell.     MRI of the brain 7/11 noted 2 small foci of acute ischemia, posterior limb left internal capsule or lacunar infarct and second tiny cortical infarct right postcenteral gyrus at the vertex chronic small vessel ischemic changes.  CTA head moderate irregularity and diffuse narrowing of M1 segment of left middle cerebral artery with less than 50% stenosis.  No foci of flow-limiting stenosis or aneurysmal dilatation.  CTA neck atheromatous changes of carotid and vertebral arteries.  70% focal stenosis right carotid bulb, 60% focal stenosis distal D1 segment left vertebral artery and 50% stenosis distal V1 segment right vertebral artery before entering the foramen transversarium C6.  ECHO 7/11/2024 EF 56-60%.  Left ventricular diastolic function normal.  Saline test negative.     Physical therapy, Occupational Therapy, speech therapy consulted.  Regular diet with thin liquids and medications whole.  OT notes maximal assist for toileting tasks.  Some right upper extremity weakness.  Will need OT intervention to address mobility, activity tolerance, balance, coordination.  Physical therapy patient moderate assist to ambulate a few feet with rolling walker.  Extremity buckled on edge of bed.  Took steps to side and needs cues  for attention.  Rehab recommended for unsafe gait and moderate assistance.  SNF recommended, patient agreeable and referral to [facility].  Bed offered and insurance approved on 7/15/2024.     TSH 0.888, free T41.29.  Lipid panel LDL 38 at goal less than 70.  Triglycerides 175, cholesterol 104.     Abnormal urinalysis with too numerous to count WBC, trace bacteria, moderate yeast.  Diflucan given.  Rocephin started.  Urine culture yeast.     Diabetes mellitus type 2, poorly controlled with hyperglycemia.  Hemoglobin A1c 13.2.  Patient has not taken insulin recently due to cost.  Accu Checks with sliding scale insulin coverage.  Lantus insulin started and uptitrated to 40 units nightly throughout hospitalization.  Glucoses 167, 193, 197. Diabetic educator consulted and discussed with CLAUDIO Avendaño and wife in contact with [facility] about assistance program paying for additional medications.  Unable to afford $106 for Lantus from meds to beds.  Prefers the $24.99 option  for 70/30 insulin from [facility].  Patient will follow-up with Dr. Betty Mcguire after discharge and assist with medications take early insulin.    Patient with primary hypertension and had not taken medications recently.  Lisinopril initiated and increased to 40 mg orally daily throughout hospitalization.  Most recent blood pressure 143/85 prior to discharge.    He has a history of peripheral artery disease and follows with Dr. Poole with plans for right lower extremity arteriogram on 7/22/2024.    Aspirin and statin continued for history of coronary artery disease.    Potassium 3.5 on 7/11 and replaced.  Follow-up potassium level 3.8.  Magnesium level 1.8     Patient fell at home and reported left knee pain.  X-ray reported moderate tricompartmental osteoarthritis with small knee joint effusion.  No acute osseous injury.  Orthopedics consulted and Dr. Max recommends no acute treatment.  Ice compression.  Weightbearing as tolerated.  If  "continued with pain and swelling after discharge follow-up as outpatient.     Lovenox and SCDs for deep vein thrombosis prophylaxis.    Social service consulted for discharge planning.  [facility] offered bed and insurance approved on 7/15/2024     On 7/15/2024, he will be discharged to [facility] for ongoing physical therapy, Occupational Therapy and speech therapy prior to consideration for discharge home.  Patient will remain on aspirin 81 mg orally daily, atorvastatin increased to 80 mg orally nightly.  Discussed smoking cessation.  Nicotine patch as needed.  He has been started on Lantus 40 units nightly and may uptitrate pending glucoses.  Lisinopril 40 mg orally daily added for blood pressure.  Patient will follow-up with Dr. Betty Mcguire in 1 to 2 weeks on 7/24/2024 to follow-up on diabetes and hypertension.  Patient will need assistance with medications and insulin after discharge.  Follow-up with neurology in 4 weeks in office will contact patient with date and time of appointment.        Physical Exam on Discharge:  /77 (BP Location: Right arm, Patient Position: Sitting)   Pulse 78   Temp 98.1 °F (36.7 °C) (Oral)   Resp 18   Ht 160 cm (63\")   Wt 77.1 kg (170 lb)   SpO2 97%   BMI 30.11 kg/m²   Physical Exam  Vitals and nursing note reviewed.   Constitutional:       Comments: Sitting up in bed.  No oxygen in use.  Wife in room.   HENT:      Head: Normocephalic and atraumatic.      Nose: No congestion.      Mouth/Throat:      Pharynx: Oropharynx is clear. No oropharyngeal exudate or posterior oropharyngeal erythema.   Eyes:      Extraocular Movements: Extraocular movements intact.      Pupils: Pupils are equal, round, and reactive to light.   Cardiovascular:      Rate and Rhythm: Normal rate and regular rhythm.      Heart sounds: No murmur heard.     Comments: Normal sinus rhythm 61-78 on telemetry.  Pulmonary:      Breath sounds: No wheezing, rhonchi or rales.      Comments: No oxygen in " use  Abdominal:      Palpations: Abdomen is soft.      Tenderness: There is no abdominal tenderness.   Genitourinary:     Comments: Voiding.  Musculoskeletal:         General: Tenderness (Left knee) present.      Cervical back: Normal range of motion and neck supple.   Skin:     General: Skin is warm and dry.   Neurological:      Mental Status: He is alert and oriented to person, place, and time.      Comments: Speech slurred but understandable, slow with hesitation.  Moves extremities.  Follows commands.  Mild right facial drooping.  Lower extremity weakness and discoordination with activity.   Psychiatric:         Mood and Affect: Mood normal.         Behavior: Behavior normal.         Condition on Discharge: Stable for discharge to [facility] [place]    Discharge Disposition:  Skilled Nursing Facility ([place] - External)    Discharge Medications:     Discharge Medications        New Medications        Instructions Start Date   atorvastatin 80 MG tablet  Commonly known as: LIPITOR   80 mg, Oral, Nightly      insulin glargine 100 UNIT/ML injection  Commonly known as: LANTUS, SEMGLEE   40 Units, Subcutaneous, Nightly      Insulin Lispro 100 UNIT/ML injection  Commonly known as: humaLOG   2-7 Units, Subcutaneous, 3 Times Daily With Meals.  Glucose 150-199 2 units, 200-249 3 units, 250-299 4 units, 300-349 5 units, 350-400 6 units, greater than 400 7 units and call provider      lisinopril 40 MG tablet  Commonly known as: PRINIVIL,ZESTRIL   40 mg, Oral, Every 24 Hours Scheduled   Start Date: July 16, 2024     nicotine 14 MG/24HR patch  Commonly known as: NICODERM CQ   1 patch, Transdermal, Daily PRN             Continue These Medications        Instructions Start Date   aspirin 81 MG chewable tablet   81 mg, Oral, Daily      nitroglycerin 0.4 MG SL tablet  Commonly known as: NITROSTAT   0.4 mg, Sublingual, Every 5 Minutes PRN, Take no more than 3 doses in 15 minutes.             Stop These Medications      rosuvastatin 10 MG  tablet  Commonly known as: Crestor            Discharge Diet:   Diet Instructions       Diet: Diabetic Diets; Consistent Carbohydrate; Regular (IDDSI 7); Thin (IDDSI 0)      Discharge Diet: Diabetic Diets    Diabetic Diet: Consistent Carbohydrate    Texture: Regular (IDDSI 7)    Fluid Consistency: Thin (IDDSI 0)            Activity at Discharge:   Activity Instructions       Activity as Tolerated              Discharge instructions:  1.  For worsening speech difficulty, extremity weakness seek medical attention.  2.  Aspirin 81 mg orally daily indefinitely  3.  Atorvastatin 80 mg orally nightly  4.  Lisinopril 40 mg orally daily  5.  Lantus 40 units nightly  6.  Accu-Cheks with sliding scale insulin coverage 3 times daily with meals.  Glucose 150-199 2 units, 200-249 3 units, 250-299 4 units, 300-349 5 units, 350-400 6 units, greater than 400 7 units and call provider  7.  Discussed smoking cessation  8.  Follow-up with Dr. Keiry Mcguire 7/20/2024 needs assistance with insulin and medication assistance program  9.  Follow-up with neurology 4 weeks.  Office will contact patient with date and time of appointment.  10.  Follow-up with Dr. 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  Office will contact patient with date and time of appointment.  Follow-up with Dr. Max as needed for left knee effusion    Test Results Pending at Discharge: None    Electronically signed by CLAUDIO Guzmán, 07/15/24, 11:38 CDT.    Time: 35 minutes.  Discussed with Dr. Dillard, patient, and wife.    The above documentation resulted from a face-to-face encounter by me Brenda SNYDER, [facility].

## 2024-07-15 NOTE — THERAPY DISCHARGE NOTE
Acute Care - Occupational Therapy Treatment Note/Discharge  [facility]     Patient Name: Anival Pennington  : 1955  MRN: [id number]  Today's Date: 7/15/2024               Admit Date: 7/10/2024       ICD-10-CM ICD-9-CM   1. Slurred speech  R47.81 784.59   2. Hyperglycemia due to diabetes mellitus  E11.65 250.02   3. Dysphagia, unspecified type  R13.10 787.20   4. Impaired mobility [Z74.09]  Z74.09 799.89     Patient Active Problem List   Diagnosis    ST elevation myocardial infarction (STEMI)    Tobacco abuse    Type 2 diabetes mellitus with circulatory disorder, without long-term current use of insulin    Essential hypertension    Coronary artery disease involving native coronary artery of native heart without angina pectoris    Mixed hyperlipidemia    Class 2 severe obesity due to excess calories with serious comorbidity and body mass index (BMI) of 36.0 to 36.9 in adult    S/P coronary artery stent placement    History of MI (myocardial infarction)    Diabetes mellitus due to underlying condition with circulatory complication    Dizziness    Heavy smoker (more than 20 cigarettes per day)    Cellulitis of face    Obesity (BMI 30-39.9)    PAD (peripheral artery disease)    Preop testing    Acute focal neurological deficit    UTI (urinary tract infection), bacterial    Uncontrolled type 2 diabetes mellitus with hyperglycemia    Acute ischemic stroke left  internal capsule lacunar and  right postcentral gyrus at the vertex.    Acute stroke due to ischemia    B12 deficiency    Moderate tricompartmental osteoarthritis left knee with small knee joint  effusion.     Past Medical History:   Diagnosis Date    Acute ischemic left MCA stroke 2024    Cancer     leukemia    Coronary artery disease     diabetes with circulatory complications     Hyperlipidemia     Hypertension     Obesity (BMI 30-39.9)     Tobacco abuse      Past Surgical History:   Procedure Laterality Date    CARDIAC CATHETERIZATION N/A 3/28/2019     Procedure: Left Heart Cath;  Surgeon: Vincent Pan MD;  Location:  [facility];  Service: Cardiovascular    CARDIAC CATHETERIZATION Bilateral 3/28/2019    Procedure: Stent JOSSIE coronary;  Surgeon: Vincent Pan MD;  Location:  [facility];  Service: Cardiovascular    CARDIAC CATHETERIZATION N/A 3/28/2019    Procedure: Left ventriculography;  Surgeon: Vincent Pan MD;  Location:  [facility];  Service: Cardiovascular    CORONARY STENT PLACEMENT      TONSILLECTOMY         OT ASSESSMENT FLOWSHEET (Last 12 Hours)       OT Evaluation and Treatment       Row Name 07/15/24 1108                   OT Time and Intention    Subjective Information complains of;fatigue  -EC        Document Type therapy note (daily note)  -EC        Mode of Treatment occupational therapy  -EC           General Information    Patient Profile Reviewed yes  -EC        Existing Precautions/Restrictions fall  -EC           Pain Assessment    Pain Intervention(s) Medication (See MAR);Repositioned;Ambulation/increased activity  -EC        Additional Documentation Pain Scale: FACES Pre/Post-Treatment (Group)  -EC           Pain Scale: FACES Pre/Post-Treatment    Pain: FACES Scale, Pretreatment 2-->hurts little bit  -EC        Posttreatment Pain Rating 4-->hurts little more  -EC        Pain Location - Side/Orientation Left  -EC        Pain Location - knee  -EC           Activities of Daily Living    BADL Assessment/Intervention lower body dressing  -EC           Lower Body Dressing Assessment/Training    [place] Level (Lower Body Dressing) don;pants/bottoms;moderate assist (50% patient effort);doff  -EC        Position (Lower Body Dressing) unsupported sitting  -EC        Comment, (Lower Body Dressing) changed mind & doffed with maxA  -EC           Bed Mobility    Comment, (Bed Mobility) up in chair  -EC           Transfer Assessment/Treatment    Transfers sit-stand transfer;stand-sit transfer   -EC           Sit-Stand Transfer    Sit-Stand [place] (Transfers) verbal cues;moderate assist (50% patient effort)  -EC        Assistive Device (Sit-Stand Transfers) walker, front-wheeled  -EC           Stand-Sit Transfer    Stand-Sit [place] (Transfers) verbal cues;minimum assist (75% patient effort)  -EC        Assistive Device (Stand-Sit Transfers) walker, front-wheeled  -EC           Motor Skills    Therapeutic Exercise shoulder;elbow/forearm  3 x 10 reps BUE strengthening  -EC           Balance    Comment, Balance R lean while sitting in chair, forward flexed posture while standing  -EC           Plan of Care Review    Plan of Care Reviewed With patient;spouse  -EC        Progress improving  -EC        Outcome Evaluation OT tx completed. Pt sitting up in chair & c/o fatigue. continues to demo slurred speech & does not engage in much conversation. Pt completed 3 x 10 reps UE strengthening exs with RB in btwn. Pt demos R lateral lean while sitting in chair, unaware of this unless provided with vcs. Pt stood with modA and worked on standing posture as he demos forward flexed posture. Don/doff pants with mod-maxA. c/o L knee pain during activity. Pt would benefit from cont OT tx & rehab at d/c.  -EC           Positioning and Restraints    Pre-Treatment Position sitting in chair/recliner  -EC        Post Treatment Position chair  -EC        In Chair call light within reach;encouraged to call for assist;exit alarm on;with family/caregiver;with nsg;reclined  -EC                  User Key  (r) = Recorded By, (t) = Taken By, (c) = Cosigned By      Initials Name Effective Dates    EC Talya Salomon OTR/L 10/13/23 -                     Occupational Therapy Education       Title: PT OT SLP Therapies (Resolved)       Topic: Occupational Therapy (Resolved)       Point: ADL training (Resolved)       Description:   Instruct learner(s) on proper safety adaptation and remediation techniques during self care or  transfers.   Instruct in proper use of assistive devices.                  Learning Progress Summary             Patient Acceptance, E, VU,NR by EC at 7/15/2024 1203    Acceptance, E, VU,NR by LS at 7/12/2024 1238    Acceptance, TB, NR by JAIME at 7/11/2024 1832    Acceptance, E, VU,NR by LS at 7/11/2024 1004   Significant Other Acceptance, E, VU,NR by EC at 7/15/2024 1203                         Point: Home exercise program (Resolved)       Description:   Instruct learner(s) on appropriate technique for monitoring, assisting and/or progressing therapeutic exercises/activities.                  Learning Progress Summary             Patient Acceptance, E, VU,NR by EC at 7/15/2024 1203    Acceptance, TB, NR by JAIME at 7/11/2024 1832   Significant Other Acceptance, E, VU,NR by EC at 7/15/2024 1203                         Point: Precautions (Resolved)       Description:   Instruct learner(s) on prescribed precautions during self-care and functional transfers.                  Learning Progress Summary             Patient Acceptance, E, VU,NR by EC at 7/15/2024 1203    Acceptance, E, VU,NR by LS at 7/12/2024 1238    Acceptance, TB, NR by JAIME at 7/11/2024 1832    Acceptance, E, VU,NR by LS at 7/11/2024 1004   Significant Other Acceptance, E, VU,NR by EC at 7/15/2024 1203                         Point: Body mechanics (Resolved)       Description:   Instruct learner(s) on proper positioning and spine alignment during self-care, functional mobility activities and/or exercises.                  Learning Progress Summary             Patient Acceptance, E, VU,NR by EC at 7/15/2024 1203    Acceptance, E, VU,NR by LS at 7/12/2024 1238    Acceptance, TB, NR by JAIME at 7/11/2024 1832    Acceptance, E, VU,NR by LS at 7/11/2024 1004   Significant Other Acceptance, E, VU,NR by EC at 7/15/2024 1203                                         User Key       Initials Effective Dates Name Provider Type Discipline     06/20/22 -  Shanice Cummins  OTR/L Occupational Therapist OT    EC 10/13/23 -  Talya Salomon OTR/L Occupational Therapist OT     04/22/24 -  Fatmata Styles, RN Registered Nurse Nurse                    OT Recommendation and Plan     Plan of Care Review  Plan of Care Reviewed With: patient, spouse  Progress: improving  Outcome Evaluation: OT tx completed. Pt sitting up in chair & c/o fatigue. continues to demo slurred speech & does not engage in much conversation. Pt completed 3 x 10 reps UE strengthening exs with RB in btwn. Pt demos R lateral lean while sitting in chair, unaware of this unless provided with vcs. Pt stood with modA and worked on standing posture as he demos forward flexed posture. Don/doff pants with mod-maxA. c/o L knee pain during activity. Pt would benefit from cont OT tx & rehab at d/c.  Plan of Care Reviewed With: patient, spouse  Outcome Evaluation: OT tx completed. Pt sitting up in chair & c/o fatigue. continues to demo slurred speech & does not engage in much conversation. Pt completed 3 x 10 reps UE strengthening exs with RB in btwn. Pt demos R lateral lean while sitting in chair, unaware of this unless provided with vcs. Pt stood with modA and worked on standing posture as he demos forward flexed posture. Don/doff pants with mod-maxA. c/o L knee pain during activity. Pt would benefit from cont OT tx & rehab at d/c.      OT Rehab Goals       Row Name 07/15/24 1300             Transfer Goal 1 (OT)    Activity/Assistive Device (Transfer Goal 1, OT) toilet;tub  -AB      [place] Level/Cues Needed (Transfer Goal 1, OT) contact guard required  -AB      Time Frame (Transfer Goal 1, OT) long term goal (LTG);10 days  -AB      Progress/Outcome (Transfer Goal 1, OT) goal not met  -AB         Dressing Goal 1 (OT)    Activity/Device (Dressing Goal 1, OT) dressing skills, all  -AB      [place]/Cues Needed (Dressing Goal 1, OT) moderate assist (50-74% patient effort)  -AB      Time Frame (Dressing Goal 1, OT) long term  goal (LTG);10 days  -AB      Progress/Outcome (Dressing Goal 1, OT) goal not met  -AB         Toileting Goal 1 (OT)    Activity/Device (Toileting Goal 1, OT) toileting skills, all  -AB      [place] Level/Cues Needed (Toileting Goal 1, OT) minimum assist (75% or more patient effort)  -AB      Time Frame (Toileting Goal 1, OT) long term goal (LTG);10 days  -AB      Progress/Outcome (Toileting Goal 1, OT) goal not met  -AB                User Key  (r) = Recorded By, (t) = Taken By, (c) = Cosigned By      Initials Name Provider Type Discipline    AB Kayleigh Perez, PTA Physical Therapist Assistant PT                     Outcome Measures       Row Name 07/15/24 1200 07/14/24 1000 07/13/24 1000       How much help from another person do you currently need...    Turning from your back to your side while in flat bed without using bedrails? -- 3  -AH 3  -AH    Moving from lying on back to sitting on the side of a flat bed without bedrails? -- 3  -AH 3  -AH    Moving to and from a bed to a chair (including a wheelchair)? -- 2  -AH 2  -AH    Standing up from a chair using your arms (e.g., wheelchair, bedside chair)? -- 2  -AH 2  -AH    Climbing 3-5 steps with a railing? -- 1  -AH 1  -AH    To walk in hospital room? -- 2  -AH 2  -AH    AM-PAC 6 Clicks Score (PT) -- 13  -AH 13  -AH    Highest Level of Mobility Goal -- 4 --> Transfer to chair/commode  -AH 4 --> Transfer to chair/commode  -AH       How much help from another is currently needed...    Putting on and taking off regular lower body clothing? 2  -EC -- --    Bathing (including washing, rinsing, and drying) 2  -EC -- --    Toileting (which includes using toilet bed pan or urinal) 2  -EC -- --    Putting on and taking off regular upper body clothing 2  -EC -- --    Taking care of personal grooming (such as brushing teeth) 3  -EC -- --    Eating meals 3  -EC -- --    AM-PAC 6 Clicks Score (OT) 14  -EC -- --       Functional Assessment    Outcome Measure Options  AM-PAC 6 Clicks Daily Activity (OT)  -EC AM-PAC 6 Clicks Basic Mobility (PT)  -AH AM-PAC 6 Clicks Basic Mobility (PT)  -AH              User Key  (r) = Recorded By, (t) = Taken By, (c) = Cosigned By      Initials Name Provider Type     Elvira Emerson, PTA Physical Therapist Assistant    EC Talya Salomon, OTR/L Occupational Therapist                    Time Calculation:    Time Calculation- OT       Row Name 07/15/24 1159 07/15/24 1155          Time Calculation- OT    OT Start Time 1058  -EC --     OT Stop Time 1122  -EC --     OT Time Calculation (min) 24 min  -EC --     Total Timed Code Minutes- OT 24 minute(s)  -EC --     OT Received On 07/15/24  -EC --        Timed Charges    33852 - OT Therapeutic Exercise Minutes 14  -EC --     63362 - Gait Training Minutes  -- 18  -LY     42083 - OT Self Care/Mgmt Minutes 10  -EC --        Total Minutes    Timed Charges Total Minutes 24  -EC 18  -LY      Total Minutes 24  -EC 18  -LY               User Key  (r) = Recorded By, (t) = Taken By, (c) = Cosigned By      Initials Name Provider Type    LY Shanice Ortega, PTA Physical Therapist Assistant    EC Talya Salomon, OTR/L Occupational Therapist                  Timed Therapy Charges  Total Units: 2      Suggested Charges  Total Units: 2      Procedure Name Documented Minutes Units Code    HC OT THER PROC EA 15 MIN 14 1   26200 (CPT®)      HC OT SELF CARE/MGMT/TRAIN EA 15 MIN 10 1   58173 (CPT®)                 Documented Minutes  Total Minutes: 24      Therapy Provided Minutes    69343 - OT Therapeutic Exercise Minutes 14    79779 - OT Self Care/Mgmt Minutes 10                  Therapy Charges for Today       Code Description Service Date Service Provider Modifiers Qty    42793583755 HC OT THER PROC EA 15 MIN 7/15/2024 Talya Salomon, OTR/L GO 1    26996703732 HC OT SELF CARE/MGMT/TRAIN EA 15 MIN 7/15/2024 YaleTalya calix, OTR/L GO 1                 OT Discharge Summary  Anticipated Discharge Disposition (OT): skilled  nursing facility  Reason for Discharge: Discharge from facility  Outcomes Achieved: Refer to plan of care for updates on goals achieved  Discharge Destination: SNF    Talya Salomon OTR/L  7/15/2024

## 2024-07-15 NOTE — THERAPY TREATMENT NOTE
Acute Care - Physical Therapy Treatment Note  [facility]     Patient Name: Anival Pennington  : 1955  MRN: [id number]  Today's Date: 7/15/2024      Visit Dx:     ICD-10-CM ICD-9-CM   1. Slurred speech  R47.81 784.59   2. Hyperglycemia due to diabetes mellitus  E11.65 250.02   3. Dysphagia, unspecified type  R13.10 787.20   4. Impaired mobility [Z74.09]  Z74.09 799.89     Patient Active Problem List   Diagnosis    ST elevation myocardial infarction (STEMI)    Tobacco abuse    Type 2 diabetes mellitus with circulatory disorder, without long-term current use of insulin    Essential hypertension    Coronary artery disease involving native coronary artery of native heart without angina pectoris    Mixed hyperlipidemia    Class 2 severe obesity due to excess calories with serious comorbidity and body mass index (BMI) of 36.0 to 36.9 in adult    S/P coronary artery stent placement    History of MI (myocardial infarction)    Diabetes mellitus due to underlying condition with circulatory complication    Dizziness    Heavy smoker (more than 20 cigarettes per day)    Cellulitis of face    Obesity (BMI 30-39.9)    PAD (peripheral artery disease)    Preop testing    Acute focal neurological deficit    UTI (urinary tract infection), bacterial    Uncontrolled type 2 diabetes mellitus with hyperglycemia    Acute ischemic stroke left  internal capsule lacunar and  right postcentral gyrus at the vertex.    Acute stroke due to ischemia    B12 deficiency    Moderate tricompartmental osteoarthritis left knee with small knee joint  effusion.     Past Medical History:   Diagnosis Date    Acute ischemic left MCA stroke 2024    Cancer     leukemia    Coronary artery disease     diabetes with circulatory complications     Hyperlipidemia     Hypertension     Obesity (BMI 30-39.9)     Tobacco abuse      Past Surgical History:   Procedure Laterality Date    CARDIAC CATHETERIZATION N/A 3/28/2019    Procedure: Left Heart Cath;  Surgeon:  Vincent Pan MD;  Location:  [facility];  Service: Cardiovascular    CARDIAC CATHETERIZATION Bilateral 3/28/2019    Procedure: Stent JOSSIE coronary;  Surgeon: Vincent Pan MD;  Location:  [facility];  Service: Cardiovascular    CARDIAC CATHETERIZATION N/A 3/28/2019    Procedure: Left ventriculography;  Surgeon: Vincent Pan MD;  Location:  [facility];  Service: Cardiovascular    CORONARY STENT PLACEMENT      TONSILLECTOMY       PT Assessment (Last 12 Hours)       PT Evaluation and Treatment       Row Name 07/15/24 0930 07/15/24 0830       Physical Therapy Time and Intention    Subjective Information complains of;weakness;fatigue  -LY --    Document Type therapy note (daily note)  -LY therapy note (daily note)  -LY    Mode of Treatment physical therapy  -LY physical therapy  -LY    Comment, Session Not Performed -- eating breakfast  -LY      Row Name 07/15/24 0930 07/15/24 0830       General Information    Existing Precautions/Restrictions fall  R weakness/inattention, L knee swollen and painful  -LY fall  R weakness/inattention, L knee swollen and painful  -LY      Row Name 07/15/24 0930          Pain    Pretreatment Pain Rating 4/10  -LY     Posttreatment Pain Rating 4/10  -LY     Pain Location - Side/Orientation Right  -LY     Pain Location - knee  -LY     Pain Intervention(s) Medication (See MAR);Ambulation/increased activity  -LY       Row Name 07/15/24 0930          Bed Mobility    Supine-Sit [place] (Bed Mobility) verbal cues;moderate assist (50% patient effort)  -LY     Assistive Device (Bed Mobility) bed rails;draw sheet  -LY       Row Name 07/15/24 0930          Sit-Stand Transfer    Sit-Stand [place] (Transfers) verbal cues;moderate assist (50% patient effort)  -LY     Assistive Device (Sit-Stand Transfers) walker, front-wheeled  -LY       Row Name 07/15/24 0930          Stand-Sit Transfer    Stand-Sit [place] (Transfers) verbal  cues;minimum assist (75% patient effort)  -LY     Assistive Device (Stand-Sit Transfers) walker, front-wheeled  -LY       Row Name 07/15/24 0930          Toilet Transfer    Type (Toilet Transfer) sit-stand;stand-sit  -LY     [place] Level (Toilet Transfer) moderate assist (50% patient effort);verbal cues;nonverbal cues (demo/gesture)  -LY       Row Name 07/15/24 0930          Gait/Stairs (Locomotion)    [place] Level (Gait) moderate assist (50% patient effort);verbal cues  -LY     Assistive Device (Gait) walker, front-wheeled  -LY     Distance in Feet (Gait) 10  then 5'  -LY     Deviations/Abnormal Patterns (Gait) base of support, wide;gait speed decreased;stride length decreased  -LY     Bilateral Gait Deviations knee buckling bilaterally  -LY     Right Sided Gait Deviations leans right  -LY     Comment, (Gait/Stairs) pt fatigues very quickly and R lateral lean increases and requires assist for RLE advancement, frequent cues for posture and gait mechanics  -LY       Row Name 07/15/24 0930          Balance    Comment, Balance R lateral lean in sitting/standing, with verbal/visual cues able to correct posture in sitting momentarily  -LY       Row Name 07/15/24 0930          Plan of Care Review    Plan of Care Reviewed With patient  -LY     Progress improving  -LY       Row Name 07/15/24 0930          Positioning and Restraints    Pre-Treatment Position in bed  -LY     Post Treatment Position chair  -LY     In Chair notified nsg;reclined;call light within reach;encouraged to call for assist;exit alarm on;with family/caregiver;[place] cushion  -LY               User Key  (r) = Recorded By, (t) = Taken By, (c) = Cosigned By      Initials Name Provider Type    LY Shanice Ortega, PTA Physical Therapist Assistant                    Physical Therapy Education       Title: PT OT SLP Therapies (In Progress)       Topic: Physical Therapy (In Progress)       Point: Mobility training (In Progress)       Learning  Progress Summary             Patient Acceptance, TB, NR by JAIME at 7/11/2024 1832    Acceptance, E,TB, VU,DU,NR by CN at 7/11/2024 1412    Acceptance, E,TB, VU,DU,NR by CN at 7/11/2024 1412    Comment: Educated on PT role in pt care.   Family Acceptance, E,TB, VU,DU,NR by CN at 7/11/2024 1412    Comment: Educated on PT role in pt care.                         Point: Home exercise program (In Progress)       Learning Progress Summary             Patient Acceptance, TB, NR by JAIME at 7/11/2024 1832                         Point: Body mechanics (In Progress)       Learning Progress Summary             Patient Acceptance, TB, NR by JAIME at 7/11/2024 1832                         Point: Precautions (In Progress)       Learning Progress Summary             Patient Acceptance, TB, NR by JAIME at 7/11/2024 1832                                         User Key       Initials Effective Dates Name Provider Type Discipline     04/22/24 -  Fatmata Styles, RN Registered Nurse Nurse    NANCY 06/24/24 -  Elvia Oakes, DEBORA Student PT Student PT                  PT Recommendation and Plan     Plan of Care Reviewed With: patient  Progress: improving   Outcome Measures       Row Name 07/14/24 1000 07/13/24 1000          How much help from another person do you currently need...    Turning from your back to your side while in flat bed without using bedrails? 3  -AH 3  -AH     Moving from lying on back to sitting on the side of a flat bed without bedrails? 3  -AH 3  -AH     Moving to and from a bed to a chair (including a wheelchair)? 2  -AH 2  -AH     Standing up from a chair using your arms (e.g., wheelchair, bedside chair)? 2  -AH 2  -AH     Climbing 3-5 steps with a railing? 1  -AH 1  -AH     To walk in hospital room? 2  -AH 2  -AH     AM-PAC 6 Clicks Score (PT) 13  -AH 13  -AH     Highest Level of Mobility Goal 4 --> Transfer to chair/commode  -AH 4 --> Transfer to chair/commode  -AH        Functional Assessment    Outcome Measure Options  AM-PAC 6 Clicks Basic Mobility (PT)  - AM-PAC 6 Clicks Basic Mobility (PT)  -               User Key  (r) = Recorded By, (t) = Taken By, (c) = Cosigned By      Initials Name Provider Type    Elvira Tan PTA Physical Therapist Assistant                     Time Calculation:    PT Charges       Row Name 07/15/24 1155             Time Calculation    Start Time 0930  -LY      Stop Time 1014  -LY      Time Calculation (min) 44 min  -LY      PT Received On 07/15/24  -LY         Time Calculation- PT    Total Timed Code Minutes- PT 44 minute(s)  -LY         Timed Charges    48601 - Gait Training Minutes  18  -LY      09065 - PT Therapeutic Activity Minutes 26  -LY         Total Minutes    Timed Charges Total Minutes 44  -LY       Total Minutes 44  -LY                User Key  (r) = Recorded By, (t) = Taken By, (c) = Cosigned By      Initials Name Provider Type    Shanice [place]ancey PTA Physical Therapist Assistant                  Therapy Charges for Today       Code Description Service Date Service Provider Modifiers Qty    00943758807 HC GAIT TRAINING EA 15 MIN 7/15/2024 Shanice Ortega PTA GP 1    90574059283 HC PT THERAPEUTIC ACT EA 15 MIN 7/15/2024 Shanice Ortega PTA GP 2            PT G-Codes  Outcome Measure Options: AM-PAC 6 Clicks Basic Mobility (PT)  AM-PAC 6 Clicks Score (PT): 13  AM-PAC 6 Clicks Score (OT): 12    Shanice Ortega PTA  7/15/2024

## 2024-07-18 NOTE — THERAPY DISCHARGE NOTE
Acute Care - Speech Language Pathology Discharge Summary  [facility]       Patient Name: Anival Pennington  : 1955  MRN: [id number]    Today's Date: 2024                   Admit Date: 7/10/2024      SLP Recommendation and Plan    Recommendations: Diet Textures: Regular diet with thin liquids.  Medications should be taken whole as tolerated.       Visit Dx:    ICD-10-CM ICD-9-CM   1. Slurred speech  R47.81 784.59   2. Hyperglycemia due to diabetes mellitus  E11.65 250.02   3. Dysphagia, unspecified type  R13.10 787.20   4. Impaired mobility [Z74.09]  Z74.09 799.89                SLP GOALS       Row Name 24 1400             (LTG) Swallow    (LTG) Swallow Patient will tolerate least restrictive diet without overt s/s of aspiration.  -MG      [place] (Swallow Long Term Goal) independently (over 90% accuracy)  -MG      Time Frame (Swallow Long Term Goal) by discharge  -MG      Barriers (Swallow Long Term Goal) none  -MG      Progress/Outcomes (Swallow Long Term Goal) discharged from facility;goal partially met  -MG         (STG) Patient will tolerate trials of    Consistencies Trialed (Tolerate trials) regular textures;thin liquids  -MG      Desired Outcome (Tolerate trials) without signs/symptoms of aspiration;without signs of distress;with adequate oral prep/transit/clearance  -MG      [place]ary (Tolerate trials) independently (over 90% accuracy)  -MG      Time Frame (Tolerate trials) by discharge  -MG      Progress/Outcomes (Tolerate trials) discharged from facility;goal partially met  -MG                User Key  (r) = Recorded By, (t) = Taken By, (c) = Cosigned By      Initials Name Provider Type    Caridad Bernal MS CCC-SLP Speech and Language Pathologist                    [email or web address]      SLP Discharge Summary  Anticipated Discharge Disposition (SLP): unknown  Reason for Discharge: discharge from this facility  Progress Toward Achieving Short/long Term Goals: goals partially met  within established timelines  Discharge Destination: [facility]      Caridad Aguayo, MS CCC-SLP  7/18/2024

## 2024-08-05 ENCOUNTER — TELEPHONE (OUTPATIENT)
Dept: NEUROLOGY | Facility: CLINIC | Age: 69
End: 2024-08-05

## 2024-08-05 NOTE — TELEPHONE ENCOUNTER
Caller: DEVORAH    Relationship to patient: WIFE    Best call back number: [phone]     New or established patient?  [] New  [] Established    Date of discharge: 07/15/24    Facility discharged from:  [facility]    Diagnosis/Symptoms: STROKE    Length of stay (If applicable): 5 DAYS    Specialty Only: Did you see a [facility] provider?    [x] Yes  [] No  If so, who? Madina Neal APRN      PT'S WIFE CALLED TO SCHEDULE A H/P FOLLOW UP APPT FOR STROKE FOR PT. PT IS NEEDING A 4-6 WEEK FOLLOW UP PER NOTES.    THANK YOU        DELETE AFTER : Send this encounter to the appropriate pool. See your Call Action Grid or Workflows for direction.

## 2024-08-08 ENCOUNTER — TELEPHONE (OUTPATIENT)
Dept: VASCULAR SURGERY | Facility: CLINIC | Age: 69
End: 2024-08-08
Payer: MEDICARE

## 2024-08-08 NOTE — TELEPHONE ENCOUNTER
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  She said that his speech is still slurred at times, but other times he is clear.  She also said that he is having issues with his right knee.  She says it charles. He did have a fall on it while at the rehab facility.  He is looking at going home from rehab this weekend.  She said she is going to see about him getting an x-ray of his right knee before he leaves to make sure everything is okay.

## 2024-09-17 ENCOUNTER — TELEPHONE (OUTPATIENT)
Dept: VASCULAR SURGERY | Facility: CLINIC | Age: 69
End: 2024-09-17
Payer: MEDICARE

## 2024-09-17 ENCOUNTER — OFFICE VISIT (OUTPATIENT)
Dept: NEUROLOGY | Facility: CLINIC | Age: 69
End: 2024-09-17
Payer: MEDICARE

## 2024-09-17 VITALS
SYSTOLIC BLOOD PRESSURE: 128 MMHG | WEIGHT: 170 LBS | HEART RATE: 101 BPM | HEIGHT: 63 IN | DIASTOLIC BLOOD PRESSURE: 66 MMHG | OXYGEN SATURATION: 96 % | BODY MASS INDEX: 30.12 KG/M2

## 2024-09-17 DIAGNOSIS — I73.9 PAD (PERIPHERAL ARTERY DISEASE): ICD-10-CM

## 2024-09-17 DIAGNOSIS — E11.59 TYPE 2 DIABETES MELLITUS WITH OTHER CIRCULATORY COMPLICATION, WITHOUT LONG-TERM CURRENT USE OF INSULIN: ICD-10-CM

## 2024-09-17 DIAGNOSIS — Z86.73 CHRONIC ARTERIAL ISCHEMIC STROKE: Primary | ICD-10-CM

## 2024-09-17 DIAGNOSIS — I65.21 INTERNAL CAROTID ARTERY STENOSIS, RIGHT: ICD-10-CM

## 2024-09-17 DIAGNOSIS — E78.2 MIXED HYPERLIPIDEMIA: ICD-10-CM

## 2024-09-17 RX ORDER — CALCIUM CITRATE/VITAMIN D3 200MG-6.25
TABLET ORAL
COMMUNITY
Start: 2024-07-25

## 2024-09-17 RX ORDER — PEN NEEDLE, DIABETIC 29 G X1/2"
NEEDLE, DISPOSABLE MISCELLANEOUS
COMMUNITY
Start: 2024-08-22

## 2024-09-17 RX ORDER — ISOPROPYL ALCOHOL 70 ML/100ML
SWAB TOPICAL
COMMUNITY
Start: 2024-07-26

## 2024-09-17 RX ORDER — GLUCOSAM/CHON-MSM1/C/MANG/BOSW 500-416.6
TABLET ORAL
COMMUNITY
Start: 2024-07-26

## 2024-09-17 RX ORDER — ACYCLOVIR 400 MG/1
TABLET ORAL
COMMUNITY
Start: 2024-09-16

## 2024-09-20 ENCOUNTER — TELEPHONE (OUTPATIENT)
Dept: VASCULAR SURGERY | Facility: CLINIC | Age: 69
End: 2024-09-20
Payer: MEDICARE

## 2024-09-23 ENCOUNTER — OFFICE VISIT (OUTPATIENT)
Dept: VASCULAR SURGERY | Facility: CLINIC | Age: 69
End: 2024-09-23
Payer: MEDICARE

## 2024-09-23 VITALS
HEIGHT: 63 IN | WEIGHT: 173 LBS | SYSTOLIC BLOOD PRESSURE: 124 MMHG | DIASTOLIC BLOOD PRESSURE: 68 MMHG | OXYGEN SATURATION: 98 % | HEART RATE: 104 BPM | BODY MASS INDEX: 30.65 KG/M2

## 2024-09-23 DIAGNOSIS — E78.2 MIXED HYPERLIPIDEMIA: ICD-10-CM

## 2024-09-23 DIAGNOSIS — I10 ESSENTIAL HYPERTENSION: Chronic | ICD-10-CM

## 2024-09-23 DIAGNOSIS — I73.9 PAD (PERIPHERAL ARTERY DISEASE): Primary | ICD-10-CM

## 2024-09-23 DIAGNOSIS — E66.9 CLASS 1 OBESITY WITH SERIOUS COMORBIDITY AND BODY MASS INDEX (BMI) OF 30.0 TO 30.9 IN ADULT, UNSPECIFIED OBESITY TYPE: ICD-10-CM

## 2024-09-23 PROCEDURE — 1159F MED LIST DOCD IN RCRD: CPT | Performed by: NURSE PRACTITIONER

## 2024-09-23 PROCEDURE — 1160F RVW MEDS BY RX/DR IN RCRD: CPT | Performed by: NURSE PRACTITIONER

## 2024-09-23 PROCEDURE — 99214 OFFICE O/P EST MOD 30 MIN: CPT | Performed by: NURSE PRACTITIONER

## 2024-09-23 PROCEDURE — 3078F DIAST BP <80 MM HG: CPT | Performed by: NURSE PRACTITIONER

## 2024-09-23 PROCEDURE — 3074F SYST BP LT 130 MM HG: CPT | Performed by: NURSE PRACTITIONER

## 2024-09-24 ENCOUNTER — TELEPHONE (OUTPATIENT)
Dept: VASCULAR SURGERY | Facility: CLINIC | Age: 69
End: 2024-09-24
Payer: MEDICARE

## 2024-09-25 ENCOUNTER — TELEPHONE (OUTPATIENT)
Dept: VASCULAR SURGERY | Facility: CLINIC | Age: 69
End: 2024-09-25
Payer: MEDICARE

## 2024-10-04 ENCOUNTER — PRE-ADMISSION TESTING (OUTPATIENT)
Dept: PREADMISSION TESTING | Facility: HOSPITAL | Age: 69
End: 2024-10-04
Payer: MEDICARE

## 2024-10-04 ENCOUNTER — HOSPITAL ENCOUNTER (OUTPATIENT)
Dept: GENERAL RADIOLOGY | Facility: HOSPITAL | Age: 69
Discharge: HOME | End: 2024-10-04
Payer: MEDICARE

## 2024-10-04 VITALS
SYSTOLIC BLOOD PRESSURE: 113 MMHG | BODY MASS INDEX: 29.45 KG/M2 | OXYGEN SATURATION: 99 % | HEIGHT: 62 IN | HEART RATE: 75 BPM | WEIGHT: 160.05 LBS | RESPIRATION RATE: 18 BRPM | DIASTOLIC BLOOD PRESSURE: 68 MMHG

## 2024-10-04 DIAGNOSIS — I73.9 PAD (PERIPHERAL ARTERY DISEASE): ICD-10-CM

## 2024-10-04 DIAGNOSIS — Z01.818 PREOP TESTING: ICD-10-CM

## 2024-10-04 LAB
ANION GAP SERPL CALCULATED.3IONS-SCNC: 10 MMOL/L (ref 5–15)
BASOPHILS # BLD AUTO: 0.07 10*3/MM3 (ref 0–0.2)
BASOPHILS NFR BLD AUTO: 0.6 % (ref 0–1.5)
BUN SERPL-MCNC: 14 MG/DL (ref 8–23)
BUN/CREAT SERPL: 26.9 (ref 7–25)
CALCIUM SPEC-SCNC: 8.9 MG/DL (ref 8.6–10.5)
CHLORIDE SERPL-SCNC: 104 MMOL/L (ref 98–107)
CO2 SERPL-SCNC: 25 MMOL/L (ref 22–29)
CREAT SERPL-MCNC: 0.52 MG/DL (ref 0.76–1.27)
DEPRECATED RDW RBC AUTO: 42.5 FL (ref 37–54)
EGFRCR SERPLBLD CKD-EPI 2021: 109.8 ML/MIN/1.73
EOSINOPHIL # BLD AUTO: 0.21 10*3/MM3 (ref 0–0.4)
EOSINOPHIL NFR BLD AUTO: 1.7 % (ref 0.3–6.2)
ERYTHROCYTE [DISTWIDTH] IN BLOOD BY AUTOMATED COUNT: 12.9 % (ref 12.3–15.4)
GLUCOSE SERPL-MCNC: 104 MG/DL (ref 65–99)
HCT VFR BLD AUTO: 40.9 % (ref 37.5–51)
HGB BLD-MCNC: 13.7 G/DL (ref 13–17.7)
IMM GRANULOCYTES # BLD AUTO: 0.06 10*3/MM3 (ref 0–0.05)
IMM GRANULOCYTES NFR BLD AUTO: 0.5 % (ref 0–0.5)
LYMPHOCYTES # BLD AUTO: 3.65 10*3/MM3 (ref 0.7–3.1)
LYMPHOCYTES NFR BLD AUTO: 29.7 % (ref 19.6–45.3)
MCH RBC QN AUTO: 30.5 PG (ref 26.6–33)
MCHC RBC AUTO-ENTMCNC: 33.5 G/DL (ref 31.5–35.7)
MCV RBC AUTO: 91.1 FL (ref 79–97)
MONOCYTES # BLD AUTO: 0.92 10*3/MM3 (ref 0.1–0.9)
MONOCYTES NFR BLD AUTO: 7.5 % (ref 5–12)
NEUTROPHILS NFR BLD AUTO: 60 % (ref 42.7–76)
NEUTROPHILS NFR BLD AUTO: 7.39 10*3/MM3 (ref 1.7–7)
NRBC BLD AUTO-RTO: 0 /100 WBC (ref 0–0.2)
PLATELET # BLD AUTO: 298 10*3/MM3 (ref 140–450)
PMV BLD AUTO: 10.1 FL (ref 6–12)
POTASSIUM SERPL-SCNC: 3.9 MMOL/L (ref 3.5–5.2)
RBC # BLD AUTO: 4.49 10*6/MM3 (ref 4.14–5.8)
SODIUM SERPL-SCNC: 139 MMOL/L (ref 136–145)
WBC NRBC COR # BLD AUTO: 12.3 10*3/MM3 (ref 3.4–10.8)

## 2024-10-04 PROCEDURE — 71046 X-RAY EXAM CHEST 2 VIEWS: CPT

## 2024-10-04 PROCEDURE — 80048 BASIC METABOLIC PNL TOTAL CA: CPT

## 2024-10-04 PROCEDURE — 85025 COMPLETE CBC W/AUTO DIFF WBC: CPT

## 2024-10-04 PROCEDURE — 36415 COLL VENOUS BLD VENIPUNCTURE: CPT

## 2024-10-04 NOTE — DISCHARGE INSTRUCTIONS
Preparing for Surgery  Follow these instructions before the procedure:  Several days or weeks before your procedure        Ask your health care provider about:  Changing or stopping your regular medicines. This is especially important if you are taking diabetes medicines or blood thinners.  Taking medicines such as aspirin and ibuprofen. These medicines can thin your blood. Do not take these medicines unless your health care provider tells you to take them.  Taking over-the-counter medicines, vitamins, herbs, and supplements.    Contact your surgeon if you:  Develop a fever of more than 100.4°F (38°C) or other feelings of illness during the 48 hours before your surgery.  Have symptoms that get worse.  Have questions or concerns about your surgery.  If you are going home the same day of your surgery you will need to arrange for a responsible adult, age 18 years old or older, to drive you home from the hospital and stay with you for 24 hours. Verification of the  will be made prior to any procedure requiring sedation. You may not go home in a taxi or any form of public transportation by yourself.     Day before your procedure  Medication(s) you need to stop the day before your surgery: LISINOPRIL    24 hours before your procedure DO NOT drink alcoholic beverages or smoke.  24 hours before your procedure STOP taking Erectile Dysfunction medication (i.e.,Cialis, Viagra)   You may be asked to shower with a germ-killing soap.  Day of your procedure         8 hours before your scheduled arrival time, STOP all food, any dairy products, and full liquids. This includes hard candy, chewing gum or mints. This is extremely important to prevent serious complications.     Up to 2 hours before your scheduled arrival time, you may have clear liquids no cream, powder, or pulp of any kind. Safe options are water, black coffee, plain tea, soda, Gatorade/Powerade, clear broth, apple juice.    2 hours before your scheduled arrival  time, STOP drinking clear liquids.    You may need to take another shower with a germ-killing soap before you leave home in the morning. Do not use perfumes, colognes, or body lotions.  Wear comfortable loose-fitting clothing.  Remove all jewelry including body piercing and rings, dark colored nail polish, and make up prior to arrival at the hospital. Leave all valuables at home.   Bring your hearing aids if you rely on them.  Do not wear contact lenses. If you wear eyeglasses remember to bring a case to store them in while you are in surgery.  Do not use denture adhesives since you will be asked to remove them during your surgery.    You do not need to bring your home medications into the hospital.   Bring your sleep apnea device with you on the day of your surgery (if this applies to you).  If you wear portable oxygen, bring it with you.   If you are staying overnight, you may bring a bag of items you may need such as slippers, robe and a change of clothes for your discharge. You may want to leave these items in the car until you are ready for them since your family will take your belongings when you leave the pre-operative area.  Arrive at the hospital as scheduled by the office. You will be asked to arrive 2 hours prior to your surgery time in order to prepare for your procedure.  When you arrive at the hospital  Go to the registration desk located at the main entrance of the hospital.  After registration is completed, you will be given a beeper and a sticker sheet. Take the stickers to Outpatient Surgery and place in the tray at the end of the desk to notify the staff that you have arrived and registered.   Return to the lobby to wait. You are not always called back according to the time of arrival but rather the time your doctor will be ready.  When your beeper lights up and vibrates proceed through the double doors, under the stairs, and a member of the Outpatient Surgery staff will escort you to your  preoperative room.   How to Use Chlorhexidine Before Surgery  Chlorhexidine gluconate (CHG) is a germ-killing (antiseptic) solution that is used to clean the skin. It can get rid of the bacteria that normally live on the skin and can keep them away for about 24 hours. To clean your skin with CHG, you may be given:  A CHG solution to use in the shower or as part of a sponge bath.  A prepackaged cloth that contains CHG.  Cleaning your skin with CHG may help lower the risk for infection:  While you are staying in the intensive care unit of the hospital.  If you have a vascular access, such as a central line, to provide short-term or long-term access to your veins.  If you have a catheter to drain urine from your bladder.  If you are on a ventilator. A ventilator is a machine that helps you breathe by moving air in and out of your lungs.  After surgery.  What are the risks?  Risks of using CHG include:  A skin reaction.  Hearing loss, if CHG gets in your ears and you have a perforated eardrum.  Eye injury, if CHG gets in your eyes and is not rinsed out.  The CHG product catching fire.  Make sure that you avoid smoking and flames after applying CHG to your skin.  Do not use CHG:  If you have a chlorhexidine allergy or have previously reacted to chlorhexidine.  On babies younger than 2 months of age.  How to use CHG solution  Use CHG only as told by your health care provider, and follow the instructions on the label.  Use the full amount of CHG as directed. Usually, this is one bottle.  During a shower    Follow these steps when using CHG solution during a shower (unless your health care provider gives you different instructions):  Start the shower.  Use your normal soap and shampoo to wash your face and hair.  Turn off the shower or move out of the shower stream.  Pour the CHG onto a clean washcloth. Do not use any type of brush or rough-edged sponge.  Starting at your neck, lather your body down to your toes. Make sure  you follow these instructions:  If you will be having surgery, pay special attention to the part of your body where you will be having surgery. Scrub this area for at least 1 minute.  Do not use CHG on your head or face. If the solution gets into your ears or eyes, rinse them well with water.  Avoid your genital area.  Avoid any areas of skin that have broken skin, cuts, or scrapes.  Scrub your back and under your arms. Make sure to wash skin folds.  Let the lather sit on your skin for 1-2 minutes or as long as told by your health care provider.  Thoroughly rinse your entire body in the shower. Make sure that all body creases and crevices are rinsed well.  Dry off with a clean towel. Do not put any substances on your body afterward--such as powder, lotion, or perfume--unless you are told to do so by your health care provider. Only use lotions that are recommended by the .  Put on clean clothes or pajamas.  If it is the night before your surgery, sleep in clean sheets.     During a sponge bath  Follow these steps when using CHG solution during a sponge bath (unless your health care provider gives you different instructions):  Use your normal soap and shampoo to wash your face and hair.  Pour the CHG onto a clean washcloth.  Starting at your neck, lather your body down to your toes. Make sure you follow these instructions:  If you will be having surgery, pay special attention to the part of your body where you will be having surgery. Scrub this area for at least 1 minute.  Do not use CHG on your head or face. If the solution gets into your ears or eyes, rinse them well with water.  Avoid your genital area.  Avoid any areas of skin that have broken skin, cuts, or scrapes.  Scrub your back and under your arms. Make sure to wash skin folds.  Let the lather sit on your skin for 1-2 minutes or as long as told by your health care provider.  Using a different clean, wet washcloth, thoroughly rinse your entire  body. Make sure that all body creases and crevices are rinsed well.  Dry off with a clean towel. Do not put any substances on your body afterward--such as powder, lotion, or perfume--unless you are told to do so by your health care provider. Only use lotions that are recommended by the .  Put on clean clothes or pajamas.  If it is the night before your surgery, sleep in clean sheets.  How to use CHG prepackaged cloths  Only use CHG cloths as told by your health care provider, and follow the instructions on the label.  Use the CHG cloth on clean, dry skin.  Do not use the CHG cloth on your head or face unless your health care provider tells you to.  When washing with the CHG cloth:  Avoid your genital area.  Avoid any areas of skin that have broken skin, cuts, or scrapes.  Before surgery    Follow these steps when using a CHG cloth to clean before surgery (unless your health care provider gives you different instructions):  Using the CHG cloth, vigorously scrub the part of your body where you will be having surgery. Scrub using a back-and-forth motion for 3 minutes. The area on your body should be completely wet with CHG when you are done scrubbing.  Do not rinse. Discard the cloth and let the area air-dry. Do not put any substances on the area afterward, such as powder, lotion, or perfume.  Put on clean clothes or pajamas.  If it is the night before your surgery, sleep in clean sheets.     For general bathing  Follow these steps when using CHG cloths for general bathing (unless your health care provider gives you different instructions).  Use a separate CHG cloth for each area of your body. Make sure you wash between any folds of skin and between your fingers and toes. Wash your body in the following order, switching to a new cloth after each step:  The front of your neck, shoulders, and chest.  Both of your arms, under your arms, and your hands.  Your stomach and groin area, avoiding the genitals.  Your  right leg and foot.  Your left leg and foot.  The back of your neck, your back, and your buttocks.  Do not rinse. Discard the cloth and let the area air-dry. Do not put any substances on your body afterward--such as powder, lotion, or perfume--unless you are told to do so by your health care provider. Only use lotions that are recommended by the .  Put on clean clothes or pajamas.  Contact a health care provider if:  Your skin gets irritated after scrubbing.  You have questions about using your solution or cloth.  You swallow any chlorhexidine. Call your local poison control center (1-214.696.5207 in the [place]  Get help right away if:  Your eyes itch badly, or they become very red or swollen.  Your skin itches badly and is red or swollen.  Your hearing changes.  You have trouble seeing.  You have swelling or tingling in your mouth or throat.  You have trouble breathing.  These symptoms may represent a serious problem that is an emergency. Do not wait to see if the symptoms will go away. Get medical help right away. Call your local emergency services (208 in the [place] Do not drive yourself to the hospital.  Summary  Chlorhexidine gluconate (CHG) is a germ-killing (antiseptic) solution that is used to clean the skin. Cleaning your skin with CHG may help to lower your risk for infection.  You may be given CHG to use for bathing. It may be in a bottle or in a prepackaged cloth to use on your skin. Carefully follow your health care provider's instructions and the instructions on the product label.  Do not use CHG if you have a chlorhexidine allergy.  Contact your health care provider if your skin gets irritated after scrubbing.  This information is not intended to replace advice given to you by your health care provider. Make sure you discuss any questions you have with your health care provider.  Document Revised: 04/17/2023 Document Reviewed: 02/28/2022  [employer] Patient Education © 2023 [employer]

## 2024-10-12 NOTE — H&P
10/12/2024             Betty Mcguire MD  [address] [place] [id number]        Anival Pennington  1955          Chief Complaint   Patient presents with    SET PAD       Here to talk about possible angio         Dear Dr. Betty Mcguire:        HPI  I had the pleasure of seeing your patient Anival Pennington in the office today.   As you recall, Anival Pennington is a 69 y.o.  male who you are currently following for routine health maintenance.  He was originally supposed to have right lower extremity angiogram performed, however unfortunately he had experienced left basal ganglia stroke and was in the hospital.  He returns today to discuss proceeding with right lower extremity angiogram.  He had right sided weakness and dysarthria.  His hemoglobin A1c was 13.2% he was unable to afford his diabetes medication.  He continues with dysarthria.  He also states that his knees want to buckle with walking.  I am proud states that he has quit smoking since his hospitalization.  He is maintained on aspirin and Lipitor.  Past Medical History:   Diagnosis Date    Acute ischemic left MCA stroke 07/11/2024    Cancer     leukemia    Coronary artery disease     diabetes with circulatory complications     Hyperlipidemia     Hypertension     Stroke     x2 since    Tobacco abuse      Past Surgical History:   Procedure Laterality Date    CARDIAC CATHETERIZATION N/A 3/28/2019    Procedure: Left Heart Cath;  Surgeon: Vincent Pan MD;  Location:  [facility];  Service: Cardiovascular    CARDIAC CATHETERIZATION Bilateral 3/28/2019    Procedure: Stent JOSSIE coronary;  Surgeon: Vincent Pan MD;  Location:  [facility];  Service: Cardiovascular    CARDIAC CATHETERIZATION N/A 3/28/2019    Procedure: Left ventriculography;  Surgeon: Vincent Pan MD;  Location:  [facility];  Service: Cardiovascular    CORONARY STENT PLACEMENT      TONSILLECTOMY       Social History     Socioeconomic History     "Marital status:    Tobacco Use    Smoking status: Former     Average packs/day: 1.5 packs/day for 55.5 years (83.3 ttl pk-yrs)     Types: Cigarettes     Start date: 1969    Smokeless tobacco: Never   Vaping Use    Vaping status: Never Used   Substance and Sexual Activity    Alcohol use: Not Currently     Comment: quit 2010    Drug use: Not Currently     Types: Marijuana    Sexual activity: Defer     Family History   Problem Relation Age of Onset    Valvular heart disease Mother     Heart attack Mother     Heart disease Mother     Heart disease Brother     Heart attack Brother     Cancer Father     No Known Problems Brother      Allergies   Allergen Reactions    Aspirin Other (See Comments)     Tightness in throat with high dose asa.  Can take the 81 mg without difficutly    Bee Venom Anaphylaxis     Current Outpatient Medications   Medication Instructions    Alcohol Swabs (DropSafe Alcohol Prep) 70 % pads     aspirin 81 mg, Oral, Daily    atorvastatin (LIPITOR) 80 mg, Oral, Nightly    BD Insulin Syringe U/F 31G X 5/16\" 0.5 ML misc USE TO INJECT INSULIN UP TO 3 TIMES DAILY AS DIRECTED    Continuous Glucose Sensor (Dexcom G7 Sensor) misc     insulin glargine (LANTUS, SEMGLEE) 40 Units, Subcutaneous, Nightly    Insulin Lispro (HUMALOG) 2-7 Units, Subcutaneous, 3 Times Daily With Meals    lisinopril (PRINIVIL,ZESTRIL) 40 mg, Oral, Every 24 Hours Scheduled    nitroglycerin (NITROSTAT) 0.4 mg, Sublingual, Every 5 Minutes PRN, Take no more than 3 doses in 15 minutes.    sertraline (ZOLOFT) 50 mg, Oral, Every Night at Bedtime    True Metrix Blood Glucose Test test strip CHECK BLOOD SUGAR EVERY DAY    TRUEplus Lancets 33G misc           Review of Systems   Constitutional:  Negative for diaphoresis and fever.   HENT: Negative.     Eyes: Negative.    Respiratory: Negative.  Negative for shortness of breath and wheezing.    Cardiovascular:  Negative for chest pain. Leg swelling: Claudication pain to bilateral lower " "extremities.  Gastrointestinal: Negative.  Negative for abdominal pain.   Endocrine: Negative.    Genitourinary: Negative.    Musculoskeletal: Negative.    Skin: Negative.    Allergic/Immunologic: Negative.    Neurological:  Positive for speech difficulty (Dysarthria) and weakness. Negative for dizziness.   Hematological: Negative.    Psychiatric/Behavioral: Negative.        /68   Pulse 104   Ht 160 cm (63\")   Wt 78.5 kg (173 lb)   SpO2 98%   BMI 30.65 kg/m²      Physical Exam  Vitals and nursing note reviewed.   Constitutional:       General: He is not in acute distress.     Appearance: Normal appearance. He is not diaphoretic.   HENT:      Head: Normocephalic. No right periorbital erythema or left periorbital erythema.      Nose: Nose normal.   Eyes:      General: No scleral icterus.     Pupils: Pupils are equal.   Cardiovascular:      Rate and Rhythm: Normal rate and regular rhythm.      Pulses: Normal pulses.           Femoral pulses are 2+ on the right side and 2+ on the left side.       Popliteal pulses are 2+ on the right side and 2+ on the left side.        Dorsalis pedis pulses are 2+ on the right side and 2+ on the left side.        Posterior tibial pulses are detected w/ Doppler on the right side and 0 on the left side.      Heart sounds: Normal heart sounds. No murmur heard.     Comments: Peroneal signal dopplered to left lower extremity but not to right.  Pulmonary:      Effort: Pulmonary effort is normal. No respiratory distress.      Breath sounds: Normal breath sounds.   Abdominal:      General: Bowel sounds are normal. There is no distension.      Palpations: Abdomen is soft.      Tenderness: There is no abdominal tenderness. There is no guarding.   Musculoskeletal:         General: No swelling or tenderness. Normal range of motion.      Cervical back: Normal range of motion and neck supple.      Right lower leg: No edema.      Left lower leg: No edema.   Feet:      Right foot:      Skin " integrity: Skin integrity normal.      Left foot:      Skin integrity: Skin integrity normal.   Skin:     General: Skin is warm and dry.      Findings: No erythema or rash.   Neurological:      General: No focal deficit present.      Mental Status: He is alert and oriented to person, place, and time. Mental status is at baseline.      Cranial Nerves: No cranial nerve deficit.      Gait: Gait normal.   Psychiatric:         Attention and Perception: Attention normal.         Mood and Affect: Mood normal.         Behavior: Behavior normal.         Thought Content: Thought content normal.         Judgment: Judgment normal.            Diagnostic data:  Carotid duplex shows less than 50% bilateral carotid stenosis       Problem List       Patient Active Problem List   Diagnosis    ST elevation myocardial infarction (STEMI)    Tobacco abuse    Type 2 diabetes mellitus with circulatory disorder, without long-term current use of insulin    Essential hypertension    Coronary artery disease involving native coronary artery of native heart without angina pectoris    Mixed hyperlipidemia    Class 2 severe obesity due to excess calories with serious comorbidity and body mass index (BMI) of 36.0 to 36.9 in adult    S/P coronary artery stent placement    History of MI (myocardial infarction)    Diabetes mellitus due to underlying condition with circulatory complication    Dizziness    Heavy smoker (more than 20 cigarettes per day)    Cellulitis of face    Obesity (BMI 30-39.9)    PAD (peripheral artery disease)    Preop testing    Acute focal neurological deficit    UTI (urinary tract infection), bacterial    Uncontrolled type 2 diabetes mellitus with hyperglycemia    Acute ischemic stroke left  internal capsule lacunar and  right postcentral gyrus at the vertex.    Acute stroke due to ischemia    B12 deficiency    Moderate tricompartmental osteoarthritis left knee with small knee joint  effusion.            Visit Diagnosis        ICD-10-CM ICD-9-CM   1. PAD (peripheral artery disease)  I73.9 443.9   2. Essential hypertension  I10 401.9   3. Mixed hyperlipidemia  E78.2 272.2   4. Class 1 obesity with serious comorbidity and body mass index (BMI) of 30.0 to 30.9 in adult, unspecified obesity type  E66.9 278.00     Z68.30 V85.30                        Plan: After thoroughly evaluating Anival Pennington, I believe the best course of action is to proceed with right lower extremity angiogram.  Risks of angiogram were discussed.  These include, but are not limited to, bleeding, infection, vessel damage, nerve damage, embolus, and loss of limb.  The patient understands these risks and wishes to proceed with procedure.  I along with Dr. Poole reviewed his CTA neck and we will monitor his carotids closely.  He did have a left basal ganglia stroke in July which canceled his right lower extremity angiogram that was previously scheduled.  He is ready to proceed.  He should continue his 81 mg aspirin daily, and Lipitor 80 mg nightly uninterrupted up until the day before surgery.  I did discuss vascular risk factors as they pertain to the progression of vascular disease including controlling his hypertension, hyperlipidemia, and diabetes.  His blood pressure stable today in office.  His last lipid panel from 2 months ago shows his total cholesterol at 104, triglycerides are elevated at 175, HDL decreased at 37, and his LDL is 38.  His hemoglobin A1c from 2 months ago shows uncontrolled at 13.2%, however he now has a Dexcom that will help him learn what is good for him and what is not.  Currently in office his blood sugar was 113.  I am proud to state that he has quit smoking at this time. BMI is >= 30 and <35. (Class 1 Obesity). The following options were offered after discussion;: exercise counseling/recommendations and nutrition counseling/recommendations.  The patient can continue taking their current medication regimen as previously planned.  This was all  discussed in full with complete understanding.     Thank you for allowing me to participate in the care of your patient.  Please do not hesitate with any questions or concerns.  I will keep you aware of any further encounters with Anival Pennington.           Sincerely yours,           Slava Poole, DO

## 2024-10-17 ENCOUNTER — TELEPHONE (OUTPATIENT)
Dept: VASCULAR SURGERY | Facility: CLINIC | Age: 69
End: 2024-10-17
Payer: MEDICARE

## 2024-10-17 NOTE — TELEPHONE ENCOUNTER
Called patient to discuss surgery instructions for his procedure on 10/18/2024 at 8:00 am. Patient is to have continued Aspirin and Lipitor uninterrupted. Patient is to be NPO after midnight patient did not answer left detailed VM.

## 2024-10-18 ENCOUNTER — ANESTHESIA (OUTPATIENT)
Dept: PERIOP | Facility: HOSPITAL | Age: 69
End: 2024-10-18
Payer: MEDICARE

## 2024-10-18 ENCOUNTER — ANESTHESIA EVENT (OUTPATIENT)
Dept: PERIOP | Facility: HOSPITAL | Age: 69
End: 2024-10-18
Payer: MEDICARE

## 2024-10-18 ENCOUNTER — HOSPITAL ENCOUNTER (OUTPATIENT)
Facility: HOSPITAL | Age: 69
Setting detail: HOSPITAL OUTPATIENT SURGERY
Discharge: HOME | End: 2024-10-18
Attending: SURGERY | Admitting: SURGERY
Payer: MEDICARE

## 2024-10-18 ENCOUNTER — APPOINTMENT (OUTPATIENT)
Dept: INTERVENTIONAL RADIOLOGY/VASCULAR | Facility: HOSPITAL | Age: 69
End: 2024-10-18
Payer: MEDICARE

## 2024-10-18 VITALS
OXYGEN SATURATION: 99 % | SYSTOLIC BLOOD PRESSURE: 123 MMHG | TEMPERATURE: 97.3 F | DIASTOLIC BLOOD PRESSURE: 67 MMHG | HEART RATE: 100 BPM | RESPIRATION RATE: 16 BRPM

## 2024-10-18 DIAGNOSIS — I73.9 PAD (PERIPHERAL ARTERY DISEASE): ICD-10-CM

## 2024-10-18 DIAGNOSIS — Z01.818 PREOP TESTING: ICD-10-CM

## 2024-10-18 LAB
ABO GROUP BLD: NORMAL
BLD GP AB SCN SERPL QL: NEGATIVE
GLUCOSE BLDC GLUCOMTR-MCNC: 108 MG/DL (ref 70–130)
GLUCOSE BLDC GLUCOMTR-MCNC: 133 MG/DL (ref 70–130)
GLUCOSE BLDC GLUCOMTR-MCNC: 61 MG/DL (ref 70–130)
GLUCOSE BLDC GLUCOMTR-MCNC: 67 MG/DL (ref 70–130)
GLUCOSE BLDC GLUCOMTR-MCNC: 75 MG/DL (ref 70–130)
RH BLD: POSITIVE
T&S EXPIRATION DATE: NORMAL

## 2024-10-18 PROCEDURE — C1887 CATHETER, GUIDING: HCPCS | Performed by: SURGERY

## 2024-10-18 PROCEDURE — 25010000002 PROPOFOL 1000 MG/100ML EMULSION: Performed by: NURSE ANESTHETIST, CERTIFIED REGISTERED

## 2024-10-18 PROCEDURE — 76000 FLUOROSCOPY <1 HR PHYS/QHP: CPT

## 2024-10-18 PROCEDURE — C1725 CATH, TRANSLUMIN NON-LASER: HCPCS | Performed by: SURGERY

## 2024-10-18 PROCEDURE — 25010000002 HEPARIN (PORCINE) PER 1000 UNITS: Performed by: NURSE ANESTHETIST, CERTIFIED REGISTERED

## 2024-10-18 PROCEDURE — C1894 INTRO/SHEATH, NON-LASER: HCPCS | Performed by: SURGERY

## 2024-10-18 PROCEDURE — 75710 ARTERY X-RAYS ARM/LEG: CPT

## 2024-10-18 PROCEDURE — 75710 ARTERY X-RAYS ARM/LEG: CPT | Performed by: SURGERY

## 2024-10-18 PROCEDURE — 75625 CONTRAST EXAM ABDOMINL AORTA: CPT | Performed by: SURGERY

## 2024-10-18 PROCEDURE — 82948 REAGENT STRIP/BLOOD GLUCOSE: CPT

## 2024-10-18 PROCEDURE — 86901 BLOOD TYPING SEROLOGIC RH(D): CPT | Performed by: NURSE PRACTITIONER

## 2024-10-18 PROCEDURE — C1769 GUIDE WIRE: HCPCS | Performed by: SURGERY

## 2024-10-18 PROCEDURE — 25010000002 CEFAZOLIN PER 500 MG: Performed by: NURSE PRACTITIONER

## 2024-10-18 PROCEDURE — 25010000002 FENTANYL CITRATE (PF) 50 MCG/ML SOLUTION: Performed by: NURSE ANESTHETIST, CERTIFIED REGISTERED

## 2024-10-18 PROCEDURE — 75625 CONTRAST EXAM ABDOMINL AORTA: CPT

## 2024-10-18 PROCEDURE — 86900 BLOOD TYPING SEROLOGIC ABO: CPT | Performed by: NURSE PRACTITIONER

## 2024-10-18 PROCEDURE — C2623 CATH, TRANSLUMIN, DRUG-COAT: HCPCS | Performed by: SURGERY

## 2024-10-18 PROCEDURE — 25510000001 IOPAMIDOL 61 % SOLUTION: Performed by: SURGERY

## 2024-10-18 PROCEDURE — 25010000002 BUPIVACAINE (PF) 0.5 % SOLUTION: Performed by: SURGERY

## 2024-10-18 PROCEDURE — 25810000003 LACTATED RINGERS PER 1000 ML: Performed by: SURGERY

## 2024-10-18 PROCEDURE — C1884 EMBOLIZATION PROTECT SYST: HCPCS | Performed by: SURGERY

## 2024-10-18 PROCEDURE — 25010000002 HEPARIN (PORCINE) PER 1000 UNITS: Performed by: SURGERY

## 2024-10-18 PROCEDURE — 86850 RBC ANTIBODY SCREEN: CPT | Performed by: NURSE PRACTITIONER

## 2024-10-18 PROCEDURE — C1760 CLOSURE DEV, VASC: HCPCS | Performed by: SURGERY

## 2024-10-18 PROCEDURE — 37224 PR REVSC OPN/PRG FEM/POP W/ANGIOPLASTY UNI: CPT | Performed by: SURGERY

## 2024-10-18 RX ORDER — EPHEDRINE SULFATE 50 MG/ML
INJECTION INTRAVENOUS AS NEEDED
Status: DISCONTINUED | OUTPATIENT
Start: 2024-10-18 | End: 2024-10-21 | Stop reason: SURG

## 2024-10-18 RX ORDER — CLOPIDOGREL BISULFATE 75 MG/1
75 TABLET ORAL DAILY
Qty: 30 TABLET | Refills: 5 | Status: SHIPPED | OUTPATIENT
Start: 2024-10-18 | End: 2025-04-16

## 2024-10-18 RX ORDER — DROPERIDOL 2.5 MG/ML
0.62 INJECTION, SOLUTION INTRAMUSCULAR; INTRAVENOUS ONCE AS NEEDED
Status: DISCONTINUED | OUTPATIENT
Start: 2024-10-18 | End: 2024-10-18 | Stop reason: HOSPADM

## 2024-10-18 RX ORDER — HYDROCODONE BITARTRATE AND ACETAMINOPHEN 10; 325 MG/1; MG/1
1 TABLET ORAL EVERY 4 HOURS PRN
Status: DISCONTINUED | OUTPATIENT
Start: 2024-10-18 | End: 2024-10-18 | Stop reason: HOSPADM

## 2024-10-18 RX ORDER — DEXTROSE MONOHYDRATE 25 G/50ML
12.5 INJECTION, SOLUTION INTRAVENOUS AS NEEDED
Status: DISCONTINUED | OUTPATIENT
Start: 2024-10-18 | End: 2024-10-18 | Stop reason: HOSPADM

## 2024-10-18 RX ORDER — SODIUM CHLORIDE 0.9 % (FLUSH) 0.9 %
10 SYRINGE (ML) INJECTION AS NEEDED
Status: DISCONTINUED | OUTPATIENT
Start: 2024-10-18 | End: 2024-10-18 | Stop reason: HOSPADM

## 2024-10-18 RX ORDER — HYDROCODONE BITARTRATE AND ACETAMINOPHEN 5; 325 MG/1; MG/1
1 TABLET ORAL ONCE AS NEEDED
Status: DISCONTINUED | OUTPATIENT
Start: 2024-10-18 | End: 2024-10-18 | Stop reason: HOSPADM

## 2024-10-18 RX ORDER — FENTANYL CITRATE 50 UG/ML
INJECTION, SOLUTION INTRAMUSCULAR; INTRAVENOUS AS NEEDED
Status: DISCONTINUED | OUTPATIENT
Start: 2024-10-18 | End: 2024-10-21 | Stop reason: SURG

## 2024-10-18 RX ORDER — SODIUM CHLORIDE 0.9 % (FLUSH) 0.9 %
10 SYRINGE (ML) INJECTION EVERY 12 HOURS SCHEDULED
Status: DISCONTINUED | OUTPATIENT
Start: 2024-10-18 | End: 2024-10-18 | Stop reason: HOSPADM

## 2024-10-18 RX ORDER — SODIUM CHLORIDE, SODIUM LACTATE, POTASSIUM CHLORIDE, CALCIUM CHLORIDE 600; 310; 30; 20 MG/100ML; MG/100ML; MG/100ML; MG/100ML
1000 INJECTION, SOLUTION INTRAVENOUS CONTINUOUS
Status: DISCONTINUED | OUTPATIENT
Start: 2024-10-18 | End: 2024-10-18 | Stop reason: HOSPADM

## 2024-10-18 RX ORDER — ASPIRIN 81 MG/1
81 TABLET, CHEWABLE ORAL ONCE
Status: DISCONTINUED | OUTPATIENT
Start: 2024-10-18 | End: 2024-10-18 | Stop reason: HOSPADM

## 2024-10-18 RX ORDER — CLOPIDOGREL BISULFATE 75 MG/1
150 TABLET ORAL ONCE
Status: COMPLETED | OUTPATIENT
Start: 2024-10-18 | End: 2024-10-18

## 2024-10-18 RX ORDER — NALOXONE HCL 0.4 MG/ML
0.4 VIAL (ML) INJECTION AS NEEDED
Status: DISCONTINUED | OUTPATIENT
Start: 2024-10-18 | End: 2024-10-18 | Stop reason: HOSPADM

## 2024-10-18 RX ORDER — PROPOFOL 10 MG/ML
INJECTION, EMULSION INTRAVENOUS CONTINUOUS PRN
Status: DISCONTINUED | OUTPATIENT
Start: 2024-10-18 | End: 2024-10-21 | Stop reason: SURG

## 2024-10-18 RX ORDER — HEPARIN SODIUM 1000 [USP'U]/ML
INJECTION, SOLUTION INTRAVENOUS; SUBCUTANEOUS AS NEEDED
Status: DISCONTINUED | OUTPATIENT
Start: 2024-10-18 | End: 2024-10-21 | Stop reason: SURG

## 2024-10-18 RX ORDER — SODIUM CHLORIDE 0.9 % (FLUSH) 0.9 %
3 SYRINGE (ML) INJECTION AS NEEDED
Status: DISCONTINUED | OUTPATIENT
Start: 2024-10-18 | End: 2024-10-18 | Stop reason: HOSPADM

## 2024-10-18 RX ORDER — ONDANSETRON 2 MG/ML
4 INJECTION INTRAMUSCULAR; INTRAVENOUS ONCE AS NEEDED
Status: DISCONTINUED | OUTPATIENT
Start: 2024-10-18 | End: 2024-10-18 | Stop reason: HOSPADM

## 2024-10-18 RX ORDER — FENTANYL CITRATE 50 UG/ML
50 INJECTION, SOLUTION INTRAMUSCULAR; INTRAVENOUS
Status: DISCONTINUED | OUTPATIENT
Start: 2024-10-18 | End: 2024-10-18 | Stop reason: HOSPADM

## 2024-10-18 RX ORDER — IBUPROFEN 600 MG/1
600 TABLET, FILM COATED ORAL EVERY 6 HOURS PRN
Status: DISCONTINUED | OUTPATIENT
Start: 2024-10-18 | End: 2024-10-18 | Stop reason: HOSPADM

## 2024-10-18 RX ORDER — SODIUM CHLORIDE, SODIUM LACTATE, POTASSIUM CHLORIDE, CALCIUM CHLORIDE 600; 310; 30; 20 MG/100ML; MG/100ML; MG/100ML; MG/100ML
9 INJECTION, SOLUTION INTRAVENOUS CONTINUOUS
Status: DISPENSED | OUTPATIENT
Start: 2024-10-18 | End: 2024-10-18

## 2024-10-18 RX ORDER — BUPIVACAINE HYDROCHLORIDE 5 MG/ML
INJECTION, SOLUTION EPIDURAL; INTRACAUDAL AS NEEDED
Status: DISCONTINUED | OUTPATIENT
Start: 2024-10-18 | End: 2024-10-18 | Stop reason: HOSPADM

## 2024-10-18 RX ORDER — LABETALOL HYDROCHLORIDE 5 MG/ML
5 INJECTION, SOLUTION INTRAVENOUS
Status: DISCONTINUED | OUTPATIENT
Start: 2024-10-18 | End: 2024-10-18 | Stop reason: HOSPADM

## 2024-10-18 RX ORDER — DEXTROSE MONOHYDRATE 25 G/50ML
50 INJECTION, SOLUTION INTRAVENOUS
Status: DISCONTINUED | OUTPATIENT
Start: 2024-10-18 | End: 2024-10-18 | Stop reason: HOSPADM

## 2024-10-18 RX ORDER — IOPAMIDOL 612 MG/ML
INJECTION, SOLUTION INTRAVASCULAR AS NEEDED
Status: DISCONTINUED | OUTPATIENT
Start: 2024-10-18 | End: 2024-10-18 | Stop reason: HOSPADM

## 2024-10-18 RX ORDER — FLUMAZENIL 0.1 MG/ML
0.2 INJECTION INTRAVENOUS AS NEEDED
Status: DISCONTINUED | OUTPATIENT
Start: 2024-10-18 | End: 2024-10-18 | Stop reason: HOSPADM

## 2024-10-18 RX ORDER — FENTANYL CITRATE 50 UG/ML
25 INJECTION, SOLUTION INTRAMUSCULAR; INTRAVENOUS
Status: DISCONTINUED | OUTPATIENT
Start: 2024-10-18 | End: 2024-10-18 | Stop reason: HOSPADM

## 2024-10-18 RX ORDER — HYDROCODONE BITARTRATE AND ACETAMINOPHEN 5; 325 MG/1; MG/1
1 TABLET ORAL EVERY 4 HOURS PRN
Status: DISCONTINUED | OUTPATIENT
Start: 2024-10-18 | End: 2024-10-18 | Stop reason: HOSPADM

## 2024-10-18 RX ADMIN — HEPARIN SODIUM 4000 UNITS: 1000 INJECTION, SOLUTION INTRAVENOUS; SUBCUTANEOUS at 11:51

## 2024-10-18 RX ADMIN — CLOPIDOGREL BISULFATE 150 MG: 75 TABLET ORAL at 12:45

## 2024-10-18 RX ADMIN — SODIUM CHLORIDE, POTASSIUM CHLORIDE, SODIUM LACTATE AND CALCIUM CHLORIDE 1000 ML: 600; 310; 30; 20 INJECTION, SOLUTION INTRAVENOUS at 09:10

## 2024-10-18 RX ADMIN — CEFAZOLIN 2000 MG: 2 INJECTION, POWDER, FOR SOLUTION INTRAMUSCULAR; INTRAVENOUS at 11:26

## 2024-10-18 RX ADMIN — EPHEDRINE SULFATE 20 MG: 50 INJECTION INTRAVENOUS at 11:55

## 2024-10-18 RX ADMIN — PROPOFOL INJECTABLE EMULSION 100 MCG/KG/MIN: 10 INJECTION, EMULSION INTRAVENOUS at 11:24

## 2024-10-18 RX ADMIN — EPHEDRINE SULFATE 20 MG: 50 INJECTION INTRAVENOUS at 11:37

## 2024-10-18 RX ADMIN — PROPOFOL INJECTABLE EMULSION 100 MG: 10 INJECTION, EMULSION INTRAVENOUS at 11:23

## 2024-10-18 RX ADMIN — FENTANYL CITRATE 100 MCG: 50 INJECTION, SOLUTION INTRAMUSCULAR; INTRAVENOUS at 11:26

## 2024-10-18 RX ADMIN — DEXTROSE MONOHYDRATE 12.5 G: 25 INJECTION, SOLUTION INTRAVENOUS at 11:06

## 2024-10-18 RX ADMIN — HEPARIN SODIUM 1000 UNITS: 1000 INJECTION, SOLUTION INTRAVENOUS; SUBCUTANEOUS at 11:36

## 2024-10-18 NOTE — ANESTHESIA POSTPROCEDURE EVALUATION
Patient: Anival Pennington    Procedure Summary       Date: 10/18/24 Room / Location:  [facility] OR  /  [facility]    Anesthesia Start: 1118 Anesthesia Stop:     Procedure: Right lower extremity angiogram, shockwave lithotripsy, balloon angioplasty, mynx closure (Right: Groin) Diagnosis:       PAD (peripheral artery disease)      Preop testing      (PAD (peripheral artery disease) [I73.9])      (Preop testing [Z01.818])    Surgeons: Slava Poole DO Provider: Dougie Chan CRNA    Anesthesia Type: MAC ASA Status: 3            Anesthesia Type: MAC    Vitals  Vitals Value Taken Time   /75 10/18/24 1356   Temp 97.3 °F (36.3 °C) 10/18/24 1341   Pulse 92 10/18/24 1400   Resp 16 10/18/24 1356   SpO2 98 % 10/18/24 1400   Vitals shown include unfiled device data.        Post Anesthesia Care and Evaluation    PONV Status: none  Comments: Patient d/c from PACU prior to anes eval based on Emile score.  Please see RN notes for details of d/c criteria.    Blood pressure 128/62, pulse 94, temperature 97.3 °F (36.3 °C), temperature source Temporal, resp. rate 16, SpO2 97%.

## 2024-10-18 NOTE — NURSING NOTE
at the bedside talking with the patient  at this time . Glucose has improved after administering dextrose IV . Initial glucose was 67. Now is noted to be 133.

## 2024-10-18 NOTE — OP NOTE
Anival Pennington  10/18/2024     PREOPERATIVE DIAGNOSIS: PAD (peripheral artery disease) [I73.9]  Preop testing [Z01.818]     POSTOPERATIVE DIAGNOSIS: Post-Op Diagnosis Codes:     * PAD (peripheral artery disease) [I73.9]     * Preop testing [Z01.818]     PROCEDURE PERFORMED:   1.  Introduction of catheter/sheath into the aorta  2.  Aortoiliac angiogram with right lower extremity runoff  3.  Contralateral cannulation of the right common iliac artery  4.  Crossing of the distal SFA chronic total occlusion  5.  Placement of a 5 mm spider distal embolic protection device in the popliteal artery  6.  Intravascular lithotripsy (IVL) of the mid to distal SFA and proximal popliteal artery using a 5 x 60 mm Shockwave balloon  7.  Balloon angioplasty of the entire SFA and proximal popliteal artery using a 5 x 250 mm Medtronic drug-coated balloon  8.  Retrieval of the spider distal embolic protection device  9.  Completion right lower extremity angiogram with radiographic supervision and interpretation  10.  Mynx closure of the left common femoral artery     SURGEON: Slava Poole DO      ANESTHESIA: MAC    PREPARATION: Routine.    STAFF: Circulator: Genesis Montiel RN  Scrub Person: Donnell Rodney; Ragini Cordon  Vascular Radiology Technician: Paola Munoz    Estimated Blood Loss: minimal    SPECIMENS: None    COMPLICATIONS: None    INDICATIONS: Anival Pennington is a 69 y.o. male who you are currently following for routine health maintenance. He was originally supposed to have right lower extremity angiogram performed, however unfortunately he had experienced left basal ganglia stroke and was in the hospital. He returns today to discuss proceeding with right lower extremity angiogram. He had right sided weakness and dysarthria. His hemoglobin A1c was 13.2% he was unable to afford his diabetes medication. He continues with dysarthria. He also states that his knees want to buckle with walking. I am proud states that he has  quit smoking since his hospitalization. He is maintained on aspirin and Lipitor. The indications, risks, and possible complications of the procedure were explained to the patient, who voiced understanding and wished to proceed with surgery.     PROCEDURE IN DETAIL: The patient was taken to the operating room and placed on the operating table in a supine position. After MAC anesthesia was obtained, the bilateral groins was prepped and draped in a sterile manner.  5 cc of 0.5% Marcaine plain was used infiltrate the left groin for local anesthesia.  Using a micropuncture technique the left common femoral artery was cannulated and a microsheath was placed.  Advantage Glidewire was advanced into the aorta and a short 6 [place] sheath was placed.  The patient was given 1000 units of intravenous heparin.  The Omni Flush catheter was advanced into the aorta and an aortoiliac angiogram was performed.  Findings are as follows:  1.  Patent renal arteries bilaterally without stenosis  2.  Patent aorta without stenosis  3.  Patent iliac systems bilaterally without stenosis    Contralateral cannulation was established of the right common iliac artery.  The catheter was then brought down to the level of the femoral head.  Angiogram with runoff was performed.  Findings are as follows:  1.  Patent common femoral and profunda femoris arteries without stenosis  2.  Patent proximal and mid SFA with evidence of moderate focal stenosis in the proximal SFA.   3.  Severe plaque burden with occlusion of the distal SFA/proximal popliteal artery at the adductor hiatus  4.  Patent popliteal artery without stenosis  5.  Patent three-vessel runoff to the foot without stenosis    At this point, the decision was made to treat the mid to distal SFA .  A 6 [place] by 45 cm destination sheath was placed down into the common femoral artery.  The patient was given 4000 units of intravenous heparin.  With the help of the [place] cross catheter, the distal  SFA  was traversed.  An angiogram was performed distally to ensure that I was in true lumen.  A 5 mm spider distal embolic protection device was placed in the popliteal artery.  Intravascular lithotripsy was then performed of the distal SFA/proximal popliteal artery .  All 300 seconds were used of the balloon.  Next, the mid to distal SFA/proximal popliteal artery lesion was balloon angioplastied with a 5 x 250 mm Medtronic drug-coated balloon.  Completion angiogram was then performed which showed rapid flow down through this area without any residual stenosis, dissection, or occlusion.  There was still maintained three-vessel runoff to the foot.  The spider was successfully retrieved.  The sheath and wire were removed and a minx device was used to seal off the left common femoral artery.  Direct pressure was held for an additional 10 to 15 minutes to help ensure hemostasis.  Sterile dressings were applied. The patient tolerated the procedure well. Sponge and needle counts were correct. The patient was then awakened in the operating room and taken to the recovery room in good condition.    Slava Poole,   Date: 10/18/2024 Time: 12:19 CDT    CC:Betty Mcguire MD

## 2024-10-18 NOTE — NURSING NOTE
Patient noted to have a brief episode of rambling ,No physical deficits are noted . He was able to answer simple questions but with slight hesitation .  was updated per Genesis Montiel RN whom witnessed this episode at the bedside .

## 2024-10-18 NOTE — ANESTHESIA PREPROCEDURE EVALUATION
Anesthesia Evaluation     Patient summary reviewed   NPO Solid Status: > 8 hours             Airway   Mallampati: II  Dental    (+) upper dentures    Pulmonary    (-) COPD, asthma, sleep apnea, not a smoker  Cardiovascular   Exercise tolerance: good (4-7 METS)    (+) hypertension, CAD, cardiac stents (2019) , angina (stable chronic angina), PVD, hyperlipidemia  (-) pacemaker, past MI      Neuro/Psych  (+) CVA  (-) seizures, TIA  GI/Hepatic/Renal/Endo    (+) diabetes mellitus using insulin  (-) GERD, liver disease, no renal disease    Musculoskeletal     Abdominal    Substance History      OB/GYN          Other                      Anesthesia Plan    ASA 3     MAC     intravenous induction     Anesthetic plan, risks, benefits, and alternatives have been provided, discussed and informed consent has been obtained with: patient.    CODE STATUS:

## 2024-10-21 PROCEDURE — 25010000002 VASOPRESSIN 20 UNIT/ML SOLUTION: Performed by: NURSE ANESTHETIST, CERTIFIED REGISTERED

## 2024-10-28 ENCOUNTER — TELEPHONE (OUTPATIENT)
Dept: MRI IMAGING | Facility: HOSPITAL | Age: 69
End: 2024-10-28
Payer: MEDICARE

## 2024-10-28 NOTE — TELEPHONE ENCOUNTER
I spoke to patient's wife about incidental lung findings and overdue f/u imaging. She stated she did not know if patient would have the imaging done or not due to his health. I called and spoke with Lizzeth at PCP office and refaxed the report. She will f/u with patient.

## 2024-10-31 ENCOUNTER — TRANSCRIBE ORDERS (OUTPATIENT)
Dept: ADMINISTRATIVE | Facility: HOSPITAL | Age: 69
End: 2024-10-31
Payer: MEDICARE

## 2024-10-31 ENCOUNTER — TELEPHONE (OUTPATIENT)
Dept: VASCULAR SURGERY | Facility: CLINIC | Age: 69
End: 2024-10-31
Payer: MEDICARE

## 2024-10-31 DIAGNOSIS — R91.1 LUNG NODULE SEEN ON IMAGING STUDY: ICD-10-CM

## 2024-10-31 DIAGNOSIS — F17.200 HEAVY SMOKER: Primary | ICD-10-CM

## 2024-11-04 ENCOUNTER — OFFICE VISIT (OUTPATIENT)
Dept: VASCULAR SURGERY | Facility: CLINIC | Age: 69
End: 2024-11-04
Payer: MEDICARE

## 2024-11-04 VITALS
HEART RATE: 80 BPM | WEIGHT: 160 LBS | SYSTOLIC BLOOD PRESSURE: 110 MMHG | DIASTOLIC BLOOD PRESSURE: 62 MMHG | BODY MASS INDEX: 29.44 KG/M2 | RESPIRATION RATE: 17 BRPM | HEIGHT: 62 IN

## 2024-11-04 DIAGNOSIS — E66.3 OVERWEIGHT (BMI 25.0-29.9): ICD-10-CM

## 2024-11-04 DIAGNOSIS — E08.59 DIABETES MELLITUS DUE TO UNDERLYING CONDITION WITH OTHER CIRCULATORY COMPLICATION, UNSPECIFIED WHETHER LONG TERM INSULIN USE: ICD-10-CM

## 2024-11-04 DIAGNOSIS — I10 ESSENTIAL HYPERTENSION: ICD-10-CM

## 2024-11-04 DIAGNOSIS — I65.23 BILATERAL CAROTID ARTERY STENOSIS: ICD-10-CM

## 2024-11-04 DIAGNOSIS — I73.9 PAD (PERIPHERAL ARTERY DISEASE): Primary | ICD-10-CM

## 2024-11-04 DIAGNOSIS — E78.2 MIXED HYPERLIPIDEMIA: ICD-10-CM

## 2024-11-04 PROBLEM — E66.09 CLASS 1 OBESITY DUE TO EXCESS CALORIES WITH BODY MASS INDEX (BMI) OF 34.0 TO 34.9 IN ADULT: Status: ACTIVE | Noted: 2019-06-26

## 2024-11-04 PROBLEM — R41.3 MEMORY DIFFICULTIES: Status: ACTIVE | Noted: 2024-02-18

## 2024-11-04 PROBLEM — Z01.818 PREOP TESTING: Status: RESOLVED | Noted: 2024-06-20 | Resolved: 2024-11-04

## 2024-11-04 PROBLEM — E66.811 CLASS 1 OBESITY DUE TO EXCESS CALORIES WITH BODY MASS INDEX (BMI) OF 34.0 TO 34.9 IN ADULT: Status: ACTIVE | Noted: 2019-06-26

## 2024-11-04 PROBLEM — E11.9 DIABETES MELLITUS: Status: ACTIVE | Noted: 2023-09-06

## 2024-11-04 PROBLEM — I21.3 ACUTE ST SEGMENT ELEVATION MYOCARDIAL INFARCTION: Status: RESOLVED | Noted: 2019-04-03 | Resolved: 2024-11-04

## 2024-11-04 PROBLEM — M51.369 DEGENERATION OF LUMBAR INTERVERTEBRAL DISC: Status: ACTIVE | Noted: 2018-07-30

## 2024-11-04 PROBLEM — E53.8 VITAMIN B12 DEFICIENCY: Status: ACTIVE | Noted: 2023-09-18

## 2024-11-04 PROBLEM — I65.21 CAROTID STENOSIS, RIGHT: Status: ACTIVE | Noted: 2024-09-25

## 2024-11-04 PROBLEM — E11.9 DIABETES MELLITUS: Status: RESOLVED | Noted: 2023-09-06 | Resolved: 2024-11-04

## 2024-11-04 PROBLEM — E11.59 TYPE 2 DIABETES MELLITUS WITH CIRCULATORY DISORDER, WITHOUT LONG-TERM CURRENT USE OF INSULIN: Status: RESOLVED | Noted: 2019-03-28 | Resolved: 2024-11-04

## 2024-11-04 PROBLEM — Z87.2 HISTORY OF CELLULITIS: Status: ACTIVE | Noted: 2023-09-06

## 2024-11-04 PROBLEM — E78.6 HYPERLIPIDEMIA WITH LOW HDL: Status: ACTIVE | Noted: 2019-04-15

## 2024-11-04 PROBLEM — F17.210 HEAVY SMOKER (MORE THAN 20 CIGARETTES PER DAY): Status: RESOLVED | Noted: 2022-07-24 | Resolved: 2024-11-04

## 2024-11-04 PROBLEM — Z72.0 TOBACCO USER: Status: ACTIVE | Noted: 2019-03-28

## 2024-11-04 PROBLEM — Z91.148 NON COMPLIANCE W MEDICATION REGIMEN: Status: ACTIVE | Noted: 2024-02-18

## 2024-11-04 PROBLEM — E78.5 HYPERLIPIDEMIA WITH LOW HDL: Status: ACTIVE | Noted: 2019-04-15

## 2024-11-04 PROBLEM — I63.9 LEFT BASAL GANGLIA EMBOLIC STROKE: Status: ACTIVE | Noted: 2024-09-25

## 2024-11-04 PROBLEM — I21.3 ST ELEVATION MYOCARDIAL INFARCTION (STEMI): Status: RESOLVED | Noted: 2019-03-28 | Resolved: 2024-11-04

## 2024-11-04 PROBLEM — L03.211 CELLULITIS OF FACE: Status: RESOLVED | Noted: 2023-05-16 | Resolved: 2024-11-04

## 2024-11-04 PROBLEM — I21.3 ACUTE ST SEGMENT ELEVATION MYOCARDIAL INFARCTION: Status: ACTIVE | Noted: 2019-04-03

## 2024-11-04 PROCEDURE — 3074F SYST BP LT 130 MM HG: CPT | Performed by: NURSE PRACTITIONER

## 2024-11-04 PROCEDURE — 3078F DIAST BP <80 MM HG: CPT | Performed by: NURSE PRACTITIONER

## 2024-11-04 PROCEDURE — 99213 OFFICE O/P EST LOW 20 MIN: CPT | Performed by: NURSE PRACTITIONER

## 2024-11-04 PROCEDURE — 1160F RVW MEDS BY RX/DR IN RCRD: CPT | Performed by: NURSE PRACTITIONER

## 2024-11-04 PROCEDURE — 1159F MED LIST DOCD IN RCRD: CPT | Performed by: NURSE PRACTITIONER

## 2024-11-04 RX ORDER — CLOPIDOGREL BISULFATE 75 MG/1
75 TABLET ORAL DAILY
Qty: 30 TABLET | Refills: 5 | Status: SHIPPED | OUTPATIENT
Start: 2024-11-04 | End: 2025-05-03

## 2024-11-04 NOTE — PROGRESS NOTES
"11/4/2024        Betty Mcguire MD  [address] [place] [id number]      Anival Pennington  1955    Chief Complaint   Patient presents with    Follow-up     Post op       Dear Dr. Betty Mcguire:      HPI  I had the pleasure of seeing your patient Anival Pennington in the office today.   As you recall, Anival Pennington is a 69 y.o.  male who you are currently following for routine health maintenance.  He is here for 2-week follow-up after undergoing right lower extremity angiogram on 10/18/2024.  He has a history of CVA, with residual right-sided weakness and dysarthria.  Since his CVA he has quit smoking.  He is maintained on aspirin, Lipitor, and Plavix.      Review of Systems   Constitutional: Negative.  Negative for diaphoresis and fever.   HENT: Negative.     Eyes: Negative.    Respiratory: Negative.  Negative for shortness of breath and wheezing.    Cardiovascular: Negative.  Negative for chest pain and leg swelling.   Gastrointestinal: Negative.  Negative for abdominal pain.   Endocrine: Negative.    Genitourinary: Negative.    Musculoskeletal: Negative.    Skin: Negative.    Allergic/Immunologic: Negative.    Neurological: Negative.  Positive for speech difficulty (Dysarthria) and weakness. Negative for dizziness.   Hematological: Negative.    Psychiatric/Behavioral: Negative.       /62 (BP Location: Left arm, Patient Position: Sitting)   Pulse 80   Resp 17   Ht 157.5 cm (62\")   Wt 72.6 kg (160 lb)   BMI 29.26 kg/m²     Physical Exam  Vitals and nursing note reviewed.   Constitutional:       General: He is not in acute distress.     Appearance: Normal appearance. He is not diaphoretic.   HENT:      Head: Normocephalic. No right periorbital erythema or left periorbital erythema.      Nose: Nose normal.   Eyes:      General: No scleral icterus.     Pupils: Pupils are equal.   Cardiovascular:      Rate and Rhythm: Normal rate and regular rhythm.      Pulses: Normal pulses.           Popliteal pulses are " 2+ on the right side.        Dorsalis pedis pulses are 2+ on the right side.        Posterior tibial pulses are 2+ on the right side.      Heart sounds: Normal heart sounds. No murmur heard.  Pulmonary:      Effort: Pulmonary effort is normal. No respiratory distress.      Breath sounds: Normal breath sounds.   Abdominal:      General: Bowel sounds are normal. There is no distension.      Palpations: Abdomen is soft.      Tenderness: There is no abdominal tenderness. There is no guarding.   Musculoskeletal:         General: No swelling or tenderness. Normal range of motion.      Cervical back: Normal range of motion and neck supple.      Right lower leg: No edema.      Left lower leg: No edema.   Feet:      Right foot:      Skin integrity: Skin integrity normal.      Left foot:      Skin integrity: Skin integrity normal.   Skin:     General: Skin is warm and dry.      Findings: No erythema or rash.   Neurological:      General: No focal deficit present.      Mental Status: He is alert and oriented to person, place, and time. Mental status is at baseline.      Cranial Nerves: No cranial nerve deficit.      Gait: Gait normal.   Psychiatric:         Attention and Perception: Attention normal.         Mood and Affect: Mood normal.         Behavior: Behavior normal.         Thought Content: Thought content normal.         Judgment: Judgment normal.       Diagnostic data:        Patient Active Problem List   Diagnosis    Tobacco user    Hypertension    Coronary artery disease involving native heart without angina pectoris    Hyperlipidemia with low HDL    Class 1 obesity due to excess calories with body mass index (BMI) of 34.0 to 34.9 in adult    S/P coronary artery stent placement    History of MI (myocardial infarction)    Diabetes mellitus due to underlying condition with circulatory complication    Dizziness    Obesity (BMI 30-39.9)    Severe peripheral arterial disease    Acute focal neurological deficit    UTI  (urinary tract infection), bacterial    Acute ischemic stroke left  internal capsule lacunar and  right postcentral gyrus at the vertex.    Acute stroke due to ischemia    Vitamin B12 deficiency    Moderate tricompartmental osteoarthritis left knee with small knee joint  effusion.    Carotid stenosis, right    Claudication of both lower extremities    Degeneration of lumbar intervertebral disc    History of cellulitis    Left basal ganglia embolic stroke    Memory difficulties    Non compliance w medication regimen    Overweight (BMI 25.0-29.9)         ICD-10-CM ICD-9-CM   1. PAD (peripheral artery disease)  I73.9 443.9   2. Bilateral carotid artery stenosis  I65.23 433.10     433.30   3. Essential hypertension  I10 401.9   4. Mixed hyperlipidemia  E78.2 272.2   5. Diabetes mellitus due to underlying condition with other circulatory complication, unspecified whether long term insulin use  E08.59 249.70   6. Overweight (BMI 25.0-29.9)  E66.3 278.02             Plan: After thoroughly evaluating Anival Pennington, I believe the best course of action is to remain conservative from vascular surgery standpoint.  Overall he is doing better and his left groin incision is healed.  His foot is warm and pulses are palpable.  We will see him back in 6 months with noninvasive testing to include ABIs and carotid duplex.  He should continue his aspirin 81 mg daily, Plavix 75 mg daily, and Lipitor 80 mg nightly in addition to his other medications.  I did discuss vascular risk factors as they pertain to the progression of vascular disease including controlling his hypertension, hyperlipidemia, and diabetes.  I did discuss vascular risk factors as they pertain to the progression of vascular disease including controlling his hypertension, hyperlipidemia, and diabetes.  His blood pressure stable today in office.  His last lipid panel shows elevated triglycerides at 175 and a decreased HDL at 37, all other values are within normal limits.   His last hemoglobin A1c was 13.2%, this was 3 months ago prior to his CVA.  He was unable to afford his diabetes medication.  He continues to be a non-smoker at this time.  Body mass index is 29.26 kg/m².         This was all discussed in full with complete understanding.    Thank you for allowing me to participate in the care of your patient.  Please do not hesitate with any questions or concerns.  I will keep you aware of any further encounters with Anival Pennington.        Sincerely yours,         CLAUDIO Sue

## 2024-11-19 ENCOUNTER — HOSPITAL ENCOUNTER (OUTPATIENT)
Dept: CT IMAGING | Facility: HOSPITAL | Age: 69
Discharge: HOME | End: 2024-11-19
Admitting: FAMILY MEDICINE
Payer: MEDICARE

## 2024-11-19 DIAGNOSIS — F17.200 HEAVY SMOKER: ICD-10-CM

## 2024-11-19 DIAGNOSIS — R91.1 LUNG NODULE SEEN ON IMAGING STUDY: ICD-10-CM

## 2024-11-19 PROCEDURE — 71250 CT THORAX DX C-: CPT

## 2024-12-10 ENCOUNTER — TRANSCRIBE ORDERS (OUTPATIENT)
Dept: ADMINISTRATIVE | Facility: HOSPITAL | Age: 69
End: 2024-12-10
Payer: MEDICARE

## 2024-12-10 DIAGNOSIS — R91.8 LUNG MASS: Primary | ICD-10-CM

## 2024-12-16 ENCOUNTER — HOSPITAL ENCOUNTER (OUTPATIENT)
Dept: CT IMAGING | Facility: HOSPITAL | Age: 69
Discharge: HOME | End: 2024-12-16
Payer: MEDICARE

## 2024-12-16 DIAGNOSIS — R91.8 LUNG MASS: ICD-10-CM

## 2024-12-16 PROCEDURE — A9552 F18 FDG: HCPCS | Performed by: FAMILY MEDICINE

## 2024-12-16 PROCEDURE — 78815 PET IMAGE W/CT SKULL-THIGH: CPT

## 2024-12-16 PROCEDURE — 34310000005 FLUDEOXYGLUCOSE F18 SOLUTION: Performed by: FAMILY MEDICINE

## 2024-12-16 RX ADMIN — FLUDEOXYGLUCOSE F18 1 DOSE: 300 INJECTION INTRAVENOUS at 13:45

## 2024-12-18 ENCOUNTER — OFFICE VISIT (OUTPATIENT)
Dept: PULMONOLOGY | Facility: CLINIC | Age: 69
End: 2024-12-18
Payer: MEDICARE

## 2024-12-18 VITALS
OXYGEN SATURATION: 99 % | DIASTOLIC BLOOD PRESSURE: 76 MMHG | BODY MASS INDEX: 28.52 KG/M2 | HEIGHT: 62 IN | HEART RATE: 95 BPM | WEIGHT: 155 LBS | SYSTOLIC BLOOD PRESSURE: 120 MMHG

## 2024-12-18 DIAGNOSIS — J43.1 PANLOBULAR EMPHYSEMA: ICD-10-CM

## 2024-12-18 DIAGNOSIS — Z72.0 TOBACCO USE: ICD-10-CM

## 2024-12-18 DIAGNOSIS — R91.1 PULMONARY NODULE: Primary | ICD-10-CM

## 2024-12-18 DIAGNOSIS — Z87.891 FORMER SMOKER: ICD-10-CM

## 2024-12-18 RX ORDER — HYDROCODONE BITARTRATE AND ACETAMINOPHEN 7.5; 325 MG/1; MG/1
1 TABLET ORAL EVERY 6 HOURS PRN
COMMUNITY
Start: 2024-12-04

## 2024-12-18 NOTE — LETTER
2024     Betty Mcguire MD  [address] [place] [id number]    Patient: Anival Pennington   YOB: 1955   Date of Visit: 2024     Dear Dr. Shayla MD:    Thank you for referring Anival Pennington to me for evaluation. Below are the relevant portions of my assessment and plan of care.    If you have questions, please do not hesitate to call me. I look forward to following Anival along with you.         Sincerely,        Pettyntlj Hess,         CC: No Recipients      Progress Notes:    Clinic Note - New Patient            NAME: Anival Pennington  MRN: [id number]  AGE: 69 y.o.            : 1955  PRIMARY CARE PROVIDER: Betty Mcguire MD               Reason for Visit:  Anival Pennington presents to clinic today as a new patient for the evaluation of right upper lobe nodule.    History of Present Illness:  Mr. Lennox is a 69-year-old male who presents to clinic today as a new patient.  Past medical history includes emphysema, former smoker, recently diagnosed right upper lobe nodule, recent CVA.    Patient was admitted to [facility] in July for strokelike symptoms.  He was diagnosed with acute CVA.  He was eventually discharged to rehab.  Workup included a CT head and CTA of the head/neck.  The CTA of the neck noted a incidental right upper lobe pulmonary nodule.  After discharge the patient did see his PCP and had a follow-up CT of the chest which was completed 2024.  This follow-up CT has shown increase in size of the right upper lobe nodule.  Due to the nodule the patient has been referred to pulmonology.  The patient denies any difficulty at baseline with dyspnea.  He has no difficulty with his ADLs.  He denies any chronic cough or wheezing.    Denies any prior diagnosis of lung disease, I explained he does have emphysema on his CT.  No inhalers or supplemental oxygen at home.  He is a former smoker who quit in July, 50-pack-year history.  No personal or family history  of lung cancer or VTE.  He was at home which is clean and dry.  He does have pet dog to which he has no allergies.  No seasonal allergies.    Review of Systems:  A full 12-point review of systems was performed and was negative except as documented in the HPI.               Social History:  Smoking: Quit in July, 50-pack-year history  Occupation: Retired   No history of exposure to industrial dusts, fumes, or asbestos.  Pets: Dog, no allergies  Recent travel: None recent  Previous exposure to persons with tuberculosis: None known            Physical Exam:  General: No acute distress, cooperative.  Head: Atraumatic, normocephalic, normal inspection.  Eyes: EOMI, normal inspection.  ENT: Mucous membranes moist, normal inspection. No thrush.  Heart: RRR, no murmur  Lungs: Clear to auscultation bilaterally, no wheezing  MSK: No edema or clubbing  GI/abdominal: Soft, nontender.  Neurologic: Alert, oriented.  Cranial nerves II through XII grossly intact.      Imaging Review:  I personally reviewed the CTA of the neck and CT of the chest done in July and November:  CT of the neck showed a round pulmonary nodule in the right upper lobe.  Follow-up CT of the chest shows the right upper lobe nodule has enlarged and is spiculated.  He does have panlobular emphysema worse in the apices.      Pulmonary Functions Testing Results:  None available for review            Problem List:  Problem List Items Addressed This Visit          Pulmonary and Pneumonias    Pulmonary nodule - Primary    Panlobular emphysema       Tobacco    Tobacco use    Relevant Orders    Spirometry with Diffusion Capacity & Lung Volumes    Former smoker         Pulmonary Assessment:  Patient is a 69-year-old male who presents to clinic for right upper lobe pulmonary nodule.  I reviewed his CT and PET/CT with the patient and his wife in clinic today.  Based on the [place] nodule calculator his chance of malignancy is about 86%.  We discussed options regarding  management.  After shared decision making the patient and his wife would like to pursue biopsy.  We discussed the risk and benefits of biopsy of the nodule and lymph nodes.  We will schedule him for Ion bronchoscopy and EBUS with biopsy.  Will also obtain PFTs on follow-up.  Hold off on inhaled therapy as the patient is asymptomatic.      Plan:   -Plan bronchoscopy with EBUS  -Will need a updated CT prior to Ion  -PFTs on follow-up  -Declines vaccinations           Follow Up:  6 weeks         Thank you Betty Mcguire MD for this referral and allowing me to participate in this patient's care.           Past Medical History:   Past Medical History:   Diagnosis Date   • Acute ischemic left MCA stroke 07/11/2024   • Acute ST segment elevation myocardial infarction 04/03/2019   • Cancer     leukemia   • Cellulitis of face 05/16/2023   • Coronary artery disease    • Diabetes mellitus 09/06/2023   • diabetes with circulatory complications    • Heavy smoker (more than 20 cigarettes per day) 07/24/2022   • Hyperlipidemia    • Hypertension    • Stroke     x2 since   • Tobacco abuse    • Type 2 diabetes mellitus with circulatory disorder, without long-term current use of insulin 03/28/2019         Past Surgical History:   Past Surgical History:   Procedure Laterality Date   • AORTAGRAM Right 10/18/2024    Procedure: Right lower extremity angiogram, shockwave lithotripsy, balloon angioplasty, mynx closure;  Surgeon: Slava Poole DO;  Location: [facility] HYBRID OR;  Service: Vascular;  Laterality: Right;   • CARDIAC CATHETERIZATION N/A 03/28/2019    Procedure: Left Heart Cath;  Surgeon: Vincent Pan MD;  Location:  [facility] CATH INVASIVE LOCATION;  Service: Cardiovascular   • CARDIAC CATHETERIZATION Bilateral 03/28/2019    Procedure: Stent JOSSIE coronary;  Surgeon: Vincent Pan MD;  Location: [facility] CATH INVASIVE LOCATION;  Service: Cardiovascular   • CARDIAC CATHETERIZATION N/A 03/28/2019    Procedure: Left  "ventriculography;  Surgeon: Vincent Pan MD;  Location:  [facility] CATH INVASIVE LOCATION;  Service: Cardiovascular   • CORONARY STENT PLACEMENT  2019    x3/widowmaker heart attack   • TONSILLECTOMY           Family History:   Family History   Problem Relation Age of Onset   • Valvular heart disease Mother    • Heart attack Mother    • Heart disease Mother    • Heart disease Brother    • Heart attack Brother    • Cancer Father    • No Known Problems Brother          Medications:   Prior to Admission medications    Medication Sig Start Date End Date Taking? Authorizing Provider   Alcohol Swabs (DropSafe Alcohol Prep) 70 % pads  7/26/24  Yes Merly Walsh MD   aspirin 81 MG chewable tablet Chew 1 tablet Daily.   Yes Merly Walsh MD   atorvastatin (LIPITOR) 80 MG tablet Take 1 tablet by mouth Every Night. 7/15/24  Yes Brenda Delgado APRN   BD Insulin Syringe U/F 31G X 5/16\" 0.5 ML misc USE TO INJECT INSULIN UP TO 3 TIMES DAILY AS DIRECTED 8/22/24  Yes Merly Walsh MD   clopidogrel (PLAVIX) 75 MG tablet Take 1 tablet by mouth Daily for 180 days. 11/4/24 5/3/25 Yes Joanie Devlin APRN   Continuous Glucose Sensor (Dexcom G7 Sensor) misc  9/16/24  Yes Merly Walsh MD   HYDROcodone-acetaminophen (NORCO) 7.5-325 MG per tablet Take 1 tablet by mouth Every 6 (Six) Hours As Needed. 12/4/24  Yes Merly Walsh MD   insulin glargine (LANTUS, SEMGLEE) 100 UNIT/ML injection Inject 40 Units under the skin into the appropriate area as directed Every Night. 7/15/24  Yes Brenda Delgado APRN   Insulin Lispro (humaLOG) 100 UNIT/ML injection Inject 2-7 Units under the skin into the appropriate area as directed 3 (Three) Times a Day With Meals. 7/15/24  Yes Brenda Delgado APRN   lisinopril (PRINIVIL,ZESTRIL) 40 MG tablet Take 1 tablet by mouth Daily. 7/16/24  Yes Brenda Delgado APRN   nitroglycerin (NITROSTAT) 0.4 MG SL tablet Place 1 tablet under the tongue Every 5 (Five) Minutes As " "Needed for Chest Pain. Take no more than 3 doses in 15 minutes. 7/20/22  Yes Betty Mcguire MD   True Metrix Blood Glucose Test test strip CHECK BLOOD SUGAR EVERY DAY 7/25/24  Yes Merly Walsh MD   TRUEplus Lancets 33G misc  7/26/24  Yes Merly Walsh MD HM LIDOCAINE PATCH EX Apply  topically.  Patient not taking: Reported on 12/18/2024    Merly Walsh MD   sertraline (ZOLOFT) 50 MG tablet Take 1 tablet by mouth every night at bedtime.  Patient not taking: Reported on 12/18/2024 8/16/24   Merly Walsh MD         Allergies:   Aspirin and Bee venom      Results Review:             Visit Vitals  /76   Pulse 95   Ht 157.5 cm (62\")   Wt 70.3 kg (155 lb)   SpO2 99% Comment: RA   BMI 28.35 kg/m²                  Andrea Hess DO  Pulmonary/Critical Care  12/18/2024  12:03 CST  "

## 2024-12-18 NOTE — PROGRESS NOTES
Clinic Note - New Patient            NAME: Anival Pennington  MRN: [id number]  AGE: 69 y.o.            : 1955  PRIMARY CARE PROVIDER: Betty Mcguire MD               Reason for Visit:  Anival Pennington presents to clinic today as a new patient for the evaluation of right upper lobe nodule.    History of Present Illness:  Mr. Lennox is a 69-year-old male who presents to clinic today as a new patient.  Past medical history includes emphysema, former smoker, recently diagnosed right upper lobe nodule, recent CVA.    Patient was admitted to [facility] in July for strokelike symptoms.  He was diagnosed with acute CVA.  He was eventually discharged to rehab.  Workup included a CT head and CTA of the head/neck.  The CTA of the neck noted a incidental right upper lobe pulmonary nodule.  After discharge the patient did see his PCP and had a follow-up CT of the chest which was completed 2024.  This follow-up CT has shown increase in size of the right upper lobe nodule.  Due to the nodule the patient has been referred to pulmonology.  The patient denies any difficulty at baseline with dyspnea.  He has no difficulty with his ADLs.  He denies any chronic cough or wheezing.    Denies any prior diagnosis of lung disease, I explained he does have emphysema on his CT.  No inhalers or supplemental oxygen at home.  He is a former smoker who quit in July, 50-pack-year history.  No personal or family history of lung cancer or VTE.  He was at home which is clean and dry.  He does have pet dog to which he has no allergies.  No seasonal allergies.    Review of Systems:  A full 12-point review of systems was performed and was negative except as documented in the HPI.               Social History:  Smoking: Quit in July, 50-pack-year history  Occupation: Retired   No history of exposure to industrial dusts, fumes, or asbestos.  Pets: Dog, no allergies  Recent travel: None recent  Previous exposure to persons with  tuberculosis: None known            Physical Exam:  General: No acute distress, cooperative.  Head: Atraumatic, normocephalic, normal inspection.  Eyes: EOMI, normal inspection.  ENT: Mucous membranes moist, normal inspection. No thrush.  Heart: RRR, no murmur  Lungs: Clear to auscultation bilaterally, no wheezing  MSK: No edema or clubbing  GI/abdominal: Soft, nontender.  Neurologic: Alert, oriented.  Cranial nerves II through XII grossly intact.      Imaging Review:  I personally reviewed the CTA of the neck and CT of the chest done in July and November:  CT of the neck showed a round pulmonary nodule in the right upper lobe.  Follow-up CT of the chest shows the right upper lobe nodule has enlarged and is spiculated.  He does have panlobular emphysema worse in the apices.      Pulmonary Functions Testing Results:  None available for review            Problem List:  Problem List Items Addressed This Visit          Pulmonary and Pneumonias    Pulmonary nodule - Primary    Panlobular emphysema       Tobacco    Tobacco use    Relevant Orders    Spirometry with Diffusion Capacity & Lung Volumes    Former smoker         Pulmonary Assessment:  Patient is a 69-year-old male who presents to clinic for right upper lobe pulmonary nodule.  I reviewed his CT and PET/CT with the patient and his wife in clinic today.  Based on the [place] nodule calculator his chance of malignancy is about 86%.  We discussed options regarding management.  After shared decision making the patient and his wife would like to pursue biopsy.  We discussed the risk and benefits of biopsy of the nodule and lymph nodes.  We will schedule him for Ion bronchoscopy and EBUS with biopsy.  Will also obtain PFTs on follow-up.  Hold off on inhaled therapy as the patient is asymptomatic.      Plan:   -Plan bronchoscopy with EBUS  -Will need a updated CT prior to Ion  -PFTs on follow-up  -Declines vaccinations           Follow Up:  6 weeks         Thank you Shayla,  Betty AUSTIN MD for this referral and allowing me to participate in this patient's care.           Past Medical History:   Past Medical History:   Diagnosis Date    Acute ischemic left MCA stroke 07/11/2024    Acute ST segment elevation myocardial infarction 04/03/2019    Cancer     leukemia    Cellulitis of face 05/16/2023    Coronary artery disease     Diabetes mellitus 09/06/2023    diabetes with circulatory complications     Heavy smoker (more than 20 cigarettes per day) 07/24/2022    Hyperlipidemia     Hypertension     Stroke     x2 since    Tobacco abuse     Type 2 diabetes mellitus with circulatory disorder, without long-term current use of insulin 03/28/2019         Past Surgical History:   Past Surgical History:   Procedure Laterality Date    AORTAGRAM Right 10/18/2024    Procedure: Right lower extremity angiogram, shockwave lithotripsy, balloon angioplasty, mynx closure;  Surgeon: Slava Poole DO;  Location: [facility] HYBRID OR;  Service: Vascular;  Laterality: Right;    CARDIAC CATHETERIZATION N/A 03/28/2019    Procedure: Left Heart Cath;  Surgeon: Vincent Pan MD;  Location:  [facility] CATH INVASIVE LOCATION;  Service: Cardiovascular    CARDIAC CATHETERIZATION Bilateral 03/28/2019    Procedure: Stent JOSSIE coronary;  Surgeon: Vincent Pan MD;  Location:  [facility] CATH INVASIVE LOCATION;  Service: Cardiovascular    CARDIAC CATHETERIZATION N/A 03/28/2019    Procedure: Left ventriculography;  Surgeon: Vincent Pan MD;  Location:  [facility] CATH INVASIVE LOCATION;  Service: Cardiovascular    CORONARY STENT PLACEMENT  2019    x3/widowmaker heart attack    TONSILLECTOMY           Family History:   Family History   Problem Relation Age of Onset    Valvular heart disease Mother     Heart attack Mother     Heart disease Mother     Heart disease Brother     Heart attack Brother     Cancer Father     No Known Problems Brother          Medications:   Prior to Admission medications    Medication Sig Start Date End Date  "Taking? Authorizing Provider   Alcohol Swabs (DropSafe Alcohol Prep) 70 % pads  7/26/24  Yes Merly Walsh MD   aspirin 81 MG chewable tablet Chew 1 tablet Daily.   Yes Merly Walsh MD   atorvastatin (LIPITOR) 80 MG tablet Take 1 tablet by mouth Every Night. 7/15/24  Yes Brenda Delgado APRN   BD Insulin Syringe U/F 31G X 5/16\" 0.5 ML misc USE TO INJECT INSULIN UP TO 3 TIMES DAILY AS DIRECTED 8/22/24  Yes Merly Walsh MD   clopidogrel (PLAVIX) 75 MG tablet Take 1 tablet by mouth Daily for 180 days. 11/4/24 5/3/25 Yes Joanie Devlin APRN   Continuous Glucose Sensor (Dexcom G7 Sensor) misc  9/16/24  Yes Merly Walsh MD   HYDROcodone-acetaminophen (NORCO) 7.5-325 MG per tablet Take 1 tablet by mouth Every 6 (Six) Hours As Needed. 12/4/24  Yes Merly Walsh MD   insulin glargine (LANTUS, SEMGLEE) 100 UNIT/ML injection Inject 40 Units under the skin into the appropriate area as directed Every Night. 7/15/24  Yes Brenda Delgado APRN   Insulin Lispro (humaLOG) 100 UNIT/ML injection Inject 2-7 Units under the skin into the appropriate area as directed 3 (Three) Times a Day With Meals. 7/15/24  Yes Brenda Delgado APRN   lisinopril (PRINIVIL,ZESTRIL) 40 MG tablet Take 1 tablet by mouth Daily. 7/16/24  Yes Brenda Delgado APRN   nitroglycerin (NITROSTAT) 0.4 MG SL tablet Place 1 tablet under the tongue Every 5 (Five) Minutes As Needed for Chest Pain. Take no more than 3 doses in 15 minutes. 7/20/22  Yes Betty Mcguire MD   True Metrix Blood Glucose Test test strip CHECK BLOOD SUGAR EVERY DAY 7/25/24  Yes Merly Walsh MD   TRUEplus Lancets 33G misc  7/26/24  Yes Merly Walsh MD   HM LIDOCAINE PATCH EX Apply  topically.  Patient not taking: Reported on 12/18/2024    Merly Walsh MD   sertraline (ZOLOFT) 50 MG tablet Take 1 tablet by mouth every night at bedtime.  Patient not taking: Reported on 12/18/2024 8/16/24   Provider, MD Merly       " "  Allergies:   Aspirin and Bee venom      Results Review:             Visit Vitals  /76   Pulse 95   Ht 157.5 cm (62\")   Wt 70.3 kg (155 lb)   SpO2 99% Comment: RONALD   BMI 28.35 kg/m²                  nAdrea Hess DO  Pulmonary/Critical Care  12/18/2024  12:03 CST  "

## 2024-12-19 ENCOUNTER — TELEPHONE (OUTPATIENT)
Dept: PULMONOLOGY | Facility: CLINIC | Age: 69
End: 2024-12-19
Payer: MEDICARE

## 2024-12-19 NOTE — TELEPHONE ENCOUNTER
Patient is scheduled for BRONCHOSCOPY NAVIGATION WITH ENDOBRONCHIAL ULTRASOUND AND ION ROBOT on 01/09/2024 at 1 pm

## 2024-12-23 ENCOUNTER — TELEPHONE (OUTPATIENT)
Dept: PULMONOLOGY | Facility: CLINIC | Age: 69
End: 2024-12-23
Payer: MEDICARE

## 2024-12-23 NOTE — TELEPHONE ENCOUNTER
Spoke to spouse and informed her that the reason for the repeat pre work was due because the previous labs are more than 30 days old.  I also explained that the Chest CT is from October and is also unavailable for formatting for the navigational process of the surgery.  She voiced understanding.

## 2024-12-23 NOTE — TELEPHONE ENCOUNTER
Caller: Anival Pennington    Relationship to patient: Self    Best call back number: [phone]     Patient is needing: PT CALLED IN STATING THAT THERE WAS A CT AND PAT SCAN SCHEDULED. PT STATED THAT THEY HAVE TO PAY A 400 DOLLAR COPAY EACH TIME THEY DO THE CT AND AT SCAN. PT IS ASKING IF THIS IS NEEDING TO BE DONE , BEING THAT HE JUST HAD CT AND PAT DONE. PLEASE ADVISE AND CALL BACK.

## 2025-01-06 ENCOUNTER — PRE-ADMISSION TESTING (OUTPATIENT)
Dept: PREADMISSION TESTING | Facility: HOSPITAL | Age: 70
End: 2025-01-06
Payer: MEDICARE

## 2025-01-06 ENCOUNTER — HOSPITAL ENCOUNTER (OUTPATIENT)
Dept: CT IMAGING | Facility: HOSPITAL | Age: 70
Discharge: HOME | End: 2025-01-06
Payer: MEDICARE

## 2025-01-06 VITALS
HEIGHT: 63 IN | WEIGHT: 154.98 LBS | RESPIRATION RATE: 18 BRPM | HEART RATE: 102 BPM | DIASTOLIC BLOOD PRESSURE: 78 MMHG | BODY MASS INDEX: 27.46 KG/M2 | OXYGEN SATURATION: 99 % | SYSTOLIC BLOOD PRESSURE: 111 MMHG

## 2025-01-06 DIAGNOSIS — R91.1 PULMONARY NODULE: ICD-10-CM

## 2025-01-06 LAB
ANION GAP SERPL CALCULATED.3IONS-SCNC: 11 MMOL/L (ref 5–15)
BUN SERPL-MCNC: 12 MG/DL (ref 8–23)
BUN/CREAT SERPL: 16.7 (ref 7–25)
CALCIUM SPEC-SCNC: 8.8 MG/DL (ref 8.6–10.5)
CHLORIDE SERPL-SCNC: 103 MMOL/L (ref 98–107)
CO2 SERPL-SCNC: 25 MMOL/L (ref 22–29)
CREAT SERPL-MCNC: 0.72 MG/DL (ref 0.76–1.27)
DEPRECATED RDW RBC AUTO: 43.8 FL (ref 37–54)
EGFRCR SERPLBLD CKD-EPI 2021: 99.5 ML/MIN/1.73
ERYTHROCYTE [DISTWIDTH] IN BLOOD BY AUTOMATED COUNT: 13.2 % (ref 12.3–15.4)
GLUCOSE SERPL-MCNC: 105 MG/DL (ref 65–99)
HCT VFR BLD AUTO: 43.7 % (ref 37.5–51)
HGB BLD-MCNC: 14.5 G/DL (ref 13–17.7)
MCH RBC QN AUTO: 30.1 PG (ref 26.6–33)
MCHC RBC AUTO-ENTMCNC: 33.2 G/DL (ref 31.5–35.7)
MCV RBC AUTO: 90.7 FL (ref 79–97)
PLATELET # BLD AUTO: 250 10*3/MM3 (ref 140–450)
PMV BLD AUTO: 10.6 FL (ref 6–12)
POTASSIUM SERPL-SCNC: 3.8 MMOL/L (ref 3.5–5.2)
RBC # BLD AUTO: 4.82 10*6/MM3 (ref 4.14–5.8)
SODIUM SERPL-SCNC: 139 MMOL/L (ref 136–145)
WBC NRBC COR # BLD AUTO: 10.84 10*3/MM3 (ref 3.4–10.8)

## 2025-01-06 PROCEDURE — 85027 COMPLETE CBC AUTOMATED: CPT

## 2025-01-06 PROCEDURE — 71250 CT THORAX DX C-: CPT

## 2025-01-06 PROCEDURE — 36415 COLL VENOUS BLD VENIPUNCTURE: CPT

## 2025-01-06 PROCEDURE — 80048 BASIC METABOLIC PNL TOTAL CA: CPT

## 2025-01-06 NOTE — DISCHARGE INSTRUCTIONS
Preparing for Surgery  Follow these instructions before the procedure:  Several days or weeks before your procedure  Medication(s) you need to stop   _______ days/week prior to surgery: PLAVIX      Ask your health care provider about:  Changing or stopping your regular medicines. This is especially important if you are taking diabetes medicines or blood thinners.  Taking medicines such as aspirin and ibuprofen. These medicines can thin your blood. Do not take these medicines unless your health care provider tells you to take them.  Taking over-the-counter medicines, vitamins, herbs, and supplements.    Contact your surgeon if you:  Develop a fever of more than 100.4°F (38°C) or other feelings of illness during the 48 hours before your surgery.  Have symptoms that get worse.  Have questions or concerns about your surgery.  If you are going home the same day of your surgery you will need to arrange for a responsible adult, age 18 years old or older, to drive you home from the hospital and stay with you for 24 hours. Verification of the  will be made prior to any procedure requiring sedation. You may not go home in a taxi or any form of public transportation by yourself.     Day before your procedure  Medication(s) you need to stop the day before your surgery:LISINOPRIL    24 hours before your procedure DO NOT drink alcoholic beverages or smoke.  24 hours before your procedure STOP taking Erectile Dysfunction medication (i.e.,Cialis, Viagra)   You may be asked to shower with a germ-killing soap.  Day of your procedure   You may take the following medication(s) the morning of surgery with a sip of water: NORCO      8 hours before your scheduled arrival time, STOP all food, any dairy products, and full liquids. This includes hard candy, chewing gum or mints. This is extremely important to prevent serious complications.     Up to 2 hours before your scheduled arrival time, you may have clear liquids no cream,  powder, or pulp of any kind. Safe options are water, black coffee, plain tea, soda, Gatorade/Powerade, clear broth, apple juice.    2 hours before your scheduled arrival time, STOP drinking clear liquids.    You may need to take another shower with a germ-killing soap before you leave home in the morning. Do not use perfumes, colognes, or body lotions.  Wear comfortable loose-fitting clothing.  Remove all jewelry including body piercing and rings, dark colored nail polish, and make up prior to arrival at the hospital. Leave all valuables at home.   Bring your hearing aids if you rely on them.  Do not wear contact lenses. If you wear eyeglasses remember to bring a case to store them in while you are in surgery.  Do not use denture adhesives since you will be asked to remove them during your surgery.    You do not need to bring your home medications into the hospital.   Bring your sleep apnea device with you on the day of your surgery (if this applies to you).  If you have an Inspire implant for sleep apnea, please bring the remote with you on the day of surgery.  If you wear portable oxygen, bring it with you.   If you are staying overnight, you may bring a bag of items you may need such as slippers, robe and a change of clothes for your discharge. You may want to leave these items in the car until you are ready for them since your family will take your belongings when you leave the pre-operative area.  Arrive at the hospital as scheduled by the office. You will be asked to arrive 2 hours prior to your surgery time in order to prepare for your procedure.  When you arrive at the hospital  Go to the registration desk located at the main entrance of the hospital.  After registration is completed, you will be given a beeper and a sticker sheet. Take the stickers to Outpatient Surgery and place in the tray at the end of the desk to notify the staff that you have arrived and registered.   Return to the lobby to wait. You  are not always called back according to the time of arrival but rather the time your doctor will be ready.  When your beeper lights up and vibrates proceed through the double doors, under the stairs, and a member of the Outpatient Surgery staff will escort you to your preoperative room.

## 2025-01-09 ENCOUNTER — ANESTHESIA (OUTPATIENT)
Dept: PERIOP | Facility: HOSPITAL | Age: 70
End: 2025-01-09
Payer: MEDICARE

## 2025-01-09 ENCOUNTER — APPOINTMENT (OUTPATIENT)
Dept: GENERAL RADIOLOGY | Facility: HOSPITAL | Age: 70
End: 2025-01-09
Payer: MEDICARE

## 2025-01-09 ENCOUNTER — HOSPITAL ENCOUNTER (OUTPATIENT)
Facility: HOSPITAL | Age: 70
Setting detail: HOSPITAL OUTPATIENT SURGERY
Discharge: HOME | End: 2025-01-09
Attending: INTERNAL MEDICINE | Admitting: INTERNAL MEDICINE
Payer: MEDICARE

## 2025-01-09 ENCOUNTER — APPOINTMENT (OUTPATIENT)
Dept: CT IMAGING | Facility: HOSPITAL | Age: 70
End: 2025-01-09
Payer: MEDICARE

## 2025-01-09 ENCOUNTER — ANESTHESIA EVENT (OUTPATIENT)
Dept: PERIOP | Facility: HOSPITAL | Age: 70
End: 2025-01-09
Payer: MEDICARE

## 2025-01-09 VITALS
TEMPERATURE: 97.7 F | RESPIRATION RATE: 16 BRPM | OXYGEN SATURATION: 96 % | DIASTOLIC BLOOD PRESSURE: 90 MMHG | HEART RATE: 82 BPM | SYSTOLIC BLOOD PRESSURE: 137 MMHG

## 2025-01-09 DIAGNOSIS — R91.1 PULMONARY NODULE: ICD-10-CM

## 2025-01-09 LAB
GLUCOSE BLDC GLUCOMTR-MCNC: 104 MG/DL (ref 70–130)
GLUCOSE BLDC GLUCOMTR-MCNC: 168 MG/DL (ref 70–130)

## 2025-01-09 PROCEDURE — 31628 BRONCHOSCOPY/LUNG BX EACH: CPT | Performed by: INTERNAL MEDICINE

## 2025-01-09 PROCEDURE — 88305 TISSUE EXAM BY PATHOLOGIST: CPT | Performed by: INTERNAL MEDICINE

## 2025-01-09 PROCEDURE — C1726 CATH, BAL DIL, NON-VASCULAR: HCPCS | Performed by: INTERNAL MEDICINE

## 2025-01-09 PROCEDURE — 71045 X-RAY EXAM CHEST 1 VIEW: CPT

## 2025-01-09 PROCEDURE — 25810000003 LACTATED RINGERS PER 1000 ML: Performed by: INTERNAL MEDICINE

## 2025-01-09 PROCEDURE — 25010000002 VASOPRESSIN 20 UNIT/ML SOLUTION

## 2025-01-09 PROCEDURE — 76000 FLUOROSCOPY <1 HR PHYS/QHP: CPT

## 2025-01-09 PROCEDURE — 88341 IMHCHEM/IMCYTCHM EA ADD ANTB: CPT | Performed by: INTERNAL MEDICINE

## 2025-01-09 PROCEDURE — 82948 REAGENT STRIP/BLOOD GLUCOSE: CPT

## 2025-01-09 PROCEDURE — 25010000002 ONDANSETRON PER 1 MG

## 2025-01-09 PROCEDURE — 25010000002 SUGAMMADEX 200 MG/2ML SOLUTION

## 2025-01-09 PROCEDURE — 71275 CT ANGIOGRAPHY CHEST: CPT

## 2025-01-09 PROCEDURE — 31653 BRONCH EBUS SAMPLNG 3/> NODE: CPT | Performed by: INTERNAL MEDICINE

## 2025-01-09 PROCEDURE — 31627 NAVIGATIONAL BRONCHOSCOPY: CPT | Performed by: INTERNAL MEDICINE

## 2025-01-09 PROCEDURE — 88342 IMHCHEM/IMCYTCHM 1ST ANTB: CPT | Performed by: INTERNAL MEDICINE

## 2025-01-09 PROCEDURE — 31654 BRONCH EBUS IVNTJ PERPH LES: CPT | Performed by: INTERNAL MEDICINE

## 2025-01-09 PROCEDURE — 25010000002 FENTANYL CITRATE (PF) 100 MCG/2ML SOLUTION

## 2025-01-09 PROCEDURE — 88172 CYTP DX EVAL FNA 1ST EA SITE: CPT | Performed by: INTERNAL MEDICINE

## 2025-01-09 PROCEDURE — 31629 BRONCHOSCOPY/NEEDLE BX EACH: CPT | Performed by: INTERNAL MEDICINE

## 2025-01-09 PROCEDURE — 25010000002 LIDOCAINE PF 2% 2 % SOLUTION

## 2025-01-09 PROCEDURE — 88112 CYTOPATH CELL ENHANCE TECH: CPT | Performed by: INTERNAL MEDICINE

## 2025-01-09 PROCEDURE — 25510000001 IOPAMIDOL PER 1 ML: Performed by: INTERNAL MEDICINE

## 2025-01-09 PROCEDURE — 25010000002 PROPOFOL 10 MG/ML EMULSION

## 2025-01-09 RX ORDER — PROPOFOL 10 MG/ML
VIAL (ML) INTRAVENOUS AS NEEDED
Status: DISCONTINUED | OUTPATIENT
Start: 2025-01-09 | End: 2025-01-09 | Stop reason: SURG

## 2025-01-09 RX ORDER — IOPAMIDOL 755 MG/ML
100 INJECTION, SOLUTION INTRAVASCULAR
Status: COMPLETED | OUTPATIENT
Start: 2025-01-09 | End: 2025-01-09

## 2025-01-09 RX ORDER — LIDOCAINE HYDROCHLORIDE 10 MG/ML
0.5 INJECTION, SOLUTION EPIDURAL; INFILTRATION; INTRACAUDAL; PERINEURAL ONCE AS NEEDED
Status: DISCONTINUED | OUTPATIENT
Start: 2025-01-09 | End: 2025-01-09 | Stop reason: HOSPADM

## 2025-01-09 RX ORDER — ROCURONIUM BROMIDE 10 MG/ML
INJECTION, SOLUTION INTRAVENOUS AS NEEDED
Status: DISCONTINUED | OUTPATIENT
Start: 2025-01-09 | End: 2025-01-09 | Stop reason: SURG

## 2025-01-09 RX ORDER — ONDANSETRON 2 MG/ML
INJECTION INTRAMUSCULAR; INTRAVENOUS AS NEEDED
Status: DISCONTINUED | OUTPATIENT
Start: 2025-01-09 | End: 2025-01-09 | Stop reason: SURG

## 2025-01-09 RX ORDER — LIDOCAINE HYDROCHLORIDE 20 MG/ML
INJECTION, SOLUTION EPIDURAL; INFILTRATION; INTRACAUDAL; PERINEURAL AS NEEDED
Status: DISCONTINUED | OUTPATIENT
Start: 2025-01-09 | End: 2025-01-09 | Stop reason: SURG

## 2025-01-09 RX ORDER — FENTANYL CITRATE 50 UG/ML
INJECTION, SOLUTION INTRAMUSCULAR; INTRAVENOUS AS NEEDED
Status: DISCONTINUED | OUTPATIENT
Start: 2025-01-09 | End: 2025-01-09 | Stop reason: SURG

## 2025-01-09 RX ORDER — SODIUM CHLORIDE 0.9 % (FLUSH) 0.9 %
3 SYRINGE (ML) INJECTION AS NEEDED
Status: DISCONTINUED | OUTPATIENT
Start: 2025-01-09 | End: 2025-01-09 | Stop reason: HOSPADM

## 2025-01-09 RX ORDER — BUPIVACAINE HCL/0.9 % NACL/PF 0.125 %
PLASTIC BAG, INJECTION (ML) EPIDURAL AS NEEDED
Status: DISCONTINUED | OUTPATIENT
Start: 2025-01-09 | End: 2025-01-09 | Stop reason: SURG

## 2025-01-09 RX ORDER — SODIUM CHLORIDE, SODIUM LACTATE, POTASSIUM CHLORIDE, CALCIUM CHLORIDE 600; 310; 30; 20 MG/100ML; MG/100ML; MG/100ML; MG/100ML
1000 INJECTION, SOLUTION INTRAVENOUS CONTINUOUS
Status: DISCONTINUED | OUTPATIENT
Start: 2025-01-09 | End: 2025-01-09 | Stop reason: HOSPADM

## 2025-01-09 RX ADMIN — PROPOFOL 150 MG: 10 INJECTION, EMULSION INTRAVENOUS at 12:56

## 2025-01-09 RX ADMIN — LIDOCAINE HYDROCHLORIDE 100 MG: 20 INJECTION, SOLUTION EPIDURAL; INFILTRATION; INTRACAUDAL; PERINEURAL at 12:56

## 2025-01-09 RX ADMIN — IOPAMIDOL 100 ML: 755 INJECTION, SOLUTION INTRAVENOUS at 15:49

## 2025-01-09 RX ADMIN — SODIUM CHLORIDE, POTASSIUM CHLORIDE, SODIUM LACTATE AND CALCIUM CHLORIDE 1000 ML: 600; 310; 30; 20 INJECTION, SOLUTION INTRAVENOUS at 11:52

## 2025-01-09 RX ADMIN — ONDANSETRON 4 MG: 2 INJECTION INTRAMUSCULAR; INTRAVENOUS at 13:47

## 2025-01-09 RX ADMIN — Medication 200 MCG: at 13:43

## 2025-01-09 RX ADMIN — Medication 100 MCG: at 14:13

## 2025-01-09 RX ADMIN — Medication 200 MCG: at 13:06

## 2025-01-09 RX ADMIN — Medication 100 MCG: at 14:22

## 2025-01-09 RX ADMIN — ROCURONIUM 100 MG: 50 INJECTION, SOLUTION INTRAVENOUS at 12:56

## 2025-01-09 RX ADMIN — FENTANYL CITRATE 100 MCG: 50 INJECTION, SOLUTION INTRAMUSCULAR; INTRAVENOUS at 12:56

## 2025-01-09 RX ADMIN — SUGAMMADEX 200 MG: 100 INJECTION, SOLUTION INTRAVENOUS at 14:31

## 2025-01-09 RX ADMIN — Medication 150 MCG: at 13:17

## 2025-01-09 NOTE — ANESTHESIA PREPROCEDURE EVALUATION
Anesthesia Evaluation     Patient summary reviewed   NPO Solid Status: > 8 hours             Airway   Mallampati: II  Dental    (+) upper dentures    Pulmonary    (+) a smoker Current, COPD,  (-) asthma, sleep apnea  Cardiovascular   Exercise tolerance: poor (<4 METS)    (+) hypertension, past MI , CAD, cardiac stents (2019) , angina (stable chronic angina), PVD, hyperlipidemia,  carotid artery disease  (-) pacemaker      Neuro/Psych  (+) CVA residual symptoms  (-) seizures, TIA  GI/Hepatic/Renal/Endo    (+) diabetes mellitus using insulin  (-) GERD, liver disease, no renal disease    Musculoskeletal     Abdominal    Substance History      OB/GYN          Other      history of cancer                      Anesthesia Plan    ASA 4     general     (Ion protocol )  intravenous induction     Anesthetic plan, risks, benefits, and alternatives have been provided, discussed and informed consent has been obtained with: patient.      CODE STATUS:

## 2025-01-09 NOTE — OP NOTE
Ion Bronchoscopy and Endobronchial Ultrasound  Procedure Note        Procedure Note (AdvancedBronchoscopy)    Date of Operation: 01/09/25  Pre-op Diagnosis: Pulmonary nodule  Post-op Diagnosis: Same  Surgeon: Marino Hess DO  Anesthesia: General    Operation: Flexible fiberoptic bronchoscopy, Bronchoscopy, Diagnostic, Ion robotic shape sensing navigational bronchoscopy, Radial probe endobronchial ultrasound, Linear endobronchial ultrasound, Transbronchial biopsy, and Transbronchial needle aspirate of mediastinum  Findings: Successful biopsy of the right upper lobe nodule and mediastinal/hilar lymphadenopathy  Specimen:   Specimens       ID Source Type Tests Collected By Collected At [facility]?    A Lung, Right Upper Lobe Fine Needle Aspirate FINE NEEDLE ASPIRATION   Andrea Hess, DO 1/9/25 1323     Description: RUL FNA    B Lung, Right Upper Lobe Tissue TISSUE PATHOLOGY EXAM   Andrea Hess, DO 1/9/25 1330     Description: bx of RUL nodule    C Lung, Right Upper Lobe Lavage NON-GYNECOLOGIC CYTOLOGY   Andrea Hess, DO 1/9/25 1347     Description: RUL lavage    D Mediastinum Lymph Node FINE NEEDLE ASPIRATION   Andrea Hess, DO 1/9/25 1349     Description: station 11L    E Mediastinum Lymph Node FINE NEEDLE ASPIRATION   Andrea Hess, DO 1/9/25 1403     Description: station 7    F Mediastinum Lymph Node FINE NEEDLE ASPIRATION   Andrea Hess, DO 1/9/25 1422     Description: station 4R    G Mediastinum Lymph Node FINE NEEDLE ASPIRATION   Andrea Hess, DO 1/9/25 1422     Description: station 10R    H Mediastinum Lymph Node FINE NEEDLE ASPIRATION   Andrea Hess, DO 1/9/25 1422     Description: station 11R          Estimated Blood Loss: Minimal    Complications: Postprocedure chest x-ray showed increased opacity in the right upper lobe concerning for pulmonary hemorrhage.  Follow-up CTA confirmed small amount of  pulmonary hemorrhage with airspace consolidation in the right upper lobe.  CTA also showed postprocedure pneumomediastinum.  The patient was evaluated 2 hours postprocedure.  States he is feeling well without any complaints.  He denied any hemoptysis.  Staff did not note any hemoptysis postprocedure.  Denied any chest pain or shortness of breath.  We discussed the results of his chest x-ray and chest CTA.  We discussed admission for overnight observation.  The patient stated he wished to return home as he was feeling well.  We discussed if he develops any signs of difficulty breathing or coughing up blood he is to call 911 or go to the nearest ED.  The patient and his wife voiced understanding.  I will get a follow-up CT in 1 week.    Indications and History:  The patient is a 69 y.o.  male with Pulmonary nodule.  The risks, benefits, complications, treatment options and expected outcomes were discussed with the patient.  The possibilities of reaction to medication, pulmonary aspiration, perforation of a viscus, bleeding, failure to diagnose a condition and creating a complication requiring transfusion or operation were discussed with the patient who freely signed the consent.  Time out was taken prior to the procedure.    Description of Procedure:    After the induction of general anesthesia, the patient was positioned supine and the Olympus BF type P190 bronchoscope was introduced through the endotracheal tube.  The scope was then passed into the trachea.     The trachea, mainstem bronchi, RUL, RML, Bronchus Intermedius, RLL, SUSAN, SUSAN upper division and lingula, and LLL, and all primary segmental airways were examined and were normal except as described below:    Endobronchial findings:  Trachea: Normal mucosa  Yanira: Sharp  Right main bronchus: Normal mucosa  Right upper lobe bronchus: Normal mucosa  Right middle lobe bronchus: Normal mucosa  Right lower lobe bronchus: Normal mucosa  Left main bronchus: Normal  mucosa  Left upper lobe bronchus: Normal mucosa  Left lower lobe bronchus: Normal mucosa    The conventional bronchoscope was removed .    Using the planning laptop and Planpoint software, the lesion of interest was identified, and marked, a pathway was generated, and anatomic borders were identified.The ION system was magnetically docked to the ETT. The shape sensing catheter with vision probe was introduced via the  into the ETT.    Registration was started by advancing the bronchoscope into the right and left mainstem bronchi. The main grey was confirmed by rotating the live-view image to match the navigation-view image of the main grey. Registration was completed by advancing the catheter into the identified the lobar airway. There was minimal visual divergence. Navigation was complicated by : endobronchial mucous impairing visualization. Using the , the shape sensing bronchoscope was advanced to the target lesion. The mobile C-Arm was brought in, and the vision probe was removed. A peripheral radial ultrasound probe was utilized to better localize the lesion in the right upper lobe apical segment, giving a concentric view.     Transbronchial needle aspirations of the target lesion were performed using an Intuitive Flexision 23 gauge needle and sent for routine cytology. The procedure was guided by fluoroscopy and radial ultrasound. Transbronchial needle aspiration technique was selected because the sampling site was not accessible using standard bronchoscopic techniques. 8 needle aspirations were obtained. Rapid On-Site Evaluation: Confirmed adequate tissue    Transbronchial biopsies of the target lesion were performed  using a Cook DBF-1.8-S Captura spikeless forceps and sent for histopathology examination. The procedure was guided by fluoroscopy and radial ultrasound. Transbronchial biopsy technique was selected because the sampling site was not visible endoscopically. 8 biopsy  samples were obtained. 15ml of iced saline was instilled through the Ion robot catheter. The vision probe was reintruced to examine the biopsied area, and then the vision probe, and shape sensing catheter was extracted and catheter guide was disconnected.    Next, the EBUS scope was inserted into the airway without difficulty.     Endobronchial ultrasound was used to examine, measure, and guide the sampling of lymph nodes as follows:  1. Station 11L. The largest lymph node met sampling size criteria. The node was isoechoic and the capsule was intact. Under EBUS guidance, 3 transbronchial needle aspirates were performed at this station. This lymph node was PET negative. Rapid onsite cytology demonstrated the presence of lymphocytes.   2. Station 7. The largest lymph node met sampling size criteria. The node was isoechoic and the capsule was intact. Under EBUS guidance, 3 transbronchial needle aspirates were performed at this station. This lymph node was PET positive. Rapid onsite cytology demonstrated the presence of lymphocytes.   3. Station 4R. The largest lymph node met sampling size criteria. The node was isoechoic and the capsule was intact. Under EBUS guidance, 3 transbronchial needle aspirates were performed at this station. This lymph node was PET positive. Rapid onsite cytology demonstrated the presence of lymphocytes.   4. Station 10R. The largest lymph node met sampling size criteria. The node was isoechoic and the capsule was intact. Under EBUS guidance, 3 transbronchial needle aspirates were performed at this station. This lymph node was PET negative. Rapid onsite cytology demonstrated the presence of lymphocytes.   5. Station 11R. The largest lymph node met sampling size criteria. The node was isoechoic and the capsule was intact. Under EBUS guidance, 3 transbronchial needle aspirates were performed at this station. This lymph node was PET negative. Rapid onsite cytology demonstrated the presence of  lymphocytes.    At this point, the EBUS scope was withdrawn from the airway and the conventional flexible video bronchoscope was reinserted.?A second survey of the accessible airway was performed to ensure hemostasis, which was adequate. The patient was recovered under the direction of the anesthesiologist and transported to the recovery area. The results of the procedure will be discussed with the patient and appropriate follow up will be arranged.     Andrea Hess DO  1/9/2025  17:53 CST   Pulmonary Critical Care Medicine      The patient was undocked from the ion robot arm.  The Patient was extubated and taken to the Recovery Room in satisfactory condition.      Marino Hess, DO at 17:49 CST, 1/9/2025

## 2025-01-09 NOTE — ANESTHESIA POSTPROCEDURE EVALUATION
Patient: Anival Pennington    Procedure Summary       Date: 01/09/25 Room / Location: [facility] OR 03 /  [facility] OR    Anesthesia Start: 1255 Anesthesia Stop: 1446    Procedure: BRONCHOSCOPY NAVIGATION WITH ENDOBRONCHIAL ULTRASOUND AND ION ROBOT (Bronchus) Diagnosis:       Pulmonary nodule      (Pulmonary nodule [R91.1])    Surgeons: Andrea Hess DO Provider: Kayleigh Wagner CRNA    Anesthesia Type: general ASA Status: 4            Anesthesia Type: general    Vitals  Vitals Value Taken Time   /66 01/09/25 1504   Temp 97.7 °F (36.5 °C) 01/09/25 1453   Pulse 82 01/09/25 1504   Resp 16 01/09/25 1504   SpO2 95 % 01/09/25 1504           Post Anesthesia Care and Evaluation    Patient location during evaluation: PHASE II  Patient participation: complete - patient participated  Level of consciousness: awake and awake and alert  Pain score: 0  Pain management: adequate    Airway patency: patent  Anesthetic complications: No anesthetic complications  PONV Status: none  Cardiovascular status: acceptable  Respiratory status: acceptable  Hydration status: acceptable    Comments: Patient discharged according to acceptable Emile score per RN assessment. See nursing records for further information.     Blood pressure 113/66, pulse 82, temperature 97.7 °F (36.5 °C), resp. rate 16, SpO2 95%.

## 2025-01-14 LAB
BEAKER LAB AP INTRAOPERATIVE CONSULTATION: NORMAL
CYTO UR: NORMAL
LAB AP CASE REPORT: NORMAL
LAB AP DIAGNOSIS COMMENT: NORMAL
LAB AP SPECIAL STAINS: NORMAL
Lab: NORMAL
PATH REPORT.FINAL DX SPEC: NORMAL
PATH REPORT.GROSS SPEC: NORMAL

## 2025-01-15 DIAGNOSIS — C34.91 ADENOCARCINOMA, LUNG, RIGHT: Primary | ICD-10-CM

## 2025-01-23 ENCOUNTER — TELEPHONE (OUTPATIENT)
Dept: ONCOLOGY | Facility: CLINIC | Age: 70
End: 2025-01-23
Payer: MEDICARE

## 2025-01-23 NOTE — TELEPHONE ENCOUNTER
Caller: DEVORAH LUCERO - WIFE    Relationship to patient: Emergency Contact    Best call back number: [phone]    Chief complaint: CANC. - R/S     Type of visit: NEW PT LAB & ONCOLOGY    Requested date: ANY DAY STARTING AT 11:30 AS THEY COME FROM AN HOUR AWAY     If rescheduling, when is the original appointment: 1/28/25     Additional notes:NOTHING IN HUB TIMEFRAME

## 2025-01-28 NOTE — PROGRESS NOTES
[facility] GROUP HEMATOLOGY & ONCOLOGY  [address]  [place] [address] [phone]    Patient Name: Anival Pennington  Encounter Date: 02/5/2025  YOB: 1955  Patient Number: [id number]    Initial Note    REASON FOR CONSULTATION: Anival Pennington is a pleasant 69 y.o.  male, former smoker, 80-pack-year smoking history, referred by Dr. 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  He is seen with spouse, Kavya.  History is obtained from patient. History is considered to be accurate.        HISTORY OF PRESENT ILLNESS: Patient followed for a right upper lobe lung nodule by CT angiogram of the neck. Limited visualized lungs show a small spiculated nodule in the right upper lobe, image #108 in series 2, measuring 8 mm in diameter. This need to be further evaluated with CT scan of the chest.    CT chest 11/19/2024. Increased size of RIGHT upper lobe spiculated suspicious pulmonary  nodule measuring 12 x 10 mm, previously 9 x 8 mm on 7/11/2024 by my measurements today. Emphysematous changes present. Consider PET/CT or tissue sampling. Mildly enlarged ascending thoracic aorta measures 3.8 cm.  Heavily calcified coronary arteries versus stent material. Similar mild chronic height loss of T12 compared to 10/4/2024.    PET 12/16/2024. 12 mm spiculated hypermetabolic right upper lobe pulmonary nodule, most likely representing a primary lung neoplasm.   Indeterminant borderline enlarged mildly hypermetabolic right lower paratracheal/mediastinal and right hilar lymph nodes. Given size and degree of uptake, favor these to be infectious/inflammatory in nature although prem spread of neoplasm is also in the differential.  No evidence of hypermetabolic metastatic disease in the neck, abdomen, or pelvis.  Paranasal sinus disease in the right maxillary sinus characterized by complete sinus  opacification with peripheral rim of hypermetabolic activity which is favored infectious/inflammatory in nature.    CBC remarkable for WBC 10.84.  BMP 1/6/2025 showed a GFR of 99.5 ml/min.    CT chest 1/6/2025. Spiculated 13 x 12 x 9 mm right upper lobe pulmonary nodule suspicious  for malignancy nodule similar in size to 11/19/2024, however increased when compared to 7/11/2024. Borderline precarinal mediastinal lymph node. Moderate emphysema.    Patient had undergone bronchoscopy and endobronchial ultrasound 1/9/2025.  No endobronchial lesion.    CT angiogram chest 1/9/2025. 12 mm lesion with irregular margins again demonstrated in the right  upper lobe. There is a small amount of air within the nodule suggesting it has been successfully biopsied. There is a small amount of pulmonary hemorrhage with airspace consolidation within the right upper lobe  including within the apical and posterior segment as was suspected on plain film radiographs. There is no pneumothorax or effusion. There are moderate changes of centrilobular emphysema. Pneumomediastinum. This extends into the base of the right neck and surrounds the trachea within the middle mediastinum. This could be related to transbronchial biopsy of mediastinal nodes and correlation is recommended to the procedure. A precarinal and right paratracheal node are surrounded by air. No pneumothorax present. No evidence of pulmonary thromboembolic disease.  The ascending thoracic aorta measures up to 3.7 cm in size. No dissection or aneurysm. Coronary calcifications are present. There is no pericardial effusion. Limited images of the upper abdomen are unremarkable except for a small hiatal hernia. The adrenals are unremarkable. No evidence of pulmonary thromboembolic disease    Pathology report 1/14/2025.  1.  Lung, right upper lobe, Ion fine-needle aspiration, smear (1) and cellblock:  A few atypical degenerated cells present, nondiagnostic of malignancy.  Many  macrophages and benign pneumocytes.  Background blood.  2.  Lung, right upper lobe, Ion biopsy and touch preparation:  Poorly differentiated non-small cell carcinoma consistent with adenocarcinoma.  Crush artifact present.  Stromal anthracotic pigment deposition.  Blood.  Comment: It is doubtful that there is sufficient paraffin-embedded cellular material for biomarker studies.  3.  Station 11L lymph node, endobronchial ultrasound-guided fine-needle aspiration, smear (1) and ThinPrep preparation (1):  Negative for malignant cells.  Lymphocytes and benign bronchial epithelial cells  4.  Station 7 lymph node, endobronchial ultrasound-guided fine-needle aspiration, smear (1) and ThinPrep preparation (1):  Negative for malignant cells.  Lymphocytes and scant benign ciliated columnar bronchial epithelial cells and scant benign metaplastic squamous epithelial cells.  5.  Station 4R lymph node, endobronchial ultrasound-guided fine-needle aspiration, smear (1) and ThinPrep preparation (1):  Negative for malignant cells.  Numerous lymphocytes.  6.  Station 10R lymph node, endobronchial ultrasound-guided fine-needle aspiration, smear (1) and ThinPrep preparation (1):  Negative for malignant cells.  Numerous lymphocytes.  Scant benign ciliated columnar bronchial epithelial cells and scant benign bronchial goblet cells.  7.  Station 11R lymph node, endobronchial ultrasound-guided fine-needle aspiration, smear (1) and ThinPrep preparation (1):  Negative for malignant cells.  Numerous lymphocytes.  Benign ciliated columnar bronchial epithelial cells.  8.  Lung, right upper lobe, bronchoalveolar lavage, ThinPrep preparation (1) and cellblock:  Negative for malignant cells.  Benign ciliated columnar bronchial epithelial cells present in a background of mixed inflammation and blood.    To see Dr. Abreu 2/20/2025.        LABS    Lab Results - Last 18 Months   Lab Units 01/06/25  1427 10/04/24  1404 07/13/24  0407 07/12/24  0402  "07/11/24  0539 07/10/24  1657   HEMOGLOBIN g/dL 14.5 13.7 13.8 14.3 14.0 17.3   HEMATOCRIT % 43.7 40.9 40.0 39.8 40.7 47.9   MCV fL 90.7 91.1 92.2 89.2 90.2 89.5   WBC 10*3/mm3 10.84* 12.30* 11.77* 11.37* 13.25* 12.89*   RDW % 13.2 12.9 12.2* 12.1* 12.1* 12.3   MPV fL 10.6 10.1 11.3 11.2 10.9 11.1   PLATELETS 10*3/mm3 250 298 202 205 218 224   IMM GRAN % %  --  0.5  --   --   --  0.4   NEUTROS ABS 10*3/mm3  --  7.39*  --   --   --  10.26*   LYMPHS ABS 10*3/mm3  --  3.65*  --   --   --  1.42   MONOS ABS 10*3/mm3  --  0.92*  --   --   --  1.04*   EOS ABS 10*3/mm3  --  0.21  --   --   --  0.06   BASOS ABS 10*3/mm3  --  0.07  --   --   --  0.06   IMMATURE GRANS (ABS) 10*3/mm3  --  0.06*  --   --   --  0.05   NRBC /100 WBC  --  0.0  --   --   --  0.0       Lab Results - Last 18 Months   Lab Units 01/06/25  1427 10/04/24  1404 07/15/24  0411 07/14/24  0512 07/13/24  0407 07/12/24  0402 07/11/24  0450 07/10/24  1657   GLUCOSE mg/dL 105* 104* 193* 184* 191* 254*   < > 466*   SODIUM mmol/L 139 139 135* 136 136 134*   < > 133*   POTASSIUM mmol/L 3.8 3.9 3.8 3.8 3.5 3.9   < > 4.5   CO2 mmol/L 25.0 25.0 23.0 23.0 24.0 23.0   < > 23.0   CHLORIDE mmol/L 103 104 103 102 104 101   < > 96*   ANION GAP mmol/L 11.0 10.0 9.0 11.0 8.0 10.0   < > 14.0   CREATININE mg/dL 0.72* 0.52* 0.59* 0.53* 0.62* 0.50*   < > 0.67*   BUN mg/dL 12 14 19 11 11 9   < > 15   BUN / CREAT RATIO  16.7 26.9* 32.2* 20.8 17.7 18.0   < > 22.4   CALCIUM mg/dL 8.8 8.9 8.8 8.7 8.7 8.7   < > 9.1   ALK PHOS U/L  --   --   --   --   --   --   --  92   TOTAL PROTEIN g/dL  --   --   --   --   --   --   --  6.8   ALT (SGPT) U/L  --   --   --   --   --   --   --  5   AST (SGOT) U/L  --   --   --   --   --   --   --  13   BILIRUBIN mg/dL  --   --   --   --   --   --   --  0.5   ALBUMIN g/dL  --   --   --   --   --   --   --  3.8   GLOBULIN gm/dL  --   --   --   --   --   --   --  3.0    < > = values in this interval not displayed.       No results for input(s): \"MSPIKE\", " "\"KAPPALAMB\", \"IGLFLC\", \"URICACID\", \"FREEKAPPAL\", \"CEA\", \"LDH\", \"REFLABREPO\" in the last 70714 hours.    Lab Results - Last 18 Months   Lab Units 07/11/24  0450   TSH uIU/mL 0.888         PAST MEDICAL HISTORY:  ALLERGIES:  Allergies   Allergen Reactions    Aspirin Other (See Comments)     Tightness in throat with high dose asa.  Can take the 81 mg without difficutly    Bee Venom Anaphylaxis     CURRENT MEDICATIONS:  Outpatient Encounter Medications as of 2/5/2025   Medication Sig Dispense Refill    albuterol sulfate  (90 Base) MCG/ACT inhaler Inhale 2 puffs Every 4 (Four) Hours As Needed for Wheezing. 17 g 5    Alcohol Swabs (DropSafe Alcohol Prep) 70 % pads       aspirin 81 MG chewable tablet Chew 1 tablet Daily.      atorvastatin (LIPITOR) 80 MG tablet Take 1 tablet by mouth Every Night.      BD Insulin Syringe U/F 31G X 5/16\" 0.5 ML misc USE TO INJECT INSULIN UP TO 3 TIMES DAILY AS DIRECTED      clopidogrel (PLAVIX) 75 MG tablet Take 1 tablet by mouth Daily for 180 days. 30 tablet 5    Continuous Glucose Sensor (Dexcom G7 Sensor) misc Currently off      HYDROcodone-acetaminophen (NORCO) 7.5-325 MG per tablet Take 1 tablet by mouth Every 6 (Six) Hours As Needed.      insulin glargine (LANTUS, SEMGLEE) 100 UNIT/ML injection Inject 40 Units under the skin into the appropriate area as directed Every Night.      Insulin Lispro (humaLOG) 100 UNIT/ML injection Inject 2-7 Units under the skin into the appropriate area as directed 3 (Three) Times a Day With Meals.      lisinopril (PRINIVIL,ZESTRIL) 40 MG tablet Take 1 tablet by mouth Daily. (Patient taking differently: Take 10 mg by mouth Daily.)      nitroglycerin (NITROSTAT) 0.4 MG SL tablet Place 1 tablet under the tongue Every 5 (Five) Minutes As Needed for Chest Pain. Take no more than 3 doses in 15 minutes. 100 tablet 12    tiotropium bromide-olodaterol (STIOLTO RESPIMAT) 2.5-2.5 MCG/ACT aerosol solution inhaler Inhale 2 puffs Daily. 1 each 5    True Metrix " Blood Glucose Test test strip CHECK BLOOD SUGAR EVERY DAY      TRUEplus Lancets 33G misc        No facility-administered encounter medications on file as of 2/5/2025.     ADULT ILLNESSES:  Patient Active Problem List   Diagnosis Code    Tobacco use Z72.0    Hypertension I10    Coronary artery disease involving native heart without angina pectoris I25.10    Hyperlipidemia with low HDL E78.5, E78.6    Class 1 obesity due to excess calories with body mass index (BMI) of 34.0 to 34.9 in adult E66.811, E66.09, Z68.34    S/P coronary artery stent placement Z95.5    History of MI (myocardial infarction) I25.2    Diabetes mellitus due to underlying condition with circulatory complication E08.59    Dizziness R42    Obesity (BMI 30-39.9) E66.9    Severe peripheral arterial disease I73.9    Acute focal neurological deficit R29.818    UTI (urinary tract infection), bacterial N39.0, A49.9    Acute ischemic stroke left  internal capsule lacunar and  right postcentral gyrus at the vertex. I63.512    Acute stroke due to ischemia I63.9    Vitamin B12 deficiency E53.8    Moderate tricompartmental osteoarthritis left knee with small knee joint  effusion. M17.12    Carotid stenosis, right I65.21    Claudication of both lower extremities I73.9    Degeneration of lumbar intervertebral disc M51.369    History of cellulitis Z87.2    Left basal ganglia embolic stroke I63.9    Memory difficulties R41.3    Non compliance w medication regimen Z91.148    Overweight (BMI 25.0-29.9) E66.3    Pulmonary nodule R91.1    Former smoker Z87.891    Panlobular emphysema J43.1     SURGERIES:  Past Surgical History:   Procedure Laterality Date    AORTAGRAM Right 10/18/2024    Procedure: Right lower extremity angiogram, shockwave lithotripsy, balloon angioplasty, mynx closure;  Surgeon: Slava Poole DO;  Location: [facility] OR;  Service: Vascular;  Laterality: Right;    BRONCHOSCOPY WITH ION ROBOTIC ASSIST N/A 1/9/2025    Procedure: BRONCHOSCOPY  NAVIGATION WITH ENDOBRONCHIAL ULTRASOUND AND ION ROBOT;  Surgeon: Andrea Hess DO;  Location:  [facility] OR;  Service: Robotics - Pulmonary;  Laterality: N/A;  preop; lung nodule  postop; lung nodule   PCP Betty Mcguire    CARDIAC CATHETERIZATION N/A 03/28/2019    Procedure: Left Heart Cath;  Surgeon: Vincent Pan MD;  Location:  [facility] CATH INVASIVE LOCATION;  Service: Cardiovascular    CARDIAC CATHETERIZATION Bilateral 03/28/2019    Procedure: Stent JOSSIE coronary;  Surgeon: Vincent Pan MD;  Location:  [facility] CATH INVASIVE LOCATION;  Service: Cardiovascular    CARDIAC CATHETERIZATION N/A 03/28/2019    Procedure: Left ventriculography;  Surgeon: Vincent Pan MD;  Location:  [facility] CATH INVASIVE LOCATION;  Service: Cardiovascular    CORONARY STENT PLACEMENT  2019    x3/widowmaker heart attack    TONSILLECTOMY       HEALTH MAINTENANCE ITEMS:  Health Maintenance Due   Topic Date Due    COLORECTAL CANCER SCREENING  Never done    DIABETIC EYE EXAM  Never done    ZOSTER VACCINE (1 of 2) Never done    HEPATITIS C SCREENING  Never done    ANNUAL WELLNESS VISIT  Never done    AAA SCREEN ONCE  Never done    COVID-19 Vaccine (3 - 2024-25 season) 09/01/2024    HEMOGLOBIN A1C  01/11/2025       <no information>  Last Completed Colonoscopy       This patient has no relevant Health Maintenance data.          Immunization History   Administered Date(s) Administered    COVID-19 (MODERNA) 1st,2nd,3rd Dose Monovalent 03/22/2021, 04/21/2021    Tdap 02/07/2018, 04/22/2022     Last Completed Mammogram       This patient has no relevant Health Maintenance data.              FAMILY HISTORY:  Family History   Problem Relation Age of Onset    Valvular heart disease Mother     Heart attack Mother     Heart disease Mother     Heart disease Brother     Heart attack Brother     Cancer Father     No Known Problems Brother      SOCIAL HISTORY:  Social History     Socioeconomic History    Marital status:    Tobacco Use    Smoking  "status: Former     Current packs/day: 0.00     Average packs/day: 1.5 packs/day for 55.5 years (83.3 ttl pk-yrs)     Types: Cigarettes     Start date:      Quit date: 7/10/2024     Years since quittin.5     Passive exposure: Past    Smokeless tobacco: Never   Vaping Use    Vaping status: Never Used   Substance and Sexual Activity    Alcohol use: Not Currently     Comment: quit     Drug use: Not Currently     Types: Marijuana    Sexual activity: Defer       REVIEW OF SYSTEMS:  Review of Systems   Constitutional:  Positive for fatigue. Negative for fever.        \"I am just tired.\"   Patient unable to perform work.   HENT:  Negative for congestion and facial swelling.    Eyes:  Negative for redness and visual disturbance.   Respiratory:  Negative for shortness of breath and wheezing.    Cardiovascular:  Negative for chest pain and palpitations.   Gastrointestinal:  Negative for abdominal pain, nausea and vomiting.   Endocrine: Negative for cold intolerance and heat intolerance.   Genitourinary:  Negative for difficulty urinating and dysuria.   Musculoskeletal:  Negative for joint swelling and myalgias.   Skin:  Negative for pallor.   Allergic/Immunologic: Negative for food allergies.   Neurological:  Negative for facial asymmetry, speech difficulty and confusion.   Hematological:  Negative for adenopathy. Bruises/bleeds easily.   Psychiatric/Behavioral:  Negative for agitation and hallucinations. The patient is not nervous/anxious.        /75   Pulse 86   Temp 96.6 °F (35.9 °C) (Temporal)   Ht 154.9 cm (60.98\")   SpO2 98%   BMI 28.17 kg/m²  Body surface area is 1.67 meters squared.  Pain Score    25 1406   PainSc: 0-No pain       Physical Exam:  Physical Exam  Vitals reviewed.   Constitutional:       General: He is not in acute distress.     Comments: Frail looking. He arrived in the exam room in a wheelchair.    HENT:      Head: Normocephalic and atraumatic.   Eyes:      General: No " scleral icterus.  Cardiovascular:      Rate and Rhythm: Normal rate.   Pulmonary:      Breath sounds: No wheezing.      Comments: Diminished breath sounds.   Abdominal:      General: Bowel sounds are normal.      Palpations: Abdomen is soft.   Musculoskeletal:         General: No swelling.      Cervical back: Neck supple.   Skin:     Coloration: Skin is not pale.   Neurological:      Mental Status: He is oriented to person, place, and time.   Psychiatric:         Mood and Affect: Mood normal.         Behavior: Behavior normal.         Thought Content: Thought content normal.         Judgment: Judgment normal.         Anival Pennington reports a pain score of 0.          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  Tumor size 1.3 x 1.2 cm.  AJCC stage: 1 A2 (cT1b, cN0, cM0)  Treatment status: Pending evaluation by thoracic surgery.   2.  Poor performance status of 3.  3.  Emphysema and cerebrovascular event times 2 contributing to his poor performance status.   4.  Peripheral artery disease on Plavix.   5.  Coronary artery disease on aspirin.         PLAN:   regarding the reason for the referral.  2.   regarding staging with brain MRI.  3.   regarding thoracic surgery evaluation for resection and mediastinal lymph node dissection.  If 1 A2 disease, no adjuvant chemotherapy.  If patient is medically inoperable, plan for definitive radiation without chemotherapy.  4.  Order brain MRI for staging lung cancer.  5.  To see Dr. Abreu 2/20/2025 to evaluate for resection of the right upper lobe lung cancer.  6.  Blood for CBC with differential and CMP next visit.  7.  Anticipate surveillance every 6 months with imaging for 3 years then annually thereafter patient has stage I A2 disease post resection.  8. Advance Care Planning   ACP discussion was declined by the patient. Patient does not have an advance directive, information provided.   9.  Plan of care discussed with patient and spouse.   Understanding expressed.  Patient agreed to proceed.  10.  Continue care per primary care physician and other specialists.  11.  Return to office 3 weeks with CBC with differential and CMP, same day.  12.  Further recommendations pending.      Thank you for the referral.      I have reviewed the assessment and plan and verified the accuracy of it. No changes to assessment and plan since the information was documented. Urbano Alfaro MD 02/05/25       I spent 65 total minutes, face-to-face, caring for Anival today. Greater than 50% of this time involved counseling and/or coordination of care as documented within this note.         MD Nilay Peña MD Kathryn Glass, MD Deborah Welch, APRN

## 2025-01-30 ENCOUNTER — OFFICE VISIT (OUTPATIENT)
Dept: PULMONOLOGY | Facility: CLINIC | Age: 70
End: 2025-01-30
Payer: MEDICARE

## 2025-01-30 ENCOUNTER — TELEPHONE (OUTPATIENT)
Dept: PULMONOLOGY | Facility: CLINIC | Age: 70
End: 2025-01-30

## 2025-01-30 VITALS
OXYGEN SATURATION: 98 % | DIASTOLIC BLOOD PRESSURE: 72 MMHG | HEIGHT: 61 IN | SYSTOLIC BLOOD PRESSURE: 122 MMHG | WEIGHT: 149 LBS | HEART RATE: 111 BPM | BODY MASS INDEX: 28.13 KG/M2

## 2025-01-30 DIAGNOSIS — J43.1 PANLOBULAR EMPHYSEMA: ICD-10-CM

## 2025-01-30 DIAGNOSIS — Z87.891 FORMER SMOKER: ICD-10-CM

## 2025-01-30 DIAGNOSIS — R06.09 CHRONIC DYSPNEA: ICD-10-CM

## 2025-01-30 DIAGNOSIS — C34.91 ADENOCARCINOMA, LUNG, RIGHT: Primary | ICD-10-CM

## 2025-01-30 RX ORDER — ALBUTEROL SULFATE 90 UG/1
2 INHALANT RESPIRATORY (INHALATION) EVERY 4 HOURS PRN
Qty: 17 G | Refills: 5 | Status: SHIPPED | OUTPATIENT
Start: 2025-01-30

## 2025-01-30 NOTE — TELEPHONE ENCOUNTER
Caller: DEVORAH LUCERO    Relationship to patient: Emergency Contact    Best call back number: [phone]     Patient is needing: THE SPIRIVA MEDICATION THAT WAS SENT IN WILL COST OVER $400. THEY ARE ASKING IF AN ALTERNATIVE OPTION OR SOMETHING ELSE IS AVAILABLE.     PLEASE CALL TO ADVISE.

## 2025-01-30 NOTE — TELEPHONE ENCOUNTER
Spoke with patient's spouse and she is going to call the insurance to see which maintenance inhaler is most affordable for them.  She will call back to let us know.

## 2025-01-30 NOTE — PROGRESS NOTES
PFT not performed due to patient's inability to complete maneuver due to unable to completely exhale.  Attempted x 5.  Patient aware no results will be available.

## 2025-01-30 NOTE — LETTER
2025     Betty Mcguire MD  [address] [place] [id number]    Patient: Anival Pennington   YOB: 1955   Date of Visit: 2025     Dear Dr. Shayla MD:    Thank you for referring Anival Pennington to me for evaluation. Below are the relevant portions of my assessment and plan of care.    If you have questions, please do not hesitate to call me. I look forward to following Anival along with you.         Sincerely,        Brently Luis Hess,         CC: No Recipients      Progress Notes:  PFT not performed due to patient's inability to complete maneuver due to unable to completely exhale.  Attempted x 5.  Patient aware no results will be available.        Clinic Note - Follow Up            NAME: Anival Pennington  MRN: [id number]  AGE: 69 y.o.            : 1955  PRIMARY CARE PROVIDER: Betty Mcguire MD               Reason for Visit:  Anival Pennington presents to clinic today for follow up for the evaluation of newly diagnosed adenocarcinoma of the lung.    History of Present Illness:  Mr. Lennox is a 69-year-old male who presents to clinic today for follow-up. Past medical history includes newly diagnosed right upper lobe adenocarcinoma, emphysema, former smoker, CVA.     Patient was last seen as a new patient on 2024 for a new right upper lobe pulmonary nodule.  He underwent Ion bronchoscopy on 2025.  The right upper lobe returned positive for adenocarcinoma.  Lymph nodes were negative x 5.  He has been established with Dr. Alfaro and Brady.  Today he does note some new dyspnea which is worse with exertion.  At his prior visit he denies any difficulties with cough or shortness of breath.  We attempted to obtain PFTs in clinic today, however the patient was not able to perform the PFTs.    Review of Systems:  A full 12-point review of systems was performed and was negative except as documented in the HPI.            Physical Exam:  General: No acute distress, cooperative.  Head:  Atraumatic, normocephalic, normal inspection.  Eyes: EOMI, normal inspection.  ENT: Mucous membranes moist, normal inspection. No thrush.  Heart: RRR, no murmur  Lungs: Diminished bilaterally, no wheezing  MSK: No edema or clubbing  GI/abdominal: Soft, nontender.  Neurologic: Alert, oriented.  Cranial nerves II through XII grossly intact.      Imaging Review:  No new chest imaging for review      Pulmonary Functions Testing Results:  None available for review            Problem List:  Problem List Items Addressed This Visit       Former smoker    Panlobular emphysema    Relevant Medications    albuterol sulfate  (90 Base) MCG/ACT inhaler    Adenocarcinoma, lung, right - Primary    Relevant Medications    albuterol sulfate  (90 Base) MCG/ACT inhaler    Chronic dyspnea    Relevant Orders    Spirometry with Diffusion Capacity & Lung Volumes         Pulmonary Assessment:  Patient is a 69-year-old male with newly diagnosed right upper lobe adenocarcinoma.  He has seen Dr. Alfaro and will undergo brain MRI.  He is currently awaiting evaluation by Dr. Pan for possible surgical intervention.  We attempted to obtain PFTs, however the patient has noted cough recently.  Therefore I have reordered the PFTs once his cough improves over the next week or so he can return to clinic to obtain these.  They will be helpful for Dr. Abreu.  Will going start empiric treatment with Stiolto and he will continue as needed albuterol.      Plan:   -PFTs in the next week once his respiratory status returns to baseline  -Start Stiolto for suspected COPD  -Continue as needed albuterol  -Continue follow-up with Dr. Alfaro and Brady           Follow Up:  3 months         Thank you Betty Mcguire MD for allowing me to participate in this patient's care.           Past Medical History:   Past Medical History:   Diagnosis Date   • Acute ischemic left MCA stroke 07/11/2024    right sided weakness   • Acute ST segment elevation myocardial  infarction 04/03/2019   • Cancer     leukemia   • Cellulitis of face 05/16/2023   • COPD (chronic obstructive pulmonary disease)    • Coronary artery disease    • Diabetes mellitus 09/06/2023   • diabetes with circulatory complications    • Heavy smoker (more than 20 cigarettes per day) 07/24/2022   • Hyperlipidemia    • Hypertension    • Stroke     x2 since   • Tobacco abuse    • Type 2 diabetes mellitus with circulatory disorder, without long-term current use of insulin 03/28/2019         Past Surgical History:   Past Surgical History:   Procedure Laterality Date   • AORTAGRAM Right 10/18/2024    Procedure: Right lower extremity angiogram, shockwave lithotripsy, balloon angioplasty, mynx closure;  Surgeon: Slava Poole DO;  Location:  [facility];  Service: Vascular;  Laterality: Right;   • BRONCHOSCOPY WITH ION ROBOTIC ASSIST N/A 1/9/2025    Procedure: BRONCHOSCOPY NAVIGATION WITH ENDOBRONCHIAL ULTRASOUND AND ION ROBOT;  Surgeon: Andrea Hess DO;  Location:  [facility];  Service: Robotics - Pulmonary;  Laterality: N/A;  preop; lung nodule  postop; lung nodule   PCP Betty Mcguire   • CARDIAC CATHETERIZATION N/A 03/28/2019    Procedure: Left Heart Cath;  Surgeon: Vincent Pan MD;  Location:  [facility];  Service: Cardiovascular   • CARDIAC CATHETERIZATION Bilateral 03/28/2019    Procedure: Stent JOSSIE coronary;  Surgeon: Vincent Pan MD;  Location:  [facility];  Service: Cardiovascular   • CARDIAC CATHETERIZATION N/A 03/28/2019    Procedure: Left ventriculography;  Surgeon: Vincent Pan MD;  Location:  [facility];  Service: Cardiovascular   • CORONARY STENT PLACEMENT  2019    x3/widowmaker heart attack   • TONSILLECTOMY           Family History:   Family History   Problem Relation Age of Onset   • Valvular heart disease Mother    • Heart attack Mother    • Heart disease Mother    • Heart disease Brother    • Heart attack Brother    •  "Cancer Father    • No Known Problems Brother          Medications:   Prior to Admission medications    Medication Sig Start Date End Date Taking? Authorizing Provider   Alcohol Swabs (DropSafe Alcohol Prep) 70 % pads  7/26/24  Yes Merly Walsh MD   aspirin 81 MG chewable tablet Chew 1 tablet Daily.   Yes Merly Walsh MD   atorvastatin (LIPITOR) 80 MG tablet Take 1 tablet by mouth Every Night. 7/15/24  Yes Brenda Delgado APRN   BD Insulin Syringe U/F 31G X 5/16\" 0.5 ML misc USE TO INJECT INSULIN UP TO 3 TIMES DAILY AS DIRECTED 8/22/24  Yes Merly Walsh MD   clopidogrel (PLAVIX) 75 MG tablet Take 1 tablet by mouth Daily for 180 days. 11/4/24 5/3/25 Yes Joanie Devlin APRN   Continuous Glucose Sensor (Dexcom G7 Sensor) misc Currently off 9/16/24  Yes Merly Walsh MD   HYDROcodone-acetaminophen (NORCO) 7.5-325 MG per tablet Take 1 tablet by mouth Every 6 (Six) Hours As Needed. 12/4/24  Yes Merly Walsh MD   insulin glargine (LANTUS, SEMGLEE) 100 UNIT/ML injection Inject 40 Units under the skin into the appropriate area as directed Every Night. 7/15/24  Yes Brenda Delgado APRN   Insulin Lispro (humaLOG) 100 UNIT/ML injection Inject 2-7 Units under the skin into the appropriate area as directed 3 (Three) Times a Day With Meals. 7/15/24  Yes Brenda Delgado APRN   lisinopril (PRINIVIL,ZESTRIL) 40 MG tablet Take 1 tablet by mouth Daily.  Patient taking differently: Take 10 mg by mouth Daily. 7/16/24  Yes Brenda Delgado APRN   nitroglycerin (NITROSTAT) 0.4 MG SL tablet Place 1 tablet under the tongue Every 5 (Five) Minutes As Needed for Chest Pain. Take no more than 3 doses in 15 minutes. 7/20/22  Yes Betty Mcguire MD True Metrix Blood Glucose Test test strip CHECK BLOOD SUGAR EVERY DAY 7/25/24  Yes Merly Walsh MD   TRUEplus Lancets 33G misc  7/26/24  Yes Merly Walsh MD   albuterol sulfate  (90 Base) MCG/ACT inhaler Inhale 2 puffs " "Every 4 (Four) Hours As Needed for Wheezing. 25   Andrea Hess, DO   tiotropium bromide-olodaterol (STIOLTO RESPIMAT) 2.5-2.5 MCG/ACT aerosol solution inhaler Inhale 2 puffs Daily. 25   Andrea Hess DO         Allergies:   Aspirin and Bee venom      Results Review:             Visit Vitals  /72   Pulse 111   Ht 154.9 cm (61\")   Wt 67.6 kg (149 lb)   SpO2 98% Comment: RA   BMI 28.15 kg/m²           Social History:     Social History     Socioeconomic History   • Marital status:    Tobacco Use   • Smoking status: Former     Current packs/day: 0.00     Average packs/day: 1.5 packs/day for 55.5 years (83.3 ttl pk-yrs)     Types: Cigarettes     Start date:      Quit date: 7/10/2024     Years since quittin.5     Passive exposure: Past   • Smokeless tobacco: Never   Vaping Use   • Vaping status: Never Used   Substance and Sexual Activity   • Alcohol use: Not Currently     Comment: quit    • Drug use: Not Currently     Types: Marijuana   • Sexual activity: Defer                Andrea Hess DO  Pulmonary/Critical Care  2025  17:56 CST    "

## 2025-01-31 NOTE — TELEPHONE ENCOUNTER
Caller: DEVORAH LUCERO    Relationship to patient: Emergency Contact    Best call back number: [phone]     Patient is needing:   PATIENT SPOUSE CONTACTED THE INSURANCE COMPANY. SHE WAS TOLD THERE IS NO ALTERNATIVE OPTION FOR THIS MEDICATION. SHE WAS ALSO TOLD HE HAS OVER $500 LEFT ON HIS DEDUCTIBLE BEFORE ANY COVERAGE COULD BE APPLIED.   SHE WOULD LIKE TO KNOW IF THERE ARE ANY OTHER OPTIONS AVAILABLE TO THEM OR ANY OTHER MEDICATIONS HE COULD TRY.     PLEASE CALL TO ADVISE.

## 2025-01-31 NOTE — TELEPHONE ENCOUNTER
I spoke to patient's spouse and they do want to pursue patient assistance.  Paperwork is up front for the patient to fill out their portion.

## 2025-02-05 ENCOUNTER — LAB (OUTPATIENT)
Dept: LAB | Facility: HOSPITAL | Age: 70
End: 2025-02-05
Payer: MEDICARE

## 2025-02-05 ENCOUNTER — CONSULT (OUTPATIENT)
Dept: ONCOLOGY | Facility: CLINIC | Age: 70
End: 2025-02-05
Payer: MEDICARE

## 2025-02-05 VITALS
HEART RATE: 86 BPM | BODY MASS INDEX: 28.17 KG/M2 | TEMPERATURE: 96.6 F | HEIGHT: 61 IN | DIASTOLIC BLOOD PRESSURE: 75 MMHG | SYSTOLIC BLOOD PRESSURE: 111 MMHG | OXYGEN SATURATION: 98 %

## 2025-02-05 DIAGNOSIS — C34.11 MALIGNANT NEOPLASM OF UPPER LOBE OF RIGHT LUNG: Primary | ICD-10-CM

## 2025-02-06 PROBLEM — R06.09 CHRONIC DYSPNEA: Status: ACTIVE | Noted: 2025-02-06

## 2025-02-06 PROBLEM — C34.91 ADENOCARCINOMA, LUNG, RIGHT: Status: ACTIVE | Noted: 2025-02-06

## 2025-02-06 NOTE — PROGRESS NOTES
Clinic Note - Follow Up            NAME: Anival Pennington  MRN: [id number]  AGE: 69 y.o.            : 1955  PRIMARY CARE PROVIDER: Betty Mcguire MD               Reason for Visit:  Anival Pennington presents to clinic today for follow up for the evaluation of newly diagnosed adenocarcinoma of the lung.    History of Present Illness:  Mr. Lennox is a 69-year-old male who presents to clinic today for follow-up. Past medical history includes newly diagnosed right upper lobe adenocarcinoma, emphysema, former smoker, CVA.     Patient was last seen as a new patient on 2024 for a new right upper lobe pulmonary nodule.  He underwent Ion bronchoscopy on 2025.  The right upper lobe returned positive for adenocarcinoma.  Lymph nodes were negative x 5.  He has been established with Dr. Alfaro and Brady.  Today he does note some new dyspnea which is worse with exertion.  At his prior visit he denies any difficulties with cough or shortness of breath.  We attempted to obtain PFTs in clinic today, however the patient was not able to perform the PFTs.    Review of Systems:  A full 12-point review of systems was performed and was negative except as documented in the HPI.            Physical Exam:  General: No acute distress, cooperative.  Head: Atraumatic, normocephalic, normal inspection.  Eyes: EOMI, normal inspection.  ENT: Mucous membranes moist, normal inspection. No thrush.  Heart: RRR, no murmur  Lungs: Diminished bilaterally, no wheezing  MSK: No edema or clubbing  GI/abdominal: Soft, nontender.  Neurologic: Alert, oriented.  Cranial nerves II through XII grossly intact.      Imaging Review:  No new chest imaging for review      Pulmonary Functions Testing Results:  None available for review            Problem List:  Problem List Items Addressed This Visit       Former smoker    Panlobular emphysema    Relevant Medications    albuterol sulfate  (90 Base) MCG/ACT inhaler    Adenocarcinoma, lung, right -  Primary    Relevant Medications    albuterol sulfate  (90 Base) MCG/ACT inhaler    Chronic dyspnea    Relevant Orders    Spirometry with Diffusion Capacity & Lung Volumes         Pulmonary Assessment:  Patient is a 69-year-old male with newly diagnosed right upper lobe adenocarcinoma.  He has seen Dr. Alfaro and will undergo brain MRI.  He is currently awaiting evaluation by Dr. Pan for possible surgical intervention.  We attempted to obtain PFTs, however the patient has noted cough recently.  Therefore I have reordered the PFTs once his cough improves over the next week or so he can return to clinic to obtain these.  They will be helpful for Dr. Abreu.  Will going start empiric treatment with Stiolto and he will continue as needed albuterol.      Plan:   -PFTs in the next week once his respiratory status returns to baseline  -Start Stiolto for suspected COPD  -Continue as needed albuterol  -Continue follow-up with Dr. Alfaro and Brady           Follow Up:  3 months         Thank you Betty Mcguire MD for allowing me to participate in this patient's care.           Past Medical History:   Past Medical History:   Diagnosis Date    Acute ischemic left MCA stroke 07/11/2024    right sided weakness    Acute ST segment elevation myocardial infarction 04/03/2019    Cancer     leukemia    Cellulitis of face 05/16/2023    COPD (chronic obstructive pulmonary disease)     Coronary artery disease     Diabetes mellitus 09/06/2023    diabetes with circulatory complications     Heavy smoker (more than 20 cigarettes per day) 07/24/2022    Hyperlipidemia     Hypertension     Stroke     x2 since    Tobacco abuse     Type 2 diabetes mellitus with circulatory disorder, without long-term current use of insulin 03/28/2019         Past Surgical History:   Past Surgical History:   Procedure Laterality Date    AORTAGRAM Right 10/18/2024    Procedure: Right lower extremity angiogram, shockwave lithotripsy, balloon angioplasty, mynx  "closure;  Surgeon: Slava Poole DO;  Location:  [facility];  Service: Vascular;  Laterality: Right;    BRONCHOSCOPY WITH ION ROBOTIC ASSIST N/A 1/9/2025    Procedure: BRONCHOSCOPY NAVIGATION WITH ENDOBRONCHIAL ULTRASOUND AND ION ROBOT;  Surgeon: Andrea Hess DO;  Location:  [facility];  Service: Robotics - Pulmonary;  Laterality: N/A;  preop; lung nodule  postop; lung nodule   PCP Betty Mcguire    CARDIAC CATHETERIZATION N/A 03/28/2019    Procedure: Left Heart Cath;  Surgeon: Vincent Pan MD;  Location:  [facility];  Service: Cardiovascular    CARDIAC CATHETERIZATION Bilateral 03/28/2019    Procedure: Stent JOSSIE coronary;  Surgeon: Vincent Pan MD;  Location:  [facility];  Service: Cardiovascular    CARDIAC CATHETERIZATION N/A 03/28/2019    Procedure: Left ventriculography;  Surgeon: Vincent Pan MD;  Location:  [facility];  Service: Cardiovascular    CORONARY STENT PLACEMENT  2019    x3/widowmaker heart attack    TONSILLECTOMY           Family History:   Family History   Problem Relation Age of Onset    Valvular heart disease Mother     Heart attack Mother     Heart disease Mother     Heart disease Brother     Heart attack Brother     Cancer Father     No Known Problems Brother          Medications:   Prior to Admission medications    Medication Sig Start Date End Date Taking? Authorizing Provider   Alcohol Swabs (DropSafe Alcohol Prep) 70 % pads  7/26/24  Yes ProviderMerly MD   aspirin 81 MG chewable tablet Chew 1 tablet Daily.   Yes Provider, MD Merly   atorvastatin (LIPITOR) 80 MG tablet Take 1 tablet by mouth Every Night. 7/15/24  Yes Brenda Delgado APRN   BD Insulin Syringe U/F 31G X 5/16\" 0.5 ML misc USE TO INJECT INSULIN UP TO 3 TIMES DAILY AS DIRECTED 8/22/24  Yes ProviderMerly MD   clopidogrel (PLAVIX) 75 MG tablet Take 1 tablet by mouth Daily for 180 days. 11/4/24 5/3/25 Yes Joanie Devlin APRN " "  Continuous Glucose Sensor (Dexcom G7 Sensor) misc Currently off 9/16/24  Yes ProviderMerly MD   HYDROcodone-acetaminophen (NORCO) 7.5-325 MG per tablet Take 1 tablet by mouth Every 6 (Six) Hours As Needed. 12/4/24  Yes Merly Walsh MD   insulin glargine (LANTUS, SEMGLEE) 100 UNIT/ML injection Inject 40 Units under the skin into the appropriate area as directed Every Night. 7/15/24  Yes Brenda Delgado APRN   Insulin Lispro (humaLOG) 100 UNIT/ML injection Inject 2-7 Units under the skin into the appropriate area as directed 3 (Three) Times a Day With Meals. 7/15/24  Yes Brenda Delgado APRN   lisinopril (PRINIVIL,ZESTRIL) 40 MG tablet Take 1 tablet by mouth Daily.  Patient taking differently: Take 10 mg by mouth Daily. 7/16/24  Yes Brenda Delgado APRN   nitroglycerin (NITROSTAT) 0.4 MG SL tablet Place 1 tablet under the tongue Every 5 (Five) Minutes As Needed for Chest Pain. Take no more than 3 doses in 15 minutes. 7/20/22  Yes Betty Mcguire MD True Metrix Blood Glucose Test test strip CHECK BLOOD SUGAR EVERY DAY 7/25/24  Yes ProviderMerly MD   TRUEplus Lancets 33G misc  7/26/24  Yes Merly Walsh MD   albuterol sulfate  (90 Base) MCG/ACT inhaler Inhale 2 puffs Every 4 (Four) Hours As Needed for Wheezing. 1/30/25   Andrea Hess,    tiotropium bromide-olodaterol (STIOLTO RESPIMAT) 2.5-2.5 MCG/ACT aerosol solution inhaler Inhale 2 puffs Daily. 2/5/25   Andrea Hess DO         Allergies:   Aspirin and Bee venom      Results Review:             Visit Vitals  /72   Pulse 111   Ht 154.9 cm (61\")   Wt 67.6 kg (149 lb)   SpO2 98% Comment: RA   BMI 28.15 kg/m²           Social History:     Social History     Socioeconomic History    Marital status:    Tobacco Use    Smoking status: Former     Current packs/day: 0.00     Average packs/day: 1.5 packs/day for 55.5 years (83.3 ttl pk-yrs)     Types: Cigarettes     Start date: 1969 "     Quit date: 7/10/2024     Years since quittin.5     Passive exposure: Past    Smokeless tobacco: Never   Vaping Use    Vaping status: Never Used   Substance and Sexual Activity    Alcohol use: Not Currently     Comment: quit     Drug use: Not Currently     Types: Marijuana    Sexual activity: Defer                Andrea Hess DO  Pulmonary/Critical Care  2025  17:56 CST

## 2025-02-18 NOTE — PROGRESS NOTES
[facility] HEMATOLOGY & ONCOLOGY  [address]  [place] [address] [phone]    Patient Name: Anival Pennington  Encounter Date: 02/26/2025  YOB: 1955  Patient Number: [id number]      REASON FOR FOLLOW-UP:Anival Pennington is a pleasant 69 y.o.  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  Patient is seen with spouse, Kavya.  History is obtained from patient.  He is a reliable historian.           DIAGNOSTIC ABNORMALITIES:   Patient followed for a right upper lobe lung nodule by CT angiogram of the neck. Limited visualized lungs show a small spiculated nodule in the right upper lobe, image #108 in series 2, measuring 8 mm in diameter. This need to be further evaluated with CT scan of the chest.  CT chest 11/19/2024. Increased size of RIGHT upper lobe spiculated suspicious pulmonary  nodule measuring 12 x 10 mm, previously 9 x 8 mm on 7/11/2024 by my measurements today. Emphysematous changes present. Consider PET/CT or tissue sampling. Mildly enlarged ascending thoracic aorta measures 3.8 cm.  Heavily calcified coronary arteries versus stent material. Similar mild chronic height loss of T12 compared to 10/4/2024.  PET 12/16/2024. 12 mm spiculated hypermetabolic right upper lobe pulmonary nodule, most likely representing a primary lung neoplasm.   Indeterminant borderline enlarged mildly hypermetabolic right lower paratracheal/mediastinal and right hilar lymph nodes. Given size and degree of uptake, favor these to be infectious/inflammatory in nature although prem spread of neoplasm is also in the differential.  No evidence of hypermetabolic metastatic disease in the neck, abdomen, or pelvis.  Paranasal sinus disease in the right maxillary sinus characterized by complete sinus opacification with peripheral rim of hypermetabolic activity which is favored  infectious/inflammatory in nature.  CBC remarkable for WBC 10.84.  BMP 1/6/2025 showed a GFR of 99.5 ml/min.  CT chest 1/6/2025. Spiculated 13 x 12 x 9 mm right upper lobe pulmonary nodule suspicious  for malignancy nodule similar in size to 11/19/2024, however increased when compared to 7/11/2024. Borderline precarinal mediastinal lymph node. Moderate emphysema.  Patient had undergone bronchoscopy and endobronchial ultrasound 1/9/2025.  No endobronchial lesion.  CT angiogram chest 1/9/2025. 12 mm lesion with irregular margins again demonstrated in the right  upper lobe. There is a small amount of air within the nodule suggesting it has been successfully biopsied. There is a small amount of pulmonary hemorrhage with airspace consolidation within the right upper lobe  including within the apical and posterior segment as was suspected on plain film radiographs. There is no pneumothorax or effusion. There are moderate changes of centrilobular emphysema. Pneumomediastinum. This extends into the base of the right neck and surrounds the trachea within the middle mediastinum. This could be related to transbronchial biopsy of mediastinal nodes and correlation is recommended to the procedure. A precarinal and right paratracheal node are surrounded by air. No pneumothorax present. No evidence of pulmonary thromboembolic disease.  The ascending thoracic aorta measures up to 3.7 cm in size. No dissection or aneurysm. Coronary calcifications are present. There is no pericardial effusion. Limited images of the upper abdomen are unremarkable except for a small hiatal hernia. The adrenals are unremarkable. No evidence of pulmonary thromboembolic disease  Pathology report 1/14/2025.  1.  Lung, right upper lobe, Ion fine-needle aspiration, smear (1) and cellblock:  A few atypical degenerated cells present, nondiagnostic of malignancy.  Many macrophages and benign pneumocytes.  Background blood.  2.  Lung, right upper lobe, Ion biopsy  and touch preparation:  Poorly differentiated non-small cell carcinoma consistent with adenocarcinoma.  Crush artifact present.  Stromal anthracotic pigment deposition.  Blood.  Comment: It is doubtful that there is sufficient paraffin-embedded cellular material for biomarker studies.  3.  Station 11L lymph node, endobronchial ultrasound-guided fine-needle aspiration, smear (1) and ThinPrep preparation (1):  Negative for malignant cells.  Lymphocytes and benign bronchial epithelial cells  4.  Station 7 lymph node, endobronchial ultrasound-guided fine-needle aspiration, smear (1) and ThinPrep preparation (1):  Negative for malignant cells.  Lymphocytes and scant benign ciliated columnar bronchial epithelial cells and scant benign metaplastic squamous epithelial cells.  5.  Station 4R lymph node, endobronchial ultrasound-guided fine-needle aspiration, smear (1) and ThinPrep preparation (1):  Negative for malignant cells.  Numerous lymphocytes.  6.  Station 10R lymph node, endobronchial ultrasound-guided fine-needle aspiration, smear (1) and ThinPrep preparation (1):  Negative for malignant cells.  Numerous lymphocytes.  Scant benign ciliated columnar bronchial epithelial cells and scant benign bronchial goblet cells.  7.  Station 11R lymph node, endobronchial ultrasound-guided fine-needle aspiration, smear (1) and ThinPrep preparation (1):  Negative for malignant cells.  Numerous lymphocytes.  Benign ciliated columnar bronchial epithelial cells.  8.  Lung, right upper lobe, bronchoalveolar lavage, ThinPrep preparation (1) and cellblock:  Negative for malignant cells.  Benign ciliated columnar bronchial epithelial cells present in a background of mixed inflammation and blood.         PREVIOUS INTERVENTIONS:  Pending radiation.          Oncology/Hematology History   Adenocarcinoma, lung, right    Initial Diagnosis    Adenocarcinoma, lung, right     11/19/2024 Imaging    CT Chest:  IMPRESSION:  1. Increased size of RIGHT  upper lobe spiculated suspicious pulmonary  nodule measuring 12 x 10 mm, previously 9 x 8 mm on 7/11/2024 by my  measurements today. Emphysematous changes present. Consider PET/CT or  tissue sampling.  2. Mildly enlarged ascending thoracic aorta measures 3.8 cm.  3. Heavily calcified coronary arteries versus stent material.  4. Similar mild chronic height loss of T12 compared to 10/4/2024.     12/16/2024 Imaging    PET/CT:  IMPRESSION:  1.  12 mm spiculated hypermetabolic right upper lobe pulmonary nodule,  most likely representing a primary lung neoplasm.  2.  Indeterminant borderline enlarged mildly hypermetabolic right lower  paratracheal/mediastinal and right hilar lymph nodes. Given size and  degree of uptake, favor these to be infectious/inflammatory in nature  although prem spread of neoplasm is also in the differential.  3.  No evidence of hypermetabolic metastatic disease in the neck,  abdomen, or pelvis.  4.  Paranasal sinus disease in the right maxillary sinus characterized  by complete sinus opacification with peripheral rim of hypermetabolic  activity which is favored infectious/inflammatory in nature.     1/6/2025 Imaging    CT Chest:  IMPRESSION:  Spiculated 13 x 12 x 9 mm right upper lobe pulmonary nodule suspicious  for malignancy nodule similar in size to 11/19/2024, however increased  when compared to 7/11/2024. Borderline precarinal mediastinal lymph  node. Moderate emphysema.     1/9/2025 Biopsy    Final Diagnosis   1.  Lung, right upper lobe, Ion fine-needle aspiration, smear (1) and cellblock:  - A few atypical degenerated cells present, nondiagnostic of malignancy.  - Many macrophages and benign pneumocytes.  - Background blood.     2.  Lung, right upper lobe, Ion biopsy and touch preparation:  - Poorly differentiated non-small cell carcinoma consistent with adenocarcinoma.  - Crush artifact present.  - Stromal anthracotic pigment deposition.  - Blood.     Comment: It is doubtful that there  is sufficient paraffin-embedded cellular material for biomarker studies.     3.  Station 11L lymph node, endobronchial ultrasound-guided fine-needle aspiration, smear (1) and ThinPrep preparation (1):  - Negative for malignant cells.  - Lymphocytes and benign bronchial epithelial cells     4.  Station 7 lymph node, endobronchial ultrasound-guided fine-needle aspiration, smear (1) and ThinPrep preparation (1):  - Negative for malignant cells.  - Lymphocytes and scant benign ciliated columnar bronchial epithelial cells and scant benign metaplastic squamous epithelial cells.     5.  Station 4R lymph node, endobronchial ultrasound-guided fine-needle aspiration, smear (1) and ThinPrep preparation (1):  - Negative for malignant cells.  - Numerous lymphocytes.     6.  Station 10R lymph node, endobronchial ultrasound-guided fine-needle aspiration, smear (1) and ThinPrep preparation (1):  - Negative for malignant cells.  - Numerous lymphocytes.  - Scant benign ciliated columnar bronchial epithelial cells and scant benign bronchial goblet cells.     7.  Station 11R lymph node, endobronchial ultrasound-guided fine-needle aspiration, smear (1) and ThinPrep preparation (1):  - Negative for malignant cells.  - Numerous lymphocytes.  - Benign ciliated columnar bronchial epithelial cells.     8.  Lung, right upper lobe, bronchoalveolar lavage, ThinPrep preparation (1) and cellblock:  - Negative for malignant cells.  - Benign ciliated columnar bronchial epithelial cells present in a background of mixed inflammation and blood.        1/9/2025 Imaging    CXR after Biopsy:  IMPRESSION:  1.. Subtle increased airspace consolidation in the right upper lobe may  represent a small amount of pulmonary hemorrhage post navigational  bronchoscopy and biopsy. There is no pneumothorax. No effusion present.    CT Angio Chest:  IMPRESSION:  1. 12 mm lesion with irregular margins again demonstrated in the right  upper lobe. There is a small amount  of air within the nodule suggesting  it has been successfully biopsied. There is a small amount of pulmonary  hemorrhage with airspace consolidation within the right upper lobe  including within the apical and posterior segment as was suspected on  plain film radiographs. There is no pneumothorax or effusion. There are  moderate changes of centrilobular emphysema.  2. Pneumomediastinum. This extends into the base of the right neck and  surrounds the trachea within the middle mediastinum. This could be  related to transbronchial biopsy of mediastinal nodes and correlation is  recommended to the procedure. A precarinal and right paratracheal node  are surrounded by air. No pneumothorax present. No evidence of pulmonary  thromboembolic disease.  3. The ascending thoracic aorta measures up to 3.7 cm in size. No  dissection or aneurysm. Coronary calcifications are present. There is no  pericardial effusion. Limited images of the upper abdomen are  unremarkable except for a small hiatal hernia. The adrenals are  unremarkable.  4. No evidence of pulmonary thromboembolic disease.     2/5/2025 Procedure    Appointment with Dr. Alfaro:  PLAN:   regarding the reason for the referral.  2.   regarding staging with brain MRI.  3.   regarding thoracic surgery evaluation for resection and mediastinal lymph node dissection.  If 1 A2 disease, no adjuvant chemotherapy.  If patient is medically inoperable, plan for definitive radiation without chemotherapy.  4.  Order brain MRI for staging lung cancer.  5.  To see Dr. Abreu 2/20/2025 to evaluate for resection of the right upper lobe lung cancer.  6.  Blood for CBC with differential and CMP next visit.  7.  Anticipate surveillance every 6 months with imaging for 3 years then annually thereafter patient has stage I A2 disease post resection.  8.Advance Care Planning  ACP discussion was declined by the patient. Patient does not have an advance directive, information  provided.     9.  Plan of care discussed with patient and spouse.  Understanding expressed.  Patient agreed to proceed.  10.  Continue care per primary care physician and other specialists.  11.  Return to office 3 weeks with CBC with differential and CMP, same day.  12.  Further recommendations pending.        2/20/2025 Procedure    Appointment with Dr. Abreu:  Mr. Pennington is a 69-year-old male who presents with incidental finding of right upper lobe lung cancer.  Nodule was found incidentally after CT scans were performed given his smoking history and history of prior stroke.  This showed a 1.3 cm right upper lobe lung nodule and has since undergone bronchoscopic biopsy with pathology consistent with adenocarcinoma.  He also had EBUS with biopsy of 4R level 7 and multiple 11 lymph nodes, these were negative for malignancy.  He has had PET scan shows hypermetabolic activity at the nodule of interest some borderline hypermetabolic activity at paratracheal and subcarinal nodes that were biopsied and negative for malignancy.  Patient has a history of stroke, uses a wheelchair most of the time and occasionally can get around his house with a walker, his stroke is left him with difficult mobility as well as slurred speech.  He has stopped smoking since his stroke.     I discussed with Mr. Pennington and his family member today the natural history of lung cancer as well as treatment options.  Given his comorbidities we discussed the option of SBRT which I believe is a better treatment option for him.  He does have a pending MRI of his brain to ensure no metastatic disease to his brain.  Given likely isolated disease I believe this would be his best treatment option as I believe recovery given his previous stroke after lobectomy would be very difficult.  Given this we will refer him to radiation oncology to discuss SBRT.  If not a candidate for SBRT can see him back to further discussed the risk benefits and expectations of  "surgical lobectomy.     2/26/2025 Imaging    MRI Brain     4/21/2025 Procedure    PFTs         PAST MEDICAL HISTORY:  ALLERGIES:  Allergies   Allergen Reactions    Aspirin Anaphylaxis and Other (See Comments)     Tightness in throat with high dose asa.  Can take the 81 mg without difficutly    Bee Venom Anaphylaxis     CURRENT MEDICATIONS:  Outpatient Encounter Medications as of 2/26/2025   Medication Sig Dispense Refill    albuterol sulfate  (90 Base) MCG/ACT inhaler Inhale 2 puffs Every 4 (Four) Hours As Needed for Wheezing. 17 g 5    Alcohol Swabs (DropSafe Alcohol Prep) 70 % pads       aspirin 81 MG chewable tablet Chew 1 tablet Daily.      atorvastatin (LIPITOR) 80 MG tablet Take 1 tablet by mouth Every Night.      BD Insulin Syringe U/F 31G X 5/16\" 0.5 ML misc USE TO INJECT INSULIN UP TO 3 TIMES DAILY AS DIRECTED      clopidogrel (PLAVIX) 75 MG tablet Take 1 tablet by mouth Daily for 180 days. 30 tablet 5    Continuous Glucose Sensor (Dexcom G7 Sensor) misc Currently off      HYDROcodone-acetaminophen (NORCO) 7.5-325 MG per tablet Take 1 tablet by mouth Every 6 (Six) Hours As Needed.      insulin glargine (LANTUS, SEMGLEE) 100 UNIT/ML injection Inject 40 Units under the skin into the appropriate area as directed Every Night.      Insulin Lispro (humaLOG) 100 UNIT/ML injection Inject 2-7 Units under the skin into the appropriate area as directed 3 (Three) Times a Day With Meals.      lisinopril (PRINIVIL,ZESTRIL) 40 MG tablet Take 1 tablet by mouth Daily. (Patient taking differently: Take 10 mg by mouth Daily.)      nitroglycerin (NITROSTAT) 0.4 MG SL tablet Place 1 tablet under the tongue Every 5 (Five) Minutes As Needed for Chest Pain. Take no more than 3 doses in 15 minutes. 100 tablet 12    True Metrix Blood Glucose Test test strip CHECK BLOOD SUGAR EVERY DAY      TRUEplus Lancets 33G misc       [DISCONTINUED] tiotropium bromide-olodaterol (STIOLTO RESPIMAT) 2.5-2.5 MCG/ACT aerosol solution inhaler " Inhale 2 puffs Daily. (Patient not taking: Reported on 2/20/2025) 3 each 3     Facility-Administered Encounter Medications as of 2/26/2025   Medication Dose Route Frequency Provider Last Rate Last Admin    [COMPLETED] Gadopiclenol (VUEWAY) injection 7 mL  7 mL Intravenous Once in imaging Urbano Alfaro MD   7 mL at 02/26/25 1216     ADULT ILLNESSES:  Patient Active Problem List   Diagnosis Code    Tobacco use Z72.0    Hypertension I10    Coronary artery disease involving native heart without angina pectoris I25.10    Hyperlipidemia with low HDL E78.5, E78.6    Class 1 obesity due to excess calories with body mass index (BMI) of 34.0 to 34.9 in adult E66.811, E66.09, Z68.34    S/P coronary artery stent placement Z95.5    History of MI (myocardial infarction) I25.2    Diabetes mellitus due to underlying condition with circulatory complication E08.59    Dizziness R42    Obesity (BMI 30-39.9) E66.9    Severe peripheral arterial disease I73.9    Acute focal neurological deficit R29.818    UTI (urinary tract infection), bacterial N39.0, A49.9    Acute ischemic stroke left  internal capsule lacunar and  right postcentral gyrus at the vertex. I63.512    Acute stroke due to ischemia I63.9    Vitamin B12 deficiency E53.8    Moderate tricompartmental osteoarthritis left knee with small knee joint  effusion. M17.12    Carotid stenosis, right I65.21    Claudication of both lower extremities I73.9    Degeneration of lumbar intervertebral disc M51.369    History of cellulitis Z87.2    Left basal ganglia embolic stroke I63.9    Memory difficulties R41.3    Non compliance w medication regimen Z91.148    Overweight (BMI 25.0-29.9) E66.3    Pulmonary nodule R91.1    Former smoker Z87.891    Panlobular emphysema J43.1    Adenocarcinoma, lung, right C34.91    Chronic dyspnea R06.09     SURGERIES:  Past Surgical History:   Procedure Laterality Date    AORTAGRAM Right 10/18/2024    Procedure: Right lower extremity angiogram, shockwave  lithotripsy, balloon angioplasty, mynx closure;  Surgeon: Slava Poole DO;  Location:  [facility] HYBRID OR;  Service: Vascular;  Laterality: Right;    BRONCHOSCOPY WITH ION ROBOTIC ASSIST N/A 1/9/2025    Procedure: BRONCHOSCOPY NAVIGATION WITH ENDOBRONCHIAL ULTRASOUND AND ION ROBOT;  Surgeon: Andrea Hess DO;  Location:  [facility] OR;  Service: Robotics - Pulmonary;  Laterality: N/A;  preop; lung nodule  postop; lung nodule   PCP [facility]    CARDIAC CATHETERIZATION N/A 03/28/2019    Procedure: Left Heart Cath;  Surgeon: Vincent Pan MD;  Location:  [facility] CATH INVASIVE LOCATION;  Service: Cardiovascular    CARDIAC CATHETERIZATION Bilateral 03/28/2019    Procedure: Stent JOSSIE coronary;  Surgeon: Vinecnt Pan MD;  Location:  [facility] CATH INVASIVE LOCATION;  Service: Cardiovascular    CARDIAC CATHETERIZATION N/A 03/28/2019    Procedure: Left ventriculography;  Surgeon: Vincent Pan MD;  Location:  [facility] CATH INVASIVE LOC[facility];  Service: Cardiovascular    CORONARY STENT PLACEMENT  2019    x3/widowmaker heart attack    TONSILLECTOMY       HEALTH MAINTENANCE ITEMS:  Health Maintenance Due   Topic Date Due    DIABETIC EYE EXAM  Never done    URINE MICROALBUMIN-CREATININE RATIO (uACR)  Never done    COLORECTAL CANCER SCREENING  Never done    ZOSTER VACCINE (1 of 2) Never done    HEPATITIS C SCREENING  Never done    ANNUAL WELLNESS VISIT  Never done    AAA SCREEN ONCE  Never done    COVID-19 Vaccine (3 - 2024-25 season) 09/01/2024       <no information>  Last Completed Colonoscopy       This patient has no relevant Health Maintenance data.          Immunization History   Administered Date(s) Administered    COVID-19 (MODERNA) 1st,2nd,3rd Dose Monovalent 03/22/2021, 04/21/2021    Tdap 02/07/2018, 04/22/2022     Last Completed Mammogram       This patient has no relevant Health Maintenance data.              FAMILY HISTORY:  Family History   Problem Relation Age of Onset    Valvular heart disease Mother      "Heart attack Mother     Heart disease Mother     Heart disease Brother     Heart attack Brother     Cancer Father     No Known Problems Brother      SOCIAL HISTORY:  Social History     Socioeconomic History    Marital status:    Tobacco Use    Smoking status: Former     Current packs/day: 0.00     Average packs/day: 2.0 packs/day for 55.5 years (111.0 ttl pk-yrs)     Types: Cigarettes     Start date:      Quit date: 7/10/2024     Years since quittin.6     Passive exposure: Past    Smokeless tobacco: Never   Vaping Use    Vaping status: Never Used   Substance and Sexual Activity    Alcohol use: Not Currently     Comment: quit     Drug use: Not Currently     Types: Marijuana    Sexual activity: Defer       REVIEW OF SYSTEMS:    Review of Systems   Constitutional:  Positive for fatigue. Negative for fever and unexpected weight change.   HENT:  Negative for congestion and facial swelling.    Eyes:  Negative for discharge and redness.   Respiratory:  Negative for shortness of breath and wheezing.    Cardiovascular:  Negative for chest pain and palpitations.   Gastrointestinal:  Negative for abdominal pain, nausea and vomiting.   Endocrine: Negative for cold intolerance and heat intolerance.   Genitourinary:  Negative for dysuria and hematuria.   Musculoskeletal:  Positive for gait problem.   Skin:  Negative for pallor.   Allergic/Immunologic: Negative for food allergies.   Neurological:  Negative for dizziness, speech difficulty and weakness.   Hematological:  Negative for adenopathy. Does not bruise/bleed easily.   Psychiatric/Behavioral:  Negative for agitation and confusion. The patient is not nervous/anxious.        VITAL SIGNS: /70   Pulse 95   Temp 96.9 °F (36.1 °C)   Resp 16   Ht 154.9 cm (60.98\")   Wt 68.2 kg (150 lb 6.4 oz)   SpO2 99%   BMI 28.44 kg/m²   Pain Score    25 1259   PainSc: 0-No pain       PHYSICAL EXAMINATION:     Physical Exam  Vitals reviewed. "   Constitutional:       Appearance: He is ill-appearing.      Comments: He arrived in the exam room in a wheelchair.    HENT:      Head: Normocephalic and atraumatic.   Eyes:      General: No scleral icterus.  Cardiovascular:      Rate and Rhythm: Normal rate.   Pulmonary:      Effort: No respiratory distress.      Breath sounds: No wheezing.   Abdominal:      General: Bowel sounds are normal.      Palpations: Abdomen is soft.      Tenderness: There is no abdominal tenderness.   Musculoskeletal:         General: No swelling.      Cervical back: Neck supple.   Skin:     Coloration: Skin is not pale.   Neurological:      Comments: Right sided weakness.    Psychiatric:         Mood and Affect: Mood normal.         Behavior: Behavior normal.         LABS    Lab Results - Last 18 Months   Lab Units 01/06/25  1427 10/04/24  1404 07/13/24  0407 07/12/24  0402 07/11/24  0539 07/10/24  1657   HEMOGLOBIN g/dL 14.5 13.7 13.8 14.3 14.0 17.3   HEMATOCRIT % 43.7 40.9 40.0 39.8 40.7 47.9   MCV fL 90.7 91.1 92.2 89.2 90.2 89.5   WBC 10*3/mm3 10.84* 12.30* 11.77* 11.37* 13.25* 12.89*   RDW % 13.2 12.9 12.2* 12.1* 12.1* 12.3   MPV fL 10.6 10.1 11.3 11.2 10.9 11.1   PLATELETS 10*3/mm3 250 298 202 205 218 224   IMM GRAN % %  --  0.5  --   --   --  0.4   NEUTROS ABS 10*3/mm3  --  7.39*  --   --   --  10.26*   LYMPHS ABS 10*3/mm3  --  3.65*  --   --   --  1.42   MONOS ABS 10*3/mm3  --  0.92*  --   --   --  1.04*   EOS ABS 10*3/mm3  --  0.21  --   --   --  0.06   BASOS ABS 10*3/mm3  --  0.07  --   --   --  0.06   IMMATURE GRANS (ABS) 10*3/mm3  --  0.06*  --   --   --  0.05   NRBC /100 WBC  --  0.0  --   --   --  0.0       Lab Results - Last 18 Months   Lab Units 01/06/25  1427 10/04/24  1404 07/15/24  0411 07/14/24  0512 07/13/24  0407 07/12/24  0402 07/11/24  0450 07/10/24  1657   GLUCOSE mg/dL 105* 104* 193* 184* 191* 254*   < > 466*   SODIUM mmol/L 139 139 135* 136 136 134*   < > 133*   POTASSIUM mmol/L 3.8 3.9 3.8 3.8 3.5 3.9   < >  "4.5   CO2 mmol/L 25.0 25.0 23.0 23.0 24.0 23.0   < > 23.0   CHLORIDE mmol/L 103 104 103 102 104 101   < > 96*   ANION GAP mmol/L 11.0 10.0 9.0 11.0 8.0 10.0   < > 14.0   CREATININE mg/dL 0.72* 0.52* 0.59* 0.53* 0.62* 0.50*   < > 0.67*   BUN mg/dL 12 14 19 11 11 9   < > 15   BUN / CREAT RATIO  16.7 26.9* 32.2* 20.8 17.7 18.0   < > 22.4   CALCIUM mg/dL 8.8 8.9 8.8 8.7 8.7 8.7   < > 9.1   ALK PHOS U/L  --   --   --   --   --   --   --  92   TOTAL PROTEIN g/dL  --   --   --   --   --   --   --  6.8   ALT (SGPT) U/L  --   --   --   --   --   --   --  5   AST (SGOT) U/L  --   --   --   --   --   --   --  13   BILIRUBIN mg/dL  --   --   --   --   --   --   --  0.5   ALBUMIN g/dL  --   --   --   --   --   --   --  3.8   GLOBULIN gm/dL  --   --   --   --   --   --   --  3.0    < > = values in this interval not displayed.       No results for input(s): \"MSPIKE\", \"KAPPALAMB\", \"IGLFLC\", \"URICACID\", \"FREEKAPPAL\", \"CEA\", \"LDH\", \"REFLABREPO\" in the last [id number] hours.    Lab Results - Last 18 Months   Lab Units 07/11/24  0450   TSH uIU/mL 0.888       Anival Aditi reports a pain score of 0.              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  Tumor size 1.3 x 1.2 cm.  AJCC stage:1A2 (cT1b, cN0, cM0)  Treatment status: Pending definitive radiation.   2.  Poor performance status of 3.  3.  Emphysema and cerebrovascular event times 2 contributing to his poor performance status.   4.  Peripheral artery disease on Plavix.   5.  Coronary artery disease on aspirin.            PLAN:   Re: Note from Dr. Hess 1/30/2025.  Pulmonary function testing when respiratory symptoms returned to baseline.  Note from Faviola Renner, MONTEZ 1/30/2025.  Pulmonary function test not performed due to patient's inability to complete maneuver, able to completely exhale.  Attempted x 5.  2.   Re: Note from Dr. Abreu 2/20/2025. Given his comorbidities we discussed the option of SBRT which I believe is a better treatment option " for him.   3.   Re: Brain MRI 2/26/2025.No metastatic disease identified.   There are multiple old bilateral lacunar infarcts.  Chronic paranasal sinus disease.  4.   Re: Patient is medically inoperable, plan for definitive radiation without chemotherapy.  5.   Re: Heme status. Latest WBC 9.8, hemoglobin 14.7 and platelet 272.  6.   Re: CMP. GFR latest 104 ml/min.  7.  Anticipate surveillance every 6 months with imaging for 3 years then annually thereafter patient has stage I A2 disease post radiation.  8. Advance Care Planning  ACP discussion was declined by the patient. Patient does not have an advance directive, information provided.  9.  Plan of care discussed with patient and  his spouse.  Understanding expressed.  The patient agreeable to proceed.  10.  Continue care per primary care physician and the other specialists.  11.  To see Dr. Shankar 2/27/2025.   12.  Return to office 3 months CBC with differential and CMP, same day.        I have reviewed the assessment and plan and verified the accuracy of it. No changes to assessment and plan since the information was documented. Urbano Alfaro MD 02/26/25         I spent 31 total minutes, face-to-face, caring for Anival shields. Greater than 50% of this time involved counseling and/or coordination of care as documented within this note.            (Roderick Hess MD)  (Bijan Shankar MD)  (Nilay Abreu MD)  Betty Mcguire MD  (Aurelia Dawn, APRN)

## 2025-02-20 ENCOUNTER — OFFICE VISIT (OUTPATIENT)
Dept: CARDIAC SURGERY | Facility: CLINIC | Age: 70
End: 2025-02-20
Payer: MEDICARE

## 2025-02-20 VITALS
BODY MASS INDEX: 28.7 KG/M2 | WEIGHT: 152 LBS | HEIGHT: 61 IN | SYSTOLIC BLOOD PRESSURE: 108 MMHG | DIASTOLIC BLOOD PRESSURE: 60 MMHG | OXYGEN SATURATION: 97 % | HEART RATE: 106 BPM

## 2025-02-20 DIAGNOSIS — C34.91 ADENOCARCINOMA, LUNG, RIGHT: Primary | ICD-10-CM

## 2025-02-20 PROCEDURE — 3078F DIAST BP <80 MM HG: CPT | Performed by: SURGERY

## 2025-02-20 PROCEDURE — 3074F SYST BP LT 130 MM HG: CPT | Performed by: SURGERY

## 2025-02-20 PROCEDURE — 1160F RVW MEDS BY RX/DR IN RCRD: CPT | Performed by: SURGERY

## 2025-02-20 PROCEDURE — 1159F MED LIST DOCD IN RCRD: CPT | Performed by: SURGERY

## 2025-02-20 PROCEDURE — 99204 OFFICE O/P NEW MOD 45 MIN: CPT | Performed by: SURGERY

## 2025-02-20 NOTE — LETTER
February 20, 2025     Betty Mcguire MD  [address] [place] [id number]    Patient: Anival Pennington   YOB: 1955   Date of Visit: 2/20/2025       Dear Betty Mcguire MD,    Anival Pennington was in my office today. Below are the relevant portions of my assessment and plan of care.    Mr. Pennington is a 69-year-old male who presents with incidental finding of right upper lobe lung cancer.  Nodule was found incidentally after CT scans were performed given his smoking history and history of prior stroke.  This showed a 1.3 cm right upper lobe lung nodule and has since undergone bronchoscopic biopsy with pathology consistent with adenocarcinoma.  He also had EBUS with biopsy of 4R level 7 and multiple 11 lymph nodes, these were negative for malignancy.  He has had PET scan shows hypermetabolic activity at the nodule of interest some borderline hypermetabolic activity at paratracheal and subcarinal nodes that were biopsied and negative for malignancy.  Patient has a history of stroke, uses a wheelchair most of the time and occasionally can get around his house with a walker, his stroke is left him with difficult mobility as well as slurred speech.  He has stopped smoking since his stroke.    I discussed with Mr. Pennington and his family member today the natural history of lung cancer as well as treatment options.  Given his comorbidities we discussed the option of SBRT which I believe is a better treatment option for him.  He does have a pending MRI of his brain to ensure no metastatic disease to his brain.  Given likely isolated disease I believe this would be his best treatment option as I believe recovery given his previous stroke after lobectomy would be very difficult.  Given this we will refer him to radiation oncology to discuss SBRT.  If not a candidate for SBRT can see him back to further discussed the risk benefits and expectations of surgical lobectomy.    Thank you for trusting with the care of   Aditi.  Please do not hesitate to call questions or concerns.         Sincerely,        Nilay Abreu MD        CC: Andrea Hess,

## 2025-02-20 NOTE — PROGRESS NOTES
Thoracic Surgery Consultation    Referring Physician: Dr. Jalil Hess    Primary Care Physician: Dr. Betty Mcguire    Chief Complaint   Patient presents with    Lung Cancer     New patient referred from Dr. Andrea Hess         Subjective     History of Present Illness  The patient presents for evaluation of a spot in his right lung. He is accompanied by his wife.    He was diagnosed with a pulmonary nodule in his right lung during a stroke workup. He has a history of two cerebrovascular accidents, the most recent occurring in July 2024, which resulted in persistent dysarthria. His blood glucose levels were elevated at that time, consistent with his known diagnosis of diabetes mellitus. He has a history of tobacco use but ceased smoking following his strokes. He has no history of thoracic or cardiac surgery but has undergone percutaneous coronary intervention with stent placement. He reports no history of carotid artery disease. He has a past medical history of leukemia. He has experienced significant weight loss, dropping from 150 pounds to 146 pounds, but has recently regained 4 pounds. His mobility is limited, requiring a walker for short distances and a wheelchair for longer distances.    SOCIAL HISTORY  He used to smoke but quit after his strokes in July 2024.      Review of Systems   Constitutional:  Positive for appetite change and fatigue.   Musculoskeletal:  Positive for gait problem.   Neurological:  Positive for speech difficulty and weakness.        A complete review of systems was performed, is negative except stated above.    Past Medical History:   Diagnosis Date    Acute ischemic left MCA stroke 07/11/2024    right sided weakness    Acute ST segment elevation myocardial infarction 04/03/2019    Cancer     leukemia    Cellulitis of face 05/16/2023    COPD (chronic obstructive pulmonary disease)     Coronary artery disease     Diabetes mellitus 09/06/2023    diabetes with circulatory  complications     Heavy smoker (more than 20 cigarettes per day) 07/24/2022    Hyperlipidemia     Hypertension     Stroke     x2 since    Tobacco abuse     Type 2 diabetes mellitus with circulatory disorder, without long-term current use of insulin 03/28/2019     Past Surgical History:   Procedure Laterality Date    AORTAGRAM Right 10/18/2024    Procedure: Right lower extremity angiogram, shockwave lithotripsy, balloon angioplasty, mynx closure;  Surgeon: Slava Poole DO;  Location:  [facility];  Service: Vascular;  Laterality: Right;    BRONCHOSCOPY WITH ION ROBOTIC ASSIST N/A 1/9/2025    Procedure: BRONCHOSCOPY NAVIGATION WITH ENDOBRONCHIAL ULTRASOUND AND ION ROBOT;  Surgeon: Andrea Hess DO;  Location:  [facility] OR;  Service: Robotics - Pulmonary;  Laterality: N/A;  preop; lung nodule  postop; lung nodule   PCP Betty Mcguire    CARDIAC CATHETERIZATION N/A 03/28/2019    Procedure: Left Heart Cath;  Surgeon: Vincent Pan MD;  Location:  [facility];  Service: Cardiovascular    CARDIAC CATHETERIZATION Bilateral 03/28/2019    Procedure: Stent JOSSIE coronary;  Surgeon: Vincent Pan MD;  Location:  [facility];  Service: Cardiovascular    CARDIAC CATHETERIZATION N/A 03/28/2019    Procedure: Left ventriculography;  Surgeon: Vincent Pan MD;  Location:  [facility];  Service: Cardiovascular    CORONARY STENT PLACEMENT  2019    x3/widowmaker heart attack    TONSILLECTOMY       Family History   Problem Relation Age of Onset    Valvular heart disease Mother     Heart attack Mother     Heart disease Mother     Heart disease Brother     Heart attack Brother     Cancer Father     No Known Problems Brother      Social History     Tobacco Use    Smoking status: Former     Current packs/day: 0.00     Average packs/day: 2.0 packs/day for 55.5 years (111.0 ttl pk-yrs)     Types: Cigarettes     Start date: 1969     Quit date: 7/10/2024     Years since  "quittin.6     Passive exposure: Past    Smokeless tobacco: Never   Vaping Use    Vaping status: Never Used   Substance Use Topics    Alcohol use: Not Currently     Comment: quit     Drug use: Not Currently     Types: Marijuana     Current Outpatient Medications   Medication Sig Dispense Refill    albuterol sulfate  (90 Base) MCG/ACT inhaler Inhale 2 puffs Every 4 (Four) Hours As Needed for Wheezing. 17 g 5    Alcohol Swabs (DropSafe Alcohol Prep) 70 % pads       aspirin 81 MG chewable tablet Chew 1 tablet Daily.      atorvastatin (LIPITOR) 80 MG tablet Take 1 tablet by mouth Every Night.      BD Insulin Syringe U/F 31G X \" 0.5 ML misc USE TO INJECT INSULIN UP TO 3 TIMES DAILY AS DIRECTED      clopidogrel (PLAVIX) 75 MG tablet Take 1 tablet by mouth Daily for 180 days. 30 tablet 5    Continuous Glucose Sensor (Dexcom G7 Sensor) misc Currently off      HYDROcodone-acetaminophen (NORCO) 7.5-325 MG per tablet Take 1 tablet by mouth Every 6 (Six) Hours As Needed.      insulin glargine (LANTUS, SEMGLEE) 100 UNIT/ML injection Inject 40 Units under the skin into the appropriate area as directed Every Night.      Insulin Lispro (humaLOG) 100 UNIT/ML injection Inject 2-7 Units under the skin into the appropriate area as directed 3 (Three) Times a Day With Meals.      lisinopril (PRINIVIL,ZESTRIL) 40 MG tablet Take 1 tablet by mouth Daily. (Patient taking differently: Take 10 mg by mouth Daily.)      nitroglycerin (NITROSTAT) 0.4 MG SL tablet Place 1 tablet under the tongue Every 5 (Five) Minutes As Needed for Chest Pain. Take no more than 3 doses in 15 minutes. 100 tablet 12    True Metrix Blood Glucose Test test strip CHECK BLOOD SUGAR EVERY DAY      TRUEplus Lancets 33G misc       tiotropium bromide-olodaterol (STIOLTO RESPIMAT) 2.5-2.5 MCG/ACT aerosol solution inhaler Inhale 2 puffs Daily. (Patient not taking: Reported on 2025) 3 each 3     No current facility-administered medications for this " "visit.     Allergies:  Aspirin and Bee venom    Objective      Vital Signs  Visit Vitals  /60   Pulse 106   Ht 154.9 cm (60.98\")   Wt 68.9 kg (152 lb)   SpO2 97%   BMI 28.74 kg/m²         Physical Exam  Constitutional:       General: He is not in acute distress.     Appearance: He is well-developed. He is not diaphoretic.      Comments: Disheveled, in wheel chair   HENT:      Head: Normocephalic and atraumatic.      Right Ear: External ear normal.      Left Ear: External ear normal.   Eyes:      General:         Right eye: No discharge.         Left eye: No discharge.      Pupils: Pupils are equal, round, and reactive to light.   Neck:      Vascular: No JVD.      Trachea: No tracheal deviation.   Cardiovascular:      Rate and Rhythm: Normal rate and regular rhythm.      Heart sounds: Normal heart sounds. No murmur heard.  Pulmonary:      Effort: Pulmonary effort is normal. No respiratory distress.      Breath sounds: Normal breath sounds. No stridor. No wheezing.   Abdominal:      General: There is no distension.      Palpations: Abdomen is soft.      Tenderness: There is no abdominal tenderness. There is no guarding.   Musculoskeletal:         General: No tenderness or deformity. Normal range of motion.      Cervical back: Normal range of motion and neck supple.   Skin:     General: Skin is warm and dry.      Capillary Refill: Capillary refill takes less than 2 seconds.      Coloration: Skin is not pale.      Findings: No erythema or rash.   Neurological:      Mental Status: He is alert and oriented to person, place, and time.      Motor: No abnormal muscle tone.      Coordination: Coordination normal.      Comments: Slurred speech   Psychiatric:         Behavior: Behavior normal.         Thought Content: Thought content normal.         Judgment: Judgment normal.        Physical Exam  Lungs were auscultated.    Results Review:     WBC   Date Value Ref Range Status   01/06/2025 10.84 (H) 3.40 - 10.80 10*3/mm3 " Final     RBC   Date Value Ref Range Status   01/06/2025 4.82 4.14 - 5.80 10*6/mm3 Final     Hemoglobin   Date Value Ref Range Status   01/06/2025 14.5 13.0 - 17.7 g/dL Final     Hematocrit   Date Value Ref Range Status   01/06/2025 43.7 37.5 - 51.0 % Final     MCV   Date Value Ref Range Status   01/06/2025 90.7 79.0 - 97.0 fL Final     MCH   Date Value Ref Range Status   01/06/2025 30.1 26.6 - 33.0 pg Final     MCHC   Date Value Ref Range Status   01/06/2025 33.2 31.5 - 35.7 g/dL Final     RDW   Date Value Ref Range Status   01/06/2025 13.2 12.3 - 15.4 % Final     RDW-SD   Date Value Ref Range Status   01/06/2025 43.8 37.0 - 54.0 fl Final     MPV   Date Value Ref Range Status   01/06/2025 10.6 6.0 - 12.0 fL Final     Platelets   Date Value Ref Range Status   01/06/2025 250 140 - 450 10*3/mm3 Final     Neutrophil %   Date Value Ref Range Status   10/04/2024 60.0 42.7 - 76.0 % Final     Lymphocyte %   Date Value Ref Range Status   10/04/2024 29.7 19.6 - 45.3 % Final     Monocyte %   Date Value Ref Range Status   10/04/2024 7.5 5.0 - 12.0 % Final     Eosinophil %   Date Value Ref Range Status   10/04/2024 1.7 0.3 - 6.2 % Final     Basophil %   Date Value Ref Range Status   10/04/2024 0.6 0.0 - 1.5 % Final     Immature Grans %   Date Value Ref Range Status   10/04/2024 0.5 0.0 - 0.5 % Final     Neutrophils, Absolute   Date Value Ref Range Status   10/04/2024 7.39 (H) 1.70 - 7.00 10*3/mm3 Final     Lymphocytes, Absolute   Date Value Ref Range Status   10/04/2024 3.65 (H) 0.70 - 3.10 10*3/mm3 Final     Monocytes, Absolute   Date Value Ref Range Status   10/04/2024 0.92 (H) 0.10 - 0.90 10*3/mm3 Final     Eosinophils, Absolute   Date Value Ref Range Status   10/04/2024 0.21 0.00 - 0.40 10*3/mm3 Final     Basophils, Absolute   Date Value Ref Range Status   10/04/2024 0.07 0.00 - 0.20 10*3/mm3 Final     Immature Grans, Absolute   Date Value Ref Range Status   10/04/2024 0.06 (H) 0.00 - 0.05 10*3/mm3 Final     nRBC   Date  Value Ref Range Status   10/04/2024 0.0 0.0 - 0.2 /100 WBC Final     Glucose   Date Value Ref Range Status   01/06/2025 105 (H) 65 - 99 mg/dL Final     Sodium   Date Value Ref Range Status   01/06/2025 139 136 - 145 mmol/L Final     Potassium   Date Value Ref Range Status   01/06/2025 3.8 3.5 - 5.2 mmol/L Final     CO2   Date Value Ref Range Status   01/06/2025 25.0 22.0 - 29.0 mmol/L Final     Chloride   Date Value Ref Range Status   01/06/2025 103 98 - 107 mmol/L Final     Anion Gap   Date Value Ref Range Status   01/06/2025 11.0 5.0 - 15.0 mmol/L Final     Creatinine   Date Value Ref Range Status   01/06/2025 0.72 (L) 0.76 - 1.27 mg/dL Final     BUN   Date Value Ref Range Status   01/06/2025 12 8 - 23 mg/dL Final     BUN/Creatinine Ratio   Date Value Ref Range Status   01/06/2025 16.7 7.0 - 25.0 Final     Calcium   Date Value Ref Range Status   01/06/2025 8.8 8.6 - 10.5 mg/dL Final     eGFR Non  Amer   Date Value Ref Range Status   03/29/2019 95 >60 mL/min/1.73 Final     Alkaline Phosphatase   Date Value Ref Range Status   07/10/2024 92 39 - 117 U/L Final     Total Protein   Date Value Ref Range Status   07/10/2024 6.8 6.0 - 8.5 g/dL Final     ALT (SGPT)   Date Value Ref Range Status   07/10/2024 5 1 - 41 U/L Final     AST (SGOT)   Date Value Ref Range Status   07/10/2024 13 1 - 40 U/L Final     Comment:     Slight hemolysis detected by analyzer. Result may be falsely elevated.     Total Bilirubin   Date Value Ref Range Status   07/10/2024 0.5 0.0 - 1.2 mg/dL Final     Albumin   Date Value Ref Range Status   07/10/2024 3.8 3.5 - 5.2 g/dL Final     Globulin   Date Value Ref Range Status   07/10/2024 3.0 gm/dL Final        I reviewed the patient's clinical results and discussed with patient.    Results  I personally reviewed CT scan of the chest the following is my interpretation:  13 mm spiculated right upper lobe lung nodule that is highly concerning for malignancy, there are spiculations no visual  retraction although it is in the central portion of the right upper lobe, I do not appreciate significantly enlarged adenopathy and no other nodules present that I am worried about.  He does have some mild heterogeneous emphysematous changes    I personally reviewed PET scan the following is my interpretation:  Hypermetabolic activity at the nodule of interest in the right upper lobe, there is very mild activity in paratracheal and subcarinal nodes but these have been biopsied and are negative for malignancy, there are some hypermetabolic activity at the area of some rib fractures on the right    ION Biopsy Path:  Final Diagnosis   1.  Lung, right upper lobe, Ion fine-needle aspiration, smear (1) and cellblock:  - A few atypical degenerated cells present, nondiagnostic of malignancy.  - Many macrophages and benign pneumocytes.  - Background blood.     2.  Lung, right upper lobe, Ion biopsy and touch preparation:  - Poorly differentiated non-small cell carcinoma consistent with adenocarcinoma.  - Crush artifact present.  - Stromal anthracotic pigment deposition.  - Blood.     Comment: It is doubtful that there is sufficient paraffin-embedded cellular material for biomarker studies.     3.  Station 11L lymph node, endobronchial ultrasound-guided fine-needle aspiration, smear (1) and ThinPrep preparation (1):  - Negative for malignant cells.  - Lymphocytes and benign bronchial epithelial cells     4.  Station 7 lymph node, endobronchial ultrasound-guided fine-needle aspiration, smear (1) and ThinPrep preparation (1):  - Negative for malignant cells.  - Lymphocytes and scant benign ciliated columnar bronchial epithelial cells and scant benign metaplastic squamous epithelial cells.     5.  Station 4R lymph node, endobronchial ultrasound-guided fine-needle aspiration, smear (1) and ThinPrep preparation (1):  - Negative for malignant cells.  - Numerous lymphocytes.     6.  Station 10R lymph node, endobronchial  ultrasound-guided fine-needle aspiration, smear (1) and ThinPrep preparation (1):  - Negative for malignant cells.  - Numerous lymphocytes.  - Scant benign ciliated columnar bronchial epithelial cells and scant benign bronchial goblet cells.     7.  Station 11R lymph node, endobronchial ultrasound-guided fine-needle aspiration, smear (1) and ThinPrep preparation (1):  - Negative for malignant cells.  - Numerous lymphocytes.  - Benign ciliated columnar bronchial epithelial cells.     8.  Lung, right upper lobe, bronchoalveolar lavage, ThinPrep preparation (1) and cellblock:  - Negative for malignant cells.  - Benign ciliated columnar bronchial epithelial cells present in a background of mixed inflammation and blood.       Assessment & Plan     Assessment & Plan    Mr. Pennington is a 69-year-old male who presents with incidental finding of right upper lobe lung cancer.  Nodule was found incidentally after CT scans were performed given his smoking history and history of prior stroke.  This showed a 1.3 cm right upper lobe lung nodule and has since undergone bronchoscopic biopsy with pathology consistent with adenocarcinoma.  He also had EBUS with biopsy of 4R level 7 and multiple 11 lymph nodes, these were negative for malignancy.  He has had PET scan shows hypermetabolic activity at the nodule of interest some borderline hypermetabolic activity at paratracheal and subcarinal nodes that were biopsied and negative for malignancy.  Patient has a history of stroke, uses a wheelchair most of the time and occasionally can get around his house with a walker, his stroke is left him with difficult mobility as well as slurred speech.  He has stopped smoking since his stroke.    I discussed with Mr. Pennington and his family member today the natural history of lung cancer as well as treatment options.  Given his comorbidities we discussed the option of SBRT which I believe is a better treatment option for him.  He does have a pending MRI  of his brain to ensure no metastatic disease to his brain.  Given likely isolated disease I believe this would be his best treatment option as I believe recovery given his previous stroke after lobectomy would be very difficult.  Given this we will refer him to radiation oncology to discuss SBRT.  If not a candidate for SBRT can see him back to further discussed the risk benefits and expectations of surgical lobectomy.    Thank you for trusting with the care of Mr. Pennington.  Please do not hesitate to call questions or concerns.      Nilay Abreu MD   Cardiothoracic Surgeon      Patient or patient representative verbalized consent for the use of Ambient Listening during the visit with  Nilay Abreu MD for chart documentation. 2/20/2025  11:10 CST

## 2025-02-24 NOTE — PROGRESS NOTES
[facility]  Radiation Oncology Clinic   Herbert Shankar MD, FACR  Ramonliat Bruce CLAUDIO  _______________________________________________  [facility]  Department of Radiation Oncology  [address] [place] [id number]  Office: [phone]  Fax: [phone]    DATE: 02/27/2025  PATIENT: Anival Pennington  1955                         MEDICAL RECORD #: [id number]    REASON FOR VISIT:    Chief Complaint   Patient presents with    Lung Cancer     1. Adenocarcinoma, lung, right    2. Former smoker                                        REASON FOR VISIT:    Chief Complaint   Patient presents with    Lung Cancer     Anival Pennington is a 69 y.o. male that has been referred to our clinic to be evaluated for consideration of radiotherapy to the lung.    HISTORY OF PRESENT ILLNESS:  11/19/2024 - CT Chest without contrast:  Increased size of RIGHT upper lobe spiculated suspicious pulmonary nodule measuring 12 x 10 mm, previously 9 x 8 mm on 7/11/2024 by my measurements today. Emphysematous changes present. Consider PET/CT or tissue sampling.  Mildly enlarged ascending thoracic aorta measures 3.8 cm.  Heavily calcified coronary arteries versus stent material.  Similar mild chronic height loss of T12 compared to 10/4/2024.    12/16/2024 - PET Scan:  12 mm spiculated hypermetabolic right upper lobe pulmonary nodule, most likely representing a primary lung neoplasm.  Indeterminant borderline enlarged mildly hypermetabolic right lower paratracheal/mediastinal and right hilar lymph nodes. Given size and degree of uptake, favor these to be infectious/inflammatory in nature although prem spread of neoplasm is also in the differential.  No evidence of hypermetabolic metastatic disease in the neck, abdomen, or pelvis.  Paranasal sinus disease in the right maxillary sinus characterized by complete sinus opacification with peripheral rim of hypermetabolic activity which is favored  infectious/inflammatory in nature.    12/18/2024 - Appointment with Andrea Hess DO:  Patient is a 69-year-old male who presents to clinic for right upper lobe pulmonary nodule.  I reviewed his CT and PET/CT with the patient and his wife in clinic today.  Based on the [place] nodule calculator his chance of malignancy is about 86%.  We discussed options regarding management.  After shared decision making the patient and his wife would like to pursue biopsy.  We discussed the risk and benefits of biopsy of the nodule and lymph nodes.  We will schedule him for Ion bronchoscopy and EBUS with biopsy.  Will also obtain PFTs on follow-up.  Hold off on inhaled therapy as the patient is asymptomatic.  Plan:   -Plan bronchoscopy with EBUS  -Will need a updated CT prior to Ion  -PFTs on follow-up  -Declines vaccinations  Follow Up:  6 weeks    01/06/2025 - CT Chest without contrast:  Spiculated 13 x 12 x 9 mm right upper lobe pulmonary nodule suspicious for malignancy nodule similar in size to 11/19/2024, however increased when compared to 7/11/2024.   Borderline precarinal mediastinal lymph node.   Moderate emphysema.    01/09/2025 - Bronchoscopy with biopsy per Andrea Hess, DO:  Lung, right upper lobe, Ion fine-needle aspiration, smear (1) and cellblock:  A few atypical degenerated cells present, nondiagnostic of malignancy.  Many macrophages and benign pneumocytes.  Background blood.  Lung, right upper lobe, Ion biopsy and touch preparation:  Poorly differentiated non-small cell carcinoma consistent with adenocarcinoma.  Crush artifact present.  Stromal anthracotic pigment deposition.  Blood.  Comment: It is doubtful that there is sufficient paraffin-embedded cellular material for biomarker studies.  Station 11L lymph node, endobronchial ultrasound-guided fine-needle aspiration, smear (1) and ThinPrep preparation (1):  Negative for malignant cells.  Lymphocytes and benign bronchial epithelial cells  Station  7 lymph node, endobronchial ultrasound-guided fine-needle aspiration, smear (1) and ThinPrep preparation (1):  Negative for malignant cells.  Lymphocytes and scant benign ciliated columnar bronchial epithelial cells and scant benign metaplastic squamous epithelial cells.  Station 4R lymph node, endobronchial ultrasound-guided fine-needle aspiration, smear (1) and ThinPrep preparation (1):  Negative for malignant cells.  Numerous lymphocytes.  Station 10R lymph node, endobronchial ultrasound-guided fine-needle aspiration, smear (1) and ThinPrep preparation (1):  Negative for malignant cells.  Numerous lymphocytes.  Scant benign ciliated columnar bronchial epithelial cells and scant benign bronchial goblet cells.  Station 11R lymph node, endobronchial ultrasound-guided fine-needle aspiration, smear (1) and ThinPrep preparation (1):  Negative for malignant cells.  Numerous lymphocytes.  Benign ciliated columnar bronchial epithelial cells.  Lung, right upper lobe, bronchoalveolar lavage, ThinPrep preparation (1) and cellblock:  Negative for malignant cells.  Benign ciliated columnar bronchial epithelial cells present in a background of mixed inflammation and blood.    01/09/2025 - Chest x-ray after Biopsy:  Subtle increased airspace consolidation in the right upper lobe may represent a small amount of pulmonary hemorrhage post navigational bronchoscopy and biopsy. There is no pneumothorax. No effusion present.    01/09/2025 - CT Angiogram Chest:  12 mm lesion with irregular margins again demonstrated in the right upper lobe. There is a small amount of air within the nodule suggesting it has been successfully biopsied. There is a small amount of pulmonary hemorrhage with airspace consolidation within the right upper lobe including within the apical and posterior segment as was suspected on plain film radiographs. There is no pneumothorax or effusion. There are moderate changes of centrilobular  emphysema.  Pneumomediastinum. This extends into the base of the right neck and surrounds the trachea within the middle mediastinum. This could be related to transbronchial biopsy of mediastinal nodes and correlation is recommended to the procedure. A precarinal and right paratracheal node are surrounded by air. No pneumothorax present. No evidence of pulmonary thromboembolic disease.  The ascending thoracic aorta measures up to 3.7 cm in size. No dissection or aneurysm. Coronary calcifications are present. There is no pericardial effusion. Limited images of the upper abdomen are unremarkable except for a small hiatal hernia. The adrenals are unremarkable.  No evidence of pulmonary thromboembolic disease.    01/30/2025 - Appointment with Andrea Hess DO:  Patient is a 69-year-old male with newly diagnosed right upper lobe adenocarcinoma.  He has seen Dr. Alfaro and will undergo brain MRI.  He is currently awaiting evaluation by Dr. Pan for possible surgical intervention.  We attempted to obtain PFTs, however the patient has noted cough recently.  Therefore I have reordered the PFTs once his cough improves over the next week or so he can return to clinic to obtain these.  They will be helpful for Dr. Abreu.  Will going start empiric treatment with Stiolto and he will continue as needed albuterol.  Plan:   -PFTs in the next week once his respiratory status returns to baseline  -Start Stiolto for suspected COPD  -Continue as needed albuterol  -Continue follow-up with Dr. Barbosa  Follow Up:  3 months    02/05/2025 - Appointment with Dr. Alfaro:  ASSESSMENT:  Adenocarcinoma right upper lobe of the lung.  Tumor size 1.3 x 1.2 cm.  AJCC stage: 1 A2 (cT1b, cN0, cM0)  Treatment status: Pending evaluation by thoracic surgery.   Poor performance status of 3.  Emphysema and cerebrovascular event times 2 contributing to his poor performance status.   Peripheral artery disease on Plavix.   Coronary artery disease  on aspirin.   PLAN:   regarding the reason for the referral.   regarding staging with brain MRI.   regarding thoracic surgery evaluation for resection and mediastinal lymph node dissection.  If 1 A2 disease, no adjuvant chemotherapy.  If patient is medically inoperable, plan for definitive radiation without chemotherapy.  Order brain MRI for staging lung cancer.  To see Dr. Abreu 2/20/2025 to evaluate for resection of the right upper lobe lung cancer.  Blood for CBC with differential and CMP next visit.  Anticipate surveillance every 6 months with imaging for 3 years then annually thereafter patient has stage I A2 disease post resection.  Advance Care Planning  ACP discussion was declined by the patient. Patient does not have an advance directive, information provided.   Plan of care discussed with patient and spouse.  Understanding expressed.  Patient agreed to proceed.  Continue care per primary care physician and other specialists.  Return to office 3 weeks with CBC with differential and CMP, same day.  Further recommendations pending.     02/20/2025 - Appointment with Dr. Abreu:  Mr. Pennington is a 69-year-old male who presents with incidental finding of right upper lobe lung cancer.  Nodule was found incidentally after CT scans were performed given his smoking history and history of prior stroke.  This showed a 1.3 cm right upper lobe lung nodule and has since undergone bronchoscopic biopsy with pathology consistent with adenocarcinoma.  He also had EBUS with biopsy of 4R level 7 and multiple 11 lymph nodes, these were negative for malignancy.    He has had PET scan shows hypermetabolic activity at the nodule of interest some borderline hypermetabolic activity at paratracheal and subcarinal nodes that were biopsied and negative for malignancy.  Patient has a history of stroke, uses a wheelchair most of the time and occasionally can get around his house with a walker, his stroke is left him with  difficult mobility as well as slurred speech.  He has stopped smoking since his stroke.  I discussed with Mr. Pennington and his family member today the natural history of lung cancer as well as treatment options.    Given his comorbidities we discussed the option of SBRT which I believe is a better treatment option for him.  He does have a pending MRI of his brain to ensure no metastatic disease to his brain.    Given likely isolated disease I believe this would be his best treatment option as I believe recovery given his previous stroke after lobectomy would be very difficult.    Given this we will refer him to radiation oncology to discuss SBRT.    If not a candidate for SBRT can see him back to further discussed the risk benefits and expectations of surgical lobectomy.    02/26/2025 - MRI Brain with and without contrast:  No metastatic disease identified.  There are multiple old bilateral lacunar infarcts.  Chronic paranasal sinus disease.     04/21/2025 - Pulmonary Function Tests:    History obtained from  PATIENT, FAMILY, and CHART    PAST MEDICAL HISTORY  Past Medical History:   Diagnosis Date    Acute ischemic left MCA stroke 07/11/2024    right sided weakness    Acute ST segment elevation myocardial infarction 04/03/2019    Cancer     leukemia    Cellulitis of face 05/16/2023    COPD (chronic obstructive pulmonary disease)     Coronary artery disease     Diabetes mellitus 09/06/2023    diabetes with circulatory complications     Heavy smoker (more than 20 cigarettes per day) 07/24/2022    Hyperlipidemia     Hypertension     Lung cancer     Stroke     x2 since    Tobacco abuse     Type 2 diabetes mellitus with circulatory disorder, without long-term current use of insulin 03/28/2019      PAST SURGICAL HISTORY  Past Surgical History:   Procedure Laterality Date    AORTAGRAM Right 10/18/2024    Procedure: Right lower extremity angiogram, shockwave lithotripsy, balloon angioplasty, mynx closure;  Surgeon: Virgilio  Slava HERNANDEZ DO;  Location:  [facility] HYBRID OR;  Service: Vascular;  Laterality: Right;    BRONCHOSCOPY WITH ION ROBOTIC ASSIST N/A 2025    Procedure: BRONCHOSCOPY NAVIGATION WITH ENDOBRONCHIAL ULTRASOUND AND ION ROBOT;  Surgeon: Andrea Hess DO;  Location:  [facility] OR;  Service: Robotics - Pulmonary;  Laterality: N/A;  preop; lung nodule  postop; lung nodule   [facility]    CARDIAC CATHETERIZATION N/A 2019    Procedure: Left Heart Cath;  Surgeon: Vincent Pan MD;  Location:  [facility] CATH INVASIVE LOCATION;  Service: Cardiovascular    CARDIAC CATHETERIZATION Bilateral 2019    Procedure: Stent JOSSIE coronary;  Surgeon: Vincent Pan MD;  Location:  [facility] CATH INVASIVE LOC[facility];  Service: Cardiovascular    CARDIAC CATHETERIZATION N/A 2019    Procedure: Left ventriculography;  Surgeon: Vincent Pan MD;  Location:  [facility] CATH INVASIVE LOC[facility];  Service: Cardiovascular    CORONARY STENT PLACEMENT  2019    x3/widowmaker heart attack    TONSILLECTOMY        FAMILY HISTORY  family history includes Cancer in his father; Heart attack in his brother and mother; Heart disease in his brother and mother; No Known Problems in his brother; Valvular heart disease in his mother.    SOCIAL HISTORY  Social History     Tobacco Use    Smoking status: Former     Current packs/day: 0.00     Average packs/day: 2.0 packs/day for 55.5 years (111.0 ttl pk-yrs)     Types: Cigarettes     Start date:      Quit date: 7/10/2024     Years since quittin.6     Passive exposure: Past    Smokeless tobacco: Never   Vaping Use    Vaping status: Never Used   Substance Use Topics    Alcohol use: Not Currently     Comment: quit     Drug use: Not Currently     Types: Marijuana     ALLERGIES  Aspirin and Bee venom     MEDICATIONS    Current Outpatient Medications:     albuterol sulfate  (90 Base) MCG/ACT inhaler, Inhale 2 puffs Every 4 (Four) Hours As Needed for Wheezing., Disp: 17 g, Rfl: 5    Alcohol  "Swabs (DropSafe Alcohol Prep) 70 % pads, , Disp: , Rfl:     aspirin 81 MG chewable tablet, Chew 1 tablet Daily., Disp: , Rfl:     atorvastatin (LIPITOR) 80 MG tablet, Take 1 tablet by mouth Every Night., Disp: , Rfl:     BD Insulin Syringe U/F 31G X 5/16\" 0.5 ML misc, USE TO INJECT INSULIN UP TO 3 TIMES DAILY AS DIRECTED, Disp: , Rfl:     clopidogrel (PLAVIX) 75 MG tablet, Take 1 tablet by mouth Daily for 180 days., Disp: 30 tablet, Rfl: 5    Continuous Glucose Sensor (Dexcom G7 Sensor) misc, Currently off, Disp: , Rfl:     HYDROcodone-acetaminophen (NORCO) 7.5-325 MG per tablet, Take 1 tablet by mouth Every 6 (Six) Hours As Needed., Disp: , Rfl:     insulin glargine (LANTUS, SEMGLEE) 100 UNIT/ML injection, Inject 40 Units under the skin into the appropriate area as directed Every Night., Disp: , Rfl:     Insulin Lispro (humaLOG) 100 UNIT/ML injection, Inject 2-7 Units under the skin into the appropriate area as directed 3 (Three) Times a Day With Meals., Disp: , Rfl:     lisinopril (PRINIVIL,ZESTRIL) 40 MG tablet, Take 1 tablet by mouth Daily. (Patient taking differently: Take 10 mg by mouth Daily.), Disp: , Rfl:     nitroglycerin (NITROSTAT) 0.4 MG SL tablet, Place 1 tablet under the tongue Every 5 (Five) Minutes As Needed for Chest Pain. Take no more than 3 doses in 15 minutes., Disp: 100 tablet, Rfl: 12    True Metrix Blood Glucose Test test strip, CHECK BLOOD SUGAR EVERY DAY, Disp: , Rfl:     TRUEplus Lancets 33G misc, , Disp: , Rfl:   No current facility-administered medications for this visit.    The following portions of the patient's history were reviewed and updated as appropriate: allergies, current medications, past family history, past medical history, past social history, past surgical history and problem list.    REVIEW OF SYSTEMS  Review of Systems   Constitutional:  Positive for fatigue.   HENT:  Negative.     Eyes: Negative.    Respiratory: Negative.     Cardiovascular: Negative.  " "  Gastrointestinal: Negative.    Endocrine: Negative.    Genitourinary: Negative.     Musculoskeletal: Negative.    Skin: Negative.    Neurological: Negative.    Hematological: Negative.    Psychiatric/Behavioral: Negative.       Implant: None     PHYSICAL EXAM  VITAL SIGNS:   Vitals:    02/27/25 0941   BP: 90/64   Pulse: 107   SpO2: 99%  Comment: room air   Weight: 68 kg (150 lb)   Height: 152.4 cm (60\")   PainSc: 0-No pain     Physical Exam  Vitals and nursing note reviewed.   Constitutional:       Appearance: He is normal weight.      Comments: Patient is wheelchair-bound   HENT:      Head: Normocephalic.   Musculoskeletal:      Cervical back: Normal range of motion.   Lymphadenopathy:      Cervical: No cervical adenopathy.      Upper Body:      Right upper body: No supraclavicular or axillary adenopathy.      Left upper body: No supraclavicular or axillary adenopathy.   Neurological:      Mental Status: He is alert. He is lethargic.      Cranial Nerves: Cranial nerves 2-12 are intact.      Sensory: Sensation is intact.      Gait: Gait abnormal.   Psychiatric:         Attention and Perception: Attention normal.         Mood and Affect: Affect is blunt.         Speech: Speech normal.         Behavior: Behavior is slowed. Behavior is cooperative.         Cognition and Memory: Cognition is impaired. Memory is impaired.         Judgment: Judgment normal.        Performance Status: ECOG (3) Capable of limited self-care, confined to bed or chair > 50% of waking hours    Clinical Quality Measures  - Pain Documented by Standardized Tool, FPS Anival Pennington reports a pain score of 0.  Given his pain assessment as noted, treatment options were discussed and the following options were decided upon as a follow-up plan to address the patient's pain:  None .  Pain Medications               aspirin 81 MG chewable tablet Chew 1 tablet Daily.    HYDROcodone-acetaminophen (NORCO) 7.5-325 MG per tablet Take 1 tablet by mouth Every 6 " (Six) Hours As Needed.          - Body Mass Index Screening and Follow-Up Plan Body mass index is 29.29 kg/m².     - Tobacco Use: Screening and Cessation Intervention  Social History    Tobacco Use      Smoking status: Former        Packs/day: 0.00        Years: 2.0 packs/day for 55.5 years (111.0 ttl pk-yrs)        Types: Cigarettes        Start date:         Quit date: 7/10/2024        Years since quittin.6        Passive exposure: Past      Smokeless tobacco: Never    - Advanced Care Planning Advance Care Planning  ACP discussion was held with the patient during this visit. Patient does not have an advance directive, information provided.    - PHQ-2 Depression Screening:  Little interest or pleasure in doing things? Several days   Feeling down, depressed, or hopeless? Not at all   PHQ-2 Total Score 1     ASSESSMENT AND PLAN  1. Adenocarcinoma, lung, right    2. Former smoker      No orders of the defined types were placed in this encounter.    RECOMMENDATIONS: Anival Pennington was diagnosed with Adenocarcinoma right upper lobe of the lung. Tumor size 1.3 x 1.2 cm. AJCC stage: 1 A2 (cT1b, cN0, cM0)    The indications and rationale of lung stereotactic/external beam radiation therapy according to the NCCN Guidelines has been discussed today. I have extensively reviewed the risks, benefits and alternatives of therapy with this diagnosis. The risks of radiation therapy includes but is not limited to radiation induced pulmonary fibrosis, progression of disease in spite of therapy with either local or systemic failure. I have seen, examined and reviewed this patient's medication list, appropriate labs and imaging studies as well as other physician notes. We discussed the goals and plans of care with the patient and family and answered all questions.     Pulmonary function tests have been reviewed and demonstrates a *** L, *** % of predicted      Following this discussion and in consideration of the diagnostic  data/evaluation of the patient, I recommended a course of stereotactic radiosurgery to the right lung, I anticipate 6224-0105 cGy over 3-5 treatments. Will simulate treatment fields today, 3D CT with MIPS to begin the planning process, final course pending. . Continue ongoing management per primary care physician and other specialists.     Thank you for allowing me to assist in this patients care.     Return for CT Simulation - TBD.     Time Spent: I spent 45 minutes caring for Anival on this date of service. This time includes time spent by me in the following activities: preparing for the visit, reviewing tests, obtaining and/or reviewing a separately obtained history, performing a medically appropriate examination and/or evaluation, counseling and educating the patient/family/caregiver, ordering medications, tests, or procedures, documenting information in the medical record, independently interpreting results and communicating that information with the patient/family/caregiver, and care coordination.   Annamarie Quezada LPN  02/27/2025

## 2025-02-26 ENCOUNTER — APPOINTMENT (OUTPATIENT)
Dept: LAB | Facility: HOSPITAL | Age: 70
End: 2025-02-26
Payer: MEDICARE

## 2025-02-26 ENCOUNTER — OFFICE VISIT (OUTPATIENT)
Dept: ONCOLOGY | Facility: CLINIC | Age: 70
End: 2025-02-26
Payer: MEDICARE

## 2025-02-26 ENCOUNTER — HOSPITAL ENCOUNTER (OUTPATIENT)
Dept: RADIATION ONCOLOGY | Facility: HOSPITAL | Age: 70
Setting detail: RADIATION/ONCOLOGY SERIES
End: 2025-02-26
Payer: MEDICARE

## 2025-02-26 ENCOUNTER — HOSPITAL ENCOUNTER (OUTPATIENT)
Dept: MRI IMAGING | Facility: HOSPITAL | Age: 70
Discharge: HOME | End: 2025-02-26
Admitting: INTERNAL MEDICINE
Payer: MEDICARE

## 2025-02-26 VITALS
RESPIRATION RATE: 16 BRPM | TEMPERATURE: 96.9 F | HEART RATE: 95 BPM | OXYGEN SATURATION: 99 % | WEIGHT: 150.4 LBS | SYSTOLIC BLOOD PRESSURE: 124 MMHG | DIASTOLIC BLOOD PRESSURE: 70 MMHG | HEIGHT: 61 IN | BODY MASS INDEX: 28.4 KG/M2

## 2025-02-26 DIAGNOSIS — C34.11 MALIGNANT NEOPLASM OF UPPER LOBE OF RIGHT LUNG: ICD-10-CM

## 2025-02-26 DIAGNOSIS — C34.91 ADENOCARCINOMA, LUNG, RIGHT: Primary | ICD-10-CM

## 2025-02-26 PROCEDURE — 70553 MRI BRAIN STEM W/O & W/DYE: CPT

## 2025-02-26 PROCEDURE — A9579 GAD-BASE MR CONTRAST NOS,1ML: HCPCS | Performed by: INTERNAL MEDICINE

## 2025-02-26 PROCEDURE — 25510000001 GADOPICLENOL 0.5 MMOL/ML SOLUTION: Performed by: INTERNAL MEDICINE

## 2025-02-26 RX ADMIN — GADOPICLENOL 7 ML: 485.1 INJECTION INTRAVENOUS at 12:16

## 2025-02-27 ENCOUNTER — CONSULT (OUTPATIENT)
Age: 70
End: 2025-02-27
Payer: MEDICARE

## 2025-02-27 VITALS
SYSTOLIC BLOOD PRESSURE: 90 MMHG | HEART RATE: 107 BPM | BODY MASS INDEX: 29.45 KG/M2 | WEIGHT: 150 LBS | OXYGEN SATURATION: 99 % | HEIGHT: 60 IN | DIASTOLIC BLOOD PRESSURE: 64 MMHG

## 2025-02-27 DIAGNOSIS — C34.91 ADENOCARCINOMA, LUNG, RIGHT: Primary | ICD-10-CM

## 2025-02-27 DIAGNOSIS — Z87.891 FORMER SMOKER: ICD-10-CM

## 2025-02-27 PROCEDURE — G0463 HOSPITAL OUTPT CLINIC VISIT: HCPCS | Performed by: RADIOLOGY

## 2025-02-28 ENCOUNTER — TELEPHONE (OUTPATIENT)
Dept: ONCOLOGY | Facility: CLINIC | Age: 70
End: 2025-02-28
Payer: MEDICARE

## 2025-02-28 NOTE — TELEPHONE ENCOUNTER
----- Message from Urbano Angelina sent at 2/26/2025  1:02 PM CST -----  No metastatic disease identified.  2.  There are multiple old bilateral lacunar infarcts.  3.  Chronic paranasal sinus disease.

## 2025-03-05 ENCOUNTER — HOSPITAL ENCOUNTER (OUTPATIENT)
Dept: RADIATION ONCOLOGY | Facility: HOSPITAL | Age: 70
Setting detail: RADIATION/ONCOLOGY SERIES
End: 2025-03-05
Payer: MEDICARE

## 2025-03-05 PROCEDURE — 77470 SPECIAL RADIATION TREATMENT: CPT | Performed by: RADIOLOGY

## 2025-03-05 PROCEDURE — 77263 THER RADIOLOGY TX PLNG CPLX: CPT | Performed by: RADIOLOGY

## 2025-03-06 ENCOUNTER — HOSPITAL ENCOUNTER (OUTPATIENT)
Dept: RADIATION ONCOLOGY | Facility: HOSPITAL | Age: 70
Discharge: HOME | End: 2025-03-06

## 2025-03-06 PROCEDURE — 77334 RADIATION TREATMENT AID(S): CPT | Performed by: RADIOLOGY

## 2025-03-11 PROCEDURE — 77300 RADIATION THERAPY DOSE PLAN: CPT | Performed by: RADIOLOGY

## 2025-03-11 PROCEDURE — 77301 RADIOTHERAPY DOSE PLAN IMRT: CPT | Performed by: RADIOLOGY

## 2025-03-11 PROCEDURE — 77338 DESIGN MLC DEVICE FOR IMRT: CPT | Performed by: RADIOLOGY

## 2025-03-11 PROCEDURE — 77293 RESPIRATOR MOTION MGMT SIMUL: CPT | Performed by: RADIOLOGY

## 2025-03-20 ENCOUNTER — HOSPITAL ENCOUNTER (OUTPATIENT)
Dept: RADIATION ONCOLOGY | Facility: HOSPITAL | Age: 70
Discharge: HOME | End: 2025-03-20

## 2025-03-20 VITALS — OXYGEN SATURATION: 98 % | HEART RATE: 94 BPM | DIASTOLIC BLOOD PRESSURE: 61 MMHG | SYSTOLIC BLOOD PRESSURE: 118 MMHG

## 2025-03-20 LAB
RAD ONC ARIA COURSE ID: NORMAL
RAD ONC ARIA COURSE INTENT: NORMAL
RAD ONC ARIA COURSE LAST TREATMENT DATE: NORMAL
RAD ONC ARIA COURSE START DATE: NORMAL
RAD ONC ARIA COURSE TREATMENT ELAPSED DAYS: 0
RAD ONC ARIA FIRST TREATMENT DATE: NORMAL
RAD ONC ARIA PLAN FRACTIONS TREATED TO DATE: 1
RAD ONC ARIA PLAN ID: NORMAL
RAD ONC ARIA PLAN PRESCRIBED DOSE PER FRACTION: 12.5 GY
RAD ONC ARIA PLAN PRIMARY REFERENCE POINT: NORMAL
RAD ONC ARIA PLAN TOTAL FRACTIONS PRESCRIBED: 4
RAD ONC ARIA PLAN TOTAL PRESCRIBED DOSE: 5000 CGY
RAD ONC ARIA REFERENCE POINT DOSAGE GIVEN TO DATE: 12.5 GY
RAD ONC ARIA REFERENCE POINT ID: NORMAL
RAD ONC ARIA REFERENCE POINT SESSION DOSAGE GIVEN: 12.5 GY

## 2025-03-20 PROCEDURE — 77373 STRTCTC BDY RAD THER TX DLVR: CPT | Performed by: RADIOLOGY

## 2025-03-20 PROCEDURE — 77435 SBRT MANAGEMENT: CPT | Performed by: RADIOLOGY

## 2025-03-25 ENCOUNTER — APPOINTMENT (OUTPATIENT)
Dept: RADIATION ONCOLOGY | Facility: HOSPITAL | Age: 70
End: 2025-03-25
Payer: MEDICARE

## 2025-03-26 ENCOUNTER — TELEPHONE (OUTPATIENT)
Age: 70
End: 2025-03-26

## 2025-03-26 NOTE — TELEPHONE ENCOUNTER
Called and spoke with patient's wife. Got patient scheduled with IZA Rossi 04/03/2025 at 11:00 AM per her request. Informed her that they will need to get the xrays on a disk and bring them to the appointment. She stated understanding and appreciation. Nothing further at this time.

## 2025-03-27 ENCOUNTER — APPOINTMENT (OUTPATIENT)
Dept: RADIATION ONCOLOGY | Facility: HOSPITAL | Age: 70
End: 2025-03-27
Payer: MEDICARE

## 2025-04-01 ENCOUNTER — TELEPHONE (OUTPATIENT)
Dept: NEUROLOGY | Facility: CLINIC | Age: 70
End: 2025-04-01
Payer: MEDICARE

## 2025-04-01 NOTE — PROGRESS NOTES
Orthopaedic Clinic Note-New Patient    NAME:  Carlos Huston   : 1955  MRN: [id number]      2025     CHIEF COMPLAINT:  Left knee pain    HISTORY OF PRESENT ILLNESS:   Carlos is a 69 y.o. male who presents to the office for evaluation and treatment of the left knee.   Pain began on 3/20/2025.  The patient had a mechanical fall.  He presented to [facility] where x-rays showed a patella dislocation.  This was reduced.  He does not have a copy of his images but did bring his results noting moderate to severe tricompartmental osteoarthritis and signs of patellar dislocation.  They gave him an immobilizer at the ER but it was too large for him so he switched that for a smaller brace that he has been wearing.  He has pain with weightbearing.  He also has swelling.    Past Medical History:    History reviewed. No pertinent past medical history.    Past Surgical History:    History reviewed. No pertinent surgical history.    Current Medications:   Prior to Admission medications    Medication Sig Start Date End Date Taking? Authorizing Provider   albuterol sulfate HFA (PROVENTIL;VENTOLIN;PROAIR) 108 (90 Base) MCG/ACT inhaler Inhale 2 puffs into the lungs every 4 hours as needed 25  Yes eRbekah Cheung MD   aspirin 81 MG chewable tablet Take 1 tablet by mouth daily   Yes Rebekah Cheung MD   atorvastatin (LIPITOR) 40 MG tablet TAKE 2 TABLETS BY MOUTH ONCE DAILY AT NIGHT FOR  CHOLESTEROL  AND  TO  PREVENT  ANOTHER  STROKE 2/10/25  Yes Rebekah Cheung MD   clopidogrel (PLAVIX) 75 MG tablet Take 1 tablet by mouth daily 24 Yes Rebekah Cheung MD   Continuous Glucose Sensor (DEXCOM G7 SENSOR) MISC 1 APPLICATOR SEE ADMINISTRATION INSTRUCTIONS, CHANGE SENSOR EVERY 10 DAYS 25  Yes Rebekah Cheung MD   HYDROcodone-acetaminophen (NORCO) 7.5-325 MG per tablet Take 1 tablet by mouth every 6 hours as needed. 24  Yes Rebekah Cheung MD   insulin glargine (LANTUS)

## 2025-04-02 ENCOUNTER — OFFICE VISIT (OUTPATIENT)
Age: 70
End: 2025-04-02
Payer: MEDICARE

## 2025-04-02 VITALS — HEIGHT: 63 IN | BODY MASS INDEX: 23.92 KG/M2 | WEIGHT: 135 LBS

## 2025-04-02 DIAGNOSIS — S83.005A PATELLAR DISLOCATION, LEFT, INITIAL ENCOUNTER: Primary | ICD-10-CM

## 2025-04-02 PROCEDURE — 99203 OFFICE O/P NEW LOW 30 MIN: CPT | Performed by: PHYSICIAN ASSISTANT

## 2025-04-02 PROCEDURE — 1159F MED LIST DOCD IN RCRD: CPT | Performed by: PHYSICIAN ASSISTANT

## 2025-04-02 PROCEDURE — G8420 CALC BMI NORM PARAMETERS: HCPCS | Performed by: PHYSICIAN ASSISTANT

## 2025-04-02 PROCEDURE — 3017F COLORECTAL CA SCREEN DOC REV: CPT | Performed by: PHYSICIAN ASSISTANT

## 2025-04-02 PROCEDURE — G8427 DOCREV CUR MEDS BY ELIG CLIN: HCPCS | Performed by: PHYSICIAN ASSISTANT

## 2025-04-02 PROCEDURE — 1123F ACP DISCUSS/DSCN MKR DOCD: CPT | Performed by: PHYSICIAN ASSISTANT

## 2025-04-02 PROCEDURE — 4004F PT TOBACCO SCREEN RCVD TLK: CPT | Performed by: PHYSICIAN ASSISTANT

## 2025-04-02 RX ORDER — HYDROCODONE BITARTRATE AND ACETAMINOPHEN 7.5; 325 MG/1; MG/1
1 TABLET ORAL EVERY 6 HOURS PRN
COMMUNITY
Start: 2024-12-04

## 2025-04-02 RX ORDER — INSULIN LISPRO 100 [IU]/ML
2-7 INJECTION, SOLUTION INTRAVENOUS; SUBCUTANEOUS
COMMUNITY
Start: 2024-07-15

## 2025-04-02 RX ORDER — ACYCLOVIR 400 MG/1
TABLET ORAL
COMMUNITY
Start: 2025-02-14

## 2025-04-02 RX ORDER — NITROGLYCERIN 0.4 MG/1
TABLET SUBLINGUAL
COMMUNITY
Start: 2024-08-16

## 2025-04-02 RX ORDER — ASPIRIN 81 MG/1
81 TABLET, CHEWABLE ORAL DAILY
COMMUNITY

## 2025-04-02 RX ORDER — INSULIN GLARGINE 100 [IU]/ML
40 INJECTION, SOLUTION SUBCUTANEOUS NIGHTLY
COMMUNITY
Start: 2024-07-15

## 2025-04-02 RX ORDER — CLOPIDOGREL BISULFATE 75 MG/1
75 TABLET ORAL DAILY
COMMUNITY
Start: 2024-11-04 | End: 2025-05-04

## 2025-04-02 RX ORDER — PEN NEEDLE, DIABETIC 32GX 5/32"
NEEDLE, DISPOSABLE MISCELLANEOUS
COMMUNITY
Start: 2025-02-23

## 2025-04-02 RX ORDER — LISINOPRIL 40 MG/1
1 TABLET ORAL DAILY
COMMUNITY
Start: 2024-07-16

## 2025-04-02 RX ORDER — ATORVASTATIN CALCIUM 40 MG/1
TABLET, FILM COATED ORAL
COMMUNITY
Start: 2025-02-10

## 2025-04-02 RX ORDER — ALBUTEROL SULFATE 90 UG/1
2 INHALANT RESPIRATORY (INHALATION) EVERY 4 HOURS PRN
COMMUNITY
Start: 2025-01-30

## 2025-04-03 ENCOUNTER — TELEPHONE (OUTPATIENT)
Age: 70
End: 2025-04-03

## 2025-04-03 ENCOUNTER — OFFICE VISIT (OUTPATIENT)
Dept: NEUROLOGY | Facility: CLINIC | Age: 70
End: 2025-04-03
Payer: MEDICARE

## 2025-04-03 VITALS
BODY MASS INDEX: 29.45 KG/M2 | HEART RATE: 90 BPM | HEIGHT: 60 IN | SYSTOLIC BLOOD PRESSURE: 118 MMHG | DIASTOLIC BLOOD PRESSURE: 80 MMHG | OXYGEN SATURATION: 98 % | WEIGHT: 150 LBS

## 2025-04-03 DIAGNOSIS — Z79.4 TYPE 2 DIABETES MELLITUS WITH OTHER NEUROLOGIC COMPLICATION, WITH LONG-TERM CURRENT USE OF INSULIN: ICD-10-CM

## 2025-04-03 DIAGNOSIS — I65.21 CAROTID STENOSIS, RIGHT: ICD-10-CM

## 2025-04-03 DIAGNOSIS — E11.49 TYPE 2 DIABETES MELLITUS WITH OTHER NEUROLOGIC COMPLICATION, WITH LONG-TERM CURRENT USE OF INSULIN: ICD-10-CM

## 2025-04-03 DIAGNOSIS — Z72.0 TOBACCO ABUSE: ICD-10-CM

## 2025-04-03 DIAGNOSIS — I69.30 HISTORY OF STROKE WITH RESIDUAL DEFICIT: Primary | ICD-10-CM

## 2025-04-03 DIAGNOSIS — R47.1 DYSARTHRIA: ICD-10-CM

## 2025-04-03 RX ORDER — PEN NEEDLE, DIABETIC 32GX 5/32"
NEEDLE, DISPOSABLE MISCELLANEOUS
COMMUNITY
Start: 2025-02-23

## 2025-04-03 NOTE — TELEPHONE ENCOUNTER
Called patient and let him know that he can come into the clinic and we can try a larger knee brace on him. Patient stated he could be here in about 30 minutes.

## 2025-04-03 NOTE — PROGRESS NOTES
Neurology Consult Note    [facility]  [address], [address]  [place] [address]  Phone: [phone]  Fax: [phone]    Referring Provider:   No ref. provider found  Primary Care Provider:  Betty Mcguire MD    Reason for Consult:  History of stroke   Subjective      Anival Pennington presents to [facility]    History of Present Illness  69-year-old male seen for follow-up of stroke.  Last seen in clinic by CLAUDIO Dickinson on 9/17/2024.  Review that records show she recommended patient to continue aspirin and atorvastatin.  Also recommended control of modifiable risk factors.  Patient had residual deficits of dysarthric speech and right-sided weakness.  Suboptimal effort was noted on exam.    Today patient presents with his spouse.  He is using a wheelchair.  He tells me he has been compliant to his medications.  He is on aspirin and Plavix as well as atorvastatin.  Plavix is for peripheral artery disease.  No new stroke symptoms.  He does continue with some dysarthric speech.  Also notes some weakness.  Denies any dysphagia.  Denies any vision loss.  Unfortunately he has started smoking again.  About a month ago he began smoking less than a pack a day.    Since being seen last, he has been diagnosed with lung cancer.  Patient Active Problem List   Diagnosis    Tobacco use    Hypertension    Coronary artery disease involving native heart without angina pectoris    Hyperlipidemia with low HDL    Class 1 obesity due to excess calories with body mass index (BMI) of 34.0 to 34.9 in adult    S/P coronary artery stent placement    History of MI (myocardial infarction)    Diabetes mellitus due to underlying condition with circulatory complication    Dizziness    Obesity (BMI 30-39.9)    Severe peripheral arterial disease    Acute focal neurological deficit    UTI (urinary tract infection), bacterial    Acute ischemic stroke left  internal capsule lacunar and  right  postcentral gyrus at the vertex.    Acute stroke due to ischemia    Vitamin B12 deficiency    Moderate tricompartmental osteoarthritis left knee with small knee joint  effusion.    Carotid stenosis, right    Claudication of both lower extremities    Degeneration of lumbar intervertebral disc    History of cellulitis    Left basal ganglia embolic stroke    Memory difficulties    Non compliance w medication regimen    Overweight (BMI 25.0-29.9)    Pulmonary nodule    Former smoker    Panlobular emphysema    Adenocarcinoma, lung, right    Chronic dyspnea        Past Medical History:   Diagnosis Date    Acute ischemic left MCA stroke 07/11/2024    right sided weakness    Acute ST segment elevation myocardial infarction 04/03/2019    Cancer     leukemia    Cellulitis of face 05/16/2023    COPD (chronic obstructive pulmonary disease)     Coronary artery disease     Diabetes mellitus 09/06/2023    diabetes with circulatory complications     Heavy smoker (more than 20 cigarettes per day) 07/24/2022    Hyperlipidemia     Hypertension     Lung cancer     Stroke     x2 since    Tobacco abuse     Type 2 diabetes mellitus with circulatory disorder, without long-term current use of insulin 03/28/2019        Social History     Socioeconomic History    Marital status:    Tobacco Use    Smoking status: Every Day     Current packs/day: 1.00     Average packs/day: 2.0 packs/day for 55.6 years (111.1 ttl pk-yrs)     Types: Cigarettes     Start date: 1969     Last attempt to quit: 7/10/2024     Passive exposure: Past    Smokeless tobacco: Never   Vaping Use    Vaping status: Never Used   Substance and Sexual Activity    Alcohol use: Not Currently     Comment: quit 2010    Drug use: Not Currently     Types: Marijuana    Sexual activity: Defer        Allergies   Allergen Reactions    Aspirin Anaphylaxis and Other (See Comments)     Tightness in throat with high dose asa.  Can take the 81 mg without difficutly    Bee Venom  "Anaphylaxis          Current Outpatient Medications:     albuterol sulfate  (90 Base) MCG/ACT inhaler, Inhale 2 puffs Every 4 (Four) Hours As Needed for Wheezing., Disp: 17 g, Rfl: 5    Alcohol Swabs (DropSafe Alcohol Prep) 70 % pads, , Disp: , Rfl:     aspirin 81 MG chewable tablet, Chew 1 tablet Daily., Disp: , Rfl:     atorvastatin (LIPITOR) 80 MG tablet, Take 1 tablet by mouth Every Night., Disp: , Rfl:     BD Insulin Syringe U/F 31G X 5/16\" 0.5 ML misc, USE TO INJECT INSULIN UP TO 3 TIMES DAILY AS DIRECTED, Disp: , Rfl:     BD Pen Needle Toma 2nd Gen 32G X 4 MM misc, USE 4 TIMES DAILY WITH INSULIN PENS, Disp: , Rfl:     clopidogrel (PLAVIX) 75 MG tablet, Take 1 tablet by mouth Daily for 180 days., Disp: 30 tablet, Rfl: 5    Continuous Glucose Sensor (Dexcom G7 Sensor) misc, Currently off, Disp: , Rfl:     HYDROcodone-acetaminophen (NORCO) 7.5-325 MG per tablet, Take 1 tablet by mouth Every 6 (Six) Hours As Needed., Disp: , Rfl:     insulin glargine (LANTUS, SEMGLEE) 100 UNIT/ML injection, Inject 40 Units under the skin into the appropriate area as directed Every Night., Disp: , Rfl:     Insulin Lispro (humaLOG) 100 UNIT/ML injection, Inject 2-7 Units under the skin into the appropriate area as directed 3 (Three) Times a Day With Meals., Disp: , Rfl:     nitroglycerin (NITROSTAT) 0.4 MG SL tablet, Place 1 tablet under the tongue Every 5 (Five) Minutes As Needed for Chest Pain. Take no more than 3 doses in 15 minutes., Disp: 100 tablet, Rfl: 12    True Metrix Blood Glucose Test test strip, CHECK BLOOD SUGAR EVERY DAY, Disp: , Rfl:     TRUEplus Lancets 33G misc, , Disp: , Rfl:     lisinopril (PRINIVIL,ZESTRIL) 40 MG tablet, Take 1 tablet by mouth Daily. (Patient taking differently: Take 10 mg by mouth Daily.), Disp: , Rfl:        Objective   Vital Signs:   /80   Pulse 90   Ht 152.4 cm (60\")   Wt 68 kg (150 lb)   SpO2 98%   BMI 29.29 kg/m²       Physical Exam  Vitals and nursing note reviewed. "   Constitutional:       Appearance: Normal appearance.   HENT:      Head: Normocephalic.   Eyes:      General: Lids are normal.      Extraocular Movements: Extraocular movements intact.      Pupils: Pupils are equal, round, and reactive to light.   Pulmonary:      Effort: Pulmonary effort is normal. No respiratory distress.   Skin:     General: Skin is warm and dry.   Neurological:      Mental Status: He is alert.      Cranial Nerves: Dysarthria present.      Motor: Motor strength is normal.     Deep Tendon Reflexes: Reflexes are normal and symmetric.        Neurological Exam  Mental Status  Alert. Oriented to person, place, time and situation. Mild dysarthria present. Language is fluent with no aphasia.    Cranial Nerves  CN II: Visual fields full to confrontation.  CN III, IV, VI: Extraocular movements intact bilaterally. Normal lids and orbits bilaterally. Pupils equal round and reactive to light bilaterally.  CN V: Facial sensation is normal.  CN VII: Full and symmetric facial movement. No appreciable facial weakness, patient would not smile.  CN IX, X: Palate elevates symmetrically. Normal gag reflex.  CN XI: Shoulder shrug strength is normal.  CN XII: Tongue deviates to the right.    Motor   Strength is 5/5 throughout all four extremities.  Suboptimal effort with lower extremity strength testing.    Sensory  Light touch is normal in upper and lower extremities.     Reflexes  Deep tendon reflexes are 2+ and symmetric in all four extremities.    Coordination  Right: Finger-to-nose normal.Left: Finger-to-nose normal.    Gait    Not assessed due to left knee pain.  Patient reports knee injury a couple weeks ago..      Result Review :   The following data was reviewed by: CLAUDIO Paez on 04/03/2025:  CMP          7/15/2024    04:11 10/4/2024    14:04 1/6/2025    14:27   CMP   Glucose 193  104  105    BUN 19  14  12    Creatinine 0.59  0.52  0.72    EGFR 106.3  109.8  99.5    Sodium 135  139  139     Potassium 3.8  3.9  3.8    Chloride 103  104  103    Calcium 8.8  8.9  8.8    BUN/Creatinine Ratio 32.2  26.9  16.7    Anion Gap 9.0  10.0  11.0      CBC w/diff          7/13/2024    04:07 10/4/2024    14:04 1/6/2025    14:27   CBC w/Diff   WBC 11.77  12.30  10.84    RBC 4.34  4.49  4.82    Hemoglobin 13.8  13.7  14.5    Hematocrit 40.0  40.9  43.7    MCV 92.2  91.1  90.7    MCH 31.8  30.5  30.1    MCHC 34.5  33.5  33.2    RDW 12.2  12.9  13.2    Platelets 202  298  250    Neutrophil Rel %  60.0     Immature Granulocyte Rel %  0.5     Lymphocyte Rel %  29.7     Monocyte Rel %  7.5     Eosinophil Rel %  1.7     Basophil Rel %  0.6       Lipid Panel          7/11/2024    04:50   Lipid Panel   Total Cholesterol 104    Triglycerides 175    HDL Cholesterol 37    VLDL Cholesterol 29    LDL Cholesterol  38    LDL/HDL Ratio 0.86      TSH          7/11/2024    04:50   TSH   TSH 0.888    HEMOGLOBIN A1C (02/12/2025 15:39 EST)       Hemoglobin A1c (07/11/2024 04:50)          Consults by Madina Neal APRN (07/11/2024 10:12)  Progress Notes by Madina Neal APRN (07/12/2024 10:50)  Progress Notes by Aurelia Urbano APRN (09/17/2024 13:00)               Impression:  Anival Pennington is a 69 y.o. male who presents for follow-up of stroke.  Etiology behind patient's stroke is small vessel disease.  This is likely exacerbated by hypertension, hyperlipidemia and heavy tobacco use.  No further workup indicated for stroke.  He is close is 1 year luz.  At this point I recommend continuing secondary prevention through primary care.  Patient may follow-up our clinic as needed.    Diagnoses and all orders for this visit:    1. History of stroke with residual deficit (Primary)    2. Dysarthria    3. Carotid stenosis, right    4. Tobacco abuse    5. Type 2 diabetes mellitus with other neurologic complication, with long-term current use of insulin        Plan:  Continue aspirin 81 mg daily for antiplatelet and stroke  prevention  Continue clopidogrel 75 mg daily for antiplatelet and peripheral artery disease through vascular surgery  Continue atorvastatin 80 mg nightly for hyperlipidemia and stroke prevention  LDL goal less than 70  A1c goal less than 6.5  BP goal less than 130/80  Recommend Mediterranean-style diet  Increase activities as tolerated  Return to emergency room for any new stroke symptoms.  Reviewed be-fast.  Recommended tobacco cessation.  Follow-up with PCP as scheduled  Follow-up with vascular surgery as scheduled  Follow-up with other specialist as scheduled  Follow-up in my clinic as needed    The patient and I have discussed the plan of care and he is in full agreement at this time.     Follow Up   Return if symptoms worsen or fail to improve, for Stroke/TIA.            Zhao Davila, CLAUDIO  04/03/25  13:25 CDT

## 2025-04-03 NOTE — TELEPHONE ENCOUNTER
Patient's wife came into clinic and exchanged brace for a bigger one for patient. Nothing further is needed at this time.

## 2025-04-09 ENCOUNTER — TELEPHONE (OUTPATIENT)
Age: 70
End: 2025-04-09

## 2025-04-16 ENCOUNTER — HOSPITAL ENCOUNTER (OUTPATIENT)
Dept: RADIATION ONCOLOGY | Facility: HOSPITAL | Age: 70
Discharge: HOME | End: 2025-04-16

## 2025-04-16 ENCOUNTER — HOSPITAL ENCOUNTER (OUTPATIENT)
Dept: RADIATION ONCOLOGY | Facility: HOSPITAL | Age: 70
Setting detail: RADIATION/ONCOLOGY SERIES
End: 2025-04-16
Payer: MEDICARE

## 2025-04-16 VITALS — DIASTOLIC BLOOD PRESSURE: 75 MMHG | OXYGEN SATURATION: 99 % | SYSTOLIC BLOOD PRESSURE: 113 MMHG | HEART RATE: 105 BPM

## 2025-04-16 LAB
RAD ONC ARIA COURSE ID: NORMAL
RAD ONC ARIA COURSE INTENT: NORMAL
RAD ONC ARIA COURSE LAST TREATMENT DATE: NORMAL
RAD ONC ARIA COURSE START DATE: NORMAL
RAD ONC ARIA COURSE TREATMENT ELAPSED DAYS: 27
RAD ONC ARIA FIRST TREATMENT DATE: NORMAL
RAD ONC ARIA PLAN FRACTIONS TREATED TO DATE: 2
RAD ONC ARIA PLAN ID: NORMAL
RAD ONC ARIA PLAN PRESCRIBED DOSE PER FRACTION: 12.5 GY
RAD ONC ARIA PLAN PRIMARY REFERENCE POINT: NORMAL
RAD ONC ARIA PLAN TOTAL FRACTIONS PRESCRIBED: 4
RAD ONC ARIA PLAN TOTAL PRESCRIBED DOSE: 5000 CGY
RAD ONC ARIA REFERENCE POINT DOSAGE GIVEN TO DATE: 25 GY
RAD ONC ARIA REFERENCE POINT ID: NORMAL
RAD ONC ARIA REFERENCE POINT SESSION DOSAGE GIVEN: 12.5 GY

## 2025-04-16 PROCEDURE — 77373 STRTCTC BDY RAD THER TX DLVR: CPT | Performed by: RADIOLOGY

## 2025-04-17 ENCOUNTER — HOSPITAL ENCOUNTER (OUTPATIENT)
Dept: RADIATION ONCOLOGY | Facility: HOSPITAL | Age: 70
Discharge: HOME | End: 2025-04-17

## 2025-04-17 VITALS — OXYGEN SATURATION: 96 % | SYSTOLIC BLOOD PRESSURE: 103 MMHG | DIASTOLIC BLOOD PRESSURE: 64 MMHG | HEART RATE: 104 BPM

## 2025-04-17 LAB
RAD ONC ARIA COURSE ID: NORMAL
RAD ONC ARIA COURSE INTENT: NORMAL
RAD ONC ARIA COURSE LAST TREATMENT DATE: NORMAL
RAD ONC ARIA COURSE START DATE: NORMAL
RAD ONC ARIA COURSE TREATMENT ELAPSED DAYS: 28
RAD ONC ARIA FIRST TREATMENT DATE: NORMAL
RAD ONC ARIA PLAN FRACTIONS TREATED TO DATE: 3
RAD ONC ARIA PLAN ID: NORMAL
RAD ONC ARIA PLAN PRESCRIBED DOSE PER FRACTION: 12.5 GY
RAD ONC ARIA PLAN PRIMARY REFERENCE POINT: NORMAL
RAD ONC ARIA PLAN TOTAL FRACTIONS PRESCRIBED: 4
RAD ONC ARIA PLAN TOTAL PRESCRIBED DOSE: 5000 CGY
RAD ONC ARIA REFERENCE POINT DOSAGE GIVEN TO DATE: 37.5 GY
RAD ONC ARIA REFERENCE POINT ID: NORMAL
RAD ONC ARIA REFERENCE POINT SESSION DOSAGE GIVEN: 12.5 GY

## 2025-04-17 PROCEDURE — 77373 STRTCTC BDY RAD THER TX DLVR: CPT | Performed by: RADIOLOGY

## 2025-04-18 ENCOUNTER — HOSPITAL ENCOUNTER (OUTPATIENT)
Dept: RADIATION ONCOLOGY | Facility: HOSPITAL | Age: 70
Discharge: HOME | End: 2025-04-18

## 2025-04-18 VITALS — OXYGEN SATURATION: 99 % | DIASTOLIC BLOOD PRESSURE: 86 MMHG | SYSTOLIC BLOOD PRESSURE: 111 MMHG | HEART RATE: 99 BPM

## 2025-04-18 DIAGNOSIS — Z87.891 FORMER SMOKER: ICD-10-CM

## 2025-04-18 DIAGNOSIS — C34.91 ADENOCARCINOMA, LUNG, RIGHT: Primary | ICD-10-CM

## 2025-04-18 LAB
RAD ONC ARIA COURSE ID: NORMAL
RAD ONC ARIA COURSE INTENT: NORMAL
RAD ONC ARIA COURSE LAST TREATMENT DATE: NORMAL
RAD ONC ARIA COURSE START DATE: NORMAL
RAD ONC ARIA COURSE TREATMENT ELAPSED DAYS: 29
RAD ONC ARIA FIRST TREATMENT DATE: NORMAL
RAD ONC ARIA PLAN FRACTIONS TREATED TO DATE: 4
RAD ONC ARIA PLAN ID: NORMAL
RAD ONC ARIA PLAN PRESCRIBED DOSE PER FRACTION: 12.5 GY
RAD ONC ARIA PLAN PRIMARY REFERENCE POINT: NORMAL
RAD ONC ARIA PLAN TOTAL FRACTIONS PRESCRIBED: 4
RAD ONC ARIA PLAN TOTAL PRESCRIBED DOSE: 5000 CGY
RAD ONC ARIA REFERENCE POINT DOSAGE GIVEN TO DATE: 50 GY
RAD ONC ARIA REFERENCE POINT ID: NORMAL
RAD ONC ARIA REFERENCE POINT SESSION DOSAGE GIVEN: 12.5 GY

## 2025-04-18 PROCEDURE — 77336 RADIATION PHYSICS CONSULT: CPT | Performed by: RADIOLOGY

## 2025-04-18 PROCEDURE — 77373 STRTCTC BDY RAD THER TX DLVR: CPT | Performed by: RADIOLOGY

## 2025-04-21 ENCOUNTER — OFFICE VISIT (OUTPATIENT)
Dept: PULMONOLOGY | Facility: CLINIC | Age: 70
End: 2025-04-21
Payer: MEDICARE

## 2025-04-21 VITALS
HEART RATE: 105 BPM | DIASTOLIC BLOOD PRESSURE: 70 MMHG | WEIGHT: 156 LBS | HEIGHT: 60 IN | OXYGEN SATURATION: 99 % | RESPIRATION RATE: 18 BRPM | BODY MASS INDEX: 30.63 KG/M2 | SYSTOLIC BLOOD PRESSURE: 90 MMHG

## 2025-04-21 DIAGNOSIS — R06.09 CHRONIC DYSPNEA: ICD-10-CM

## 2025-04-21 DIAGNOSIS — C34.91 ADENOCARCINOMA, LUNG, RIGHT: Primary | ICD-10-CM

## 2025-04-21 LAB
RAD ONC ARIA COURSE END DATE: NORMAL
RAD ONC ARIA COURSE ID: NORMAL
RAD ONC ARIA COURSE INTENT: NORMAL
RAD ONC ARIA COURSE LAST TREATMENT DATE: NORMAL
RAD ONC ARIA COURSE START DATE: NORMAL
RAD ONC ARIA COURSE TREATMENT ELAPSED DAYS: 29
RAD ONC ARIA FIRST TREATMENT DATE: NORMAL
RAD ONC ARIA PLAN FRACTIONS TREATED TO DATE: 4
RAD ONC ARIA PLAN ID: NORMAL
RAD ONC ARIA PLAN NAME: NORMAL
RAD ONC ARIA PLAN PRESCRIBED DOSE PER FRACTION: 12.5 GY
RAD ONC ARIA PLAN PRIMARY REFERENCE POINT: NORMAL
RAD ONC ARIA PLAN TOTAL FRACTIONS PRESCRIBED: 4
RAD ONC ARIA PLAN TOTAL PRESCRIBED DOSE: 5000 CGY
RAD ONC ARIA REFERENCE POINT DOSAGE GIVEN TO DATE: 50 GY
RAD ONC ARIA REFERENCE POINT ID: NORMAL

## 2025-04-21 NOTE — PROGRESS NOTES
PFT not performed due to patient's inability to complete maneuver due to unable to completely exhale.  Attempted x 3.  Patient aware no results will be available.

## 2025-05-02 ENCOUNTER — TELEPHONE (OUTPATIENT)
Dept: VASCULAR SURGERY | Facility: CLINIC | Age: 70
End: 2025-05-02
Payer: MEDICARE

## 2025-05-02 NOTE — TELEPHONE ENCOUNTER
CALLED PATIENT, SPOKE WITH WIFE I LET HER KNOW THAT PATIENT WAS SUPPOSED TO BE SEEN IN OUR OFFICE WITH [facility] ON MONDAY 5/5 BUT PATIENT NO SHOWED TESTING TODAY. SHE STATED SHE WAS NOT AWARE. I LET HER KNOW I WOULD CANCEL THE APPOINTMENT WITH [facility] AND TRANSFER HER TO SCHEDULING, ONCE SCHEDULED I WILL GIVE THEM A CALL BACK WITH AN APPOINTMENT TO BE SEEN IN OFFICE TO FOLLOW UP AFTER TESTING COMPLETED. SHE VOICED UNDERSTANDING.     CALL WAS TRANSFERRED TO SCHEDULING.

## 2025-05-20 ENCOUNTER — HOSPITAL ENCOUNTER (OUTPATIENT)
Dept: ULTRASOUND IMAGING | Facility: HOSPITAL | Age: 70
Discharge: HOME | End: 2025-05-20
Payer: MEDICARE

## 2025-05-20 DIAGNOSIS — I65.23 BILATERAL CAROTID ARTERY STENOSIS: ICD-10-CM

## 2025-05-20 DIAGNOSIS — I73.9 PAD (PERIPHERAL ARTERY DISEASE): ICD-10-CM

## 2025-05-20 PROCEDURE — 93923 UPR/LXTR ART STDY 3+ LVLS: CPT

## 2025-05-20 PROCEDURE — 93880 EXTRACRANIAL BILAT STUDY: CPT

## 2025-05-23 ENCOUNTER — HOSPITAL ENCOUNTER (OUTPATIENT)
Dept: CT IMAGING | Facility: HOSPITAL | Age: 70
Discharge: HOME | End: 2025-05-23
Payer: MEDICARE

## 2025-05-23 DIAGNOSIS — C34.91 ADENOCARCINOMA, LUNG, RIGHT: ICD-10-CM

## 2025-05-23 DIAGNOSIS — Z87.891 FORMER SMOKER: ICD-10-CM

## 2025-05-23 PROCEDURE — 71250 CT THORAX DX C-: CPT

## 2025-05-27 ENCOUNTER — TELEPHONE (OUTPATIENT)
Age: 70
End: 2025-05-27
Payer: MEDICARE

## 2025-05-27 DIAGNOSIS — R93.6 ABNORMAL X-RAY OF HUMERUS: Primary | ICD-10-CM

## 2025-05-27 NOTE — TELEPHONE ENCOUNTER
Called patient to let him know that Ramon SNYDER had ordered a xray of his right humerus. Stated that he could come in earlier to his appointment on 06/02 and have it done prior. He verbalized understanding.

## 2025-05-28 ENCOUNTER — TELEPHONE (OUTPATIENT)
Dept: ONCOLOGY | Facility: CLINIC | Age: 70
End: 2025-05-28
Payer: MEDICARE

## 2025-05-28 NOTE — TELEPHONE ENCOUNTER
Caller: DEVORAH LUCERO - WIFE    Relationship to patient: Emergency Contact    Best call back number: [phone]    Chief complaint: PT CANCELLED APPTS. FOR 5/29/25 & DID NOT R/S     Type of visit: LAB & F/U 2    Requested date: WILL CALL BACK TO R/S     If rescheduling, when is the original appointment: 5/29/25

## 2025-05-29 ENCOUNTER — OFFICE VISIT (OUTPATIENT)
Dept: VASCULAR SURGERY | Facility: CLINIC | Age: 70
End: 2025-05-29
Payer: MEDICARE

## 2025-05-29 VITALS
WEIGHT: 156 LBS | SYSTOLIC BLOOD PRESSURE: 102 MMHG | HEIGHT: 60 IN | HEART RATE: 58 BPM | OXYGEN SATURATION: 93 % | BODY MASS INDEX: 30.63 KG/M2 | DIASTOLIC BLOOD PRESSURE: 64 MMHG

## 2025-05-29 DIAGNOSIS — E08.59 DIABETES MELLITUS DUE TO UNDERLYING CONDITION WITH OTHER CIRCULATORY COMPLICATION, UNSPECIFIED WHETHER LONG TERM INSULIN USE: ICD-10-CM

## 2025-05-29 DIAGNOSIS — I10 ESSENTIAL HYPERTENSION: ICD-10-CM

## 2025-05-29 DIAGNOSIS — E66.811 CLASS 1 OBESITY WITH SERIOUS COMORBIDITY AND BODY MASS INDEX (BMI) OF 30.0 TO 30.9 IN ADULT, UNSPECIFIED OBESITY TYPE: ICD-10-CM

## 2025-05-29 DIAGNOSIS — I73.9 PAD (PERIPHERAL ARTERY DISEASE): Primary | ICD-10-CM

## 2025-05-29 DIAGNOSIS — E78.2 MIXED HYPERLIPIDEMIA: ICD-10-CM

## 2025-05-29 DIAGNOSIS — I65.23 BILATERAL CAROTID ARTERY STENOSIS: ICD-10-CM

## 2025-05-29 PROBLEM — E66.09 CLASS 1 OBESITY DUE TO EXCESS CALORIES WITH BODY MASS INDEX (BMI) OF 34.0 TO 34.9 IN ADULT: Status: RESOLVED | Noted: 2023-10-09 | Resolved: 2025-05-29

## 2025-05-29 PROBLEM — S83.005A PATELLAR DISLOCATION, LEFT, INITIAL ENCOUNTER: Status: ACTIVE | Noted: 2025-04-02

## 2025-05-29 PROBLEM — F17.200 SMOKER: Status: ACTIVE | Noted: 2022-07-24

## 2025-05-29 PROBLEM — Z92.3 HISTORY OF RADIATION THERAPY: Status: ACTIVE | Noted: 2025-05-29

## 2025-05-29 RX ORDER — CLOPIDOGREL BISULFATE 75 MG/1
1 TABLET ORAL DAILY
COMMUNITY
Start: 2025-05-06

## 2025-05-29 RX ORDER — CALCIUM CITRATE/VITAMIN D3 200MG-6.25
1 TABLET ORAL DAILY
COMMUNITY
Start: 2025-05-14

## 2025-05-29 RX ORDER — LISINOPRIL 5 MG/1
5 TABLET ORAL 2 TIMES DAILY
COMMUNITY
Start: 2025-05-18

## 2025-05-29 NOTE — PROGRESS NOTES
"5/29/2025        Betty Mcguire MD  [address] [place] [id number]      Anival Pennington  1955    Chief Complaint   Patient presents with    AD (peripheral artery disease)    Bilateral carotid artery stenosi     Testing done 5/20/2025,no complaints or changes. States no changes in meds but have no list       Dear Dr. Betty Mcguire:      HPI  I had the pleasure of seeing your patient Anival Pennington in the office today.   As you recall, Anival Pennington is a 69 y.o.  male who you are currently following for routine health maintenance.  He did undergo right lower extremity angiogram on 10/18/2024.  He has a history of CVA, with residual right-sided weakness and the dysarthria.  He is seen today in a wheelchair.  He did have a dislocated left patellar.  He stated that his right leg gave out and he fell which caused the injury.  He is maintained on aspirin, Lipitor, and Plavix.      Review of Systems   Constitutional:  Negative for diaphoresis and fever.   HENT: Negative.     Eyes: Negative.    Respiratory: Negative.  Negative for shortness of breath and wheezing.    Cardiovascular: Negative.  Negative for chest pain and leg swelling.   Gastrointestinal: Negative.  Negative for abdominal pain.   Endocrine: Negative.    Genitourinary: Negative.    Musculoskeletal: Negative.    Skin: Negative.    Allergic/Immunologic: Negative.    Neurological:  Positive for speech difficulty and weakness (Right sided weakness). Negative for dizziness.   Hematological: Negative.    Psychiatric/Behavioral: Negative.       /64   Pulse 58   Ht 152.4 cm (60\")   Wt 70.8 kg (156 lb)   SpO2 93%   BMI 30.47 kg/m²     Physical Exam  Vitals and nursing note reviewed.   Constitutional:       General: He is not in acute distress.     Appearance: Normal appearance. He is obese. He is not diaphoretic.   HENT:      Head: Normocephalic. No right periorbital erythema or left periorbital erythema.      Nose: Nose normal.   Eyes:      General: " No scleral icterus.     Pupils: Pupils are equal.   Cardiovascular:      Rate and Rhythm: Normal rate and regular rhythm.      Pulses: Normal pulses.           Dorsalis pedis pulses are 2+ on the right side and 2+ on the left side.        Posterior tibial pulses are 2+ on the right side and 2+ on the left side.      Heart sounds: Normal heart sounds. No murmur heard.  Pulmonary:      Effort: Pulmonary effort is normal. No respiratory distress.      Breath sounds: Normal breath sounds.   Abdominal:      General: Bowel sounds are normal. There is no distension.      Palpations: Abdomen is soft.      Tenderness: There is no abdominal tenderness. There is no guarding.   Musculoskeletal:         General: No swelling or tenderness. Normal range of motion.      Cervical back: Normal range of motion and neck supple.      Right lower leg: No edema.      Left lower leg: No edema.   Feet:      Right foot:      Skin integrity: Skin integrity normal.      Left foot:      Skin integrity: Skin integrity normal.   Skin:     General: Skin is warm and dry.      Findings: No erythema or rash.   Neurological:      General: No focal deficit present.      Mental Status: He is alert and oriented to person, place, and time. Mental status is at baseline.      Cranial Nerves: No cranial nerve deficit.      Gait: Gait normal.   Psychiatric:         Attention and Perception: Attention normal.         Mood and Affect: Mood normal.         Behavior: Behavior normal.         Thought Content: Thought content normal.         Judgment: Judgment normal.         Diagnostic data:  History: PAD     Comments: Bilateral lower extremity arterial with multi-level pulse  volume recordings and segmental pressures were performed at rest and  stress.     The right ankle/brachial index is 0.85. Waveforms are biphasic at the  ankle without dampening. These findings are consistent with mild  arterial insufficiency of the right lower extremity at rest.     The left  ankle/brachial index is 0.87. Waveforms are monophasic at the  ankle without dampening. These findings are consistent with mild  arterial insufficiency of the left lower extremity at rest.     IMPRESSION:  Impression:  1. Mild arterial insufficiency of the right lower extremity at rest.  2. Mild arterial insufficiency of the left lower extremity at rest.           This report was signed and finalized on 5/20/2025 6:43 PM by Vinay Wing.    History: Carotid occlusive disease     IMPRESSION:  Impression:  1. There is less than 50% stenosis of the right internal carotid artery.  2. There is less than 50% stenosis of the left internal carotid artery.  3. Antegrade flow is demonstrated in bilateral vertebral arteries.     Comments: Bilateral carotid vertebral arterial duplex scan was  performed.     Grayscale imaging shows intimal thickening and calcified elements at the  carotid bifurcation. The right internal carotid artery peak systolic  velocity is 59.1 cm/sec. The end-diastolic velocity is 16.3 cm/sec. The  right ICA/CCA ratio is approximately 1.0. These findings correlate with  less than 50% stenosis of the right internal carotid artery.     Grayscale imaging shows intimal thickening and calcified elements at the  carotid bifurcation. The left internal carotid artery peak systolic  velocity is 67.1 cm/sec. The end-diastolic velocity is 19.5 cm/sec. The  left ICA/CCA ratio is approximately 0.8. These findings correlate with  less than 50% stenosis of the left internal carotid artery.     Antegrade flow is demonstrated in bilateral vertebral arteries.        This report was signed and finalized on 5/20/2025 7:32 PM by Vinay Wing.    Patient Active Problem List   Diagnosis    Tobacco use    Hypertension    Coronary artery disease involving native heart without angina pectoris    Hyperlipidemia with low HDL    Class 1 obesity due to excess calories with body mass index (BMI) of 34.0 to 34.9 in adult    S/P  coronary artery stent placement    History of MI (myocardial infarction)    Diabetes mellitus due to underlying condition with circulatory complication    Dizziness    Smoker    Obesity (BMI 30-39.9)    Severe peripheral arterial disease    Acute focal neurological deficit    UTI (urinary tract infection), bacterial    Acute ischemic stroke left  internal capsule lacunar and  right postcentral gyrus at the vertex.    Acute stroke due to ischemia    Vitamin B12 deficiency    Moderate tricompartmental osteoarthritis left knee with small knee joint  effusion.    Carotid stenosis, right    Claudication of both lower extremities    Degeneration of lumbar intervertebral disc    History of cellulitis    Left basal ganglia embolic stroke    Memory difficulties    Non compliance w medication regimen    Overweight (BMI 25.0-29.9)    Pulmonary nodule    Former smoker    Panlobular emphysema    Adenocarcinoma, lung, right    Chronic dyspnea    History of radiation therapy    Chronic pain    Displacement of lumbar intervertebral disc without myelopathy    Hearing loss    Meningococcal infection    Osteoarthritis    Osteoporosis    Otorrhea    Patellar dislocation, left, initial encounter    Vitamin D deficiency         ICD-10-CM ICD-9-CM   1. PAD (peripheral artery disease)  I73.9 443.9   2. Bilateral carotid artery stenosis  I65.23 433.10     433.30   3. Essential hypertension  I10 401.9   4. Mixed hyperlipidemia  E78.2 272.2   5. Diabetes mellitus due to underlying condition with other circulatory complication, unspecified whether long term insulin use  E08.59 249.70   6. Class 1 obesity with serious comorbidity and body mass index (BMI) of 30.0 to 30.9 in adult, unspecified obesity type  E66.811 278.00    Z68.30 V85.30               Plan: After thoroughly evaluating Anival Pennington, I believe the best course of action is to remain conservative from vascular surgery standpoint.  He is still nonambulatory and continues to have  dysarthria.  He did ask if he could start driving, I told him that would not be up to me that was up to neurology and his PCP.  His ABIs show mild arterial insufficiency to his bilateral lower extremities, his carotid duplex shows less than 50% bilateral carotid stenosis.  We will see him back in 6 months with repeat noninvasive testing to include ABIs for continued surveillance.  He should continue his aspirin 81 mg daily, Plavix 75 mg daily, and Lipitor 80 mg nightly in addition to his other medications.  I did discuss vascular risk factors as it pertains to the progression of vascular disease including controlling his hypertension, hyperlipidemia, diabetes, and smoking cessation.  Immediately following his CVA he did quit smoking for roughly 6 months however he has started back to smoking.  His blood pressure stable today in office.  His last lipid panel shows elevated triglycerides at 175 and a decreased HDL at 37, all other values are within normal limits.  His last hemoglobin A1c in February 2025 is controlled at 5.9%.  Unfortunately he is a daily smoker and has no desire to quit smoking at this time. Body mass index is 30.47 kg/m².            This was all discussed in full with complete understanding.    Thank you for allowing me to participate in the care of your patient.  Please do not hesitate with any questions or concerns.  I will keep you aware of any further encounters with Anival Pennington.        Sincerely yours,         CLAUDIO Rios

## 2025-05-29 NOTE — PROGRESS NOTES
[facility]  Radiation Oncology Clinic   Herbert Shankar MD, FACR  Ramon SNYDER  _______________________________________________  [facility]  Department of Radiation Oncology  [address] [place] [id number]  Office: [phone]  Fax: [phone]    DATE: 06/02/2025  PATIENT: Anival Pennington  1955                         MEDICAL RECORD #: [id number]    1. History of primary non-small cell carcinoma of right lung    2. History of radiation therapy    3. Current every day smoker    4. Malignant neoplasm of right main bronchus                                          REASON FOR VISIT:    Chief Complaint   Patient presents with    Lung Nodule     Reason for Follow up Visit:  Anival Pennington is a very pleasant 69 y.o. patient that completed radiation to the lung and returns to the clinic today for oncologic surveillance .     History of Present Illness:  Diagnosed with poorly differentiated adenocarcinoma of the right upper lobe of the lung. He completed 5000 cGy in 4 fractions to the right lung on 04/18/2025.     11/19/2024 - CT Chest without contrast:  Increased size of RIGHT upper lobe spiculated suspicious pulmonary nodule measuring 12 x 10 mm, previously 9 x 8 mm on 7/11/2024 by my measurements today. Emphysematous changes present. Consider PET/CT or tissue sampling.  Mildly enlarged ascending thoracic aorta measures 3.8 cm.  Heavily calcified coronary arteries versus stent material.  Similar mild chronic height loss of T12 compared to 10/4/2024.    12/16/2024 - PET Scan:  12 mm spiculated hypermetabolic right upper lobe pulmonary nodule, most likely representing a primary lung neoplasm.  Indeterminant borderline enlarged mildly hypermetabolic right lower paratracheal/mediastinal and right hilar lymph nodes. Given size and degree of uptake, favor these to be infectious/inflammatory in nature although prem spread of neoplasm is also in the  differential.  No evidence of hypermetabolic metastatic disease in the neck, abdomen, or pelvis.  Paranasal sinus disease in the right maxillary sinus characterized by complete sinus opacification with peripheral rim of hypermetabolic activity which is favored infectious/inflammatory in nature.    12/18/2024 - Appointment with Andrea Hess DO:  Patient is a 69-year-old male who presents to clinic for right upper lobe pulmonary nodule.  I reviewed his CT and PET/CT with the patient and his wife in clinic today.  Based on the Given nodule calculator his chance of malignancy is about 86%.  We discussed options regarding management.  After shared decision making the patient and his wife would like to pursue biopsy.  We discussed the risk and benefits of biopsy of the nodule and lymph nodes.  We will schedule him for Ion bronchoscopy and EBUS with biopsy.  Will also obtain PFTs on follow-up.  Hold off on inhaled therapy as the patient is asymptomatic.  Plan:   -Plan bronchoscopy with EBUS  -Will need a updated CT prior to Ion  -PFTs on follow-up  -Declines vaccinations  Follow Up:  6 weeks    01/06/2025 - CT Chest without contrast:  Spiculated 13 x 12 x 9 mm right upper lobe pulmonary nodule suspicious for malignancy nodule similar in size to 11/19/2024, however increased when compared to 7/11/2024.   Borderline precarinal mediastinal lymph node.   Moderate emphysema.    01/09/2025 - Bronchoscopy with biopsy per Andrea Hess, DO:  Lung, right upper lobe, Ion fine-needle aspiration, smear (1) and cellblock:  A few atypical degenerated cells present, nondiagnostic of malignancy.  Many macrophages and benign pneumocytes.  Background blood.  Lung, right upper lobe, Ion biopsy and touch preparation:  Poorly differentiated non-small cell carcinoma consistent with adenocarcinoma.  Crush artifact present.  Stromal anthracotic pigment deposition.  Blood.  Comment: It is doubtful that there is sufficient  paraffin-embedded cellular material for biomarker studies.  Station 11L lymph node, endobronchial ultrasound-guided fine-needle aspiration, smear (1) and ThinPrep preparation (1):  Negative for malignant cells.  Lymphocytes and benign bronchial epithelial cells  Station 7 lymph node, endobronchial ultrasound-guided fine-needle aspiration, smear (1) and ThinPrep preparation (1):  Negative for malignant cells.  Lymphocytes and scant benign ciliated columnar bronchial epithelial cells and scant benign metaplastic squamous epithelial cells.  Station 4R lymph node, endobronchial ultrasound-guided fine-needle aspiration, smear (1) and ThinPrep preparation (1):  Negative for malignant cells.  Numerous lymphocytes.  Station 10R lymph node, endobronchial ultrasound-guided fine-needle aspiration, smear (1) and ThinPrep preparation (1):  Negative for malignant cells.  Numerous lymphocytes.  Scant benign ciliated columnar bronchial epithelial cells and scant benign bronchial goblet cells.  Station 11R lymph node, endobronchial ultrasound-guided fine-needle aspiration, smear (1) and ThinPrep preparation (1):  Negative for malignant cells.  Numerous lymphocytes.  Benign ciliated columnar bronchial epithelial cells.  Lung, right upper lobe, bronchoalveolar lavage, ThinPrep preparation (1) and cellblock:  Negative for malignant cells.  Benign ciliated columnar bronchial epithelial cells present in a background of mixed inflammation and blood.    01/09/2025 - Chest x-ray after Biopsy:  Subtle increased airspace consolidation in the right upper lobe may represent a small amount of pulmonary hemorrhage post navigational bronchoscopy and biopsy. There is no pneumothorax. No effusion present.    01/09/2025 - CT Angiogram Chest:  12 mm lesion with irregular margins again demonstrated in the right upper lobe. There is a small amount of air within the nodule suggesting it has been successfully biopsied. There is a small amount of pulmonary  hemorrhage with airspace consolidation within the right upper lobe including within the apical and posterior segment as was suspected on plain film radiographs. There is no pneumothorax or effusion. There are moderate changes of centrilobular emphysema.  Pneumomediastinum. This extends into the base of the right neck and surrounds the trachea within the middle mediastinum. This could be related to transbronchial biopsy of mediastinal nodes and correlation is recommended to the procedure. A precarinal and right paratracheal node are surrounded by air. No pneumothorax present. No evidence of pulmonary thromboembolic disease.  The ascending thoracic aorta measures up to 3.7 cm in size. No dissection or aneurysm. Coronary calcifications are present. There is no pericardial effusion. Limited images of the upper abdomen are unremarkable except for a small hiatal hernia. The adrenals are unremarkable.  No evidence of pulmonary thromboembolic disease.    01/30/2025 - Appointment with Andrea Hess DO:  Patient is a 69-year-old male with newly diagnosed right upper lobe adenocarcinoma.  He has seen Dr. Alfaro and will undergo brain MRI.  He is currently awaiting evaluation by Dr. Pan for possible surgical intervention.  We attempted to obtain PFTs, however the patient has noted cough recently.  Therefore I have reordered the PFTs once his cough improves over the next week or so he can return to clinic to obtain these.  They will be helpful for Dr. Abreu.  Will going start empiric treatment with Stiolto and he will continue as needed albuterol.  Plan:   -PFTs in the next week once his respiratory status returns to baseline  -Start Stiolto for suspected COPD  -Continue as needed albuterol  -Continue follow-up with Dr. Alfaro and Brady  Follow Up:  3 months    02/05/2025 - Appointment with Dr. Alfaro:  ASSESSMENT:  Adenocarcinoma right upper lobe of the lung.  Tumor size 1.3 x 1.2 cm.  AJCC stage: 1 A2 (cT1b, cN0,  cM0)  Treatment status: Pending evaluation by thoracic surgery.   Poor performance status of 3.  Emphysema and cerebrovascular event times 2 contributing to his poor performance status.   Peripheral artery disease on Plavix.   Coronary artery disease on aspirin.   PLAN:   regarding the reason for the referral.   regarding staging with brain MRI.   regarding thoracic surgery evaluation for resection and mediastinal lymph node dissection.  If 1 A2 disease, no adjuvant chemotherapy.  If patient is medically inoperable, plan for definitive radiation without chemotherapy.  Order brain MRI for staging lung cancer.  To see Dr. Abreu 2/20/2025 to evaluate for resection of the right upper lobe lung cancer.  Blood for CBC with differential and CMP next visit.  Anticipate surveillance every 6 months with imaging for 3 years then annually thereafter patient has stage I A2 disease post resection.  Advance Care Planning  ACP discussion was declined by the patient. Patient does not have an advance directive, information provided.   Plan of care discussed with patient and spouse.  Understanding expressed.  Patient agreed to proceed.  Continue care per primary care physician and other specialists.  Return to office 3 weeks with CBC with differential and CMP, same day.  Further recommendations pending.     02/20/2025 - Appointment with Dr. Abreu:  Mr. Pennington is a 69-year-old male who presents with incidental finding of right upper lobe lung cancer.  Nodule was found incidentally after CT scans were performed given his smoking history and history of prior stroke.  This showed a 1.3 cm right upper lobe lung nodule and has since undergone bronchoscopic biopsy with pathology consistent with adenocarcinoma.  He also had EBUS with biopsy of 4R level 7 and multiple 11 lymph nodes, these were negative for malignancy.    He has had PET scan shows hypermetabolic activity at the nodule of interest some borderline  hypermetabolic activity at paratracheal and subcarinal nodes that were biopsied and negative for malignancy.  Patient has a history of stroke, uses a wheelchair most of the time and occasionally can get around his house with a walker, his stroke is left him with difficult mobility as well as slurred speech.  He has stopped smoking since his stroke.  I discussed with Mr. Pennington and his family member today the natural history of lung cancer as well as treatment options.    Given his comorbidities we discussed the option of SBRT which I believe is a better treatment option for him.  He does have a pending MRI of his brain to ensure no metastatic disease to his brain.    Given likely isolated disease I believe this would be his best treatment option as I believe recovery given his previous stroke after lobectomy would be very difficult.    Given this we will refer him to radiation oncology to discuss SBRT.    If not a candidate for SBRT can see him back to further discussed the risk benefits and expectations of surgical lobectomy.    02/27/2025 - Consult with :  Following this discussion and in consideration of the diagnostic data/evaluation of the patient, I recommended a course of stereotactic radiosurgery to the right lung, I anticipate 5000 cGy over 5 treatments. Will simulate treatment fields using 4D CT with MIPS to begin the planning process, final course pending.    Continue ongoing management per primary care physician and other specialists.     02/26/2025 - MRI Brain with and without contrast:  No metastatic disease identified.  There are multiple old bilateral lacunar infarcts.  Chronic paranasal sinus disease.    03/20/2025 - 04/18/2025 - Completed radiation course:  Received 5000 cGy in 4 fractions to the right lung.     04/21/2025 - Pulmonary Function Tests:  PFT not performed due to patient's inability to complete maneuver due to unable to completely exhale.  Attempted x 3.  Patient aware no results  will be available.      05/23/2025 - CT Chest with contrast:  Pulmonary nodule in the right apex shows slight interval decrease in size post treatment. There is a stable small nodule in the medial suprahilar aspect of the left upper lobe. No new nodules present. No consolidative pneumonia.  Stable right paratracheal and precarinal nodes. No new or enlarging mediastinal or axillary lymphadenopathy.  The proximal and mid shaft of the right humerus are abnormal in appearance. This is on the periphery of the field-of-view and could potentially be artifactual in nature but correlation with plain film radiographs of the right humerus recommended to exclude a permeative pattern. No occult fracture. There are chronic mild wedge compression deformities at T10, T11 and T12 with an accentuated thoracic kyphosis.    05/29/2025 - Appointment with  - Patient cancelled appointment.     History obtained from  PATIENT and CHART    PAST MEDICAL HISTORY  Past Medical History:   Diagnosis Date    Acute ischemic left MCA stroke 07/11/2024    right sided weakness    Acute ST segment elevation myocardial infarction 04/03/2019    Cancer     leukemia    Cellulitis of face 05/16/2023    Class 1 obesity due to excess calories with body mass index (BMI) of 34.0 to 34.9 in adult 10/09/2023    COPD (chronic obstructive pulmonary disease)     Coronary artery disease     Diabetes mellitus 09/06/2023    diabetes with circulatory complications     Heavy smoker (more than 20 cigarettes per day) 07/24/2022    Hyperlipidemia     Hypertension     Lung cancer     Otalgia 03/01/2012    Rash and other nonspecific skin eruption 01/19/2012    Stroke     x2 since    Tobacco abuse     Type 2 diabetes mellitus with circulatory disorder, without long-term current use of insulin 03/28/2019      PAST SURGICAL HISTORY  Past Surgical History:   Procedure Laterality Date    AORTOGRAM Right 10/18/2024    Procedure: Right lower extremity angiogram, shockwave  lithotripsy, balloon angioplasty, mynx closure;  Surgeon: Slava Poole DO;  Location:  [facility] OR;  Service: Vascular;  Laterality: Right;    BRONCHOSCOPY WITH ION ROBOTIC ASSIST N/A 1/9/2025    Procedure: BRONCHOSCOPY NAVIGATION WITH ENDOBRONCHIAL ULTRASOUND AND ION ROBOT;  Surgeon: Andrea Hess DO;  Location:  [facility] OR;  Service: Robotics - Pulmonary;  Laterality: N/A;  preop; lung nodule  postop; lung nodule   PCP Betty Mcguire    CARDIAC CATHETERIZATION N/A 03/28/2019    Procedure: Left Heart Cath;  Surgeon: Vincent Pan MD;  Location:  [facility] INVASIVE [facility]ATION;  Service: Cardiovascular    CARDIAC CATHETERIZATION Bilateral 03/28/2019    Procedure: Stent JOSSIE coronary;  Surgeon: Vincent Pan MD;  Location:  [facility] INVAS[facility];  Service: Cardiovascular    CARDIAC CATHETERIZATION N/A 03/28/2019    Procedure: Left ventriculography;  Surgeon: Vincent Pan MD;  Location:  [facility]ATH INVASIVE LOCATION;  Service: Cardiovascular    CORONARY STENT PLACEMENT  2019    x3/widowmaker heart attack    TONSILLECTOMY        FAMILY HISTORY  family history includes Cancer in his father; Heart attack in his brother and mother; Heart disease in his brother and mother; No Known Problems in his brother; Valvular heart disease in his mother.    SOCIAL HISTORY  Social History     Tobacco Use    Smoking status: Every Day     Current packs/day: 1.00     Average packs/day: 2.0 packs/day for 55.8 years (111.3 ttl pk-yrs)     Types: Cigarettes     Start date: 1969     Last attempt to quit: 7/10/2024     Passive exposure: Past    Smokeless tobacco: Never   Vaping Use    Vaping status: Never Used   Substance Use Topics    Alcohol use: Not Currently     Comment: quit 2010    Drug use: Not Currently     Types: Marijuana     ALLERGIES  Aspirin and Bee venom     MEDICATIONS    Current Outpatient Medications:     albuterol sulfate  (90 Base) MCG/ACT inhaler, Inhale 2 puffs Every 4 (Four) Hours As  "Needed for Wheezing., Disp: 17 g, Rfl: 5    Alcohol Swabs (DropSafe Alcohol Prep) 70 % pads, , Disp: , Rfl:     aspirin 81 MG chewable tablet, Chew 1 tablet Daily., Disp: , Rfl:     atorvastatin (LIPITOR) 80 MG tablet, Take 1 tablet by mouth Every Night., Disp: , Rfl:     BD Insulin Syringe U/F 31G X 5/16\" 0.5 ML misc, USE TO INJECT INSULIN UP TO 3 TIMES DAILY AS DIRECTED, Disp: , Rfl:     BD Pen Needle Toma 2nd Gen 32G X 4 MM misc, USE 4 TIMES DAILY WITH INSULIN PENS, Disp: , Rfl:     clopidogrel (PLAVIX) 75 MG tablet, Take 1 tablet by mouth Daily., Disp: , Rfl:     Continuous Glucose Sensor (Dexcom G7 Sensor) misc, Currently off, Disp: , Rfl:     glucose blood (True Metrix Blood Glucose Test) test strip, 1 each by Other route Daily. test blood sugar every day, Disp: , Rfl:     HYDROcodone-acetaminophen (NORCO) 7.5-325 MG per tablet, Take 1 tablet by mouth Every 6 (Six) Hours As Needed., Disp: , Rfl:     insulin glargine (LANTUS, SEMGLEE) 100 UNIT/ML injection, Inject 40 Units under the skin into the appropriate area as directed Every Night., Disp: , Rfl:     Insulin Lispro (humaLOG) 100 UNIT/ML injection, Inject 2-7 Units under the skin into the appropriate area as directed 3 (Three) Times a Day With Meals., Disp: , Rfl:     lisinopril (PRINIVIL,ZESTRIL) 5 MG tablet, Take 1 tablet by mouth 2 (Two) Times a Day., Disp: , Rfl:     nitroglycerin (NITROSTAT) 0.4 MG SL tablet, Place 1 tablet under the tongue Every 5 (Five) Minutes As Needed for Chest Pain. Take no more than 3 doses in 15 minutes., Disp: 100 tablet, Rfl: 12    True Metrix Blood Glucose Test test strip, CHECK BLOOD SUGAR EVERY DAY, Disp: , Rfl:     TRUEplus Lancets 33G misc, , Disp: , Rfl:     Current outpatient and discharge medications have been reconciled for the patient.  Reviewed by: CLAUDIO Lopez    The following portions of the patient's history were reviewed and updated as appropriate: allergies, current medications, past family history, " "past medical history, past social history, past surgical history and problem list.    REVIEW OF SYSTEMS  Review of Systems   Constitutional:  Positive for fatigue.   HENT: Negative.     Eyes: Negative.    Respiratory:  Positive for cough (dry, non-productive) and shortness of breath (stable).    Cardiovascular: Negative.    Gastrointestinal: Negative.    Endocrine: Negative.    Genitourinary: Negative.    Musculoskeletal:  Positive for gait problem (wheelchair).   Skin: Negative.    Allergic/Immunologic: Negative.    Neurological:  Negative for dizziness and headaches.   Hematological: Negative.    Psychiatric/Behavioral: Negative.       PHYSICAL EXAM  VITAL SIGNS:   Vitals:    06/02/25 1355   BP: 104/65   Pulse: 97   SpO2: 96%  Comment: room air   Weight: Comment: wheelchair   Height: 152.4 cm (60\")   PainSc: 0-No pain     Physical Exam  Vitals reviewed.   Constitutional:       Appearance: Normal appearance.   HENT:      Head: Normocephalic.      Nose: Nose normal.   Eyes:      Pupils: Pupils are equal, round, and reactive to light.   Cardiovascular:      Rate and Rhythm: Normal rate and regular rhythm.      Pulses: Normal pulses.      Heart sounds: Normal heart sounds.   Pulmonary:      Effort: Pulmonary effort is normal. No respiratory distress.      Breath sounds: Normal breath sounds. No wheezing.   Abdominal:      General: Bowel sounds are normal.      Palpations: There is no mass.   Musculoskeletal:         General: Normal range of motion.      Cervical back: Normal range of motion and neck supple. No tenderness.   Lymphadenopathy:      Cervical: No cervical adenopathy.   Skin:     General: Skin is warm and dry.      Capillary Refill: Capillary refill takes less than 2 seconds.   Neurological:      General: No focal deficit present.      Mental Status: He is alert and oriented to person, place, and time.      Motor: No weakness.   Psychiatric:         Mood and Affect: Mood normal.         Behavior: Behavior " normal.       Performance Status: ECOG (3) Capable of limited self-care, confined to bed or chair > 50% of waking hours    Clinical Quality Measures  - Pain Documented by Standardized Tool, FPS   Anival Pennington reports a pain score of 0.  Given his pain assessment as noted, treatment options were discussed and the following options were decided upon as a follow-up plan to address the patient's pain: continuation of current treatment plan for pain.    - Body Mass Index Screening and Follow-Up Plan   Body mass index is 30.47 kg/m². Defer to pcp.     - Tobacco Use: Screening and Cessation Intervention  Social History    Tobacco Use      Smoking status: Every Day        Packs/day: 1.00        Years: 2.0 packs/day for 55.8 years (111.3 ttl pk-yrs)        Types: Cigarettes        Start date: 1969        Last attempt to quit: 7/10/2024        Passive exposure: Past      Smokeless tobacco: Never    Smoking cessation information given in after visit summary.    - Advanced Care Planning Advance Care Planning  ACP discussion was held with the patient during this visit. Patient does not have an advance directive, information provided.    - PHQ-2 Depression Screening  Little interest or pleasure in doing things? Not at all   Feeling down, depressed, or hopeless? Not at all   PHQ-2 Total Score 0     ASSESSMENT AND PLAN  1. History of primary non-small cell carcinoma of right lung    2. History of radiation therapy    3. Current every day smoker    4. Malignant neoplasm of right main bronchus      Orders Placed This Encounter   Procedures    NM Pet Skull Base To Mid Thigh     Standing Status:   Future     Expected Date:   9/2/2025     Expiration Date:   9/2/2026     What radiopharmaceutical is preferred for this exam?:   FDG  (offered at all sites)     Reason for Exam::   lung nodule, sbrt     Release to patient:   Routine Release [[id number]]     RECOMMENDATIONS: Anival Pennington is status post completion of radiation therapy to the  lung and presents to our clinic today for oncological surveillance. Diagnosed with poorly differentiated adenocarcinoma of the right upper lobe of the lung. He completed 5000 cGy in 4 fractions to the right lung on 04/18/2025.     CT-scan of the chest on 05/23/2025 revealed pulmonary nodule in the right apex shows slight interval decrease in size post treatment. There is a stable small nodule in the medial suprahilar aspect of the left upper lobe. No new nodules present. No consolidative pneumonia. Stable right paratracheal and precarinal nodes. No new or enlarging mediastinal or axillary lymphadenopathy. The proximal and mid shaft of the right humerus are abnormal in appearance. This is on the periphery of the field-of-view and could potentially be artifactual in nature but correlation with plain film radiographs of the right humerus recommended to exclude a permeative pattern. No occult fracture. There are chronic mild wedge compression deformities at T10, T11 and T12 with an accentuated thoracic kyphosis.    Xray imaging of right humerus today revealed osteopenia without obvious cortical irregularity or periosteal reaction. FINDINGS on CT chest may be artifactual.    On exam, I do not see evidence for recurrent or metastatic disease at this time. He requires continued oncological surveillance. We will continue routine follow-up/surveillance as discussed in 3 months with follow up PET Scan before visit and I have instructed him to continue to see the other health care providers as per their scheduling.    Anival Pennington  reports that he has been smoking cigarettes. He started smoking about 56 years ago. He has a 111.3 pack-year smoking history. He has been exposed to tobacco smoke. He has never used smokeless tobacco. I have educated him on the risk of diseases from using tobacco products such as cancer, COPD, and heart disease. I spent >10 minutes counseling the patient.    Patient Instructions   1) will call you  with Xray results  2) return in 3 months with a PET scan before.     Return in about 3 months (around 9/2/2025).    Time Spent: I spent 42 minutes caring for Anival on this date of service. This time includes time spent by me in the following activities: preparing for the visit, reviewing tests, obtaining and/or reviewing a separately obtained history, performing a medically appropriate examination and/or evaluation, counseling and educating the patient/family/caregiver, ordering medications, tests, or procedures, referring and communicating with other health care professionals, documenting information in the medical record, independently interpreting results and communicating that information with the patient/family/caregiver, and care coordination.   Ramon Bruce, APRN  06/02/2025

## 2025-06-02 ENCOUNTER — HOSPITAL ENCOUNTER (OUTPATIENT)
Dept: RADIATION ONCOLOGY | Facility: HOSPITAL | Age: 70
Setting detail: RADIATION/ONCOLOGY SERIES
End: 2025-06-02
Payer: MEDICARE

## 2025-06-02 ENCOUNTER — HOSPITAL ENCOUNTER (OUTPATIENT)
Dept: GENERAL RADIOLOGY | Facility: HOSPITAL | Age: 70
Discharge: HOME | End: 2025-06-02
Payer: MEDICARE

## 2025-06-02 ENCOUNTER — OFFICE VISIT (OUTPATIENT)
Age: 70
End: 2025-06-02
Payer: MEDICARE

## 2025-06-02 VITALS
SYSTOLIC BLOOD PRESSURE: 104 MMHG | HEART RATE: 97 BPM | BODY MASS INDEX: 30.47 KG/M2 | DIASTOLIC BLOOD PRESSURE: 65 MMHG | OXYGEN SATURATION: 96 % | HEIGHT: 60 IN

## 2025-06-02 DIAGNOSIS — R93.6 ABNORMAL X-RAY OF HUMERUS: ICD-10-CM

## 2025-06-02 DIAGNOSIS — F17.200 CURRENT EVERY DAY SMOKER: ICD-10-CM

## 2025-06-02 DIAGNOSIS — C34.01 MALIGNANT NEOPLASM OF RIGHT MAIN BRONCHUS: ICD-10-CM

## 2025-06-02 DIAGNOSIS — Z85.118 HISTORY OF PRIMARY NON-SMALL CELL CARCINOMA OF RIGHT LUNG: Primary | ICD-10-CM

## 2025-06-02 DIAGNOSIS — Z92.3 HISTORY OF RADIATION THERAPY: ICD-10-CM

## 2025-06-02 PROCEDURE — 99024 POSTOP FOLLOW-UP VISIT: CPT

## 2025-06-02 PROCEDURE — 3078F DIAST BP <80 MM HG: CPT

## 2025-06-02 PROCEDURE — 73060 X-RAY EXAM OF HUMERUS: CPT

## 2025-06-02 PROCEDURE — 3074F SYST BP LT 130 MM HG: CPT

## 2025-06-02 PROCEDURE — G0463 HOSPITAL OUTPT CLINIC VISIT: HCPCS | Performed by: RADIOLOGY

## 2025-06-02 PROCEDURE — 1126F AMNT PAIN NOTED NONE PRSNT: CPT

## 2025-07-18 ENCOUNTER — TELEPHONE (OUTPATIENT)
Dept: PULMONOLOGY | Facility: CLINIC | Age: 70
End: 2025-07-18
Payer: MEDICARE

## 2025-07-18 NOTE — TELEPHONE ENCOUNTER
Spoke with patient spouse Kavya regarding appointment on Monday 7/21/25@145. Kavya stated that patient has pulled  cartilage, and is down at the moment. Kavya stated once patient is feeling better they will call and reschedule appointment.

## (undated) DEVICE — DOC GUIDE WIRE EXTENSION: Brand: DOC

## (undated) DEVICE — GLV SURG BIOGEL LTX PF 7 1/2

## (undated) DEVICE — TBG PRESS EXT

## (undated) DEVICE — PERCLOSE PROGLIDE™ SUTURE-MEDIATED CLOSURE SYSTEM: Brand: PERCLOSE PROGLIDE™

## (undated) DEVICE — THE CHANNEL CLEANING BRUSH IS A NYLON FLEXI BRUSH ATTACHED TO A FLEXIBLE PLASTIC SHEATH DESIGNED TO SAFELY REMOVE DEBRIS FROM FLEXIBLE ENDOSCOPES.

## (undated) DEVICE — ST MIC/INTRO ACC SHRP/NDL TUNG/TP NITNL 5F 45CM 7CM

## (undated) DEVICE — PAD ENDOVASCULAR: Brand: MEDLINE INDUSTRIES, INC.

## (undated) DEVICE — SINGLE USE SUCTION VALVE MAJ-209: Brand: SINGLE USE SUCTION VALVE (STERILE)

## (undated) DEVICE — 6F .070 XB 3.5 100CM: Brand: VISTA BRITE TIP

## (undated) DEVICE — INFLATION DEVICE: Brand: ENCORE™ 26

## (undated) DEVICE — DEV EPS SPIDERFX 5MM OTW320/RX190CM

## (undated) DEVICE — NAVICROSS SUPPORT CATHETER: Brand: NAVICROSS

## (undated) DEVICE — CANN CO2/O2 NASL A/

## (undated) DEVICE — SWIVEL CONNECTOR

## (undated) DEVICE — DEV CLS VASC MYNXCONTROL 6FTO7F

## (undated) DEVICE — FRCP BIOP SUPERDIMENSION PREMARK

## (undated) DEVICE — ELECTRD PAD DEFIB A/

## (undated) DEVICE — 6F .070 JR 4 100CM: Brand: CORDIS

## (undated) DEVICE — MODEL AT P65, P/N 701554-001KIT CONTENTS: HAND CONTROLLER, 3-WAY HIGH-PRESSURE STOPCOCK WITH ROTATING END AND PREMIUM HIGH-PRESSURE TUBING: Brand: ANGIOTOUCH® KIT

## (undated) DEVICE — RADIFOCUS GLIDEWIRE ADVANTAGE GUIDEWIRE: Brand: GLIDEWIRE ADVANTAGE

## (undated) DEVICE — Device: Brand: ION

## (undated) DEVICE — Device: Brand: BALLOON

## (undated) DEVICE — GW GRAPHX .014 182CM

## (undated) DEVICE — A2000 MULTI-USE SYRINGE KIT, P/N 701277-003KIT CONTENTS: 100ML CONTRAST RESERVOIR AND TUBING WITH CONTRAST SPIKE AND CLAMP: Brand: A2000 MULTI-USE SYRINGE KIT

## (undated) DEVICE — CATH FLSH OMNI SFT 5F 90CM

## (undated) DEVICE — KT CATH BALN FEM/ART M5/PLS 6F 5X60MM 135CM

## (undated) DEVICE — TREK CORONARY DILATATION CATHETER 2.50 MM X 15 MM / RAPID-EXCHANGE: Brand: TREK

## (undated) DEVICE — PINNACLE R/O II INTRODUCER SHEATH WITH RADIOPAQUE MARKER: Brand: PINNACLE

## (undated) DEVICE — DRSNG SURESITE WNDW 4X4.5

## (undated) DEVICE — IMMOB KN 3PNL DLX CANVS 19IN BLU

## (undated) DEVICE — VISION PROBE ADAPTER AND SUCTION ADAPTER

## (undated) DEVICE — INTENDED FOR TISSUE SEPARATION, AND OTHER PROCEDURES THAT REQUIRE A SHARP SURGICAL BLADE TO PUNCTURE OR CUT.: Brand: BARD-PARKER ®  SAFETY SCALPED

## (undated) DEVICE — Device

## (undated) DEVICE — THE SUPERCROSS MICROCATHETER IS INTENDED TO BE USED IN CONJUNCTION WITH STEERABLE GUIDEWIRES TO ACCESS DISCRETE REGIONS OF THE CORONARY AND/OR PERIPHERAL VASCULATURE. IT MAY BE USED TO FACILITATE PLACEMENT AND EXCHANGE OF GUIDEWIRES AND OTHER INTERVENTIONAL DEVICES AND TO SUBSELECTIVELY INFUSE/DELIVER DIAGNOSTIC AND THERAPEUTIC AGENTS.: Brand: SUPERCROSS™ AT MICROCATHETER

## (undated) DEVICE — SINGLE USE BIOPSY VALVE MAJ-210: Brand: SINGLE USE BIOPSY VALVE (STERILE)

## (undated) DEVICE — SENSR O2 OXIMAX FNGR A/ 18IN NONSTR

## (undated) DEVICE — TBG SMPL FLTR LINE NASL 02/C02 A/ BX/100

## (undated) DEVICE — BIOPSY NEEDLE, 23G: Brand: FLEXISION

## (undated) DEVICE — CATH DIAG IMPULSE M/ PK 145 5FR

## (undated) DEVICE — MODEL BT2000 P/N 700287-012KIT CONTENTS: MANIFOLD WITH SALINE AND CONTRAST PORTS, SALINE TUBING WITH SPIKE AND HAND SYRINGE, TRANSDUCER: Brand: BT2000 AUTOMATED MANIFOLD KIT

## (undated) DEVICE — TRAP,MUCUS SPECIMEN, 80CC: Brand: MEDLINE

## (undated) DEVICE — VLV HEMO GUARDIAN INSRT/TOOL W TORQ DEV

## (undated) DEVICE — GW STARTER FXD CORE J .035 3X150CM 3MM

## (undated) DEVICE — HI-TORQUE PILOT 50 GUIDE WIRE .014 J TIP 3.0 CM X 190 CM: Brand: HI-TORQUE PILOT

## (undated) DEVICE — PINNACLE INTRODUCER SHEATH: Brand: PINNACLE

## (undated) DEVICE — SOLIDIFIER LIQUI LOC PLUS 2000CC

## (undated) DEVICE — PK CATH CARD 30 CA/4

## (undated) DEVICE — DESTINATION RENAL GUIDING SHEATH: Brand: DESTINATION

## (undated) DEVICE — ADAPT SWVL FIBROPTIC BRONCH

## (undated) DEVICE — KT ASP VIZISHOT 5SYRG 5BIOPSY/VLV 22G

## (undated) DEVICE — SOL IRR NACL 0.9PCT BT 1000ML